# Patient Record
Sex: FEMALE | Race: WHITE | Employment: FULL TIME | URBAN - NONMETROPOLITAN AREA
[De-identification: names, ages, dates, MRNs, and addresses within clinical notes are randomized per-mention and may not be internally consistent; named-entity substitution may affect disease eponyms.]

---

## 2019-05-20 ENCOUNTER — OFFICE VISIT (OUTPATIENT)
Dept: FAMILY MEDICINE CLINIC | Age: 34
End: 2019-05-20
Payer: COMMERCIAL

## 2019-05-20 VITALS
WEIGHT: 246.2 LBS | BODY MASS INDEX: 35.24 KG/M2 | OXYGEN SATURATION: 97 % | TEMPERATURE: 97.8 F | DIASTOLIC BLOOD PRESSURE: 76 MMHG | HEART RATE: 70 BPM | SYSTOLIC BLOOD PRESSURE: 138 MMHG | HEIGHT: 70 IN

## 2019-05-20 DIAGNOSIS — N30.00 ACUTE CYSTITIS WITHOUT HEMATURIA: Primary | ICD-10-CM

## 2019-05-20 PROCEDURE — 99213 OFFICE O/P EST LOW 20 MIN: CPT | Performed by: FAMILY MEDICINE

## 2019-05-20 RX ORDER — CIPROFLOXACIN 500 MG/1
500 TABLET, FILM COATED ORAL 2 TIMES DAILY
Qty: 14 TABLET | Refills: 0 | Status: SHIPPED | OUTPATIENT
Start: 2019-05-20 | End: 2019-05-24 | Stop reason: ALTCHOICE

## 2019-05-20 ASSESSMENT — PATIENT HEALTH QUESTIONNAIRE - PHQ9
1. LITTLE INTEREST OR PLEASURE IN DOING THINGS: 0
SUM OF ALL RESPONSES TO PHQ9 QUESTIONS 1 & 2: 1
2. FEELING DOWN, DEPRESSED OR HOPELESS: 1
SUM OF ALL RESPONSES TO PHQ QUESTIONS 1-9: 1
SUM OF ALL RESPONSES TO PHQ QUESTIONS 1-9: 1

## 2019-05-20 ASSESSMENT — ENCOUNTER SYMPTOMS
PHOTOPHOBIA: 0
SHORTNESS OF BREATH: 0
BACK PAIN: 0
CHEST TIGHTNESS: 0
TROUBLE SWALLOWING: 0
EYE PAIN: 0
COUGH: 0
VOMITING: 0
DIARRHEA: 0
SORE THROAT: 0
EYE REDNESS: 0
SINUS PAIN: 0
EYE DISCHARGE: 0
ALLERGIC/IMMUNOLOGIC NEGATIVE: 1
BLOOD IN STOOL: 0
NAUSEA: 0
ABDOMINAL PAIN: 0

## 2019-05-20 NOTE — PROGRESS NOTES
19  Miguel Mtz : 1985 Sex: female  Age: 27 y.o. Chief Complaint   Patient presents with    Vaginal Itching    Lower Back Pain    Fatigue       The patient states that they have had dysuria and urinary frequency for the last 3 days. Called medical line, given Bactrim yesterday, no improvementThe patient does not admit to suprapubic pressure. The patient has had urgency but denies gross hematuria. The patient denies any abdominal or flank pain. The patient denies nausea and vomiting. No fevers of chills. No prior history of kidney stones. The patient has a history of UTI's and states that this feels the same. Review of Systems   Constitutional: Negative for appetite change, fatigue and unexpected weight change. HENT: Negative for congestion, ear pain, hearing loss, sinus pain, sore throat and trouble swallowing. Eyes: Negative for photophobia, pain, discharge and redness. Respiratory: Negative for cough, chest tightness and shortness of breath. Cardiovascular: Negative for chest pain, palpitations and leg swelling. Gastrointestinal: Negative for abdominal pain, blood in stool, diarrhea, nausea and vomiting. Endocrine: Negative. Genitourinary: Positive for dysuria, flank pain, frequency and urgency. Negative for hematuria. Musculoskeletal: Negative for arthralgias, back pain, joint swelling and myalgias. Skin: Negative. Allergic/Immunologic: Negative. Neurological: Negative for dizziness, seizures, syncope, weakness, light-headedness, numbness and headaches. Hematological: Negative for adenopathy. Does not bruise/bleed easily. Psychiatric/Behavioral: Negative.           Current Outpatient Medications:     ciprofloxacin (CIPRO) 500 MG tablet, Take 1 tablet by mouth 2 times daily for 7 days, Disp: 14 tablet, Rfl: 0    Prenatal MV-Min-Fe Fum-FA-DHA (PRENATAL 1 PO), Take 1 tablet by mouth daily, Disp: , Rfl:     ibuprofen (ADVIL;MOTRIN) 800 MG tablet, Take 1 tablet by mouth every 8 hours, Disp: 60 tablet, Rfl: 3    benzocaine-menthol (DERMOPLAST) 20-0.5 % AERO spray, Apply topically as needed for Pain, Disp: 1 Can, Rfl: 3    iron polysaccharides (NIFEREX) 150 MG capsule, Take 1 capsule by mouth 2 times daily (before meals), Disp: 60 capsule, Rfl: 3    docusate sodium (COLACE, DULCOLAX) 100 MG CAPS, Take 100 mg by mouth 2 times daily, Disp: 60 capsule, Rfl: 3    lanolin ointment, Apply topically as needed. , Disp: 1 Tube, Rfl: 3    famotidine (PEPCID) 10 MG tablet, Take 10 mg by mouth daily as needed, Disp: , Rfl:     fluticasone (FLONASE) 50 MCG/ACT nasal spray, , Disp: , Rfl:     SUMAtriptan (IMITREX) 100 MG tablet, Take 100 mg by mouth once as needed for Migraine Twice a week as needed, Disp: , Rfl:     desvenlafaxine (PRISTIQ) 50 MG TB24, Take 50 mg by mouth daily Instructed to take am of procedure, Disp: , Rfl:     propranolol (INDERAL) 20 MG tablet, Take 40 mg by mouth 2 times daily Instructed to take am of procedure, takes for migraines, Disp: , Rfl:     pantoprazole (PROTONIX) 40 MG tablet, Take 40 mg by mouth daily Instructed to take am of procedure, Disp: , Rfl:   Allergies   Allergen Reactions    Pcn [Penicillins] Rash       Past Medical History:   Diagnosis Date    Abnormal Pap smear of cervix     Back pain     Chronic sinusitis     with facial pain    Disease of blood and blood forming organ     hemacromotosis high levels iron    Hemochromatosis     Migraines     born with heart murmur    Seasonal allergies      Past Surgical History:   Procedure Laterality Date    SINUS SURGERY N/A 2/25/16    FUNCTIONAL ENDOSCOPIC SINUS SURGERY WITH BILATERAL MAXILLARY, FRONTAL & SPENOID ANTROSTOMIES, SUBMUCCOSAL RESECTION OF INFERIOR TURBINATES     Family History   Problem Relation Age of Onset    Arthritis Mother     Other Mother         sinus problems    High Blood Pressure Father     Other Maternal Aunt         migraine    Other Maternal Uncle         migraine    Other Paternal Aunt         migraine    Other Paternal Uncle         migraine    Arthritis Maternal Grandmother      Social History     Socioeconomic History    Marital status:      Spouse name: Not on file    Number of children: Not on file    Years of education: Not on file    Highest education level: Not on file   Occupational History    Not on file   Social Needs    Financial resource strain: Not on file    Food insecurity:     Worry: Not on file     Inability: Not on file    Transportation needs:     Medical: Not on file     Non-medical: Not on file   Tobacco Use    Smoking status: Never Smoker    Smokeless tobacco: Never Used   Substance and Sexual Activity    Alcohol use: No     Alcohol/week: 0.0 oz    Drug use: No    Sexual activity: Not on file   Lifestyle    Physical activity:     Days per week: Not on file     Minutes per session: Not on file    Stress: Not on file   Relationships    Social connections:     Talks on phone: Not on file     Gets together: Not on file     Attends Bahai service: Not on file     Active member of club or organization: Not on file     Attends meetings of clubs or organizations: Not on file     Relationship status: Not on file    Intimate partner violence:     Fear of current or ex partner: Not on file     Emotionally abused: Not on file     Physically abused: Not on file     Forced sexual activity: Not on file   Other Topics Concern    Not on file   Social History Narrative    Not on file       Vitals:    05/20/19 1116   BP: 138/76   Pulse: 70   Temp: 97.8 °F (36.6 °C)   SpO2: 97%   Weight: 246 lb 3.2 oz (111.7 kg)   Height: 5' 10\" (1.778 m)       Physical Exam   Constitutional: She is oriented to person, place, and time. She appears well-developed and well-nourished. HENT:   Head: Atraumatic.    Right Ear: External ear normal.   Left Ear: External ear normal.   Nose: Nose normal.   Mouth/Throat: Oropharynx is clear and moist. No oropharyngeal exudate. Eyes: Pupils are equal, round, and reactive to light. Conjunctivae and EOM are normal.   Neck: Normal range of motion. Neck supple. No tracheal deviation present. No thyromegaly present. Cardiovascular: Normal rate, regular rhythm and intact distal pulses. Exam reveals no gallop and no friction rub. No murmur heard. Pulmonary/Chest: Effort normal and breath sounds normal. No respiratory distress. Abdominal: Soft. Bowel sounds are normal.   Bilateral flank pain with suprapubic tenderness to palpation   Musculoskeletal: Normal range of motion. She exhibits no edema, tenderness or deformity. Lymphadenopathy:     She has no cervical adenopathy. Neurological: She is alert and oriented to person, place, and time. She displays normal reflexes. No sensory deficit. She exhibits normal muscle tone. Coordination normal.   Skin: Skin is warm and dry. Capillary refill takes less than 2 seconds. No rash noted. Psychiatric: She has a normal mood and affect. Assessment and Plan:  Alejandro Monzon was seen today for vaginal itching, lower back pain and fatigue. Diagnoses and all orders for this visit:    Acute cystitis without hematuria    Other orders  -     ciprofloxacin (CIPRO) 500 MG tablet; Take 1 tablet by mouth 2 times daily for 7 days        Return in about 2 weeks (around 6/3/2019).       Seen By:  Nicole Win DO

## 2019-05-22 ENCOUNTER — TELEPHONE (OUTPATIENT)
Dept: FAMILY MEDICINE CLINIC | Age: 34
End: 2019-05-22

## 2019-05-22 ENCOUNTER — HOSPITAL ENCOUNTER (EMERGENCY)
Age: 34
Discharge: HOME | End: 2019-05-22
Attending: EMERGENCY MEDICINE
Payer: COMMERCIAL

## 2019-05-22 VITALS
HEART RATE: 70 BPM | HEIGHT: 70 IN | RESPIRATION RATE: 14 BRPM | WEIGHT: 240 LBS | SYSTOLIC BLOOD PRESSURE: 118 MMHG | OXYGEN SATURATION: 99 % | BODY MASS INDEX: 34.36 KG/M2 | DIASTOLIC BLOOD PRESSURE: 66 MMHG | TEMPERATURE: 97.8 F

## 2019-05-22 DIAGNOSIS — R42 DIZZINESS: ICD-10-CM

## 2019-05-22 DIAGNOSIS — R11.0 NAUSEA: ICD-10-CM

## 2019-05-22 DIAGNOSIS — R30.0 DYSURIA: Primary | ICD-10-CM

## 2019-05-22 LAB
ANION GAP SERPL CALCULATED.3IONS-SCNC: 11 MMOL/L (ref 7–16)
BACTERIA: ABNORMAL /HPF
BASOPHILS ABSOLUTE: 0.05 E9/L (ref 0–0.2)
BASOPHILS RELATIVE PERCENT: 0.6 % (ref 0–2)
BILIRUBIN URINE: NEGATIVE
BLOOD, URINE: NORMAL
BUN BLDV-MCNC: 13 MG/DL (ref 6–20)
CALCIUM SERPL-MCNC: 8.2 MG/DL (ref 8.6–10.2)
CHLORIDE BLD-SCNC: 100 MMOL/L (ref 98–107)
CLARITY: NORMAL
CO2: 25 MMOL/L (ref 22–29)
COLOR: YELLOW
CREAT SERPL-MCNC: 0.7 MG/DL (ref 0.5–1)
EOSINOPHILS ABSOLUTE: 0.4 E9/L (ref 0.05–0.5)
EOSINOPHILS RELATIVE PERCENT: 4.6 % (ref 0–6)
EPITHELIAL CELLS, UA: ABNORMAL /HPF
GFR AFRICAN AMERICAN: >60
GFR NON-AFRICAN AMERICAN: >60 ML/MIN/1.73
GLUCOSE BLD-MCNC: 98 MG/DL (ref 74–99)
GLUCOSE URINE: NEGATIVE MG/DL
HCG, URINE, POC: NEGATIVE
HCT VFR BLD CALC: 42.1 % (ref 34–48)
HEMOGLOBIN: 13.7 G/DL (ref 11.5–15.5)
IMMATURE GRANULOCYTES #: 0.04 E9/L
IMMATURE GRANULOCYTES %: 0.5 % (ref 0–5)
KETONES, URINE: NEGATIVE MG/DL
LEUKOCYTE ESTERASE, URINE: NEGATIVE
LYMPHOCYTES ABSOLUTE: 3.15 E9/L (ref 1.5–4)
LYMPHOCYTES RELATIVE PERCENT: 36.3 % (ref 20–42)
Lab: NORMAL
MCH RBC QN AUTO: 28.5 PG (ref 26–35)
MCHC RBC AUTO-ENTMCNC: 32.5 % (ref 32–34.5)
MCV RBC AUTO: 87.5 FL (ref 80–99.9)
MONOCYTES ABSOLUTE: 0.73 E9/L (ref 0.1–0.95)
MONOCYTES RELATIVE PERCENT: 8.4 % (ref 2–12)
NEGATIVE QC PASS/FAIL: NORMAL
NEUTROPHILS ABSOLUTE: 4.3 E9/L (ref 1.8–7.3)
NEUTROPHILS RELATIVE PERCENT: 49.6 % (ref 43–80)
NITRITE, URINE: NEGATIVE
PDW BLD-RTO: 12.9 FL (ref 11.5–15)
PH UA: 6.5 (ref 5–9)
PLATELET # BLD: 328 E9/L (ref 130–450)
PMV BLD AUTO: 9.1 FL (ref 7–12)
POSITIVE QC PASS/FAIL: NORMAL
POTASSIUM SERPL-SCNC: 3.7 MMOL/L (ref 3.5–5)
PROTEIN UA: NEGATIVE MG/DL
RBC # BLD: 4.81 E12/L (ref 3.5–5.5)
RBC UA: ABNORMAL /HPF (ref 0–2)
SODIUM BLD-SCNC: 136 MMOL/L (ref 132–146)
SPECIFIC GRAVITY UA: 1.02 (ref 1–1.03)
UROBILINOGEN, URINE: 0.2 E.U./DL
WBC # BLD: 8.7 E9/L (ref 4.5–11.5)
WBC UA: ABNORMAL /HPF (ref 0–5)

## 2019-05-22 PROCEDURE — 80048 BASIC METABOLIC PNL TOTAL CA: CPT

## 2019-05-22 PROCEDURE — 96375 TX/PRO/DX INJ NEW DRUG ADDON: CPT

## 2019-05-22 PROCEDURE — 6360000002 HC RX W HCPCS: Performed by: EMERGENCY MEDICINE

## 2019-05-22 PROCEDURE — 85025 COMPLETE CBC W/AUTO DIFF WBC: CPT

## 2019-05-22 PROCEDURE — 2580000003 HC RX 258: Performed by: EMERGENCY MEDICINE

## 2019-05-22 PROCEDURE — 87088 URINE BACTERIA CULTURE: CPT

## 2019-05-22 PROCEDURE — 96374 THER/PROPH/DIAG INJ IV PUSH: CPT

## 2019-05-22 PROCEDURE — 99283 EMERGENCY DEPT VISIT LOW MDM: CPT

## 2019-05-22 PROCEDURE — 81001 URINALYSIS AUTO W/SCOPE: CPT

## 2019-05-22 RX ORDER — ONDANSETRON 4 MG/1
4 TABLET, ORALLY DISINTEGRATING ORAL EVERY 8 HOURS PRN
Qty: 10 TABLET | Refills: 0 | Status: SHIPPED | OUTPATIENT
Start: 2019-05-22 | End: 2019-05-24 | Stop reason: ALTCHOICE

## 2019-05-22 RX ORDER — IBUPROFEN 800 MG/1
800 TABLET ORAL EVERY 8 HOURS PRN
Qty: 15 TABLET | Refills: 0 | Status: SHIPPED | OUTPATIENT
Start: 2019-05-22 | End: 2019-05-24

## 2019-05-22 RX ORDER — KETOROLAC TROMETHAMINE 30 MG/ML
30 INJECTION, SOLUTION INTRAMUSCULAR; INTRAVENOUS ONCE
Status: COMPLETED | OUTPATIENT
Start: 2019-05-22 | End: 2019-05-22

## 2019-05-22 RX ORDER — 0.9 % SODIUM CHLORIDE 0.9 %
1000 INTRAVENOUS SOLUTION INTRAVENOUS ONCE
Status: COMPLETED | OUTPATIENT
Start: 2019-05-22 | End: 2019-05-22

## 2019-05-22 RX ORDER — ONDANSETRON 2 MG/ML
8 INJECTION INTRAMUSCULAR; INTRAVENOUS ONCE
Status: COMPLETED | OUTPATIENT
Start: 2019-05-22 | End: 2019-05-22

## 2019-05-22 RX ADMIN — KETOROLAC TROMETHAMINE 30 MG: 30 INJECTION, SOLUTION INTRAMUSCULAR at 18:20

## 2019-05-22 RX ADMIN — ONDANSETRON 8 MG: 2 INJECTION INTRAMUSCULAR; INTRAVENOUS at 18:20

## 2019-05-22 RX ADMIN — SODIUM CHLORIDE 1000 ML: 9 INJECTION, SOLUTION INTRAVENOUS at 18:19

## 2019-05-22 ASSESSMENT — ENCOUNTER SYMPTOMS
BLOOD IN STOOL: 0
VOMITING: 0
COUGH: 0
NAUSEA: 1
BACK PAIN: 0
ABDOMINAL PAIN: 0
SHORTNESS OF BREATH: 0

## 2019-05-22 ASSESSMENT — PAIN DESCRIPTION - LOCATION
LOCATION: BACK
LOCATION: ABDOMEN

## 2019-05-22 ASSESSMENT — PAIN DESCRIPTION - PAIN TYPE: TYPE: ACUTE PAIN

## 2019-05-22 ASSESSMENT — PAIN DESCRIPTION - FREQUENCY: FREQUENCY: CONTINUOUS

## 2019-05-22 ASSESSMENT — PAIN DESCRIPTION - ORIENTATION: ORIENTATION: LOWER;MID

## 2019-05-22 ASSESSMENT — PAIN DESCRIPTION - DESCRIPTORS: DESCRIPTORS: DISCOMFORT

## 2019-05-22 ASSESSMENT — PAIN SCALES - GENERAL
PAINLEVEL_OUTOF10: 7
PAINLEVEL_OUTOF10: 6

## 2019-05-22 NOTE — ED PROVIDER NOTES
rhythm. Pulmonary/Chest: Effort normal and breath sounds normal.   Abdominal: Soft. Normal appearance and bowel sounds are normal. There is tenderness in the suprapubic area. There is CVA tenderness. Neurological: She is alert and oriented to person, place, and time. Procedures    MDM    ED Course as of May 22 1924   Wed May 22, 2019   135 [facility] Patient seen and examined concern for pyelonephritis IV fluids nausea meds and pain medication and order will evaluate with I and CBC CMP and urinalysis. [CF]   1923 Labs were reviewed patient's labs are very reassuring. Patient is treated with Cipro for her UTI and she is to continue this medication. She is feeling improvement at this time she'll be discharged with anti-inflammatories and nausea medications. She is encouraged her to rehydrate. I will follow-up with her primary care physician.    [CF]      ED Course User Index  [CF] Anju Uribe, DO     --------------------------------------------- PAST HISTORY ---------------------------------------------  Past Medical History:  has a past medical history of Abnormal Pap smear of cervix, Back pain, Chronic sinusitis, Disease of blood and blood forming organ, Hemochromatosis, Migraines, and Seasonal allergies. Past Surgical History:  has a past surgical history that includes sinus surgery (N/A, 2/25/16). Social History:  reports that she has never smoked. She has never used smokeless tobacco. She reports that she does not drink alcohol or use drugs. Family History: family history includes Arthritis in her maternal grandmother and mother; High Blood Pressure in her father; Other in her maternal aunt, maternal uncle, mother, paternal aunt, and paternal uncle. The patients home medications have been reviewed.     Allergies: Pcn [penicillins]    -------------------------------------------------- RESULTS -------------------------------------------------  Labs:  Results for orders placed or performed during the hospital encounter of 05/22/19   CBC auto differential   Result Value Ref Range    WBC 8.7 4.5 - 11.5 E9/L    RBC 4.81 3.50 - 5.50 E12/L    Hemoglobin 13.7 11.5 - 15.5 g/dL    Hematocrit 42.1 34.0 - 48.0 %    MCV 87.5 80.0 - 99.9 fL    MCH 28.5 26.0 - 35.0 pg    MCHC 32.5 32.0 - 34.5 %    RDW 12.9 11.5 - 15.0 fL    Platelets 799 461 - 428 E9/L    MPV 9.1 7.0 - 12.0 fL    Neutrophils % 49.6 43.0 - 80.0 %    Immature Granulocytes % 0.5 0.0 - 5.0 %    Lymphocytes % 36.3 20.0 - 42.0 %    Monocytes % 8.4 2.0 - 12.0 %    Eosinophils % 4.6 0.0 - 6.0 %    Basophils % 0.6 0.0 - 2.0 %    Neutrophils # 4.30 1.80 - 7.30 E9/L    Immature Granulocytes # 0.04 E9/L    Lymphocytes # 3.15 1.50 - 4.00 E9/L    Monocytes # 0.73 0.10 - 0.95 E9/L    Eosinophils # 0.40 0.05 - 0.50 E9/L    Basophils # 0.05 0.00 - 0.20 E6/E   Basic Metabolic Panel   Result Value Ref Range    Sodium 136 132 - 146 mmol/L    Potassium 3.7 3.5 - 5.0 mmol/L    Chloride 100 98 - 107 mmol/L    CO2 25 22 - 29 mmol/L    Anion Gap 11 7 - 16 mmol/L    Glucose 98 74 - 99 mg/dL    BUN 13 6 - 20 mg/dL    CREATININE 0.7 0.5 - 1.0 mg/dL    GFR Non-African American >60 >=60 mL/min/1.73    GFR African American >60     Calcium 8.2 (L) 8.6 - 10.2 mg/dL   Urinalysis   Result Value Ref Range    Color, UA Yellow Straw/Yellow    Clarity, UA SL CLOUDY Clear    Glucose, Ur Negative Negative mg/dL    Bilirubin Urine Negative Negative    Ketones, Urine Negative Negative mg/dL    Specific Gravity, UA 1.025 1.005 - 1.030    Blood, Urine TRACE-INTACT Negative    pH, UA 6.5 5.0 - 9.0    Protein, UA Negative Negative mg/dL    Urobilinogen, Urine 0.2 <2.0 E.U./dL    Nitrite, Urine Negative Negative    Leukocyte Esterase, Urine Negative Negative   Microscopic Urinalysis   Result Value Ref Range    WBC, UA 2-5 0 - 5 /HPF    RBC, UA 0-1 0 - 2 /HPF    Epi Cells FEW /HPF    Bacteria, UA FEW (A) /HPF   POC Pregnancy Urine Qual   Result Value Ref Range    HCG, Urine, POC Negative Negative Lot Number [id number]     Positive QC Pass/Fail Pass     Negative QC Pass/Fail Pass        Radiology:  No orders to display       ------------------------- NURSING NOTES AND VITALS REVIEWED ---------------------------  Date / Time Roomed:  5/22/2019  5:48 PM  ED Bed Assignment:  [id number]    The nursing notes within the ED encounter and vital signs as below have been reviewed. /74   Pulse 79   Temp 97.8 °F (36.6 °C) (Oral)   Resp 16   Ht 5' 10\" (1.778 m)   Wt 240 lb (108.9 kg)   LMP 05/08/2019 (Approximate)   SpO2 98%   BMI 34.44 kg/m²   Oxygen Saturation Interpretation: Normal      ------------------------------------------ PROGRESS NOTES ------------------------------------------    I have spoken with the patient and discussed todays results, in addition to providing specific details for the plan of care and counseling regarding the diagnosis and prognosis. Their questions are answered at this time and they are agreeable with the plan. I discussed at length with them reasons for immediate return here for re evaluation. They will followup with their primary care physician by calling their office tomorrow. --------------------------------- ADDITIONAL PROVIDER NOTES ---------------------------------  At this time the patient is without objective evidence of an acute process requiring hospitalization or inpatient management. They have remained hemodynamically stable throughout their entire ED visit and are stable for discharge with outpatient follow-up. The plan has been discussed in detail and they are aware of the specific conditions for emergent return, as well as the importance of follow-up. New Prescriptions    IBUPROFEN (IBU) 800 MG TABLET    Take 1 tablet by mouth every 8 hours as needed for Pain    ONDANSETRON (ZOFRAN ODT) 4 MG DISINTEGRATING TABLET    Take 1 tablet by mouth every 8 hours as needed for Nausea or Vomiting       Diagnosis:  1. Dysuria    2. Nausea    3. Dizziness        Disposition:  Patient's disposition: Discharge to home  Patient's condition is stable.               Kyleigh Taylor DO  Resident  05/22/19 0837

## 2019-05-22 NOTE — TELEPHONE ENCOUNTER
Pt left a VM requesting results from culture. She said that her symptoms are worse. Now has N&V and diarrhea. Culture results are not back yet. When I called pt she was in the ER getting evaluated.

## 2019-05-22 NOTE — ED NOTES
Bed:  HALL-09  Expected date:   Expected time:   Means of arrival:   Comments:  Jeremy Dawkins RN  05/22/19 0925

## 2019-05-24 ENCOUNTER — OFFICE VISIT (OUTPATIENT)
Dept: FAMILY MEDICINE CLINIC | Age: 34
End: 2019-05-24
Payer: COMMERCIAL

## 2019-05-24 VITALS
TEMPERATURE: 97.4 F | HEIGHT: 70 IN | SYSTOLIC BLOOD PRESSURE: 122 MMHG | HEART RATE: 85 BPM | DIASTOLIC BLOOD PRESSURE: 74 MMHG | BODY MASS INDEX: 35.39 KG/M2 | OXYGEN SATURATION: 99 % | WEIGHT: 247.2 LBS

## 2019-05-24 DIAGNOSIS — R11.2 NON-INTRACTABLE VOMITING WITH NAUSEA, UNSPECIFIED VOMITING TYPE: ICD-10-CM

## 2019-05-24 DIAGNOSIS — K21.00 GERD WITH ESOPHAGITIS: ICD-10-CM

## 2019-05-24 DIAGNOSIS — H81.313 VERTIGO, AURAL, BILATERAL: Primary | ICD-10-CM

## 2019-05-24 LAB — URINE CULTURE, ROUTINE: NORMAL

## 2019-05-24 PROCEDURE — 93000 ELECTROCARDIOGRAM COMPLETE: CPT | Performed by: FAMILY MEDICINE

## 2019-05-24 PROCEDURE — 99214 OFFICE O/P EST MOD 30 MIN: CPT | Performed by: FAMILY MEDICINE

## 2019-05-24 RX ORDER — MECLIZINE HYDROCHLORIDE 25 MG/1
25 TABLET ORAL 2 TIMES DAILY
Qty: 20 TABLET | Refills: 0 | Status: SHIPPED | OUTPATIENT
Start: 2019-05-24 | End: 2019-06-03

## 2019-05-24 RX ORDER — ONDANSETRON 4 MG/1
4 TABLET, FILM COATED ORAL DAILY PRN
Qty: 20 TABLET | Refills: 1 | Status: SHIPPED
Start: 2019-05-24 | End: 2020-06-08

## 2019-05-24 RX ORDER — SERTRALINE HYDROCHLORIDE 100 MG/1
TABLET, FILM COATED ORAL
Refills: 0 | COMMUNITY
Start: 2019-04-18 | End: 2019-08-02 | Stop reason: SDUPTHER

## 2019-05-24 ASSESSMENT — ENCOUNTER SYMPTOMS
EYE DISCHARGE: 0
COUGH: 0
NAUSEA: 1
EYE REDNESS: 0
PHOTOPHOBIA: 0
SINUS PAIN: 0
TROUBLE SWALLOWING: 0
SORE THROAT: 0
ABDOMINAL PAIN: 0
VOMITING: 1
EYE PAIN: 0
BACK PAIN: 0
DIARRHEA: 0
BLOOD IN STOOL: 0
ALLERGIC/IMMUNOLOGIC NEGATIVE: 1
CHEST TIGHTNESS: 0
SHORTNESS OF BREATH: 0

## 2019-05-24 NOTE — PROGRESS NOTES
19  Paulette Mtz : 1985 Sex: female  Age: 27 y.o. Chief Complaint   Patient presents with    Dizziness     ER Wednesday shows no infection    Nausea    Fatigue       Seen Monday for urinary complaints, C&S was neg for4 infection. Seen THE [facility] ER Wednesday for vertigo, N/V. Work up there was benign. Recheck today shows some improvement but still with intermittent vertigo      Review of Systems   Constitutional: Negative for appetite change, fatigue and unexpected weight change. HENT: Negative for congestion, ear pain, hearing loss, sinus pain, sore throat and trouble swallowing. Eyes: Negative for photophobia, pain, discharge and redness. Respiratory: Negative for cough, chest tightness and shortness of breath. Cardiovascular: Negative for chest pain, palpitations and leg swelling. Gastrointestinal: Positive for nausea and vomiting. Negative for abdominal pain, blood in stool and diarrhea. Endocrine: Negative. Genitourinary: Negative for dysuria, flank pain, frequency and hematuria. Musculoskeletal: Negative for arthralgias, back pain, joint swelling and myalgias. Skin: Negative. Allergic/Immunologic: Negative. Neurological: Positive for dizziness and light-headedness. Negative for seizures, syncope, weakness, numbness and headaches. Hematological: Negative for adenopathy. Does not bruise/bleed easily. Psychiatric/Behavioral: Negative.           Current Outpatient Medications:     ondansetron (ZOFRAN) 4 MG tablet, Take 1 tablet by mouth daily as needed for Nausea or Vomiting, Disp: 20 tablet, Rfl: 1    meclizine (ANTIVERT) 25 MG tablet, Take 1 tablet by mouth 2 times daily for 10 days, Disp: 20 tablet, Rfl: 0    sertraline (ZOLOFT) 100 MG tablet, take 1 tablet by mouth once daily, Disp: , Rfl: 0  Allergies   Allergen Reactions    Pcn [Penicillins] Rash       Past Medical History:   Diagnosis Date    Abnormal Pap smear of cervix     Back pain    

## 2019-08-09 RX ORDER — SERTRALINE HYDROCHLORIDE 100 MG/1
100 TABLET, FILM COATED ORAL DAILY
Qty: 30 TABLET | Refills: 3 | Status: SHIPPED | OUTPATIENT
Start: 2019-08-09 | End: 2019-12-20 | Stop reason: SDUPTHER

## 2019-12-20 RX ORDER — SERTRALINE HYDROCHLORIDE 100 MG/1
100 TABLET, FILM COATED ORAL DAILY
Qty: 30 TABLET | Refills: 2 | Status: SHIPPED
Start: 2019-12-20 | End: 2020-04-01 | Stop reason: SDUPTHER

## 2020-01-24 ENCOUNTER — OFFICE VISIT (OUTPATIENT)
Dept: FAMILY MEDICINE CLINIC | Age: 35
End: 2020-01-24
Payer: COMMERCIAL

## 2020-01-24 VITALS
TEMPERATURE: 98.6 F | HEIGHT: 69 IN | HEART RATE: 77 BPM | DIASTOLIC BLOOD PRESSURE: 82 MMHG | BODY MASS INDEX: 39.99 KG/M2 | SYSTOLIC BLOOD PRESSURE: 136 MMHG | OXYGEN SATURATION: 98 % | WEIGHT: 270 LBS | RESPIRATION RATE: 18 BRPM

## 2020-01-24 PROCEDURE — 1036F TOBACCO NON-USER: CPT | Performed by: PHYSICIAN ASSISTANT

## 2020-01-24 PROCEDURE — G8427 DOCREV CUR MEDS BY ELIG CLIN: HCPCS | Performed by: PHYSICIAN ASSISTANT

## 2020-01-24 PROCEDURE — G8484 FLU IMMUNIZE NO ADMIN: HCPCS | Performed by: PHYSICIAN ASSISTANT

## 2020-01-24 PROCEDURE — 99213 OFFICE O/P EST LOW 20 MIN: CPT | Performed by: PHYSICIAN ASSISTANT

## 2020-01-24 PROCEDURE — G8417 CALC BMI ABV UP PARAM F/U: HCPCS | Performed by: PHYSICIAN ASSISTANT

## 2020-01-24 RX ORDER — BROMPHENIRAMINE MALEATE, PSEUDOEPHEDRINE HYDROCHLORIDE, AND DEXTROMETHORPHAN HYDROBROMIDE 2; 30; 10 MG/5ML; MG/5ML; MG/5ML
5 SYRUP ORAL 4 TIMES DAILY PRN
Qty: 120 ML | Refills: 0 | Status: SHIPPED | OUTPATIENT
Start: 2020-01-24 | End: 2020-01-31

## 2020-01-24 RX ORDER — DOXYCYCLINE 100 MG/1
100 CAPSULE ORAL 2 TIMES DAILY
Qty: 20 CAPSULE | Refills: 0 | Status: SHIPPED | OUTPATIENT
Start: 2020-01-24 | End: 2020-02-03

## 2020-01-24 NOTE — PROGRESS NOTES
Chief Complaint:   Sinus Problem      History of Present Illness   Kwadwo Acosta is a 27 y.o. old female who presents [facility] for cough and nasal and chest congestion which began 2 days ago. She also states she has a sore throat, intermittent headaches, maxillary sinus pressure, bilateral ear pressure and intermittent dizziness. She intermittently has greenish drainage from nares and is has a productive cough with greenish sputum. She has attempted taking OTC medications without marked improvement. Denies any CP, SOB, palpitations, syncope, abdominal pain, fever, chills, neck stiffness, rash, weakness or lethargy. Non smoker. Denies other symptoms and presents for evaluation. ROS   Constitutional: Negative for fatigue, fever and unexpected weight change. HENT: + for maxillary sinus pressure, drainage, ear pressure sore throatand nasal congestion. Negative for  ear discharge, ear pain, hearing loss, mouth sores, and sneezing. Eyes: + for intermittent pressure behind the eyes. Negative for photophobia, pain, redness and itching. Respiratory: +cough productive greenish sputum,  Negative for chest tightness, shortness of breath and wheezing. Cardiovascular: Negative for chest pain, palpitations and leg swelling. Gastrointestinal:  Negative for abdominal pain, constipation, diarrhea, nausea and vomiting. Endocrine: Negative for cold intolerance and heat intolerance. Genitourinary: Negative for difficulty urinating, dysuria, frequency, hematuria and urgency. Musculoskeletal: Negative for arthralgias, back pain, joint swelling, myalgias, neck pain and neck stiffness. Skin: Negative for color change, pallor and wound. Allergic/Immunologic: Negative for environmental allergies and food allergies. Neurological: + for headaches and intermittent dizziness. Negative  For seizures, syncope, weakness, and light-headedness. Hematological: Negative for adenopathy.     Past Surgical History: has a past surgical history that includes sinus surgery (N/A, 2/25/16). Social History:  reports that she has never smoked. She has never used smokeless tobacco. She reports that she does not drink alcohol or use drugs. Family History: family history includes Arthritis in her maternal grandmother and mother; High Blood Pressure in her father; Other in her maternal aunt, maternal uncle, mother, paternal aunt, and paternal uncle. Allergies: Pcn [penicillins]    Physical Exam     VS:  /82   Pulse 77   Temp 98.6 °F (37 °C) (Temporal)   Resp 18   Ht 5' 9\" (1.753 m)   Wt 270 lb (122.5 kg)   SpO2 98%   BMI 39.87 kg/m²        Constitutional:  Alert, development consistent with age. HEENT: Bilateral TMs are gray and intact. Nares are patent. Pharynx is pink without lesions or exudate. Uvula is midline. No drooling or trismus. Mild postnasal drainage is appreciated. Mild tenderness with palpation of the maxillary sinuses. Neck:  Supple. There is no anterior cervical adenopathy. Lungs:   Breath sounds: Non-labored, equal, and without adventitious sounds. No wheezing, rales, or rhonchi. Heart:  Regular rate and rhythm, normal heart sounds, without pathological murmurs, ectopy, gallops, or rubs. Skin:  Normal turgor. Warm, dry, without visible rash. Neurological:  Alert and oriented. Motor functions intact. Responds to verbal commands. Hand grasp are equal bilaterally. Gait is steady. EOM's are normal without nystagmus. Lab / Imaging Results   (All laboratory and radiology results have been personally reviewed by myself)  Labs:  No results found for this visit on 01/24/20. Assessment / Plan     Impression(s):  Fausto Willis was seen today for sinus problem. Diagnoses and all orders for this visit:    Acute non-recurrent frontal sinusitis  -     doxycycline monohydrate (MONODOX) 100 MG capsule;  Take 1 capsule by mouth 2 times daily for 10 days  -     brompheniramine-pseudoephedrine-DM 2-30-10

## 2020-04-01 RX ORDER — SERTRALINE HYDROCHLORIDE 100 MG/1
100 TABLET, FILM COATED ORAL DAILY
Qty: 30 TABLET | Refills: 2 | Status: SHIPPED
Start: 2020-04-01 | End: 2020-10-02 | Stop reason: ALTCHOICE

## 2020-06-08 ENCOUNTER — VIRTUAL VISIT (OUTPATIENT)
Dept: FAMILY MEDICINE CLINIC | Age: 35
End: 2020-06-08
Payer: COMMERCIAL

## 2020-06-08 PROCEDURE — 99213 OFFICE O/P EST LOW 20 MIN: CPT | Performed by: FAMILY MEDICINE

## 2020-06-08 RX ORDER — ETONOGESTREL 68 MG/1
68 IMPLANT SUBCUTANEOUS ONCE
COMMUNITY
End: 2020-10-02

## 2020-06-08 ASSESSMENT — ENCOUNTER SYMPTOMS
COUGH: 0
EYE REDNESS: 0
SINUS PAIN: 0
BACK PAIN: 1
EYE PAIN: 0
VOMITING: 0
BLOOD IN STOOL: 0
DIARRHEA: 0
CHEST TIGHTNESS: 0
EYE DISCHARGE: 0
SORE THROAT: 0
SHORTNESS OF BREATH: 0
ABDOMINAL PAIN: 0
PHOTOPHOBIA: 0
TROUBLE SWALLOWING: 0
NAUSEA: 0
ALLERGIC/IMMUNOLOGIC NEGATIVE: 1

## 2020-06-08 NOTE — PROGRESS NOTES
2020    TELEHEALTH EVALUATION -- Audio/Visual (During [facility]- public health emergency)    HPI: Arthritis pain, anxiety    Wong Mtz (:  1985) has requested an audio/video evaluation for the following concern(s):    Ongoing pain lower back, and now right knee for last week. Pain, stiffness, decreased ROM. Good color, pulses, sensation leg. Gradually weaning Zoloft, now down to 50 mg daily with no side effects  Review of Systems   Constitutional: Negative for appetite change, fatigue and unexpected weight change. HENT: Negative for congestion, ear pain, hearing loss, sinus pain, sore throat and trouble swallowing. Eyes: Negative for photophobia, pain, discharge and redness. Respiratory: Negative for cough, chest tightness and shortness of breath. Cardiovascular: Negative for chest pain, palpitations and leg swelling. Gastrointestinal: Negative for abdominal pain, blood in stool, diarrhea, nausea and vomiting. Endocrine: Negative. Genitourinary: Negative for dysuria, flank pain, frequency and hematuria. Musculoskeletal: Positive for arthralgias, back pain, joint swelling and neck pain. Negative for myalgias. Lumbar, cervical   Skin: Negative. Allergic/Immunologic: Negative. Neurological: Negative for dizziness, seizures, syncope, weakness, light-headedness, numbness and headaches. Hematological: Negative for adenopathy. Does not bruise/bleed easily. Psychiatric/Behavioral: The patient is nervous/anxious. Prior to Visit Medications    Medication Sig Taking?  Authorizing Provider   etonogestrel (NEXPLANON) 68 MG implant 68 mg by Subdermal route once Yes Historical Provider, MD   sertraline (ZOLOFT) 100 MG tablet Take 1 tablet by mouth daily Yes Easton Page [place], DO       Social History     Tobacco Use    Smoking status: Never Smoker    Smokeless tobacco: Never Used   Substance Use Topics    Alcohol use: No     Alcohol/week: 0.0 standard drinks    Drug

## 2020-06-09 ENCOUNTER — HOSPITAL ENCOUNTER (OUTPATIENT)
Age: 35
Discharge: HOME | End: 2020-06-11
Payer: COMMERCIAL

## 2020-06-09 LAB
ALBUMIN SERPL-MCNC: 4.5 G/DL (ref 3.5–5.2)
ALP BLD-CCNC: 97 U/L (ref 35–104)
ALT SERPL-CCNC: 18 U/L (ref 0–32)
ANION GAP SERPL CALCULATED.3IONS-SCNC: 10 MMOL/L (ref 7–16)
AST SERPL-CCNC: 15 U/L (ref 0–31)
BACTERIA: ABNORMAL /HPF
BASOPHILS ABSOLUTE: 0.1 E9/L (ref 0–0.2)
BASOPHILS RELATIVE PERCENT: 1 % (ref 0–2)
BILIRUB SERPL-MCNC: 0.5 MG/DL (ref 0–1.2)
BILIRUBIN URINE: NEGATIVE
BLOOD, URINE: ABNORMAL
BUN BLDV-MCNC: 10 MG/DL (ref 6–20)
CALCIUM SERPL-MCNC: 9.3 MG/DL (ref 8.6–10.2)
CHLORIDE BLD-SCNC: 102 MMOL/L (ref 98–107)
CHOLESTEROL, TOTAL: 194 MG/DL (ref 0–199)
CLARITY: CLEAR
CO2: 27 MMOL/L (ref 22–29)
COLOR: YELLOW
CREAT SERPL-MCNC: 0.7 MG/DL (ref 0.5–1)
EOSINOPHILS ABSOLUTE: 0.79 E9/L (ref 0.05–0.5)
EOSINOPHILS RELATIVE PERCENT: 8 % (ref 0–6)
GFR AFRICAN AMERICAN: >60
GFR NON-AFRICAN AMERICAN: >60 ML/MIN/1.73
GLUCOSE FASTING: 91 MG/DL (ref 74–99)
GLUCOSE URINE: NEGATIVE MG/DL
HCT VFR BLD CALC: 45.1 % (ref 34–48)
HDLC SERPL-MCNC: 52 MG/DL
HEMOGLOBIN: 14.4 G/DL (ref 11.5–15.5)
IMMATURE GRANULOCYTES #: 0.06 E9/L
IMMATURE GRANULOCYTES %: 0.6 % (ref 0–5)
KETONES, URINE: NEGATIVE MG/DL
LDL CHOLESTEROL CALCULATED: 113 MG/DL (ref 0–99)
LEUKOCYTE ESTERASE, URINE: ABNORMAL
LYMPHOCYTES ABSOLUTE: 3.19 E9/L (ref 1.5–4)
LYMPHOCYTES RELATIVE PERCENT: 32.2 % (ref 20–42)
MCH RBC QN AUTO: 28.9 PG (ref 26–35)
MCHC RBC AUTO-ENTMCNC: 31.9 % (ref 32–34.5)
MCV RBC AUTO: 90.4 FL (ref 80–99.9)
MONOCYTES ABSOLUTE: 0.54 E9/L (ref 0.1–0.95)
MONOCYTES RELATIVE PERCENT: 5.4 % (ref 2–12)
NEUTROPHILS ABSOLUTE: 5.24 E9/L (ref 1.8–7.3)
NEUTROPHILS RELATIVE PERCENT: 52.8 % (ref 43–80)
NITRITE, URINE: NEGATIVE
PDW BLD-RTO: 12.8 FL (ref 11.5–15)
PH UA: 7 (ref 5–9)
PLATELET # BLD: 376 E9/L (ref 130–450)
PMV BLD AUTO: 9.4 FL (ref 7–12)
POTASSIUM SERPL-SCNC: 4.2 MMOL/L (ref 3.5–5)
PROTEIN UA: NEGATIVE MG/DL
RBC # BLD: 4.99 E12/L (ref 3.5–5.5)
RBC UA: ABNORMAL /HPF (ref 0–2)
SODIUM BLD-SCNC: 139 MMOL/L (ref 132–146)
SPECIFIC GRAVITY UA: 1.01 (ref 1–1.03)
TOTAL PROTEIN: 7.4 G/DL (ref 6.4–8.3)
TRIGL SERPL-MCNC: 147 MG/DL (ref 0–149)
TSH SERPL DL<=0.05 MIU/L-ACNC: 1.15 UIU/ML (ref 0.27–4.2)
UROBILINOGEN, URINE: 0.2 E.U./DL
VLDLC SERPL CALC-MCNC: 29 MG/DL
WBC # BLD: 9.9 E9/L (ref 4.5–11.5)
WBC UA: ABNORMAL /HPF (ref 0–5)

## 2020-06-09 PROCEDURE — 85025 COMPLETE CBC W/AUTO DIFF WBC: CPT

## 2020-06-09 PROCEDURE — 80053 COMPREHEN METABOLIC PANEL: CPT

## 2020-06-09 PROCEDURE — 80061 LIPID PANEL: CPT

## 2020-06-09 PROCEDURE — 84443 ASSAY THYROID STIM HORMONE: CPT

## 2020-06-09 PROCEDURE — 81001 URINALYSIS AUTO W/SCOPE: CPT

## 2020-06-09 PROCEDURE — 36415 COLL VENOUS BLD VENIPUNCTURE: CPT

## 2020-06-16 ENCOUNTER — TELEPHONE (OUTPATIENT)
Dept: FAMILY MEDICINE CLINIC | Age: 35
End: 2020-06-16

## 2020-06-16 RX ORDER — MELOXICAM 15 MG/1
15 TABLET ORAL DAILY
Qty: 60 TABLET | Refills: 2 | Status: SHIPPED
Start: 2020-06-16 | End: 2020-10-02

## 2020-06-16 NOTE — TELEPHONE ENCOUNTER
She is asking if you will give her a script of Mobic? She said that you was waiting for the labs to come back before putting her on this.          [phone]

## 2020-10-02 ENCOUNTER — OFFICE VISIT (OUTPATIENT)
Dept: FAMILY MEDICINE CLINIC | Age: 35
End: 2020-10-02
Payer: COMMERCIAL

## 2020-10-02 ENCOUNTER — NURSE TRIAGE (OUTPATIENT)
Dept: OTHER | Facility: CLINIC | Age: 35
End: 2020-10-02

## 2020-10-02 VITALS
HEART RATE: 88 BPM | WEIGHT: 280 LBS | TEMPERATURE: 97.5 F | SYSTOLIC BLOOD PRESSURE: 158 MMHG | OXYGEN SATURATION: 98 % | BODY MASS INDEX: 41.35 KG/M2 | DIASTOLIC BLOOD PRESSURE: 100 MMHG

## 2020-10-02 PROCEDURE — G8417 CALC BMI ABV UP PARAM F/U: HCPCS | Performed by: FAMILY MEDICINE

## 2020-10-02 PROCEDURE — G8484 FLU IMMUNIZE NO ADMIN: HCPCS | Performed by: FAMILY MEDICINE

## 2020-10-02 PROCEDURE — G8427 DOCREV CUR MEDS BY ELIG CLIN: HCPCS | Performed by: FAMILY MEDICINE

## 2020-10-02 PROCEDURE — 1036F TOBACCO NON-USER: CPT | Performed by: FAMILY MEDICINE

## 2020-10-02 PROCEDURE — 93000 ELECTROCARDIOGRAM COMPLETE: CPT | Performed by: FAMILY MEDICINE

## 2020-10-02 PROCEDURE — 99214 OFFICE O/P EST MOD 30 MIN: CPT | Performed by: FAMILY MEDICINE

## 2020-10-02 RX ORDER — LISINOPRIL 10 MG/1
10 TABLET ORAL DAILY
Qty: 30 TABLET | Refills: 0 | Status: SHIPPED
Start: 2020-10-02 | End: 2020-10-19 | Stop reason: DRUGHIGH

## 2020-10-02 ASSESSMENT — ENCOUNTER SYMPTOMS
EYE PAIN: 0
BACK PAIN: 0
VOMITING: 0
CHEST TIGHTNESS: 0
SINUS PAIN: 0
COUGH: 0
SHORTNESS OF BREATH: 0
TROUBLE SWALLOWING: 0
EYE DISCHARGE: 0
SORE THROAT: 0
EYE REDNESS: 0
ABDOMINAL PAIN: 0
NAUSEA: 0
BLOOD IN STOOL: 0
PHOTOPHOBIA: 0
ALLERGIC/IMMUNOLOGIC NEGATIVE: 1
DIARRHEA: 0

## 2020-10-02 NOTE — PROGRESS NOTES
10/2/20  Sandy Mtz : 1985 Sex: female  Age: 38 y.o. Chief Complaint   Patient presents with    Hypertension       The patient is here for blood pressure check. . No recent changes to their medications. No headache or visual disturbances. No dizziness or tinnitus. No chest pain, palpitations, or dyspnea. No nausea and vomiting. Admits numbness, tingling and or weakness to the upper extremities, has history of cervical disc disease. No fevers or chills. No recent illness. Review of Systems   Constitutional: Negative for appetite change, fatigue and unexpected weight change. HENT: Negative for congestion, ear pain, hearing loss, sinus pain, sore throat and trouble swallowing. Eyes: Negative for photophobia, pain, discharge and redness. Respiratory: Negative for cough, chest tightness and shortness of breath. Cardiovascular: Negative for chest pain, palpitations and leg swelling. Gastrointestinal: Negative for abdominal pain, blood in stool, diarrhea, nausea and vomiting. Endocrine: Negative. Genitourinary: Negative for dysuria, flank pain, frequency and hematuria. Musculoskeletal: Negative for arthralgias, back pain, joint swelling and myalgias. Skin: Negative. Allergic/Immunologic: Negative. Neurological: Negative for dizziness, seizures, syncope, weakness, light-headedness, numbness and headaches. Paresthesias bilateral upper extremities   Hematological: Negative for adenopathy. Does not bruise/bleed easily. Psychiatric/Behavioral: Negative.           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    Hemochromatosis     Migraines     born with heart murmur    Seasonal allergies      Past Surgical History:   Procedure Laterality Date    SINUS SURGERY N/A 2/25/16    FUNCTIONAL ENDOSCOPIC SINUS SURGERY WITH BILATERAL MAXILLARY, FRONTAL & SPENOID ANTROSTOMIES, SUBMUCCOSAL RESECTION OF INFERIOR TURBINATES     Family History   Problem Relation Age of Onset    Arthritis Mother     Other Mother         sinus problems    High Blood Pressure Father     Other Maternal Aunt         migraine    Other Maternal Uncle         migraine    Other Paternal Aunt         migraine    Other Paternal Uncle         migraine    Arthritis Maternal Grandmother      Social History     Socioeconomic History    Marital status:      Spouse name: Not on file    Number of children: Not on file    Years of education: Not on file    Highest education level: Not on file   Occupational History    Not on file   Social Needs    Financial resource strain: Not on file    Food insecurity     Worry: Not on file     Inability: Not on file    Transportation needs     Medical: Not on file     Non-medical: Not on file   Tobacco Use    Smoking status: Never Smoker    Smokeless tobacco: Never Used   Substance and Sexual Activity    Alcohol use: No     Alcohol/week: 0.0 standard drinks    Drug use: No    Sexual activity: Not on file   Lifestyle    Physical activity     Days per week: Not on file     Minutes per session: Not on file    Stress: Not on file   Relationships    Social connections     Talks on phone: Not on file     Gets together: Not on file     Attends Jehovah's witness service: Not on file     Active member of club or organization: Not on file     Attends meetings of clubs or organizations: Not on file     Relationship status: Not on file    Intimate partner violence     Fear of current or ex partner: Not on file     Emotionally abused: Not on file     Physically abused: Not on file Forced sexual activity: Not on file   Other Topics Concern    Not on file   Social History Narrative    Not on file       Vitals:    10/02/20 1441   BP: (!) 158/100   Pulse: 88   Temp: 97.5 °F (36.4 °C)   SpO2: 98%   Weight: 280 lb (127 kg)       Physical Exam  Vitals signs and nursing note reviewed. Constitutional:       Appearance: She is well-developed. HENT:      Head: Atraumatic. Right Ear: External ear normal.      Left Ear: External ear normal.      Nose: Nose normal.      Mouth/Throat:      Pharynx: No oropharyngeal exudate. Eyes:      Conjunctiva/sclera: Conjunctivae normal.      Pupils: Pupils are equal, round, and reactive to light. Neck:      Musculoskeletal: Normal range of motion and neck supple. Thyroid: No thyromegaly. Trachea: No tracheal deviation. Cardiovascular:      Rate and Rhythm: Normal rate and regular rhythm. Heart sounds: No murmur. No friction rub. No gallop. Pulmonary:      Effort: Pulmonary effort is normal. No respiratory distress. Breath sounds: Normal breath sounds. Abdominal:      General: Bowel sounds are normal.      Palpations: Abdomen is soft. Musculoskeletal: Normal range of motion. General: No tenderness or deformity. Lymphadenopathy:      Cervical: No cervical adenopathy. Skin:     General: Skin is warm and dry. Capillary Refill: Capillary refill takes less than 2 seconds. Findings: No rash. Neurological:      Mental Status: She is alert and oriented to person, place, and time. Sensory: Sensory deficit present. Motor: No abnormal muscle tone. Coordination: Coordination normal.      Deep Tendon Reflexes: Reflexes normal.      Comments: Upper extremities         Assessment and Plan:  Vernel Libman was seen today for hypertension. Diagnoses and all orders for this visit:    Hypertension, unspecified type  -     EKG 12 Lead  -     lisinopril (PRINIVIL;ZESTRIL) 10 MG tablet;  Take 1 tablet by mouth daily  -     US CAROTID ARTERY BILATERAL; Future    Cervical radiculitis    Thyroid nodule  -     US THYROID; Future        Return in about 4 weeks (around 10/30/2020).       Seen By:  Ho Moreira, DO

## 2020-10-02 NOTE — TELEPHONE ENCOUNTER
Reason for Disposition   Systolic BP >= 106 OR Diastolic >= 648    Answer Assessment - Initial Assessment Questions  1. BLOOD PRESSURE: \"What is the blood pressure? \" \"Did you take at least two measurements 5 minutes apart?\"      163/100  2. ONSET: \"When did you take your blood pressure? \"      Now  3. HOW: \"How did you obtain the blood pressure? \" (e.g., visiting nurse, automatic home BP monitor)      School nurse  4. HISTORY: \"Do you have a history of high blood pressure? \"      Last several weeks  5. MEDICATIONS: Didier Gauze you taking any medications for blood pressure? \" \"Have you missed any doses recently? \"      No  6. OTHER SYMPTOMS: \"Do you have any symptoms? \" (e.g., headache, chest pain, blurred vision, difficulty breathing, weakness)      Does report headaches  7. PREGNANCY: \"Is there any chance you are pregnant? \" \"When was your last menstrual period? \"      No, had BC removed yesterday    Protocols used: HIGH BLOOD PRESSURE-ADULT-[place]    Received call from [facility]. Caller is reporting she went to get her [facility] implant yesterday and was told her BP was 160/100. Today she had school nurse take her BP and is 163/100. Caller is asymptomatic. Provided care advice to help with symptoms. Call soft transferred to [address] to schedule appointment. Attention Provider: Thank you for allowing me to participate in the care of your patient. The  patient was connected to triage in response to information provided to the [facility]. Please do not respond through this encounter as the response is not directed to a shared pool.

## 2020-10-06 ENCOUNTER — OFFICE VISIT (OUTPATIENT)
Dept: FAMILY MEDICINE CLINIC | Age: 35
End: 2020-10-06
Payer: COMMERCIAL

## 2020-10-06 ENCOUNTER — TELEPHONE (OUTPATIENT)
Dept: FAMILY MEDICINE CLINIC | Age: 35
End: 2020-10-06

## 2020-10-06 ENCOUNTER — HOSPITAL ENCOUNTER (EMERGENCY)
Age: 35
Discharge: HOME | End: 2020-10-06
Attending: EMERGENCY MEDICINE
Payer: COMMERCIAL

## 2020-10-06 ENCOUNTER — APPOINTMENT (OUTPATIENT)
Dept: CT IMAGING | Age: 35
End: 2020-10-06
Payer: COMMERCIAL

## 2020-10-06 VITALS
SYSTOLIC BLOOD PRESSURE: 134 MMHG | TEMPERATURE: 97.8 F | DIASTOLIC BLOOD PRESSURE: 70 MMHG | HEART RATE: 96 BPM | BODY MASS INDEX: 39.99 KG/M2 | RESPIRATION RATE: 18 BRPM | OXYGEN SATURATION: 98 % | WEIGHT: 270 LBS | HEIGHT: 69 IN

## 2020-10-06 VITALS
HEIGHT: 69 IN | DIASTOLIC BLOOD PRESSURE: 100 MMHG | OXYGEN SATURATION: 98 % | HEART RATE: 92 BPM | TEMPERATURE: 97.7 F | BODY MASS INDEX: 41.18 KG/M2 | SYSTOLIC BLOOD PRESSURE: 144 MMHG | RESPIRATION RATE: 16 BRPM | WEIGHT: 278 LBS

## 2020-10-06 LAB
ANION GAP SERPL CALCULATED.3IONS-SCNC: 12 MMOL/L (ref 7–16)
BASOPHILS ABSOLUTE: 0.09 E9/L (ref 0–0.2)
BASOPHILS RELATIVE PERCENT: 0.8 % (ref 0–2)
BUN BLDV-MCNC: 10 MG/DL (ref 6–20)
CALCIUM SERPL-MCNC: 9.3 MG/DL (ref 8.6–10.2)
CHLORIDE BLD-SCNC: 100 MMOL/L (ref 98–107)
CO2: 26 MMOL/L (ref 22–29)
CREAT SERPL-MCNC: 0.7 MG/DL (ref 0.5–1)
EKG ATRIAL RATE: 80 BPM
EKG P AXIS: 25 DEGREES
EKG P-R INTERVAL: 152 MS
EKG Q-T INTERVAL: 378 MS
EKG QRS DURATION: 82 MS
EKG QTC CALCULATION (BAZETT): 435 MS
EKG R AXIS: 27 DEGREES
EKG T AXIS: 48 DEGREES
EKG VENTRICULAR RATE: 80 BPM
EOSINOPHILS ABSOLUTE: 0.51 E9/L (ref 0.05–0.5)
EOSINOPHILS RELATIVE PERCENT: 4.7 % (ref 0–6)
GFR AFRICAN AMERICAN: >60
GFR NON-AFRICAN AMERICAN: >60 ML/MIN/1.73
GLUCOSE BLD-MCNC: 116 MG/DL (ref 74–99)
HCT VFR BLD CALC: 44.8 % (ref 34–48)
HEMOGLOBIN: 14.7 G/DL (ref 11.5–15.5)
IMMATURE GRANULOCYTES #: 0.06 E9/L
IMMATURE GRANULOCYTES %: 0.6 % (ref 0–5)
LYMPHOCYTES ABSOLUTE: 3.6 E9/L (ref 1.5–4)
LYMPHOCYTES RELATIVE PERCENT: 33 % (ref 20–42)
MCH RBC QN AUTO: 29.6 PG (ref 26–35)
MCHC RBC AUTO-ENTMCNC: 32.8 % (ref 32–34.5)
MCV RBC AUTO: 90.3 FL (ref 80–99.9)
MONOCYTES ABSOLUTE: 0.51 E9/L (ref 0.1–0.95)
MONOCYTES RELATIVE PERCENT: 4.7 % (ref 2–12)
NEUTROPHILS ABSOLUTE: 6.13 E9/L (ref 1.8–7.3)
NEUTROPHILS RELATIVE PERCENT: 56.2 % (ref 43–80)
PDW BLD-RTO: 12.4 FL (ref 11.5–15)
PLATELET # BLD: 381 E9/L (ref 130–450)
PMV BLD AUTO: 8.9 FL (ref 7–12)
POTASSIUM SERPL-SCNC: 4.1 MMOL/L (ref 3.5–5)
RBC # BLD: 4.96 E12/L (ref 3.5–5.5)
SODIUM BLD-SCNC: 138 MMOL/L (ref 132–146)
TROPONIN: <0.01 NG/ML (ref 0–0.03)
WBC # BLD: 10.9 E9/L (ref 4.5–11.5)

## 2020-10-06 PROCEDURE — G8427 DOCREV CUR MEDS BY ELIG CLIN: HCPCS | Performed by: FAMILY MEDICINE

## 2020-10-06 PROCEDURE — 2580000003 HC RX 258: Performed by: STUDENT IN AN ORGANIZED HEALTH CARE EDUCATION/TRAINING PROGRAM

## 2020-10-06 PROCEDURE — 96372 THER/PROPH/DIAG INJ SC/IM: CPT

## 2020-10-06 PROCEDURE — 6360000002 HC RX W HCPCS: Performed by: STUDENT IN AN ORGANIZED HEALTH CARE EDUCATION/TRAINING PROGRAM

## 2020-10-06 PROCEDURE — 93005 ELECTROCARDIOGRAM TRACING: CPT | Performed by: EMERGENCY MEDICINE

## 2020-10-06 PROCEDURE — 84484 ASSAY OF TROPONIN QUANT: CPT

## 2020-10-06 PROCEDURE — 80048 BASIC METABOLIC PNL TOTAL CA: CPT

## 2020-10-06 PROCEDURE — 96375 TX/PRO/DX INJ NEW DRUG ADDON: CPT

## 2020-10-06 PROCEDURE — 70450 CT HEAD/BRAIN W/O DYE: CPT

## 2020-10-06 PROCEDURE — 99282 EMERGENCY DEPT VISIT SF MDM: CPT

## 2020-10-06 PROCEDURE — G8417 CALC BMI ABV UP PARAM F/U: HCPCS | Performed by: FAMILY MEDICINE

## 2020-10-06 PROCEDURE — 1036F TOBACCO NON-USER: CPT | Performed by: FAMILY MEDICINE

## 2020-10-06 PROCEDURE — 99213 OFFICE O/P EST LOW 20 MIN: CPT | Performed by: FAMILY MEDICINE

## 2020-10-06 PROCEDURE — 96374 THER/PROPH/DIAG INJ IV PUSH: CPT

## 2020-10-06 PROCEDURE — 85025 COMPLETE CBC W/AUTO DIFF WBC: CPT

## 2020-10-06 PROCEDURE — G8484 FLU IMMUNIZE NO ADMIN: HCPCS | Performed by: FAMILY MEDICINE

## 2020-10-06 RX ORDER — DEXAMETHASONE SODIUM PHOSPHATE 4 MG/ML
4 INJECTION, SOLUTION INTRA-ARTICULAR; INTRALESIONAL; INTRAMUSCULAR; INTRAVENOUS; SOFT TISSUE ONCE
Status: COMPLETED | OUTPATIENT
Start: 2020-10-06 | End: 2020-10-06

## 2020-10-06 RX ORDER — METOCLOPRAMIDE HYDROCHLORIDE 5 MG/ML
10 INJECTION INTRAMUSCULAR; INTRAVENOUS ONCE
Status: COMPLETED | OUTPATIENT
Start: 2020-10-06 | End: 2020-10-06

## 2020-10-06 RX ORDER — DIPHENHYDRAMINE HYDROCHLORIDE 50 MG/ML
25 INJECTION INTRAMUSCULAR; INTRAVENOUS ONCE
Status: COMPLETED | OUTPATIENT
Start: 2020-10-06 | End: 2020-10-06

## 2020-10-06 RX ORDER — KETOROLAC TROMETHAMINE 30 MG/ML
15 INJECTION, SOLUTION INTRAMUSCULAR; INTRAVENOUS ONCE
Status: COMPLETED | OUTPATIENT
Start: 2020-10-06 | End: 2020-10-06

## 2020-10-06 RX ORDER — 0.9 % SODIUM CHLORIDE 0.9 %
1000 INTRAVENOUS SOLUTION INTRAVENOUS ONCE
Status: COMPLETED | OUTPATIENT
Start: 2020-10-06 | End: 2020-10-06

## 2020-10-06 RX ADMIN — SODIUM CHLORIDE 1000 ML: 9 INJECTION, SOLUTION INTRAVENOUS at 18:48

## 2020-10-06 RX ADMIN — KETOROLAC TROMETHAMINE 15 MG: 30 INJECTION, SOLUTION INTRAMUSCULAR; INTRAVENOUS at 18:49

## 2020-10-06 RX ADMIN — DEXAMETHASONE SODIUM PHOSPHATE 4 MG: 4 INJECTION, SOLUTION INTRAMUSCULAR; INTRAVENOUS at 18:49

## 2020-10-06 RX ADMIN — DIPHENHYDRAMINE HYDROCHLORIDE 25 MG: 50 INJECTION, SOLUTION INTRAMUSCULAR; INTRAVENOUS at 18:49

## 2020-10-06 RX ADMIN — METOCLOPRAMIDE 10 MG: 5 INJECTION, SOLUTION INTRAMUSCULAR; INTRAVENOUS at 18:49

## 2020-10-06 ASSESSMENT — ENCOUNTER SYMPTOMS
EYE PAIN: 0
WHEEZING: 0
SORE THROAT: 0
COUGH: 0
NAUSEA: 0
EYE REDNESS: 0
ALLERGIC/IMMUNOLOGIC NEGATIVE: 1
EYE PAIN: 0
VOMITING: 0
BACK PAIN: 0
PHOTOPHOBIA: 0
NAUSEA: 0
SINUS PAIN: 0
COUGH: 0
ABDOMINAL PAIN: 0
TROUBLE SWALLOWING: 0
EYE DISCHARGE: 0
DIARRHEA: 0
VOMITING: 0
DIARRHEA: 0
PHOTOPHOBIA: 0
EYE REDNESS: 0
TROUBLE SWALLOWING: 0
BACK PAIN: 0
SORE THROAT: 0
BLOOD IN STOOL: 0
EYE DISCHARGE: 0
SHORTNESS OF BREATH: 0
CHEST TIGHTNESS: 0
SHORTNESS OF BREATH: 0
ABDOMINAL DISTENTION: 0

## 2020-10-06 ASSESSMENT — PAIN SCALES - GENERAL: PAINLEVEL_OUTOF10: 3

## 2020-10-06 NOTE — PROGRESS NOTES
10/6/20  Sandy Mtz : 1985 Sex: female  Age: 38 y.o. Chief Complaint   Patient presents with    Hypertension    Headache       The patient is here for blood pressure check. Patient has been taking their medications as prescribed. No recent changes to their medications. No headache or visual disturbances. No dizziness or tinnitus. No chest pain, palpitations, or dyspnea. No nausea and vomiting. No numbness, tingling and or weakness to the extremities. No fevers or chills. No recent illness. The patient complains of a headache for the last 5 days. The headache is located all over the head. The headache did come on gradually. It is constant and currently a 5/10 on the pain scale. The patient has taken Tylenol/Motrin at home with minimal improvement in their discomfort. She has had some \"floaters\" in her peripheral vision Patient denies any dizziness or visual disturbances. No photophobia. No nausea and vomiting. No neck pain/ stiffness, fever or chills. No sore throat, nasal congestion or facial pressure. No numbness, tingling, or weakness to the extremities. No back and abdominal or flank pain. No bowel or bladder dysfunction. No recent head injury. No new exposures or recent illness. She has a history of migraines but this is a different pain      Review of Systems   Constitutional: Negative for appetite change, fatigue and unexpected weight change. HENT: Negative for congestion, ear pain, hearing loss, sinus pain, sore throat and trouble swallowing. Eyes: Negative for photophobia, pain, discharge and redness. Respiratory: Negative for cough, chest tightness and shortness of breath. Cardiovascular: Negative for chest pain, palpitations and leg swelling. Gastrointestinal: Negative for abdominal pain, blood in stool, diarrhea, nausea and vomiting. Endocrine: Negative. Genitourinary: Negative for dysuria, flank pain, frequency and hematuria.    Musculoskeletal: Negative for arthralgias, back pain, joint swelling and myalgias. Skin: Negative. Allergic/Immunologic: Negative. Neurological: Positive for headaches. Negative for dizziness, seizures, syncope, weakness, light-headedness and numbness. Hematological: Negative for adenopathy. Does not bruise/bleed easily. Psychiatric/Behavioral: Negative.           Current Outpatient Medications:     lisinopril (PRINIVIL;ZESTRIL) 10 MG tablet, Take 1 tablet by mouth daily, Disp: 30 tablet, Rfl: 0  Allergies   Allergen Reactions    Pcn [Penicillins] Rash       Past Medical History:   Diagnosis Date    Abnormal Pap smear of cervix     Back pain     Chronic sinusitis     with facial pain    Depression 6/6/2011    Disease of blood and blood forming organ     hemacromotosis high levels iron    Hemochromatosis     Migraines     born with heart murmur    Seasonal allergies      Past Surgical History:   Procedure Laterality Date    SINUS SURGERY N/A 2/25/16    FUNCTIONAL ENDOSCOPIC SINUS SURGERY WITH BILATERAL MAXILLARY, FRONTAL & SPENOID ANTROSTOMIES, SUBMUCCOSAL RESECTION OF INFERIOR TURBINATES     Family History   Problem Relation Age of Onset    Arthritis Mother     Other Mother         sinus problems    High Blood Pressure Father     Other Maternal Aunt         migraine    Other Maternal Uncle         migraine    Other Paternal Aunt         migraine    Other Paternal Uncle         migraine    Arthritis Maternal Grandmother      Social History     Socioeconomic History    Marital status:      Spouse name: Not on file    Number of children: Not on file    Years of education: Not on file    Highest education level: Not on file   Occupational History    Not on file   Social Needs    Financial resource strain: Not on file    Food insecurity     Worry: Not on file     Inability: Not on file    Transportation needs     Medical: Not on file     Non-medical: Not on file   Tobacco Use    Smoking status: Never Smoker    Smokeless tobacco: Never Used   Substance and Sexual Activity    Alcohol use: No     Alcohol/week: 0.0 standard drinks    Drug use: No    Sexual activity: Not on file   Lifestyle    Physical activity     Days per week: Not on file     Minutes per session: Not on file    Stress: Not on file   Relationships    Social connections     Talks on phone: Not on file     Gets together: Not on file     Attends Congregation service: Not on file     Active member of club or organization: Not on file     Attends meetings of clubs or organizations: Not on file     Relationship status: Not on file    Intimate partner violence     Fear of current or ex partner: Not on file     Emotionally abused: Not on file     Physically abused: Not on file     Forced sexual activity: Not on file   Other Topics Concern    Not on file   Social History Narrative    Not on file       Vitals:    10/06/20 1536   BP: (!) 144/100   Pulse: 92   Resp: 16   Temp: 97.7 °F (36.5 °C)   TempSrc: Temporal   SpO2: 98%   Weight: 278 lb (126.1 kg)   Height: 5' 9\" (1.753 m)       Physical Exam  Vitals signs and nursing note reviewed. Constitutional:       Appearance: She is well-developed. HENT:      Head: Atraumatic. Right Ear: External ear normal.      Left Ear: External ear normal.      Nose: Nose normal.      Mouth/Throat:      Pharynx: No oropharyngeal exudate. Eyes:      Conjunctiva/sclera: Conjunctivae normal.      Pupils: Pupils are equal, round, and reactive to light. Neck:      Musculoskeletal: Normal range of motion and neck supple. Thyroid: No thyromegaly. Trachea: No tracheal deviation. Cardiovascular:      Rate and Rhythm: Normal rate and regular rhythm. Heart sounds: No murmur. No friction rub. No gallop. Pulmonary:      Effort: Pulmonary effort is normal. No respiratory distress. Breath sounds: Normal breath sounds.    Abdominal:      General: Bowel sounds are normal. Palpations: Abdomen is soft. Musculoskeletal: Normal range of motion. General: No tenderness or deformity. Lymphadenopathy:      Cervical: No cervical adenopathy. Skin:     General: Skin is warm and dry. Capillary Refill: Capillary refill takes less than 2 seconds. Findings: No rash. Neurological:      Mental Status: She is alert and oriented to person, place, and time. Sensory: No sensory deficit. Motor: No abnormal muscle tone. Coordination: Coordination normal.      Deep Tendon Reflexes: Reflexes normal.         Assessment and Plan:  Earnest Pain was seen today for hypertension and headache. Diagnoses and all orders for this visit:    Essential hypertension    Acute nonintractable headache, unspecified headache type    To THE [facility] ER for evaluation, treatment. She will need scanned to r/o pathology    No follow-ups on file.       Seen By:  Cody Murillo DO

## 2020-10-06 NOTE — TELEPHONE ENCOUNTER
Patient calling in you gave her lisinopril 10mg on fri. Her bp has remained high at 157/107.  Please advise

## 2020-10-06 NOTE — ED PROVIDER NOTES
Laying in bed, comfortable. HENT:      Head: Normocephalic and atraumatic. Right Ear: External ear normal.      Left Ear: External ear normal.      Nose: Nose normal.   Eyes:      Extraocular Movements: Extraocular movements intact. Conjunctiva/sclera: Conjunctivae normal.      Pupils: Pupils are equal, round, and reactive to light. Neck:      Musculoskeletal: Normal range of motion and neck supple. No neck rigidity or muscular tenderness. Cardiovascular:      Rate and Rhythm: Normal rate and regular rhythm. Pulses: Normal pulses. Heart sounds: Normal heart sounds. Pulmonary:      Effort: Pulmonary effort is normal. No respiratory distress. Breath sounds: Normal breath sounds. No wheezing, rhonchi or rales. Chest:      Chest wall: No tenderness. Abdominal:      General: Abdomen is flat. There is no distension. Palpations: Abdomen is soft. Tenderness: There is no abdominal tenderness. There is no guarding or rebound. Musculoskeletal:         General: No swelling or deformity. Right lower leg: No edema. Left lower leg: No edema. Lymphadenopathy:      Cervical: No cervical adenopathy. Skin:     General: Skin is warm and dry. Capillary Refill: Capillary refill takes less than 2 seconds. Neurological:      General: No focal deficit present. Mental Status: She is alert. Cranial Nerves: No cranial nerve deficit. Sensory: No sensory deficit. Motor: No weakness. Psychiatric:         Mood and Affect: Mood normal.         Behavior: Behavior normal.         Thought Content: Thought content normal.          Procedures     MDM  Number of Diagnoses or Management Options  Chronic nonintractable headache, unspecified headache type:   Hypertension, unspecified type:   Diagnosis management comments: This is a 76-year-old female presenting to the emergency department for headache, hypertension.   Given her ongoing headache, her numbness, tingling, visual disturbance, will obtain CT of head. Will symptomatically treat her headache here in the emergency department. Headache improved with treatment here in the emergency department, head CT was negative. Patient already follows with neurology, has been seen at [facility] clinic. Advised patient to follow-up with her PCP, neurologist as necessary to help control her headache symptoms. Patient comfortable with discharge home, all questions were answered. Patient was told to return the emergency department with any new or worsening symptoms. ED Course as of Oct 06 2014   Tue Oct 06, 2020   2000 Patient reevaluated, her headache has resolved with medication, patient updated on results of labs, imaging, patient comfortable with plan for discharge home.    [RH]      ED Course User Index  [RH] [address], DO        ED Course as of Oct 06 2014   Tue Oct 06, 2020   2000 Patient reevaluated, her headache has resolved with medication, patient updated on results of labs, imaging, patient comfortable with plan for discharge home.    [RH]      ED Course User Index  [RH] [address] DO       --------------------------------------------- PAST HISTORY ---------------------------------------------  Past Medical History:  has a past medical history of Abnormal Pap smear of cervix, Back pain, Chronic sinusitis, Depression, Disease of blood and blood forming organ, Hemochromatosis, Migraines, and Seasonal allergies. Past Surgical History:  has a past surgical history that includes sinus surgery (N/A, 2/25/16). Social History:  reports that she has never smoked. She has never used smokeless tobacco. She reports that she does not drink alcohol or use drugs. Family History: family history includes Arthritis in her maternal grandmother and mother; High Blood Pressure in her father; Other in her maternal aunt, maternal uncle, mother, paternal aunt, and paternal uncle.      The patients home medications have been reviewed. Allergies: Pcn [penicillins]    -------------------------------------------------- RESULTS -------------------------------------------------  Labs:  Results for orders placed or performed during the hospital encounter of 10/06/20   CBC auto differential   Result Value Ref Range    WBC 10.9 4.5 - 11.5 E9/L    RBC 4.96 3.50 - 5.50 E12/L    Hemoglobin 14.7 11.5 - 15.5 g/dL    Hematocrit 44.8 34.0 - 48.0 %    MCV 90.3 80.0 - 99.9 fL    MCH 29.6 26.0 - 35.0 pg    MCHC 32.8 32.0 - 34.5 %    RDW 12.4 11.5 - 15.0 fL    Platelets 841 517 - 130 E9/L    MPV 8.9 7.0 - 12.0 fL    Neutrophils % 56.2 43.0 - 80.0 %    Immature Granulocytes % 0.6 0.0 - 5.0 %    Lymphocytes % 33.0 20.0 - 42.0 %    Monocytes % 4.7 2.0 - 12.0 %    Eosinophils % 4.7 0.0 - 6.0 %    Basophils % 0.8 0.0 - 2.0 %    Neutrophils Absolute 6.13 1.80 - 7.30 E9/L    Immature Granulocytes # 0.06 E9/L    Lymphocytes Absolute 3.60 1.50 - 4.00 E9/L    Monocytes Absolute 0.51 0.10 - 0.95 E9/L    Eosinophils Absolute 0.51 (H) 0.05 - 0.50 E9/L    Basophils Absolute 0.09 0.00 - 0.20 A0/N   Basic metabolic panel   Result Value Ref Range    Sodium 138 132 - 146 mmol/L    Potassium 4.1 3.5 - 5.0 mmol/L    Chloride 100 98 - 107 mmol/L    CO2 26 22 - 29 mmol/L    Anion Gap 12 7 - 16 mmol/L    Glucose 116 (H) 74 - 99 mg/dL    BUN 10 6 - 20 mg/dL    CREATININE 0.7 0.5 - 1.0 mg/dL    GFR Non-African American >60 >=60 mL/min/1.73    GFR African American >60     Calcium 9.3 8.6 - 10.2 mg/dL   Troponin   Result Value Ref Range    Troponin <0.01 0.00 - 0.03 ng/mL   EKG 12 Lead   Result Value Ref Range    Ventricular Rate 80 BPM    Atrial Rate 80 BPM    P-R Interval 152 ms    QRS Duration 82 ms    Q-T Interval 378 ms    QTc Calculation (Bazett) 435 ms    P Axis 25 degrees    R Axis 27 degrees    T Axis 48 degrees       Radiology:  CT HEAD WO CONTRAST   Final Result   No acute intracranial abnormality. ------------------------- NURSING NOTES AND VITALS REVIEWED ---------------------------  Date / Time Roomed:  10/6/2020  5:34 PM  ED Bed Assignment:  [id number]    The nursing notes within the ED encounter and vital signs as below have been reviewed. /70   Pulse 96   Temp 97.8 °F (36.6 °C)   Resp 18   Ht 5' 9\" (1.753 m)   Wt 270 lb (122.5 kg)   SpO2 98%   BMI 39.87 kg/m²   Oxygen Saturation Interpretation: Normal      ------------------------------------------ PROGRESS NOTES ------------------------------------------  6:18 PM EDT  I have spoken with the patient and discussed todays results, in addition to providing specific details for the plan of care and counseling regarding the diagnosis and prognosis. Their questions are answered at this time and they are agreeable with the plan. I discussed at length with them reasons for immediate return here for re evaluation. They will followup with their primary care physician by calling their office tomorrow. --------------------------------- ADDITIONAL PROVIDER NOTES ---------------------------------  At this time the patient is without objective evidence of an acute process requiring hospitalization or inpatient management. They have remained hemodynamically stable throughout their entire ED visit and are stable for discharge with outpatient follow-up. The plan has been discussed in detail and they are aware of the specific conditions for emergent return, as well as the importance of follow-up. New Prescriptions    No medications on file       Diagnosis:  1. Chronic nonintractable headache, unspecified headache type    2. Hypertension, unspecified type        Disposition:  Patient's disposition: Discharge to home  Patient's condition is stable.            [address] DO  Resident  10/06/20 2014

## 2020-10-07 ENCOUNTER — TELEPHONE (OUTPATIENT)
Dept: FAMILY MEDICINE CLINIC | Age: 35
End: 2020-10-07

## 2020-10-07 NOTE — TELEPHONE ENCOUNTER
Dr. Ziegler [facility] office called they need a US-guided  fine needle aspiration with afirma done.

## 2020-10-13 NOTE — TELEPHONE ENCOUNTER
Doctor needs to put the order in and once that gets done then we can call interventional radiology to let them know the order is in and they will call patient to schedule. If there is any questions on what to order you can call IR at [phone] and they can tell you exactly what to put in. Let me know.      Brii Murillo

## 2020-10-14 NOTE — TELEPHONE ENCOUNTER
[facility] in and faxed [facility] along with insurance card

## 2020-10-19 ENCOUNTER — TELEPHONE (OUTPATIENT)
Dept: FAMILY MEDICINE CLINIC | Age: 35
End: 2020-10-19

## 2020-10-19 RX ORDER — LISINOPRIL 20 MG/1
20 TABLET ORAL DAILY
Qty: 30 TABLET | Refills: 2 | Status: SHIPPED
Start: 2020-10-19 | End: 2020-12-04 | Stop reason: SDUPTHER

## 2020-10-19 NOTE — TELEPHONE ENCOUNTER
Name of Medication(s) Requested:  Lisinopril  20MG    Pharmacy Requested:   [facility]    Medication(s) pended? [x] Yes  [] No    Last Appointment:  10/6/2020    Future appts:  Future Appointments   Date Time Provider Ab Smith   10/30/2020  3:30 PM [place] Rowena Mendoza, DO REMI MURPHY [facility]        Does patient need call back? [] Yes  [x] No       She was to double up on the 10mg, so I pended the 20mg for you to send to [facility].

## 2020-10-19 NOTE — TELEPHONE ENCOUNTER
Mendel [place] at interventional radiology scheduling calling [place] SONO GUIDE NEEDLE BIOPSY is an inactive order. Please change order to image# [id number] U/S thyroid biopsy. She will check back in epic for new order. Patient is scheduled for nov 9.  She is aware you are out of office

## 2020-10-30 ENCOUNTER — OFFICE VISIT (OUTPATIENT)
Dept: FAMILY MEDICINE CLINIC | Age: 35
End: 2020-10-30
Payer: COMMERCIAL

## 2020-10-30 VITALS
RESPIRATION RATE: 16 BRPM | SYSTOLIC BLOOD PRESSURE: 138 MMHG | HEIGHT: 69 IN | OXYGEN SATURATION: 98 % | WEIGHT: 281 LBS | BODY MASS INDEX: 41.62 KG/M2 | DIASTOLIC BLOOD PRESSURE: 82 MMHG | HEART RATE: 110 BPM | TEMPERATURE: 97.2 F

## 2020-10-30 PROCEDURE — G8417 CALC BMI ABV UP PARAM F/U: HCPCS | Performed by: FAMILY MEDICINE

## 2020-10-30 PROCEDURE — G8427 DOCREV CUR MEDS BY ELIG CLIN: HCPCS | Performed by: FAMILY MEDICINE

## 2020-10-30 PROCEDURE — 99213 OFFICE O/P EST LOW 20 MIN: CPT | Performed by: FAMILY MEDICINE

## 2020-10-30 PROCEDURE — 1036F TOBACCO NON-USER: CPT | Performed by: FAMILY MEDICINE

## 2020-10-30 PROCEDURE — G8484 FLU IMMUNIZE NO ADMIN: HCPCS | Performed by: FAMILY MEDICINE

## 2020-10-30 RX ORDER — HYDROCHLOROTHIAZIDE 25 MG/1
25 TABLET ORAL EVERY MORNING
Qty: 30 TABLET | Refills: 0 | Status: SHIPPED
Start: 2020-10-30 | End: 2020-11-12 | Stop reason: SDUPTHER

## 2020-10-30 ASSESSMENT — ENCOUNTER SYMPTOMS
NAUSEA: 0
VOMITING: 0
EYE PAIN: 0
SORE THROAT: 0
COUGH: 0
PHOTOPHOBIA: 0
CHEST TIGHTNESS: 0
EYE DISCHARGE: 0
BLOOD IN STOOL: 0
DIARRHEA: 0
TROUBLE SWALLOWING: 0
SHORTNESS OF BREATH: 0
ALLERGIC/IMMUNOLOGIC NEGATIVE: 1
SINUS PAIN: 0
EYE REDNESS: 0
ABDOMINAL PAIN: 0
BACK PAIN: 0

## 2020-10-30 NOTE — PROGRESS NOTES
10/30/20  Luiz Mtz : 1985 Sex: female  Age: 38 y.o. Chief Complaint   Patient presents with    Hypertension       The patient is here for blood pressure check. Patient has been taking their medications as prescribed. No recent changes to their medications. No headache or visual disturbances. No dizziness or tinnitus. No chest pain, palpitations, or dyspnea. No nausea and vomiting. No numbness, tingling and or weakness to the extremities. No fevers or chills. No recent illness. Review of Systems   Constitutional: Negative for appetite change, fatigue and unexpected weight change. HENT: Negative for congestion, ear pain, hearing loss, sinus pain, sore throat and trouble swallowing. Eyes: Negative for photophobia, pain, discharge and redness. Respiratory: Negative for cough, chest tightness and shortness of breath. Cardiovascular: Negative for chest pain, palpitations and leg swelling. Gastrointestinal: Negative for abdominal pain, blood in stool, diarrhea, nausea and vomiting. Endocrine: Negative. Genitourinary: Negative for dysuria, flank pain, frequency and hematuria. Musculoskeletal: Negative for arthralgias, back pain, joint swelling and myalgias. Skin: Negative. Allergic/Immunologic: Negative. Neurological: Negative for dizziness, seizures, syncope, weakness, light-headedness, numbness and headaches. Hematological: Negative for adenopathy. Does not bruise/bleed easily. Psychiatric/Behavioral: Negative.           Current Outpatient Medications:     hydroCHLOROthiazide (HYDRODIURIL) 25 MG tablet, Take 1 tablet by mouth every morning, Disp: 30 tablet, Rfl: 0    lisinopril (PRINIVIL;ZESTRIL) 20 MG tablet, Take 1 tablet by mouth daily, Disp: 30 tablet, Rfl: 2  Allergies   Allergen Reactions    Pcn [Penicillins] Rash       Past Medical History:   Diagnosis Date    Abnormal Pap smear of cervix     Back pain     Chronic sinusitis     with facial pain    Depression 6/6/2011    Disease of blood and blood forming organ     hemacromotosis high levels iron    Hemochromatosis     Migraines     born with heart murmur    Seasonal allergies      Past Surgical History:   Procedure Laterality Date    SINUS SURGERY N/A 2/25/16    FUNCTIONAL ENDOSCOPIC SINUS SURGERY WITH BILATERAL MAXILLARY, FRONTAL & SPENOID ANTROSTOMIES, SUBMUCCOSAL RESECTION OF INFERIOR TURBINATES     Family History   Problem Relation Age of Onset    Arthritis Mother     Other Mother         sinus problems    High Blood Pressure Father     Other Maternal Aunt         migraine    Other Maternal Uncle         migraine    Other Paternal Aunt         migraine    Other Paternal Uncle         migraine    Arthritis Maternal Grandmother      Social History     Socioeconomic History    Marital status:      Spouse name: Not on file    Number of children: Not on file    Years of education: Not on file    Highest education level: Not on file   Occupational History    Not on file   Social Needs    Financial resource strain: Not on file    Food insecurity     Worry: Not on file     Inability: Not on file    Transportation needs     Medical: Not on file     Non-medical: Not on file   Tobacco Use    Smoking status: Never Smoker    Smokeless tobacco: Never Used   Substance and Sexual Activity    Alcohol use: No     Alcohol/week: 0.0 standard drinks    Drug use: No    Sexual activity: Not on file   Lifestyle    Physical activity     Days per week: Not on file     Minutes per session: Not on file    Stress: Not on file   Relationships    Social connections     Talks on phone: Not on file     Gets together: Not on file     Attends Nondenominational service: Not on file     Active member of club or organization: Not on file     Attends meetings of clubs or organizations: Not on file     Relationship status: Not on file    Intimate partner violence     Fear of current or ex partner: Not on file Emotionally abused: Not on file     Physically abused: Not on file     Forced sexual activity: Not on file   Other Topics Concern    Not on file   Social History Narrative    Not on file       Vitals:    10/30/20 1541 10/30/20 1552   BP:  138/82   Pulse: 110    Resp: 16    Temp: 97.2 °F (36.2 °C)    TempSrc: Temporal    SpO2: 98%    Weight: 281 lb (127.5 kg)    Height: 5' 9\" (1.753 m)        Physical Exam  Vitals signs and nursing note reviewed. Constitutional:       Appearance: She is well-developed. HENT:      Head: Atraumatic. Right Ear: External ear normal.      Left Ear: External ear normal.      Nose: Nose normal.      Mouth/Throat:      Pharynx: No oropharyngeal exudate. Eyes:      Conjunctiva/sclera: Conjunctivae normal.      Pupils: Pupils are equal, round, and reactive to light. Neck:      Musculoskeletal: Normal range of motion and neck supple. Thyroid: No thyromegaly. Trachea: No tracheal deviation. Cardiovascular:      Rate and Rhythm: Normal rate and regular rhythm. Heart sounds: No murmur. No friction rub. No gallop. Pulmonary:      Effort: Pulmonary effort is normal. No respiratory distress. Breath sounds: Normal breath sounds. Abdominal:      General: Bowel sounds are normal.      Palpations: Abdomen is soft. Musculoskeletal: Normal range of motion. General: No tenderness or deformity. Lymphadenopathy:      Cervical: No cervical adenopathy. Skin:     General: Skin is warm and dry. Capillary Refill: Capillary refill takes less than 2 seconds. Findings: No rash. Neurological:      Mental Status: She is alert and oriented to person, place, and time. Sensory: No sensory deficit. Motor: No abnormal muscle tone. Coordination: Coordination normal.      Deep Tendon Reflexes: Reflexes normal.         Assessment and Plan:  Nathaniel Funk was seen today for hypertension.     Diagnoses and all orders for this visit:    Essential hypertension  -     hydroCHLOROthiazide (HYDRODIURIL) 25 MG tablet; Take 1 tablet by mouth every morning    Multiple thyroid nodules    [facility]    Return in about 2 weeks (around 11/13/2020).       Seen By:  Ari Anderson DO

## 2020-11-02 DIAGNOSIS — Z01.818 PREOP TESTING: Primary | ICD-10-CM

## 2020-11-03 DIAGNOSIS — Z01.818 PREOP TESTING: ICD-10-CM

## 2020-11-04 LAB
SARS-COV-2: NOT DETECTED
SOURCE: NORMAL

## 2020-11-09 ENCOUNTER — HOSPITAL ENCOUNTER (OUTPATIENT)
Dept: ULTRASOUND IMAGING | Age: 35
Discharge: HOME | End: 2020-11-11
Payer: COMMERCIAL

## 2020-11-09 PROCEDURE — 88173 CYTOPATH EVAL FNA REPORT: CPT

## 2020-11-09 PROCEDURE — 10005 FNA BX W/US GDN 1ST LES: CPT

## 2020-11-09 PROCEDURE — 88305 TISSUE EXAM BY PATHOLOGIST: CPT

## 2020-11-09 PROCEDURE — 10005 FNA BX W/US GDN 1ST LES: CPT | Performed by: RADIOLOGY

## 2020-11-09 NOTE — H&P
Interventional Radiology  Attending Pre-operative History and Physical    DIAGNOSIS:    Patient Active Problem List   Diagnosis    GERD with esophagitis    Allergic rhinitis    Congenital spondylolysis, lumbosacral region    Degeneration of lumbar or lumbosacral intervertebral disc    Depression    Hemochromatosis, hereditary ([facility] [address]    Migraine without aura, with intractable migraine, so stated, without mention of status migrainosus       CHIEF COMPLAINT: <principal problem not specified>          Current Outpatient Medications:     hydroCHLOROthiazide (HYDRODIURIL) 25 MG tablet, Take 1 tablet by mouth every morning, Disp: 30 tablet, Rfl: 0    lisinopril (PRINIVIL;ZESTRIL) 20 MG tablet, Take 1 tablet by mouth daily, Disp: 30 tablet, Rfl: 2    Allergies   Allergen Reactions    Pcn [Penicillins] Rash       Past Medical History:   Diagnosis Date    Abnormal Pap smear of cervix     Back pain     Chronic sinusitis     with facial pain    Depression 6/6/2011    Disease of blood and blood forming organ     hemacromotosis high levels iron    Hemochromatosis     Migraines     born with heart murmur    Seasonal allergies        Past Surgical History:   Procedure Laterality Date    SINUS SURGERY N/A 2/25/16    FUNCTIONAL ENDOSCOPIC SINUS SURGERY WITH BILATERAL MAXILLARY, FRONTAL & SPENOID ANTROSTOMIES, SUBMUCCOSAL RESECTION OF INFERIOR TURBINATES       Family History   Problem Relation Age of Onset    Arthritis Mother     Other Mother         sinus problems    High Blood Pressure Father     Other Maternal Aunt         migraine    Other Maternal Uncle         migraine    Other Paternal Aunt         migraine    Other Paternal Uncle         migraine    Arthritis Maternal Grandmother        Social History     Socioeconomic History    Marital status:      Spouse name: Not on file    Number of children: Not on file    Years of education: Not on file    Highest education level: Not on file Occupational History    Not on file   Social Needs    Financial resource strain: Not on file    Food insecurity     Worry: Not on file     Inability: Not on file    Transportation needs     Medical: Not on file     Non-medical: Not on file   Tobacco Use    Smoking status: Never Smoker    Smokeless tobacco: Never Used   Substance and Sexual Activity    Alcohol use: No     Alcohol/week: 0.0 standard drinks    Drug use: No    Sexual activity: Not on file   Lifestyle    Physical activity     Days per week: Not on file     Minutes per session: Not on file    Stress: Not on file   Relationships    Social connections     Talks on phone: Not on file     Gets together: Not on file     Attends Caodaism service: Not on file     Active member of club or organization: Not on file     Attends meetings of clubs or organizations: Not on file     Relationship status: Not on file    Intimate partner violence     Fear of current or ex partner: Not on file     Emotionally abused: Not on file     Physically abused: Not on file     Forced sexual activity: Not on file   Other Topics Concern    Not on file   Social History Narrative    Not on file       ROS: Non-contributory other than as noted above    PHYSICAL EXAM:      Heent: Alert and orientated.     Heart:  Rapid regular rhythm    Lungs:  demonstrate no contraindications to proceed      Abdomen:  normal      DATA:  CBC:   Lab Results   Component Value Date    WBC 10.9 10/06/2020    RBC 4.96 10/06/2020    HGB 14.7 10/06/2020    HCT 44.8 10/06/2020    MCV 90.3 10/06/2020    MCH 29.6 10/06/2020    MCHC 32.8 10/06/2020    RDW 12.4 10/06/2020     10/06/2020    MPV 8.9 10/06/2020     CBC with Differential:    Lab Results   Component Value Date    WBC 10.9 10/06/2020    RBC 4.96 10/06/2020    HGB 14.7 10/06/2020    HCT 44.8 10/06/2020     10/06/2020    MCV 90.3 10/06/2020    MCH 29.6 10/06/2020    MCHC 32.8 10/06/2020    RDW 12.4 10/06/2020    SEGSPCT 52 01/10/2012    LYMPHOPCT 33.0 10/06/2020    MONOPCT 4.7 10/06/2020    BASOPCT 0.8 10/06/2020    MONOSABS 0.51 10/06/2020    LYMPHSABS 3.60 10/06/2020    EOSABS 0.51 10/06/2020    BASOSABS 0.09 10/06/2020     Platelets:    Lab Results   Component Value Date     10/06/2020     BUN/Creatinine:    Lab Results   Component Value Date    BUN 10 10/06/2020    CREATININE 0.7 10/06/2020       ASSESSMENT AND PLAN:  1. Bilateral thyroid mass bx  2. Procedure options, risks and benefits reviewed with patient. Patient expresses understanding.     Electronically signed by Kristian De Oliveira II, MD on 11/9/2020 at 1:33 PM

## 2020-11-09 NOTE — BRIEF OP NOTE
Brief Postoperative Note    Debbie Rivero  YOB: 1985  [id number]    Pre-operative Diagnosis: mass  Bilateral thyroid mass bx  Post-operative Diagnosis: Same    Procedure: biopsy    Estimated Blood Loss: < 10 cc    Surgeon: Brett ALLEN     Complications: none    Specimens obtained: Yes, biopsy of mass    Findings: biopsy of a mass      Cem DAWOOD Tobias   11/9/2020 1:34 PM

## 2020-11-12 ENCOUNTER — OFFICE VISIT (OUTPATIENT)
Dept: FAMILY MEDICINE CLINIC | Age: 35
End: 2020-11-12
Payer: COMMERCIAL

## 2020-11-12 VITALS
HEART RATE: 80 BPM | TEMPERATURE: 97.7 F | RESPIRATION RATE: 16 BRPM | OXYGEN SATURATION: 99 % | HEIGHT: 69 IN | DIASTOLIC BLOOD PRESSURE: 80 MMHG | BODY MASS INDEX: 40.73 KG/M2 | SYSTOLIC BLOOD PRESSURE: 122 MMHG | WEIGHT: 275 LBS

## 2020-11-12 PROCEDURE — G8484 FLU IMMUNIZE NO ADMIN: HCPCS | Performed by: FAMILY MEDICINE

## 2020-11-12 PROCEDURE — G8427 DOCREV CUR MEDS BY ELIG CLIN: HCPCS | Performed by: FAMILY MEDICINE

## 2020-11-12 PROCEDURE — 99213 OFFICE O/P EST LOW 20 MIN: CPT | Performed by: FAMILY MEDICINE

## 2020-11-12 PROCEDURE — G8417 CALC BMI ABV UP PARAM F/U: HCPCS | Performed by: FAMILY MEDICINE

## 2020-11-12 PROCEDURE — 1036F TOBACCO NON-USER: CPT | Performed by: FAMILY MEDICINE

## 2020-11-12 RX ORDER — HYDROCHLOROTHIAZIDE 25 MG/1
25 TABLET ORAL EVERY MORNING
Qty: 30 TABLET | Refills: 1 | Status: SHIPPED
Start: 2020-11-12 | End: 2020-12-10 | Stop reason: SDUPTHER

## 2020-11-12 ASSESSMENT — ENCOUNTER SYMPTOMS
DIARRHEA: 0
SINUS PAIN: 0
EYE REDNESS: 0
TROUBLE SWALLOWING: 0
PHOTOPHOBIA: 0
SORE THROAT: 0
COUGH: 0
EYE PAIN: 0
SHORTNESS OF BREATH: 0
VOMITING: 0
ALLERGIC/IMMUNOLOGIC NEGATIVE: 1
BACK PAIN: 0
ABDOMINAL PAIN: 0
NAUSEA: 0
BLOOD IN STOOL: 0
EYE DISCHARGE: 0
CHEST TIGHTNESS: 0

## 2020-11-12 NOTE — PROGRESS NOTES
20  Alma Rosa Farrell Brothers : 1985 Sex: female  Age: 38 y.o. Chief Complaint   Patient presents with    Hypertension       The patient is here for blood pressure check. Patient has been taking their medications as prescribed. No recent changes to their medications. No headache or visual disturbances. No dizziness or tinnitus. No chest pain, palpitations, or dyspnea. No nausea and vomiting. No numbness, tingling and or weakness to the extremities. No fevers or chills. No recent illness. Biopsy results from thyroid were negative for malignancy. However she continues as if these are hot nodules with palpitationsm tenperature intolerances. She will reauire an endo referral      Review of Systems   Constitutional: Negative for appetite change, fatigue and unexpected weight change. HENT: Negative for congestion, ear pain, hearing loss, sinus pain, sore throat and trouble swallowing. Eyes: Negative for photophobia, pain, discharge and redness. Respiratory: Negative for cough, chest tightness and shortness of breath. Cardiovascular: Negative for chest pain, palpitations and leg swelling. Gastrointestinal: Negative for abdominal pain, blood in stool, diarrhea, nausea and vomiting. Endocrine: Negative. Genitourinary: Negative for dysuria, flank pain, frequency and hematuria. Musculoskeletal: Negative for arthralgias, back pain, joint swelling and myalgias. Skin: Negative. Allergic/Immunologic: Negative. Neurological: Negative for dizziness, seizures, syncope, weakness, light-headedness, numbness and headaches. Hematological: Negative for adenopathy. Does not bruise/bleed easily. Psychiatric/Behavioral: Negative.           Current Outpatient Medications:     hydroCHLOROthiazide (HYDRODIURIL) 25 MG tablet, Take 1 tablet by mouth every morning, Disp: 30 tablet, Rfl: 1    lisinopril (PRINIVIL;ZESTRIL) 20 MG tablet, Take 1 tablet by mouth daily, Disp: 30 tablet, Rfl: 2  Allergies   Allergen Reactions    Pcn [Penicillins] Rash       Past Medical History:   Diagnosis Date    Abnormal Pap smear of cervix     Back pain     Chronic sinusitis     with facial pain    Depression 6/6/2011    Disease of blood and blood forming organ     hemacromotosis high levels iron    Hemochromatosis     Migraines     born with heart murmur    Seasonal allergies      Past Surgical History:   Procedure Laterality Date    SINUS SURGERY N/A 2/25/16    FUNCTIONAL ENDOSCOPIC SINUS SURGERY WITH BILATERAL MAXILLARY, FRONTAL & SPENOID ANTROSTOMIES, SUBMUCCOSAL RESECTION OF INFERIOR TURBINATES     Family History   Problem Relation Age of Onset    Arthritis Mother     Other Mother         sinus problems    High Blood Pressure Father     Other Maternal Aunt         migraine    Other Maternal Uncle         migraine    Other Paternal Aunt         migraine    Other Paternal Uncle         migraine    Arthritis Maternal Grandmother      Social History     Socioeconomic History    Marital status:      Spouse name: Not on file    Number of children: Not on file    Years of education: Not on file    Highest education level: Not on file   Occupational History    Not on file   Social Needs    Financial resource strain: Not on file    Food insecurity     Worry: Not on file     Inability: Not on file    Transportation needs     Medical: Not on file     Non-medical: Not on file   Tobacco Use    Smoking status: Never Smoker    Smokeless tobacco: Never Used   Substance and Sexual Activity    Alcohol use: No     Alcohol/week: 0.0 standard drinks    Drug use: No    Sexual activity: Not on file   Lifestyle    Physical activity     Days per week: Not on file     Minutes per session: Not on file    Stress: Not on file   Relationships    Social connections     Talks on phone: Not on file     Gets together: Not on file     Attends Gnosticist service: Not on file     Active member of club or Deep Tendon Reflexes: Reflexes normal.         Assessment and Plan:  Ernie Ríos was seen today for hypertension. Diagnoses and all orders for this visit:    Melba Brandon MD, Endocrinology, [place]    Essential hypertension  -     hydroCHLOROthiazide (HYDRODIURIL) 25 MG tablet; Take 1 tablet by mouth every morning        Return in about 3 months (around 2/12/2021).       Seen By:  Susannah Duran DO

## 2020-12-04 RX ORDER — LISINOPRIL 20 MG/1
20 TABLET ORAL DAILY
Qty: 30 TABLET | Refills: 5 | Status: SHIPPED
Start: 2020-12-04 | End: 2021-06-29 | Stop reason: SDUPTHER

## 2020-12-04 NOTE — TELEPHONE ENCOUNTER
Last Appointment:  11/12/2020  Future Appointments   Date Time Provider Ab Alcalai   3/31/2021  2:40 PM Allegra Matias MD [facility]      Pt asking for refill on lisinopril

## 2020-12-10 ENCOUNTER — OFFICE VISIT (OUTPATIENT)
Dept: PRIMARY CARE CLINIC | Age: 35
End: 2020-12-10
Payer: COMMERCIAL

## 2020-12-10 VITALS
BODY MASS INDEX: 40.73 KG/M2 | SYSTOLIC BLOOD PRESSURE: 138 MMHG | TEMPERATURE: 97.5 F | RESPIRATION RATE: 20 BRPM | OXYGEN SATURATION: 100 % | DIASTOLIC BLOOD PRESSURE: 90 MMHG | HEART RATE: 82 BPM | WEIGHT: 275 LBS | HEIGHT: 69 IN

## 2020-12-10 DIAGNOSIS — Z20.822 ENCOUNTER FOR LABORATORY TESTING FOR COVID-19 VIRUS: ICD-10-CM

## 2020-12-10 LAB
Lab: NORMAL
QC PASS/FAIL: NORMAL
SARS-COV-2, POC: NORMAL

## 2020-12-10 PROCEDURE — 1036F TOBACCO NON-USER: CPT | Performed by: PHYSICIAN ASSISTANT

## 2020-12-10 PROCEDURE — G8484 FLU IMMUNIZE NO ADMIN: HCPCS | Performed by: PHYSICIAN ASSISTANT

## 2020-12-10 PROCEDURE — G8427 DOCREV CUR MEDS BY ELIG CLIN: HCPCS | Performed by: PHYSICIAN ASSISTANT

## 2020-12-10 PROCEDURE — G8417 CALC BMI ABV UP PARAM F/U: HCPCS | Performed by: PHYSICIAN ASSISTANT

## 2020-12-10 PROCEDURE — 87426 SARSCOV CORONAVIRUS AG IA: CPT | Performed by: PHYSICIAN ASSISTANT

## 2020-12-10 PROCEDURE — 99213 OFFICE O/P EST LOW 20 MIN: CPT | Performed by: PHYSICIAN ASSISTANT

## 2020-12-10 RX ORDER — AZITHROMYCIN 250 MG/1
250 TABLET, FILM COATED ORAL SEE ADMIN INSTRUCTIONS
Qty: 6 TABLET | Refills: 0 | Status: SHIPPED | OUTPATIENT
Start: 2020-12-10 | End: 2020-12-15

## 2020-12-10 RX ORDER — CHLORPHENIRAMINE MALEATE 4 MG/1
4 TABLET ORAL EVERY 6 HOURS PRN
Qty: 20 TABLET | Refills: 0 | Status: SHIPPED
Start: 2020-12-10 | End: 2021-03-11

## 2020-12-10 RX ORDER — HYDROCHLOROTHIAZIDE 25 MG/1
25 TABLET ORAL EVERY MORNING
Qty: 30 TABLET | Refills: 5 | Status: SHIPPED
Start: 2020-12-10 | End: 2021-08-04

## 2020-12-10 RX ORDER — BENZONATATE 100 MG/1
100 CAPSULE ORAL 3 TIMES DAILY PRN
Qty: 21 CAPSULE | Refills: 0 | Status: SHIPPED | OUTPATIENT
Start: 2020-12-10 | End: 2020-12-17

## 2020-12-10 NOTE — TELEPHONE ENCOUNTER
Last Appointment:  11/12/2020  Future Appointments   Date Time Provider Ab Smith   3/31/2021  2:40 PM Susana Jonas MD [facility]      Patient needs refill on hctz to [facility]

## 2020-12-10 NOTE — PROGRESS NOTES
12/10/20  Yesenia Mtz : 1985 Sex: female  Age 38 y.o. Subjective:  Chief Complaint   Patient presents with    Nasal Congestion     sx x 3 days    Drainage    Cough    Pharyngitis    Diarrhea    Covid Testing     tested for covid beginning of November         HPI:   Yesenia Mtz , 38 y.o. female presents to express care for evaluation of nasal congestion, drainage, cough, sore throat. The patient had a little bit of diarrhea as well. The patient had a Covid test in the beginning of November that was negative. The patient states that any other year she would not think anything of this. Her son does have some mild symptoms similar to what she has currently. The patient is a teacher. ROS:   Unless otherwise stated in this report the patient's positive and negative responses for review of systems for constitutional, eyes, ENT, cardiovascular, respiratory, gastrointestinal, neurological, , musculoskeletal, and integument systems and related systems to the presenting problem are either stated in the history of present illness or were not pertinent or were negative for the symptoms and/or complaints related to the presenting medical problem. Positives and pertinent negatives as per HPI. All others reviewed and are negative.       PMH:     Past Medical History:   Diagnosis Date    Abnormal Pap smear of cervix     Back pain     Chronic sinusitis     with facial pain    Depression 2011    Disease of blood and blood forming organ     hemacromotosis high levels iron    Hemochromatosis     Migraines     born with heart murmur    Seasonal allergies        Past Surgical History:   Procedure Laterality Date    SINUS SURGERY N/A 16    FUNCTIONAL ENDOSCOPIC SINUS SURGERY WITH BILATERAL MAXILLARY, FRONTAL & SPENOID ANTROSTOMIES, SUBMUCCOSAL RESECTION OF INFERIOR TURBINATES       Family History   Problem Relation Age of Onset    Arthritis Mother    [facility] Other Mother post-auricular tenderness, erythema, or swelling noted. Nasal congestion, rhinorrhea  Posterior oropharynx shows slight erythema but no evidence of tonsillar hypertrophy, or exudate. the uvula is midline. No trismus or drooling is noted. Moist mucous membranes. Neck: No anterior/posterior lymphadenopathy noted. No erythema, no masses, no fluctuance or induration noted. No meningeal signs. Cardiovascular: Regular Rate and Rhythm  Respiratory: No acute distress, no rhonchi, wheezing or crackles noted. No stridor or retractions are noted. Neurological: A&O x4, normal speech  Psychiatric: Cooperative         Testing:     Results for orders placed or performed in visit on 12/10/20   POCT COVID-19, Antigen   Result Value Ref Range    SARS-COV-2, POC Not-Detected Not Detected    Lot Number [id number]     QC Pass/Fail pass      Formal PCR test will be sent out for evaluation      Medical Decision Making:       The patient has been seen and evaluated. The vital signs have been reviewed. The patient does not appear to be toxic or lethargic. COVID-19 test in the office was negative. We will send off a formal PCR testing. The patient will be treated with a azithromycin, Tessalon Perles and chlorphentermine. The patient will monitor symptoms. I did recommend that she have her time son checked and evaluated    The patient was educated on the proper dosage of motrin and tylenol and the appropriate intervals of each. The patient is to increase fluid intake over the next several days. The patient is to use OTC decongestant as needed. The patient is to return to express care or go directly to the emergency department should any of the signs or symptoms worsen. The patient is to followup with primary care physician in 2-3 days for repeat evaluation. The patient has no other questions or concerns at this time the patient will be discharged home.       Clinical Impression:   Vernel Libman was seen today for nasal congestion, drainage, cough, pharyngitis, diarrhea and covid testing. Diagnoses and all orders for this visit:    Encounter for laboratory testing for COVID-19 virus  -     POCT COVID-19, Antigen  -     COVID-19 Ambulatory; Future  -     azithromycin (ZITHROMAX) 250 MG tablet; Take 1 tablet by mouth See Admin Instructions for 5 days 500mg on day 1 followed by 250mg on days 2 - 5    Acute upper respiratory infection, unspecified    Acute non-recurrent sinusitis, unspecified location    Other orders  -     chlorpheniramine (ALLER-CHLOR) 4 MG tablet; Take 1 tablet by mouth every 6 hours as needed for Allergies  -     benzonatate (TESSALON) 100 MG capsule; Take 1 capsule by mouth 3 times daily as needed for Cough        The patient is to call for any concerns or return if any of the signs or symptoms worsen. The patient is to follow-up with PCP in the next 2-3 days for repeat evaluation repeat assessment or go directly to the emergency department.      SIGNATURE: Heriberto Mcdaniel III, PA-C

## 2020-12-12 LAB
SARS-COV-2: NOT DETECTED
SOURCE: NORMAL

## 2021-03-05 ENCOUNTER — TELEPHONE (OUTPATIENT)
Dept: FAMILY MEDICINE CLINIC | Age: 36
End: 2021-03-05

## 2021-03-05 NOTE — TELEPHONE ENCOUNTER
Pt called to say that she was advised by her neurologist to give you a call and let you know that she recommended Lyrica or Gabapentin. Pt is leaning more towards taking Lyrica for her issues than the Gabapentin. She is wanting to know if you would be willing to prescribe? Please advise.

## 2021-03-11 ENCOUNTER — OFFICE VISIT (OUTPATIENT)
Dept: FAMILY MEDICINE CLINIC | Age: 36
End: 2021-03-11
Payer: COMMERCIAL

## 2021-03-11 VITALS
RESPIRATION RATE: 18 BRPM | DIASTOLIC BLOOD PRESSURE: 80 MMHG | HEIGHT: 69 IN | HEART RATE: 82 BPM | WEIGHT: 277 LBS | TEMPERATURE: 97.3 F | OXYGEN SATURATION: 99 % | BODY MASS INDEX: 41.03 KG/M2 | SYSTOLIC BLOOD PRESSURE: 138 MMHG

## 2021-03-11 DIAGNOSIS — R20.2 PARESTHESIAS: ICD-10-CM

## 2021-03-11 DIAGNOSIS — K21.00 GASTROESOPHAGEAL REFLUX DISEASE WITH ESOPHAGITIS WITHOUT HEMORRHAGE: Primary | ICD-10-CM

## 2021-03-11 PROCEDURE — G8417 CALC BMI ABV UP PARAM F/U: HCPCS | Performed by: FAMILY MEDICINE

## 2021-03-11 PROCEDURE — 99213 OFFICE O/P EST LOW 20 MIN: CPT | Performed by: FAMILY MEDICINE

## 2021-03-11 PROCEDURE — G8427 DOCREV CUR MEDS BY ELIG CLIN: HCPCS | Performed by: FAMILY MEDICINE

## 2021-03-11 PROCEDURE — G8484 FLU IMMUNIZE NO ADMIN: HCPCS | Performed by: FAMILY MEDICINE

## 2021-03-11 PROCEDURE — 1036F TOBACCO NON-USER: CPT | Performed by: FAMILY MEDICINE

## 2021-03-11 RX ORDER — PANTOPRAZOLE SODIUM 20 MG/1
20 TABLET, DELAYED RELEASE ORAL
Qty: 30 TABLET | Refills: 0 | Status: SHIPPED
Start: 2021-03-11 | End: 2021-04-12

## 2021-03-11 ASSESSMENT — ENCOUNTER SYMPTOMS
ABDOMINAL PAIN: 0
SINUS PAIN: 0
SORE THROAT: 0
DIARRHEA: 0
COUGH: 0
CHEST TIGHTNESS: 0
ALLERGIC/IMMUNOLOGIC NEGATIVE: 1
TROUBLE SWALLOWING: 0
VOMITING: 0
EYE DISCHARGE: 0
NAUSEA: 0
BACK PAIN: 0
SHORTNESS OF BREATH: 0
EYE PAIN: 0
BLOOD IN STOOL: 0
EYE REDNESS: 0
PHOTOPHOBIA: 0

## 2021-03-11 NOTE — PROGRESS NOTES
trouble swallowing. Eyes: Negative for photophobia, pain, discharge and redness. Respiratory: Negative for cough, chest tightness and shortness of breath. Cardiovascular: Negative for chest pain, palpitations and leg swelling. Gastrointestinal: Negative for abdominal pain, blood in stool, diarrhea, nausea and vomiting. Endocrine: Negative. Genitourinary: Negative for dysuria, flank pain, frequency and hematuria. Musculoskeletal: Negative for arthralgias, back pain, joint swelling and myalgias. Skin: Negative. Allergic/Immunologic: Negative. Neurological: Positive for numbness and headaches. Negative for dizziness, seizures, syncope, weakness and light-headedness. Hematological: Negative for adenopathy. Does not bruise/bleed easily. Psychiatric/Behavioral: Negative.           Current Outpatient Medications:     pantoprazole (PROTONIX) 20 MG tablet, Take 1 tablet by mouth every morning (before breakfast), Disp: 30 tablet, Rfl: 0    hydroCHLOROthiazide (HYDRODIURIL) 25 MG tablet, Take 1 tablet by mouth every morning, Disp: 30 tablet, Rfl: 5    lisinopril (PRINIVIL;ZESTRIL) 20 MG tablet, Take 1 tablet by mouth daily, Disp: 30 tablet, Rfl: 5  Allergies   Allergen Reactions    Pcn [Penicillins] Rash       Past Medical History:   Diagnosis Date    Abnormal Pap smear of cervix     Back pain     Chronic sinusitis     with facial pain    Depression 6/6/2011    Disease of blood and blood forming organ     hemacromotosis high levels iron    Hemochromatosis     Migraines     born with heart murmur    Seasonal allergies      Past Surgical History:   Procedure Laterality Date    SINUS SURGERY N/A 2/25/16    FUNCTIONAL ENDOSCOPIC SINUS SURGERY WITH BILATERAL MAXILLARY, FRONTAL & SPENOID ANTROSTOMIES, SUBMUCCOSAL RESECTION OF INFERIOR TURBINATES     Family History   Problem Relation Age of Onset    Arthritis Mother     Other Mother         sinus problems    High Blood Pressure Father     Other Maternal Aunt         migraine    Other Maternal Uncle         migraine    Other Paternal Aunt         migraine    Other Paternal Uncle         migraine    Arthritis Maternal Grandmother      Social History     Socioeconomic History    Marital status:      Spouse name: Not on file    Number of children: Not on file    Years of education: Not on file    Highest education level: Not on file   Occupational History    Not on file   Social Needs    Financial resource strain: Not on file    Food insecurity     Worry: Not on file     Inability: Not on file    Transportation needs     Medical: Not on file     Non-medical: Not on file   Tobacco Use    Smoking status: Never Smoker    Smokeless tobacco: Never Used   Substance and Sexual Activity    Alcohol use: No     Alcohol/week: 0.0 standard drinks    Drug use: No    Sexual activity: Not on file   Lifestyle    Physical activity     Days per week: Not on file     Minutes per session: Not on file    Stress: Not on file   Relationships    Social connections     Talks on phone: Not on file     Gets together: Not on file     Attends [employer]ostalism service: Not on file     Active member of club or organization: Not on file     Attends meetings of clubs or organizations: Not on file     Relationship status: Not on file    Intimate partner violence     Fear of current or ex partner: Not on file     Emotionally abused: Not on file     Physically abused: Not on file     Forced sexual activity: Not on file   Other Topics Concern    Not on file   Social History Narrative    Not on file       Vitals:    03/11/21 0845   BP: 138/80   Pulse: 82   Resp: 18   Temp: 97.3 °F (36.3 °C)   TempSrc: Temporal   SpO2: 99%   Weight: 277 lb (125.6 kg)   Height: 5' 9\" (1.753 m)       Physical Exam  Vitals signs and nursing note reviewed. Constitutional:       Appearance: She is well-developed. HENT:      Head: Atraumatic.       Right Ear: External ear normal.      Left

## 2021-03-31 ENCOUNTER — OFFICE VISIT (OUTPATIENT)
Dept: ENDOCRINOLOGY | Age: 36
End: 2021-03-31
Payer: COMMERCIAL

## 2021-03-31 ENCOUNTER — INITIAL CONSULT (OUTPATIENT)
Dept: NEUROSURGERY | Age: 36
End: 2021-03-31
Payer: COMMERCIAL

## 2021-03-31 ENCOUNTER — TELEPHONE (OUTPATIENT)
Dept: FAMILY MEDICINE CLINIC | Age: 36
End: 2021-03-31

## 2021-03-31 ENCOUNTER — HOSPITAL ENCOUNTER (OUTPATIENT)
Age: 36
Discharge: HOME | End: 2021-03-31
Payer: COMMERCIAL

## 2021-03-31 VITALS
WEIGHT: 282 LBS | DIASTOLIC BLOOD PRESSURE: 78 MMHG | TEMPERATURE: 98.4 F | HEART RATE: 102 BPM | BODY MASS INDEX: 41.64 KG/M2 | SYSTOLIC BLOOD PRESSURE: 140 MMHG | OXYGEN SATURATION: 97 %

## 2021-03-31 DIAGNOSIS — R63.5 ABNORMAL WEIGHT GAIN: ICD-10-CM

## 2021-03-31 DIAGNOSIS — R20.0 NUMBNESS IN BOTH LEGS: ICD-10-CM

## 2021-03-31 DIAGNOSIS — R20.0 NUMBNESS IN BOTH LEGS: Primary | ICD-10-CM

## 2021-03-31 DIAGNOSIS — E04.9 GOITER: ICD-10-CM

## 2021-03-31 DIAGNOSIS — E66.01 MORBID OBESITY (HCC): ICD-10-CM

## 2021-03-31 DIAGNOSIS — E04.2 MULTINODULAR GOITER: ICD-10-CM

## 2021-03-31 DIAGNOSIS — I10 HYPERTENSION, UNSPECIFIED TYPE: Primary | ICD-10-CM

## 2021-03-31 LAB
FOLATE: 11.7 NG/ML (ref 4.8–24.2)
VITAMIN B-12: 769 PG/ML (ref 211–946)

## 2021-03-31 PROCEDURE — G8484 FLU IMMUNIZE NO ADMIN: HCPCS | Performed by: INTERNAL MEDICINE

## 2021-03-31 PROCEDURE — 99204 OFFICE O/P NEW MOD 45 MIN: CPT | Performed by: PHYSICIAN ASSISTANT

## 2021-03-31 PROCEDURE — G8428 CUR MEDS NOT DOCUMENT: HCPCS | Performed by: PHYSICIAN ASSISTANT

## 2021-03-31 PROCEDURE — G8417 CALC BMI ABV UP PARAM F/U: HCPCS | Performed by: INTERNAL MEDICINE

## 2021-03-31 PROCEDURE — G8484 FLU IMMUNIZE NO ADMIN: HCPCS | Performed by: PHYSICIAN ASSISTANT

## 2021-03-31 PROCEDURE — G8417 CALC BMI ABV UP PARAM F/U: HCPCS | Performed by: PHYSICIAN ASSISTANT

## 2021-03-31 PROCEDURE — 82607 VITAMIN B-12: CPT

## 2021-03-31 PROCEDURE — 82746 ASSAY OF FOLIC ACID SERUM: CPT

## 2021-03-31 PROCEDURE — 1036F TOBACCO NON-USER: CPT | Performed by: PHYSICIAN ASSISTANT

## 2021-03-31 PROCEDURE — 36415 COLL VENOUS BLD VENIPUNCTURE: CPT

## 2021-03-31 PROCEDURE — G8428 CUR MEDS NOT DOCUMENT: HCPCS | Performed by: INTERNAL MEDICINE

## 2021-03-31 PROCEDURE — 1036F TOBACCO NON-USER: CPT | Performed by: INTERNAL MEDICINE

## 2021-03-31 PROCEDURE — 99204 OFFICE O/P NEW MOD 45 MIN: CPT | Performed by: INTERNAL MEDICINE

## 2021-03-31 RX ORDER — DEXAMETHASONE 1 MG
1 TABLET ORAL ONCE
Qty: 1 TABLET | Refills: 0 | Status: SHIPPED | OUTPATIENT
Start: 2021-03-31 | End: 2021-03-31

## 2021-03-31 ASSESSMENT — ENCOUNTER SYMPTOMS
EYES NEGATIVE: 1
RESPIRATORY NEGATIVE: 1
BACK PAIN: 1
ALLERGIC/IMMUNOLOGIC NEGATIVE: 1
GASTROINTESTINAL NEGATIVE: 1

## 2021-03-31 NOTE — PROGRESS NOTES
sinusitis     with facial pain    Depression 6/6/2011    Disease of blood and blood forming organ     hemacromotosis high levels iron    Hemochromatosis     Migraines     born with heart murmur    Seasonal allergies        PAST SURGICAL HISTORY   Past Surgical History:   Procedure Laterality Date    SINUS SURGERY N/A 2/25/16    FUNCTIONAL ENDOSCOPIC SINUS SURGERY WITH BILATERAL MAXILLARY, FRONTAL & SPENOID ANTROSTOMIES, SUBMUCCOSAL RESECTION OF INFERIOR TURBINATES       SOCIAL HISTORY   Tobacco:   reports that she has never smoked. She has never used smokeless tobacco.  Alcohol:   reports no history of alcohol use. Drugs:   reports no history of drug use. FAMILY HISTORY   Family History   Problem Relation Age of Onset    Arthritis Mother     Other Mother         sinus problems    High Blood Pressure Father     Other Maternal Aunt         migraine    Other Maternal Uncle         migraine    Other Paternal Aunt         migraine    Other Paternal Uncle         migraine    Arthritis Maternal Grandmother        ALLERGIES AND DRUG REACTIONS   Allergies   Allergen Reactions    Pcn [Penicillins] Rash       CURRENT MEDICATIONS   Current Outpatient Medications   Medication Sig Dispense Refill    dexamethasone (DECADRON) 1 MG tablet Take 1 tablet by mouth once for 1 dose Take 1 tablet of Dexamethasone 1 mg at 11 pm and go to the Lab next morning at 8am 1 tablet 0    pantoprazole (PROTONIX) 20 MG tablet Take 1 tablet by mouth every morning (before breakfast) 30 tablet 0    hydroCHLOROthiazide (HYDRODIURIL) 25 MG tablet Take 1 tablet by mouth every morning 30 tablet 5    lisinopril (PRINIVIL;ZESTRIL) 20 MG tablet Take 1 tablet by mouth daily 30 tablet 5     No current facility-administered medications for this visit. Review of Systems  Constitutional: No fever, no chills, no diaphoresis, no generalized weakness.   HEENT: No blurred vision, No sore throat, no ear pain, no hair loss  Neck: denied any neck swelling, difficulty swallowing,   Cadrdiopulomary: No CP, SOB or palpitation, No orthopnea or PND. No cough or wheezing. GI: No N/V/D, no constipation, No abdominal pain, no melena or hematochezia   : Denied any dysuria, hematuria, flank pain, discharge, or incontinence. Skin: denied any rash, ulcer, Hirsute, or hyperpigmentation. MSK: denied any joint deformity, joint pain/swelling, muscle pain, or back pain. Neuro: no numbess, no tingling, no weakness,     OBJECTIVE    BP (!) 140/78   Pulse 102   Temp 98.4 °F (36.9 °C)   Wt 282 lb (127.9 kg)   SpO2 97%   BMI 41.64 kg/m²   BP Readings from Last 4 Encounters:   03/31/21 (!) 140/78   03/11/21 138/80   12/10/20 (!) 138/90   11/12/20 122/80     Wt Readings from Last 6 Encounters:   03/31/21 282 lb (127.9 kg)   03/11/21 277 lb (125.6 kg)   12/10/20 275 lb (124.7 kg)   11/12/20 275 lb (124.7 kg)   10/30/20 281 lb (127.5 kg)   10/06/20 270 lb (122.5 kg)       Physical examination:  General: awake alert, oriented x3, no abnormal position or movements. HEENT: normocephalic non traumatic  Neck: supple, no LN enlargement, no thyromegaly, Lt side thyroid nodule is palpable, no thyroid tenderness, no JVD. Pulm: Clear equal air entry no added sounds, no wheezing or rhonchi    CVS: S1 + S2, no murmur, no heave. Dorsalis pedis pulse palpable   Abd: soft lax, no tenderness, no organomegaly, audible bowel sounds. Skin: warm, no lesions, no rash.  No Palmar erythema  Musculoskeletal: No back tenderness, no kyphosis/scoliosis     Neuro: CN intact, sensation normal , muscle power normal. No tremors   Psych: normal mood, and affect    Review of Laboratory Data:  I personally reviewed the following labs:   Lab Results   Component Value Date/Time    WBC 10.9 10/06/2020 04:55 PM    RBC 4.96 10/06/2020 04:55 PM    HGB 14.7 10/06/2020 04:55 PM    HCT 44.8 10/06/2020 04:55 PM    MCV 90.3 10/06/2020 04:55 PM    MCH 29.6 10/06/2020 04:55 PM    MCHC 32.8 10/06/2020 04:55 PM RDW 12.4 10/06/2020 04:55 PM     10/06/2020 04:55 PM    MPV 8.9 10/06/2020 04:55 PM      Lab Results   Component Value Date/Time     10/06/2020 04:55 PM    K 4.1 10/06/2020 04:55 PM    CO2 26 10/06/2020 04:55 PM    BUN 10 10/06/2020 04:55 PM    CREATININE 0.7 10/06/2020 04:55 PM    CALCIUM 9.3 10/06/2020 04:55 PM    LABGLOM >60 10/06/2020 04:55 PM    GFRAA >60 10/06/2020 04:55 PM      Lab Results   Component Value Date/Time    TSH 1.150 06/09/2020 10:30 AM     Lab Results   Component Value Date    GLUCOSE 116 10/06/2020    GLUCOSE 89 01/10/2012     Lab Results   Component Value Date    TRIG 147 06/09/2020    HDL 52 06/09/2020    LDLCALC 113 06/09/2020    CHOL 194 06/09/2020     No results found for: [address] Brothers, a 39 y.o.-old female seen in for the following issues     Multinodular goiter   · FNA to dominant Lt side thyroid nodule was benign in 11/2020   ·  I will continue following with US  · Repeat US in few weeks then in a year of nodule remained stable   · Check TFT     Obesity   · With HTN will order 1 mg DST   · Ordered referral to Wt loss clinic     I personally reviewed external notes from PCP and other patient's care team providers, and personally interpreted labs associated with the above diagnosis. I also ordered labs to further assess and manage the above addressed medical conditions. Return in about 5 months (around 8/31/2021) for Multinodular goiter, VitD deficiency, HTN . The above issues were reviewed with the patient who understood and agreed with the plan. Thank you for allowing us to participate in the care of this patient. Please do not hesitate to contact us with any additional questions. Diagnosis Orders   1. Hypertension, unspecified type  dexamethasone (DECADRON) 1 MG tablet    Cortisol Total    Basic Metabolic Panel   2.  Abnormal weight gain  dexamethasone (DECADRON) 1 MG tablet    Cortisol Total    TSH without Reflex    T4, Free    Basic Metabolic Panel   3. Goiter  TSH without Reflex    T4, Free    Thyroid AB    T4   4. Multinodular goiter  TSH without Reflex    T4, Free    US THYROID   5. Morbid obesity [facility])  Latosha Fay MD, CHYNA POOL [facility], Surgical Weight Loss [address] [facility] [place] MD  Endocrinologist, [facility])   [facility] [address] [facility] [address]   Phone: [phone]  Fax: [phone]  -------------------------   An electronic signature was used to authenticate this note.  Sarina Alonso MD on 3/31/2021 at 8:11 PM

## 2021-03-31 NOTE — PROGRESS NOTES
Subjective:      Patient ID: Morenita Osborn is a 39 y.o. female. Back Pain  This is a chronic problem. Episode onset: over 20 years. The problem occurs daily. The problem has been gradually worsening since onset. The pain is present in the lumbar spine. The quality of the pain is described as aching. The pain is at a severity of 5/10. The symptoms are aggravated by twisting, standing and bending. Associated symptoms include headaches and numbness. (Leg numbness and tingling. Bilateral arm and face numbness/tingling) She has tried chiropractic manipulation and NSAIDs (KIKE, facet injections, PT) for the symptoms. The treatment provided mild relief. Review of Systems   Constitutional: Negative. HENT: Negative. Eyes: Negative. Respiratory: Negative. Cardiovascular: Negative. Gastrointestinal: Negative. Endocrine: Negative. Genitourinary: Negative. Musculoskeletal: Positive for back pain. Skin: Negative. Allergic/Immunologic: Negative. Neurological: Positive for numbness and headaches. Hematological: Negative. Psychiatric/Behavioral: Negative. Objective:   Physical Exam  Constitutional:       Appearance: Normal appearance. HENT:      Head: Normocephalic and atraumatic. Nose: Nose normal.   Eyes:      Pupils: Pupils are equal, round, and reactive to light. Pulmonary:      Effort: Pulmonary effort is normal.   Abdominal:      General: There is no distension. Skin:     General: Skin is warm and dry. Neurological:      Mental Status: She is alert. GCS: GCS eye subscore is 4. GCS verbal subscore is 5. GCS motor subscore is 6. Cranial Nerves: Cranial nerves are intact. Psychiatric:         Mood and Affect: Mood normal.         Assessment:      39year old female with complaints including:  Headaches, neck/mid/low back pain, facial and all extremity numbness.   Spine x-ray reveals grade 2 spondylolithesis at L5-S1 and degenerative changes in cervical and thoracic spine. Plan: We will order a B12 level. She may continue with PT and NSAIDS. If conservative measures fail, we will order a spine and brain MRI.         JUDSON Bennett

## 2021-04-05 NOTE — TELEPHONE ENCOUNTER
Cannot pend unsure if you want voltaren gel vs voltaren pills. Unsure of doseing or directions of use. Does not mention in last note.

## 2021-04-11 DIAGNOSIS — K21.00 GASTROESOPHAGEAL REFLUX DISEASE WITH ESOPHAGITIS WITHOUT HEMORRHAGE: ICD-10-CM

## 2021-04-12 RX ORDER — PANTOPRAZOLE SODIUM 20 MG/1
TABLET, DELAYED RELEASE ORAL
Qty: 30 TABLET | Refills: 0 | Status: SHIPPED
Start: 2021-04-12 | End: 2021-05-20

## 2021-04-12 NOTE — TELEPHONE ENCOUNTER
Last Appointment:  3/11/2021  Future Appointments   Date Time Provider Ab Smith   4/14/2021  3:30 PM [facility]   9/15/2021  3:00 PM [facility]      Refill requested

## 2021-04-13 ENCOUNTER — TELEPHONE (OUTPATIENT)
Dept: BARIATRICS/WEIGHT MGMT | Age: 36
End: 2021-04-13

## 2021-04-14 ENCOUNTER — OFFICE VISIT (OUTPATIENT)
Dept: FAMILY MEDICINE CLINIC | Age: 36
End: 2021-04-14
Payer: COMMERCIAL

## 2021-04-14 VITALS
HEIGHT: 69 IN | RESPIRATION RATE: 18 BRPM | HEART RATE: 97 BPM | TEMPERATURE: 97.2 F | WEIGHT: 280 LBS | DIASTOLIC BLOOD PRESSURE: 84 MMHG | SYSTOLIC BLOOD PRESSURE: 134 MMHG | BODY MASS INDEX: 41.47 KG/M2 | OXYGEN SATURATION: 98 %

## 2021-04-14 DIAGNOSIS — R20.2 PARESTHESIAS: Primary | ICD-10-CM

## 2021-04-14 DIAGNOSIS — E83.110 HEMOCHROMATOSIS, HEREDITARY (HCC): ICD-10-CM

## 2021-04-14 DIAGNOSIS — G43.019 INTRACTABLE MIGRAINE WITHOUT AURA AND WITHOUT STATUS MIGRAINOSUS: ICD-10-CM

## 2021-04-14 DIAGNOSIS — Q76.2 CONGENITAL SPONDYLOLYSIS, LUMBOSACRAL REGION: ICD-10-CM

## 2021-04-14 PROCEDURE — G8427 DOCREV CUR MEDS BY ELIG CLIN: HCPCS | Performed by: FAMILY MEDICINE

## 2021-04-14 PROCEDURE — 99213 OFFICE O/P EST LOW 20 MIN: CPT | Performed by: FAMILY MEDICINE

## 2021-04-14 PROCEDURE — 1036F TOBACCO NON-USER: CPT | Performed by: FAMILY MEDICINE

## 2021-04-14 PROCEDURE — G8417 CALC BMI ABV UP PARAM F/U: HCPCS | Performed by: FAMILY MEDICINE

## 2021-04-14 ASSESSMENT — ENCOUNTER SYMPTOMS
ABDOMINAL PAIN: 0
BLOOD IN STOOL: 0
EYE PAIN: 0
EYE DISCHARGE: 0
TROUBLE SWALLOWING: 0
CHEST TIGHTNESS: 0
BACK PAIN: 0
SINUS PAIN: 0
PHOTOPHOBIA: 0
SHORTNESS OF BREATH: 0
NAUSEA: 0
DIARRHEA: 0
EYE REDNESS: 0
ALLERGIC/IMMUNOLOGIC NEGATIVE: 1
COUGH: 0
SORE THROAT: 0
VOMITING: 0

## 2021-04-14 NOTE — PROGRESS NOTES
21  Julee Crisostomo Brothers : 1985 Sex: female  Age: 39 y.o. Assessment and Plan:  Neil Padilla was seen today for hypertension. Diagnoses and all orders for this visit:    Paresthesias  Continues locally with endocrinology and neurosurgery. Intractable migraine without aura and without status migrainosus  This remains stable    Congenital spondylolysis, lumbosacral region  Unchanged. Hemochromatosis, hereditary ([address]  Pending blood work to recheck CBC. Return in about 4 weeks (around 2021). Chief Complaint   Patient presents with    Hypertension       The patient is here for blood pressure check. Patient has been taking their medications as prescribed. No recent changes to their medications. No headache or visual disturbances. No dizziness or tinnitus. No chest pain, palpitations, or dyspnea. No nausea and vomiting. No numbness, tingling and or weakness to the extremities. No fevers or chills. No recent illness. Cup continues for her paresthesias. [facility] clinic felt it was not due to or surgical cause, local neurosurgeon here agreed with that. She is also seeing the endocrinologist who is working up her thyroid and other causes like her adrenal glands. She continues with his numbness pains essentially unchanged from its onset. He is hesitant to continue care at the [facility] because of the difficulty in traveling, will see if we can get local specialists to take care what she needs. Review of Systems   Constitutional: Negative for appetite change, fatigue and unexpected weight change. HENT: Negative for congestion, ear pain, hearing loss, sinus pain, sore throat and trouble swallowing. Eyes: Negative for photophobia, pain, discharge and redness. Respiratory: Negative for cough, chest tightness and shortness of breath. Cardiovascular: Negative for chest pain, palpitations and leg swelling.    Gastrointestinal: Negative for abdominal pain, blood in stool, diarrhea, nausea and vomiting. Endocrine: Negative. Genitourinary: Negative for dysuria, flank pain, frequency and hematuria. Musculoskeletal: Negative for arthralgias, back pain, joint swelling and myalgias. Skin: Negative. Allergic/Immunologic: Negative. Neurological: Positive for numbness. Negative for dizziness, seizures, syncope, weakness, light-headedness and headaches. Face, trunk and extremities. Hematological: Negative for adenopathy. Does not bruise/bleed easily. Psychiatric/Behavioral: Negative.           Current Outpatient Medications:     pantoprazole (PROTONIX) 20 MG tablet, take 1 tablet by mouth every morning BEFORE BREAKFAST, Disp: 30 tablet, Rfl: 0    diclofenac (VOLTAREN) 50 MG EC tablet, Take 1 tablet by mouth 2 times daily, Disp: 60 tablet, Rfl: 2    hydroCHLOROthiazide (HYDRODIURIL) 25 MG tablet, Take 1 tablet by mouth every morning, Disp: 30 tablet, Rfl: 5    lisinopril (PRINIVIL;ZESTRIL) 20 MG tablet, Take 1 tablet by mouth daily, Disp: 30 tablet, Rfl: 5  Allergies   Allergen Reactions    Pcn [Penicillins] Rash       Past Medical History:   Diagnosis Date    Abnormal Pap smear of cervix     Back pain     Chronic sinusitis     with facial pain    Depression 6/6/2011    Disease of blood and blood forming organ     hemacromotosis high levels iron    Hemochromatosis     Migraines     born with heart murmur    Seasonal allergies      Past Surgical History:   Procedure Laterality Date    SINUS SURGERY N/A 2/25/16    FUNCTIONAL ENDOSCOPIC SINUS SURGERY WITH BILATERAL MAXILLARY, FRONTAL & SPENOID ANTROSTOMIES, SUBMUCCOSAL RESECTION OF INFERIOR TURBINATES     Family History   Problem Relation Age of Onset    Arthritis Mother     Other Mother         sinus problems    High Blood Pressure Father     Other Maternal Aunt         migraine    Other Maternal Uncle         migraine    Other Paternal Aunt         migraine    Other Paternal Uncle migraine    Arthritis Maternal Grandmother      Social History     Socioeconomic History    Marital status:      Spouse name: Not on file    Number of children: Not on file    Years of education: Not on file    Highest education level: Not on file   Occupational History    Not on file   Social Needs    Financial resource strain: Not on file    Food insecurity     Worry: Not on file     Inability: Not on file    Transportation needs     Medical: Not on file     Non-medical: Not on file   Tobacco Use    Smoking status: Never Smoker    Smokeless tobacco: Never Used   Substance and Sexual Activity    Alcohol use: No     Alcohol/week: 0.0 standard drinks    Drug use: No    Sexual activity: Not on file   Lifestyle    Physical activity     Days per week: Not on file     Minutes per session: Not on file    Stress: Not on file   Relationships    Social connections     Talks on phone: Not on file     Gets together: Not on file     Attends [employer] service: Not on file     Active member of club or organization: Not on file     Attends meetings of clubs or organizations: Not on file     Relationship status: Not on file    Intimate partner violence     Fear of current or ex partner: Not on file     Emotionally abused: Not on file     Physically abused: Not on file     Forced sexual activity: Not on file   Other Topics Concern    Not on file   Social History Narrative    Not on file       Vitals:    04/14/21 1540   BP: 134/84   Pulse: 97   Resp: 18   Temp: 97.2 °F (36.2 °C)   SpO2: 98%   Weight: 280 lb (127 kg)   Height: 5' 9\" (1.753 m)       Physical Exam  Vitals signs and nursing note reviewed. Constitutional:       Appearance: She is well-developed. HENT:      Head: Atraumatic. Right Ear: External ear normal.      Left Ear: External ear normal.      Nose: Nose normal.      Mouth/Throat:      Pharynx: No oropharyngeal exudate.    Eyes:      Conjunctiva/sclera: Conjunctivae normal. Pupils: Pupils are equal, round, and reactive to light. Neck:      Musculoskeletal: Normal range of motion and neck supple. Thyroid: No thyromegaly. Trachea: No tracheal deviation. Cardiovascular:      Rate and Rhythm: Normal rate and regular rhythm. Heart sounds: No murmur. No friction rub. No gallop. Pulmonary:      Effort: Pulmonary effort is normal. No respiratory distress. Breath sounds: Normal breath sounds. Abdominal:      General: Bowel sounds are normal.      Palpations: Abdomen is soft. Musculoskeletal: Normal range of motion. General: No tenderness or deformity. Lymphadenopathy:      Cervical: No cervical adenopathy. Skin:     General: Skin is warm and dry. Capillary Refill: Capillary refill takes less than 2 seconds. Findings: No rash. Neurological:      Mental Status: She is alert and oriented to person, place, and time. Sensory: Sensory deficit present. Motor: No abnormal muscle tone.       Coordination: Coordination normal.      Deep Tendon Reflexes: Reflexes normal.             Seen By:  Lior Cerda, DO

## 2021-04-17 ENCOUNTER — HOSPITAL ENCOUNTER (OUTPATIENT)
Dept: ULTRASOUND IMAGING | Age: 36
Discharge: HOME | End: 2021-04-19
Payer: COMMERCIAL

## 2021-04-17 ENCOUNTER — HOSPITAL ENCOUNTER (OUTPATIENT)
Age: 36
Discharge: HOME | End: 2021-04-17
Payer: COMMERCIAL

## 2021-04-17 DIAGNOSIS — E04.2 MULTINODULAR GOITER: ICD-10-CM

## 2021-04-17 DIAGNOSIS — I10 HYPERTENSION, UNSPECIFIED TYPE: ICD-10-CM

## 2021-04-17 DIAGNOSIS — E04.9 GOITER: ICD-10-CM

## 2021-04-17 DIAGNOSIS — R63.5 ABNORMAL WEIGHT GAIN: ICD-10-CM

## 2021-04-17 LAB
ANION GAP SERPL CALCULATED.3IONS-SCNC: 11 MMOL/L (ref 7–16)
BUN BLDV-MCNC: 16 MG/DL (ref 6–20)
CALCIUM SERPL-MCNC: 9.3 MG/DL (ref 8.6–10.2)
CHLORIDE BLD-SCNC: 104 MMOL/L (ref 98–107)
CO2: 23 MMOL/L (ref 22–29)
CORTISOL TOTAL: 1.07 MCG/DL (ref 2.68–18.4)
CREAT SERPL-MCNC: 0.7 MG/DL (ref 0.5–1)
GFR AFRICAN AMERICAN: >60
GFR NON-AFRICAN AMERICAN: >60 ML/MIN/1.73
GLUCOSE BLD-MCNC: 103 MG/DL (ref 74–99)
POTASSIUM SERPL-SCNC: 4.4 MMOL/L (ref 3.5–5)
SODIUM BLD-SCNC: 138 MMOL/L (ref 132–146)
T4 FREE: 1.22 NG/DL (ref 0.93–1.7)
T4 TOTAL: 8.5 MCG/DL (ref 4.5–11.7)
TSH SERPL DL<=0.05 MIU/L-ACNC: 0.68 UIU/ML (ref 0.27–4.2)

## 2021-04-17 PROCEDURE — 80048 BASIC METABOLIC PNL TOTAL CA: CPT

## 2021-04-17 PROCEDURE — 84439 ASSAY OF FREE THYROXINE: CPT

## 2021-04-17 PROCEDURE — 82533 TOTAL CORTISOL: CPT

## 2021-04-17 PROCEDURE — 36415 COLL VENOUS BLD VENIPUNCTURE: CPT

## 2021-04-17 PROCEDURE — 86376 MICROSOMAL ANTIBODY EACH: CPT

## 2021-04-17 PROCEDURE — 84443 ASSAY THYROID STIM HORMONE: CPT

## 2021-04-17 PROCEDURE — 76536 US EXAM OF HEAD AND NECK: CPT

## 2021-04-17 PROCEDURE — 86800 THYROGLOBULIN ANTIBODY: CPT

## 2021-04-19 ENCOUNTER — TELEPHONE (OUTPATIENT)
Dept: ENDOCRINOLOGY | Age: 36
End: 2021-04-19

## 2021-04-20 LAB
THYROGLOBULIN AB: <0.9 IU/ML (ref 0–4)
THYROID PEROXIDASE (TPO) ABS: 0.3 IU/ML (ref 0–9)

## 2021-04-20 NOTE — TELEPHONE ENCOUNTER
Notify pt,  I have reviewed your recent results    Thyroid nodule remained stable on ultrasound    Thyroid hormones are very good     AM cortisol appropriately suppressed following 1 mg dexamethasone suppression test. This result is excellent and essentially r/o Cushing's

## 2021-04-27 ENCOUNTER — TELEPHONE (OUTPATIENT)
Dept: NEUROSURGERY | Age: 36
End: 2021-04-27

## 2021-04-27 DIAGNOSIS — R20.0 EXTREMITY NUMBNESS: ICD-10-CM

## 2021-04-27 DIAGNOSIS — M54.2 NECK PAIN OF OVER 3 MONTHS DURATION: ICD-10-CM

## 2021-04-27 DIAGNOSIS — M54.9 MID BACK PAIN: ICD-10-CM

## 2021-04-27 DIAGNOSIS — M54.16 LUMBAR RADICULOPATHY: Primary | ICD-10-CM

## 2021-04-27 NOTE — TELEPHONE ENCOUNTER
Ester Weaver called in stating [facility] [place] [address]. It helped some but she still has numbness everywhere- more so in the face, and now she is having weakness in her hands. She is getting more concerned and would like the MRIs ordered.  Pts #[phone]

## 2021-05-04 ENCOUNTER — OFFICE VISIT (OUTPATIENT)
Dept: BARIATRICS/WEIGHT MGMT | Age: 36
End: 2021-05-04
Payer: COMMERCIAL

## 2021-05-04 VITALS
TEMPERATURE: 97.2 F | SYSTOLIC BLOOD PRESSURE: 139 MMHG | HEART RATE: 86 BPM | DIASTOLIC BLOOD PRESSURE: 65 MMHG | WEIGHT: 279.2 LBS | BODY MASS INDEX: 41.35 KG/M2 | HEIGHT: 69 IN

## 2021-05-04 DIAGNOSIS — E66.01 CLASS 3 SEVERE OBESITY DUE TO EXCESS CALORIES WITHOUT SERIOUS COMORBIDITY WITH BODY MASS INDEX (BMI) OF 40.0 TO 44.9 IN ADULT (HCC): ICD-10-CM

## 2021-05-04 DIAGNOSIS — I10 ESSENTIAL HYPERTENSION: Primary | ICD-10-CM

## 2021-05-04 PROCEDURE — 1036F TOBACCO NON-USER: CPT | Performed by: INTERNAL MEDICINE

## 2021-05-04 PROCEDURE — G8428 CUR MEDS NOT DOCUMENT: HCPCS | Performed by: INTERNAL MEDICINE

## 2021-05-04 PROCEDURE — 99202 OFFICE O/P NEW SF 15 MIN: CPT

## 2021-05-04 PROCEDURE — 99205 OFFICE O/P NEW HI 60 MIN: CPT | Performed by: INTERNAL MEDICINE

## 2021-05-04 PROCEDURE — G8417 CALC BMI ABV UP PARAM F/U: HCPCS | Performed by: INTERNAL MEDICINE

## 2021-05-04 NOTE — PROGRESS NOTES
CC -   HTN, Obesity    BACKGROUND -   First visit: 5/4/21    · Obesity   Began in mid-20's  Initial BMI 41.8, Wt 279.2 lbs, 5'8.5\"  HS Grad wt 160 lbs   Lowest   wt 160 lbs   Highest  wt 280 lbs  Pattern of wt gain: grad  Wt change past yr: +10 lbs  Most wt lost: 40 lbs (Shakeology with [employer])  Other diets attempted: [employer], Noom, Gluten-free, no medications, fad diets, Naturopathic    Desire to lose weight: 10/10  Problem posed by appetite: 6-7/10    Initial Diet:    Number of meals per day - 4    Number of snacks per day - 3-4    Meal volume - 12\" plate, usually seconds    Fast food/convenience store - 5x/week    Restaurants (not fast food) - 0x/week   Sweets - 7d/week (reg Snickers, Butterfinger; 4 small cookies)   Chips - 5d/week (1 cereal bowl, flat)   Crackers/pretzels - 3d/week (1 handful)   Nuts - 0d/week   Peanut Butter - 0d/week   Popcorn - 0d/week   Dried fruit - 5d/week (2 tablespoons of raisins or craisins)   Whole fruit - 5d/week (1-2 pieces)   Breakfast cereal - 5d/week (1/3 cup dry oats with 3 tablespoons brown sugar)   Granola/Protein/Energy bar - 0d/week   Sugar sweetened beverages - 24 oz/wk of lemonade, 1 cup tea/d, 7d/wk with 1-2 tablespoons sugar   Protein - No supplements   Fiber - No supplements     Exercise:    Gym membership - none    Walking - none    Running - none    Resistance - none    Aerobic class - none    ______________________    [facility] -  Past Medical History:   Diagnosis Date    Abnormal Pap smear of cervix     Back pain     Chronic sinusitis     with facial pain    Class 3 severe obesity due to excess calories without serious comorbidity with body mass index (BMI) of 40.0 to 44.9 in adult [facility])     Depression 06/06/2011    Essential hypertension     Hemochromatosis     Migraines     born with heart murmur    Seasonal allergies      Current Outpatient Medications   Medication Sig Dispense Refill    pantoprazole (PROTONIX) 20 MG tablet take 1 tablet by mouth every morning BEFORE BREAKFAST 30 tablet 0    diclofenac (VOLTAREN) 50 MG EC tablet Take 1 tablet by mouth 2 times daily 60 tablet 2    hydroCHLOROthiazide (HYDRODIURIL) 25 MG tablet Take 1 tablet by mouth every morning 30 tablet 5    lisinopril (PRINIVIL;ZESTRIL) 20 MG tablet Take 1 tablet by mouth daily 30 tablet 5     No current facility-administered medications for this visit. ROS -  Card - no CP  GI - no N/V/D    PE -  Gen : /65 (Site: Left Upper Arm, Position: Sitting, Cuff Size: Large Adult)   Pulse 86   Temp 97.2 °F (36.2 °C) (Temporal)   Ht 5' 8.5\" (1.74 m)   Wt 279 lb 3.2 oz (126.6 kg)   BMI 41.83 kg/m²    WN, WD, NAD  Lung: Nml resp effort  Psych: Normal mood   Full affect  Neuro: Moves all ext well  ______________________    HISTORY & ASSESSMENT/PLAN -     Problem 1  - HTN   HPI   - Diagnosed 09/2020       139/65 today      Regimen: HCTZ 25 mg/d and Lisinopril 20 mg/d      Home readings are 150's/80's. But they are much better at physician visits (130's/80's). Her BP cuff therefore may be giving erroneously high readings       Excess weight is increasing the severity of her hypertension  Assessment  - Controlled with medication  Plan   - Cont present regimen     Proceed with wt loss per the plan below    Problem 2  - Obesity  HPI   - See above Background for description    Weight  Date    279.2 lbs 5/4/21  DEN = 2325 lesli/d = 16,275 lesli/wk  Weight effect of high risk, high calorie foods = [employer] food 750 lesli/wk + Sweets 2465 + Chips/Crackers/Pretzels 1200 +  = 5,090 lesli/wk = 725 lesli/d = 31% DEN = 72 lbs/yr  Willing to consider surgery, but does not want to without first trying a non-surgical approach  Does not want an appetites suppressant today.  Qsymia is the best option b/c of her migraines  Will hold on a VLCD for now and go with a counting based program with target elimination  Agreeable with a counting  Assessment  - Uncontrolled  Plan   -   Rules:  · Count every calorie every day  · Limit sweets to one day per month  · Limit chips/crackers/pretzels/nuts to 100 lesli/day  · Eliminate all sugar sweetened beverages (including fruit juice)  · Limit restaurants (including fast food and food from a convenience store) to one time every two weeks    Requirements:  · Make sure protein intake is at least 70 grams per day (do not count protein every day; instead spot check your intake every 2-3 weeks and make sure what you think you are getting is close to accurate; consider using a protein shake if needed; these are in the pharmacy section of the stores, not the grocery section; Premier, Pure Protein and Fairlife are relatively inexpensive and taste good to most patients; other options are Nectar, Boost Max, Ensure Max, BeneProtein and GNC lean (which is lactose-free); Nectar fruit, Premier Protein Clear, IsoPure Protein Drink, and Protein 2 O are water-based options; Quest (or Cosco, which is cheaper and is ordered on Amazon) and the siXis 1 protein bars can also be used, but have less protein in them )  · Make sure that fiber intake is at least 22 grams per day. Do this by either eating 12 tablespoons of the original, plain Fiber One cereal every day or 4 tablespoons of wheat dextrin powder (Benefiber or a generic brand) every day. Work up to this amount slowly by starting with only one-eighth to one-fourth of the target amount and then adding another one-eighth to one-fourth every one or two weeks until reaching the target. · Take one multivitamin every day    Targets:  · Limit calorie intake to 1500 calories/day  · Walk 30 minutes daily  · Avoid eating 2 hours within bedtime. Tips:  · Do not eat outside of the dining room or the kitchen  · Do not eat while watching TV, videos, working on the computer or using a smart phone  · Do not eat food out of a multi-serving bag or container. · Establish 6 hours of food-free periods (times you don't eat) each day.  No period can be less than 1 hour long. The periods need to be the same every day for days that are the same (for example, workdays would have one set of food free periods and weekends would have another set of days). These six hours are in addition to the two hours before bedtime and the time spent sleeping.         Total time spent on encounter: 68 min    Dale Teixeira MD  Endocrinology/Obesity  5/4/21

## 2021-05-04 NOTE — PATIENT INSTRUCTIONS
Rules:  · Count every calorie every day  · Limit sweets to one day per month  · Limit chips/crackers/pretzels/nuts to 100 lesli/day  · Eliminate all sugar sweetened beverages (including fruit juice)  · Limit restaurants (including fast food and food from a convenience store) to one time every two weeks    Requirements:  · Make sure protein intake is at least 70 grams per day (do not count protein every day; instead spot check your intake every 2-3 weeks and make sure what you think you are getting is close to accurate; consider using a protein shake if needed; these are in the pharmacy section of the stores, not the grocery section; Premier, Pure Protein and Fairlife are relatively inexpensive and taste good to most patients; other options are Nectar, Boost Max, Ensure Max, BeneProtein and GNC lean (which is lactose-free); Nectar fruit, Premier Protein Clear, IsoPure Protein Drink, and Protein 2 O are water-based options; Quest (or Cosco, which is cheaper and is ordered on [employer]) and the [employer] protein bars can also be used, but have less protein in them )  · Make sure that fiber intake is at least 22 grams per day. Do this by either eating 12 tablespoons of the original, plain Fiber One cereal every day or 4 tablespoons of wheat dextrin powder (Benefiber or a generic brand) every day. Work up to this amount slowly by starting with only one-eighth to one-fourth of the target amount and then adding another one-eighth to one-fourth every one or two weeks until reaching the target. · Take one multivitamin every day    Targets:  · Limit calorie intake to 1500 calories/day  · Walk 30 minutes daily  · Avoid eating 2 hours within bedtime. Tips:  · Do not eat outside of the dining room or the kitchen  · Do not eat while watching TV, videos, working on the computer or using a smart phone  · Do not eat food out of a multi-serving bag or container.   · Establish 6 hours of food-free periods (times you don't eat) each day. No period can be less than 1 hour long. The periods need to be the same every day for days that are the same (for example, workdays would have one set of food free periods and weekends would have another set of days). These six hours are in addition to the two hours before bedtime and the time spent sleeping.

## 2021-05-20 DIAGNOSIS — K21.00 GASTROESOPHAGEAL REFLUX DISEASE WITH ESOPHAGITIS WITHOUT HEMORRHAGE: ICD-10-CM

## 2021-05-20 RX ORDER — PANTOPRAZOLE SODIUM 20 MG/1
TABLET, DELAYED RELEASE ORAL
Qty: 30 TABLET | Refills: 5 | Status: SHIPPED
Start: 2021-05-20 | End: 2021-06-29

## 2021-05-20 NOTE — TELEPHONE ENCOUNTER
Last Appointment:  4/14/2021  Future Appointments   Date Time Provider Ab Smith   6/8/2021  9:15 AM Ismael Huggins MD Surg Weight [facility]   6/25/2021  8:30 AM DO Tisha Cruz ENT [facility]   9/15/2021  3:00 PM Douglas Rice MD BD ENDO [facility]

## 2021-06-07 ENCOUNTER — HOSPITAL ENCOUNTER (OUTPATIENT)
Dept: MRI IMAGING | Age: 36
Discharge: HOME | End: 2021-06-09
Payer: COMMERCIAL

## 2021-06-07 DIAGNOSIS — M54.16 LUMBAR RADICULOPATHY: ICD-10-CM

## 2021-06-07 DIAGNOSIS — R20.0 EXTREMITY NUMBNESS: ICD-10-CM

## 2021-06-07 DIAGNOSIS — M54.9 MID BACK PAIN: ICD-10-CM

## 2021-06-07 DIAGNOSIS — M54.2 NECK PAIN OF OVER 3 MONTHS DURATION: ICD-10-CM

## 2021-06-07 PROCEDURE — 72148 MRI LUMBAR SPINE W/O DYE: CPT

## 2021-06-07 PROCEDURE — 72141 MRI NECK SPINE W/O DYE: CPT

## 2021-06-08 ENCOUNTER — OFFICE VISIT (OUTPATIENT)
Dept: BARIATRICS/WEIGHT MGMT | Age: 36
End: 2021-06-08
Payer: COMMERCIAL

## 2021-06-08 VITALS
HEIGHT: 69 IN | SYSTOLIC BLOOD PRESSURE: 117 MMHG | WEIGHT: 262.6 LBS | HEART RATE: 65 BPM | DIASTOLIC BLOOD PRESSURE: 73 MMHG | TEMPERATURE: 98.1 F | BODY MASS INDEX: 38.89 KG/M2

## 2021-06-08 DIAGNOSIS — E66.01 CLASS 3 SEVERE OBESITY DUE TO EXCESS CALORIES WITHOUT SERIOUS COMORBIDITY WITH BODY MASS INDEX (BMI) OF 40.0 TO 44.9 IN ADULT (HCC): ICD-10-CM

## 2021-06-08 DIAGNOSIS — I10 ESSENTIAL HYPERTENSION: Primary | ICD-10-CM

## 2021-06-08 PROCEDURE — 99211 OFF/OP EST MAY X REQ PHY/QHP: CPT

## 2021-06-08 PROCEDURE — G8428 CUR MEDS NOT DOCUMENT: HCPCS | Performed by: INTERNAL MEDICINE

## 2021-06-08 PROCEDURE — G8417 CALC BMI ABV UP PARAM F/U: HCPCS | Performed by: INTERNAL MEDICINE

## 2021-06-08 PROCEDURE — 1036F TOBACCO NON-USER: CPT | Performed by: INTERNAL MEDICINE

## 2021-06-08 PROCEDURE — 99213 OFFICE O/P EST LOW 20 MIN: CPT | Performed by: INTERNAL MEDICINE

## 2021-06-08 NOTE — PATIENT INSTRUCTIONS
Rules:  · Count every calorie every day  · Limit sweets to one day per month  · Limit chips/crackers/pretzels/nuts to 100 lesli/day  · Eliminate all sugar sweetened beverages (including fruit juice)  · Limit restaurants (including fast food and food from a convenience store) to one time every two weeks    Requirements:  · Make sure protein intake is at least 70 grams per day (do not count protein every day; instead spot check your intake every 2-3 weeks and make sure what you think you are getting is close to accurate; consider using a protein shake if needed; these are in the pharmacy section of the stores, not the grocery section; Premier, Pure Protein and Fairlife are relatively inexpensive and taste good to most patients; other options are Nectar, Boost Max, Ensure Max, BeneProtein and GNC lean (which is lactose-free); Nectar fruit, Premier Protein Clear, IsoPure Protein Drink, and Protein 2 O are water-based options; Quest (or Cosco, which is cheaper and is ordered on [employer]) and the [employer] protein bars can also be used, but have less protein in them )  · Make sure that fiber intake is at least 22 grams per day. Do this by either eating 12 tablespoons of the original, plain Fiber One cereal every day or 4 tablespoons of wheat dextrin powder (Benefiber or a generic brand) every day. Work up to this amount slowly by starting with only one-eighth to one-fourth of the target amount and then adding another one-eighth to one-fourth every one or two weeks until reaching the target. · Take one multivitamin every day    Targets:  · Limit calorie intake to 1500 calories/day  · Walk 30 minutes daily  · Avoid eating 2 hours within bedtime. Tips:  · Do not eat outside of the dining room or the kitchen  · Do not eat while watching TV, videos, working on the computer or using a smart phone  · Do not eat food out of a multi-serving bag or container.   · Establish 6 hours of food-free periods (times you don't eat) each day. No period can be less than 1 hour long. The periods need to be the same every day for days that are the same (for example, workdays would have one set of food free periods and weekends would have another set of days). These six hours are in addition to the two hours before bedtime and the time spent sleeping.     Cont lisinopril 20 mg daily  Consider reducing HCTZ by 12.5 mg increments if BP allows    See me back in six weeks

## 2021-06-08 NOTE — PROGRESS NOTES
CC -   HTN, Obesity    BACKGROUND -   Last visit: 5/4/21  First visit: 5/4/21    · Obesity   Began in mid-20's  Initial BMI 41.8, Wt 279.2 lbs, 5'8.5\"  HS Grad wt 160 lbs   Lowest   wt 160 lbs   Highest  wt 280 lbs  Pattern of wt gain: grad  Wt change past yr: +10 lbs  Most wt lost: 40 lbs (Shakeology with [employer])  Other diets attempted: [employer], Noom, Gluten-free, no medications, fad diets, Naturopathic    Desire to lose weight: 10/10  Problem posed by appetite: 6-7/10    Initial Diet:    Number of meals per day - 4    Number of snacks per day - 3-4    Meal volume - 12\" plate, usually seconds    Fast food/convenience store - 5x/week    Restaurants (not fast food) - 0x/week   Sweets - 7d/week (reg Snickers, Butterfinger; 4 small cookies)   Chips - 5d/week (1 cereal bowl, flat)   Crackers/pretzels - 3d/week (1 handful)   Nuts - 0d/week   Peanut Butter - 0d/week   Popcorn - 0d/week   Dried fruit - 5d/week (2 tablespoons of raisins or craisins)   Whole fruit - 5d/week (1-2 pieces)   Breakfast cereal - 5d/week (1/3 cup dry oats with 3 tablespoons brown sugar)   Granola/Protein/Energy bar - 0d/week   Sugar sweetened beverages - 24 oz/wk of lemonade, 1 cup tea/d, 7d/wk with 1-2 tablespoons sugar   Protein - No supplements   Fiber - No supplements     Exercise:    Gym membership - none    Walking - none    Running - none    Resistance - none    Aerobic class - none    ______________________    [facility] -  Past Medical History:   Diagnosis Date    Abnormal Pap smear of cervix     Back pain     Chronic sinusitis     with facial pain    Class 3 severe obesity due to excess calories without serious comorbidity with body mass index (BMI) of 40.0 to 44.9 in adult [facility])     Depression 06/06/2011    Essential hypertension     Essential hypertension     Gastroesophageal reflux disease without esophagitis     Hemochromatosis     Migraines     born with heart murmur    Seasonal allergies      Current Outpatient Medications Medication Sig Dispense Refill    pantoprazole (PROTONIX) 20 MG tablet take 1 tablet by mouth every morning BEFORE BREAKFAST 30 tablet 5    diclofenac (VOLTAREN) 50 MG EC tablet Take 1 tablet by mouth 2 times daily 60 tablet 2    hydroCHLOROthiazide (HYDRODIURIL) 25 MG tablet Take 1 tablet by mouth every morning 30 tablet 5    lisinopril (PRINIVIL;ZESTRIL) 20 MG tablet Take 1 tablet by mouth daily 30 tablet 5     No current facility-administered medications for this visit. ROS -  Card - no CP  GI - no N/V/D    PE -  Gen : /73 (Site: Left Upper Arm, Position: Sitting, Cuff Size: Thigh)   Pulse 65   Temp 98.1 °F (36.7 °C) (Temporal)   Ht 5' 8.5\" (1.74 m)   Wt 262 lb 9.6 oz (119.1 kg)   BMI 39.35 kg/m²    WN, WD, NAD  Lung: Nml resp effort  Psych: Normal mood   Full affect  Neuro:  Moves all ext well  ______________________    HISTORY & ASSESSMENT/PLAN -     Problem 1  - HTN   HPI   - Diagnosed 09/2020      /73 today      Regimen: HCTZ 25 mg/d and Lisinopril 20 mg/d      Checks at home 2x/wk; runs in the 120s/80s       Excess weight increases the severity of her hypertension  Assessment  - Controlled with medication; improving with wt reduction; okay to trial reduction of HCTZ  Plan   - Cont lisinopril 20 mg daily      Consider reducing HCTZ by 12.5 mg increments if BP allows     Cont wt loss per the plan below    Problem 2  - Obesity  HPI   - See above Background for description    Weight  Date    279.2 lbs 5/4/21    262.6 lbs 6/8/21  Total wt change to date:  - 16.6 lbs  DEN = 2325 lesli/d = 16,275 lesli/wk  Avg daily energy variance:   5/4/21 - 6/8/21 = 14.8 lbs (51,800 lesli)/34d = - 1,523 lesli/d deficit  Weight effect of high risk, high calorie foods = [employer] food 750 lesli/wk + Sweets 2465 + Chips/Crackers/Pretzels 1200 +  = 5,090 lesli/wk = 725 lesli/d = 31% DEN = 72 lbs/yr  At the first visit, she was willing to consider surgery, but did not want to without first trying a non-surgical

## 2021-06-14 ENCOUNTER — TELEPHONE (OUTPATIENT)
Dept: NEUROSURGERY | Age: 36
End: 2021-06-14

## 2021-06-14 DIAGNOSIS — M54.16 LUMBAR RADICULOPATHY: Primary | ICD-10-CM

## 2021-06-15 ENCOUNTER — HOSPITAL ENCOUNTER (OUTPATIENT)
Dept: MRI IMAGING | Age: 36
Discharge: HOME | End: 2021-06-17
Payer: COMMERCIAL

## 2021-06-15 PROCEDURE — 72146 MRI CHEST SPINE W/O DYE: CPT

## 2021-06-21 ENCOUNTER — TELEPHONE (OUTPATIENT)
Dept: FAMILY MEDICINE CLINIC | Age: 36
End: 2021-06-21

## 2021-06-21 DIAGNOSIS — F41.9 ANXIETY: Primary | ICD-10-CM

## 2021-06-21 RX ORDER — LORAZEPAM 0.5 MG/1
TABLET ORAL
Qty: 30 TABLET | Refills: 0 | Status: SHIPPED | OUTPATIENT
Start: 2021-06-21 | End: 2021-06-22

## 2021-06-21 NOTE — TELEPHONE ENCOUNTER
The patient needs a referral for an oncologist. She is going to see Dr. Sheree Leon at [facility]   I also called the pharmacy and confirmed the rx

## 2021-06-22 ENCOUNTER — HOSPITAL ENCOUNTER (OUTPATIENT)
Age: 36
Discharge: HOME | End: 2021-06-22
Payer: COMMERCIAL

## 2021-06-22 ENCOUNTER — HOSPITAL ENCOUNTER (OUTPATIENT)
Dept: MRI IMAGING | Age: 36
Discharge: HOME | End: 2021-06-24
Payer: COMMERCIAL

## 2021-06-22 DIAGNOSIS — M54.16 LUMBAR RADICULOPATHY: ICD-10-CM

## 2021-06-22 LAB
BUN BLDV-MCNC: 15 MG/DL (ref 6–20)
CREAT SERPL-MCNC: 0.8 MG/DL (ref 0.5–1)
GFR AFRICAN AMERICAN: >60
GFR NON-AFRICAN AMERICAN: >60 ML/MIN/1.73

## 2021-06-22 PROCEDURE — 6360000004 HC RX CONTRAST MEDICATION: Performed by: RADIOLOGY

## 2021-06-22 PROCEDURE — 84520 ASSAY OF UREA NITROGEN: CPT

## 2021-06-22 PROCEDURE — 36415 COLL VENOUS BLD VENIPUNCTURE: CPT

## 2021-06-22 PROCEDURE — 70553 MRI BRAIN STEM W/O & W/DYE: CPT

## 2021-06-22 PROCEDURE — 82565 ASSAY OF CREATININE: CPT

## 2021-06-22 PROCEDURE — A9579 GAD-BASE MR CONTRAST NOS,1ML: HCPCS | Performed by: RADIOLOGY

## 2021-06-22 RX ADMIN — GADOTERIDOL 20 ML: 279.3 INJECTION, SOLUTION INTRAVENOUS at 17:50

## 2021-06-25 ENCOUNTER — OFFICE VISIT (OUTPATIENT)
Dept: ENT CLINIC | Age: 36
End: 2021-06-25
Payer: COMMERCIAL

## 2021-06-25 VITALS — HEIGHT: 69 IN | WEIGHT: 260 LBS | BODY MASS INDEX: 38.51 KG/M2

## 2021-06-25 DIAGNOSIS — J30.1 SEASONAL ALLERGIC RHINITIS DUE TO POLLEN: Primary | ICD-10-CM

## 2021-06-25 DIAGNOSIS — J32.4 CHRONIC PANSINUSITIS: ICD-10-CM

## 2021-06-25 PROCEDURE — 99204 OFFICE O/P NEW MOD 45 MIN: CPT | Performed by: OTOLARYNGOLOGY

## 2021-06-25 PROCEDURE — G8417 CALC BMI ABV UP PARAM F/U: HCPCS | Performed by: OTOLARYNGOLOGY

## 2021-06-25 PROCEDURE — 1036F TOBACCO NON-USER: CPT | Performed by: OTOLARYNGOLOGY

## 2021-06-25 PROCEDURE — G8427 DOCREV CUR MEDS BY ELIG CLIN: HCPCS | Performed by: OTOLARYNGOLOGY

## 2021-06-25 ASSESSMENT — ENCOUNTER SYMPTOMS
RESPIRATORY NEGATIVE: 1
COLOR CHANGE: 0
GASTROINTESTINAL NEGATIVE: 1
RHINORRHEA: 1
SINUS PRESSURE: 1
ABDOMINAL PAIN: 0
EYES NEGATIVE: 1
SHORTNESS OF BREATH: 0

## 2021-06-25 NOTE — PROGRESS NOTES
Subjective:      Patient ID:  Malinda Vega is a 39 y.o. female. HPI:    Pt is returning from FESS surgery 4 year(s) ago. PT is  doing well. PT is  breathing better. surgery in 2016  Pt is not using Flonase. Pt is not having issues since the surgery. She is still having issues with drainage     Pt is complaining of muffled hearing.      Past Medical History:   Diagnosis Date    Abnormal Pap smear of cervix     Back pain     Chronic sinusitis     with facial pain    Class 3 severe obesity due to excess calories without serious comorbidity with body mass index (BMI) of 40.0 to 44.9 in adult [facility])     Depression 06/06/2011    Essential hypertension     Essential hypertension     Gastroesophageal reflux disease without esophagitis     Hemochromatosis     Hypertension    [facility] Migraines     born with heart murmur    Seasonal allergies      Past Surgical History:   Procedure Laterality Date    SINUS SURGERY N/A 2/25/16    FUNCTIONAL ENDOSCOPIC SINUS SURGERY WITH BILATERAL MAXILLARY, FRONTAL & SPENOID ANTROSTOMIES, SUBMUCCOSAL RESECTION OF INFERIOR TURBINATES     Family History   Problem Relation Age of Onset    Arthritis Mother     Other Mother         sinus problems    High Blood Pressure Father     Other Maternal Aunt         migraine    Other Maternal Uncle         migraine    Other Paternal Aunt         migraine    Other Paternal Uncle         migraine    Arthritis Maternal Grandmother      Social History     Socioeconomic History    Marital status:      Spouse name: None    Number of children: None    Years of education: None    Highest education level: None   Occupational History    None   Tobacco Use    Smoking status: Never Smoker    Smokeless tobacco: Never Used   Vaping Use    Vaping Use: Never used   Substance and Sexual Activity    Alcohol use: No     Alcohol/week: 0.0 standard drinks    Drug use: No    Sexual activity: None   Other Topics Concern    None Hearing, tympanic membrane, ear canal and external ear normal.      Left Ear: Hearing, tympanic membrane, ear canal and external ear normal.      Nose: Mucosal edema and rhinorrhea present. Mouth/Throat:      Pharynx: Uvula midline. Eyes:      Conjunctiva/sclera: Conjunctivae normal.      Pupils: Pupils are equal, round, and reactive to light. Cardiovascular:      Rate and Rhythm: Normal rate and regular rhythm. Heart sounds: Normal heart sounds. Pulmonary:      Effort: Pulmonary effort is normal.      Breath sounds: Normal breath sounds. Abdominal:      General: Bowel sounds are normal.      Palpations: Abdomen is soft. Musculoskeletal:      Cervical back: Normal range of motion and neck supple. Skin:     General: Skin is warm and dry. Neurological:      Mental Status: She is alert and oriented to person, place, and time. Assessment:       Diagnosis Orders   1. Seasonal allergic rhinitis due to pollen     2. Chronic pansinusitis                Plan:      Allergic rhinitis  I do want to continue fluticasone (Flonase)   for now. Call or return to clinic prn if these symptoms worsen or fail to improve as anticipated. Pt will also get audio if not better.      Follow up in 1 month(s)

## 2021-06-28 ENCOUNTER — TELEPHONE (OUTPATIENT)
Dept: NEUROSURGERY | Age: 36
End: 2021-06-28

## 2021-06-29 ENCOUNTER — OFFICE VISIT (OUTPATIENT)
Dept: FAMILY MEDICINE CLINIC | Age: 36
End: 2021-06-29
Payer: COMMERCIAL

## 2021-06-29 VITALS
DIASTOLIC BLOOD PRESSURE: 76 MMHG | SYSTOLIC BLOOD PRESSURE: 110 MMHG | OXYGEN SATURATION: 98 % | HEART RATE: 66 BPM | RESPIRATION RATE: 16 BRPM | HEIGHT: 68 IN | BODY MASS INDEX: 38.19 KG/M2 | WEIGHT: 252 LBS | TEMPERATURE: 97.7 F

## 2021-06-29 DIAGNOSIS — R20.2 PARESTHESIAS: ICD-10-CM

## 2021-06-29 DIAGNOSIS — Z01.818 PRE-OP EXAM: Primary | ICD-10-CM

## 2021-06-29 DIAGNOSIS — K21.00 GASTROESOPHAGEAL REFLUX DISEASE WITH ESOPHAGITIS WITHOUT HEMORRHAGE: ICD-10-CM

## 2021-06-29 DIAGNOSIS — E66.01 CLASS 3 SEVERE OBESITY DUE TO EXCESS CALORIES WITHOUT SERIOUS COMORBIDITY WITH BODY MASS INDEX (BMI) OF 40.0 TO 44.9 IN ADULT (HCC): ICD-10-CM

## 2021-06-29 DIAGNOSIS — G43.019 INTRACTABLE MIGRAINE WITHOUT AURA AND WITHOUT STATUS MIGRAINOSUS: ICD-10-CM

## 2021-06-29 DIAGNOSIS — Q76.2 CONGENITAL SPONDYLOLYSIS, LUMBOSACRAL REGION: ICD-10-CM

## 2021-06-29 DIAGNOSIS — I10 ESSENTIAL HYPERTENSION: ICD-10-CM

## 2021-06-29 DIAGNOSIS — E83.110 HEMOCHROMATOSIS, HEREDITARY (HCC): ICD-10-CM

## 2021-06-29 PROCEDURE — G8427 DOCREV CUR MEDS BY ELIG CLIN: HCPCS | Performed by: FAMILY MEDICINE

## 2021-06-29 PROCEDURE — 99214 OFFICE O/P EST MOD 30 MIN: CPT | Performed by: FAMILY MEDICINE

## 2021-06-29 PROCEDURE — G8417 CALC BMI ABV UP PARAM F/U: HCPCS | Performed by: FAMILY MEDICINE

## 2021-06-29 PROCEDURE — 93000 ELECTROCARDIOGRAM COMPLETE: CPT | Performed by: FAMILY MEDICINE

## 2021-06-29 PROCEDURE — 1036F TOBACCO NON-USER: CPT | Performed by: FAMILY MEDICINE

## 2021-06-29 RX ORDER — HYDROCHLOROTHIAZIDE 25 MG/1
25 TABLET ORAL EVERY MORNING
Qty: 30 TABLET | Refills: 5 | Status: CANCELLED | OUTPATIENT
Start: 2021-06-29

## 2021-06-29 RX ORDER — LISINOPRIL 20 MG/1
20 TABLET ORAL DAILY
Qty: 30 TABLET | Refills: 5 | Status: SHIPPED
Start: 2021-06-29 | End: 2021-08-04

## 2021-06-29 SDOH — ECONOMIC STABILITY: FOOD INSECURITY: WITHIN THE PAST 12 MONTHS, THE FOOD YOU BOUGHT JUST DIDN'T LAST AND YOU DIDN'T HAVE MONEY TO GET MORE.: PATIENT DECLINED

## 2021-06-29 SDOH — ECONOMIC STABILITY: FOOD INSECURITY: WITHIN THE PAST 12 MONTHS, YOU WORRIED THAT YOUR FOOD WOULD RUN OUT BEFORE YOU GOT MONEY TO BUY MORE.: PATIENT DECLINED

## 2021-06-29 ASSESSMENT — SOCIAL DETERMINANTS OF HEALTH (SDOH): HOW HARD IS IT FOR YOU TO PAY FOR THE VERY BASICS LIKE FOOD, HOUSING, MEDICAL CARE, AND HEATING?: PATIENT DECLINED

## 2021-06-29 ASSESSMENT — ENCOUNTER SYMPTOMS
DIARRHEA: 0
NAUSEA: 0
PHOTOPHOBIA: 0
SINUS PAIN: 0
EYE DISCHARGE: 0
EYE PAIN: 0
ALLERGIC/IMMUNOLOGIC NEGATIVE: 1
SHORTNESS OF BREATH: 0
ABDOMINAL PAIN: 0
BACK PAIN: 1
COUGH: 0
CHEST TIGHTNESS: 0
TROUBLE SWALLOWING: 0
BLOOD IN STOOL: 0
SORE THROAT: 0
VOMITING: 0
EYE REDNESS: 0

## 2021-06-29 NOTE — PROGRESS NOTES
21  Louise Meyer Brothers : 1985 Sex: female  Age: 39 y.o. Assessment and Plan:  Becka Berry was seen today for pre-op exam and other. Diagnoses and all orders for this visit:    Becka Berry was seen today for pre-op exam and other. Diagnoses and all orders for this visit:    Pre-op exam  -     EKG 12 Lead; Future  -     EKG 12 Lead  ECG was within normal limits. Essential hypertension  -     lisinopril (PRINIVIL;ZESTRIL) 20 MG tablet; Take 1 tablet by mouth daily  We will discontinue hydrochlorothiazide, monitor blood pressure at home. Gastroesophageal reflux disease with esophagitis without hemorrhage  Is resolved with her weight loss. Intractable migraine without aura and without status migrainosus  Stable at this time no voice complaints     hemochromatosis, hereditary [facility])  -     External Referral To Oncology  Will have a referral for hematology oncology for their evaluation. Class 3 severe obesity due to excess calories without serious comorbidity with body mass index (BMI) of 40.0 to 44.9 in adult ([facility])  BMI now down to class II with a value of 38.5 as a reflection of her 30 pound weight loss. Susie Read MD, Neurology, [facility]  No neurosurgical etiology for her paresthesias, will get neurology opinion. Congenital spondylolysis, lumbosacral region  -     External Referral To Physical Therapy  Continue physical therapy in [place] by [facility]. Return in about 6 months (around 2021). Chief Complaint   Patient presents with    Pre-op Exam     tubal ligation on 21    Other     Referral- Dr. Byron Pickett and for Physical Therapy       Patient here for presurgical exam, BTL scheduled by gynecologist.  She has lost 30 pounds in the last 6 months. To a weight loss clinic run by an endocrinologist.  Her blood pressure issues are resolving, the GERD symptoms are also gone. Her paresthesias continue. Her MRIs were reviewed me. Gastroesophageal reflux disease without esophagitis     Hemochromatosis     Hypertension    [facility] Migraines     born with heart murmur    Seasonal allergies      Past Surgical History:   Procedure Laterality Date    SINUS SURGERY N/A 2/25/16    FUNCTIONAL ENDOSCOPIC SINUS SURGERY WITH BILATERAL MAXILLARY, FRONTAL & SPENOID ANTROSTOMIES, SUBMUCCOSAL RESECTION OF INFERIOR TURBINATES     Family History   Problem Relation Age of Onset    Arthritis Mother     Other Mother         sinus problems    High Blood Pressure Father     Other Maternal Aunt         migraine    Other Maternal Uncle         migraine    Other Paternal Aunt         migraine    Other Paternal Uncle         migraine    Arthritis Maternal Grandmother      Social History     Socioeconomic History    Marital status:      Spouse name: Not on file    Number of children: Not on file    Years of education: Not on file    Highest education level: Not on file   Occupational History    Not on file   Tobacco Use    Smoking status: Never Smoker    Smokeless tobacco: Never Used   Vaping Use    Vaping Use: Never used   Substance and Sexual Activity    Alcohol use: No     Alcohol/week: 0.0 standard drinks    Drug use: No    Sexual activity: Not on file   Other Topics Concern    Not on file   Social History Narrative    Not on file     Social Determinants of Health     Financial Resource Strain: Unknown    Difficulty of Paying Living Expenses: Patient refused   Food Insecurity: Unknown    Worried About Running Out of Food in the Last Year: Patient refused    Ran Out of Food in the Last Year: Patient refused   Transportation Needs:     Lack of Transportation (Medical):      Lack of Transportation (Non-Medical):    Physical Activity:     Days of Exercise per Week:     Minutes of Exercise per Session:    Stress:     Feeling of Stress :    Social Connections:     Frequency of Communication with Friends and Family:     Frequency of Social Gatherings with Friends and Family:     Attends Buddhist Services:     Active Member of Clubs or Organizations:     Attends Club or Organization Meetings:     Marital Status:    Intimate Partner Violence:     Fear of Current or Ex-Partner:     Emotionally Abused:     Physically Abused:     Sexually Abused:        Vitals:    06/29/21 0903   BP: 110/76   Pulse: 66   Resp: 16   Temp: 97.7 °F (36.5 °C)   TempSrc: Temporal   SpO2: 98%   Weight: 252 lb (114.3 kg)   Height: 5' 8\" (1.727 m)       Physical Exam  Vitals and nursing note reviewed. Constitutional:       Appearance: She is well-developed. HENT:      Head: Atraumatic. Right Ear: External ear normal.      Left Ear: External ear normal.      Nose: Nose normal.      Mouth/Throat:      Pharynx: No oropharyngeal exudate. Eyes:      Conjunctiva/sclera: Conjunctivae normal.      Pupils: Pupils are equal, round, and reactive to light. Neck:      Thyroid: No thyromegaly. Trachea: No tracheal deviation. Cardiovascular:      Rate and Rhythm: Normal rate and regular rhythm. Heart sounds: No murmur heard. No friction rub. No gallop. Pulmonary:      Effort: Pulmonary effort is normal. No respiratory distress. Breath sounds: Normal breath sounds. Abdominal:      General: Bowel sounds are normal.      Palpations: Abdomen is soft. Musculoskeletal:         General: Tenderness present. No deformity. Normal range of motion. Cervical back: Normal range of motion and neck supple. Comments: Pain, spasm, stiffness decreased range of motion cervical, lumbar spines. There are some radicular discomfort arms and legs. Lymphadenopathy:      Cervical: No cervical adenopathy. Skin:     General: Skin is warm and dry. Capillary Refill: Capillary refill takes less than 2 seconds. Findings: No rash. Neurological:      Mental Status: She is alert and oriented to person, place, and time.       Sensory: No sensory deficit. Motor: No abnormal muscle tone.       Coordination: Coordination normal.      Deep Tendon Reflexes: Reflexes normal.             Seen By:  Kelsy Lyons DO

## 2021-07-20 ENCOUNTER — OFFICE VISIT (OUTPATIENT)
Dept: BARIATRICS/WEIGHT MGMT | Age: 36
End: 2021-07-20
Payer: COMMERCIAL

## 2021-07-20 VITALS
SYSTOLIC BLOOD PRESSURE: 136 MMHG | DIASTOLIC BLOOD PRESSURE: 87 MMHG | HEIGHT: 69 IN | WEIGHT: 251.4 LBS | TEMPERATURE: 97.2 F | HEART RATE: 64 BPM | BODY MASS INDEX: 37.23 KG/M2

## 2021-07-20 DIAGNOSIS — E66.01 CLASS 3 SEVERE OBESITY DUE TO EXCESS CALORIES WITHOUT SERIOUS COMORBIDITY WITH BODY MASS INDEX (BMI) OF 40.0 TO 44.9 IN ADULT (HCC): ICD-10-CM

## 2021-07-20 DIAGNOSIS — I10 ESSENTIAL HYPERTENSION: Primary | ICD-10-CM

## 2021-07-20 PROCEDURE — 99211 OFF/OP EST MAY X REQ PHY/QHP: CPT

## 2021-07-20 PROCEDURE — 99214 OFFICE O/P EST MOD 30 MIN: CPT | Performed by: INTERNAL MEDICINE

## 2021-07-20 PROCEDURE — 1036F TOBACCO NON-USER: CPT | Performed by: INTERNAL MEDICINE

## 2021-07-20 PROCEDURE — G8417 CALC BMI ABV UP PARAM F/U: HCPCS | Performed by: INTERNAL MEDICINE

## 2021-07-20 PROCEDURE — G8428 CUR MEDS NOT DOCUMENT: HCPCS | Performed by: INTERNAL MEDICINE

## 2021-07-20 NOTE — PROGRESS NOTES
CC -   HTN, Obesity    BACKGROUND -   Last visit: 6/8/21  First visit: 5/4/21    · Obesity   Began in mid-20's  Initial BMI 41.8, Wt 279.2 lbs, 5'8.5\"  HS Grad wt 160 lbs   Lowest   wt 160 lbs   Highest  wt 280 lbs  Pattern of wt gain: grad  Wt change past yr: +10 lbs  Most wt lost: 40 lbs ([employer] with [employer])  Other diets attempted: [employer], Noom, Gluten-free, no medications, fad diets, Naturopathic    Desire to lose weight: 10/10  Problem posed by appetite: 6-7/10    Initial Diet:    Number of meals per day - 4    Number of snacks per day - 3-4    Meal volume - 12\" plate, usually seconds    Fast food/convenience store - 5x/week    Restaurants (not fast food) - 0x/week   Sweets - 7d/week (reg Snickers, Butterfinger; 4 small cookies)   Chips - 5d/week (1 cereal bowl, flat)   Crackers/pretzels - 3d/week (1 handful)   Nuts - 0d/week   Peanut Butter - 0d/week   Popcorn - 0d/week   Dried fruit - 5d/week (2 tablespoons of raisins or craisins)   Whole fruit - 5d/week (1-2 pieces)   Breakfast cereal - 5d/week (1/3 cup dry oats with 3 tablespoons brown sugar)   Granola/Protein/Energy bar - 0d/week   Sugar sweetened beverages - 24 oz/wk of lemonade, 1 cup tea/d, 7d/wk with 1-2 tablespoons sugar   Protein - No supplements   Fiber - No supplements     Exercise:    Gym membership - none    Walking - none    Running - none    Resistance - none    Aerobic class - none    ______________________    [facility] -  Past Medical History:   Diagnosis Date    Abnormal Pap smear of cervix     Back pain     Chronic sinusitis     with facial pain    Class 3 severe obesity due to excess calories without serious comorbidity with body mass index (BMI) of 40.0 to 44.9 in adult [facility])     Depression 06/06/2011    Essential hypertension     Essential hypertension     Gastroesophageal reflux disease without esophagitis     Hemochromatosis     Hypertension     Migraines     born with heart murmur    Seasonal allergies      Current Outpatient Medications   Medication Sig Dispense Refill    lisinopril (PRINIVIL;ZESTRIL) 20 MG tablet Take 1 tablet by mouth daily 30 tablet 5    MULTIPLE VITAMIN PO Take by mouth      diclofenac (VOLTAREN) 50 MG EC tablet Take 1 tablet by mouth 2 times daily 60 tablet 2    hydroCHLOROthiazide (HYDRODIURIL) 25 MG tablet Take 1 tablet by mouth every morning 30 tablet 5     No current facility-administered medications for this visit. ROS -  Card - no CP  GI - no N/V/D    PE -  Gen : /87 (Site: Left Upper Arm, Position: Sitting, Cuff Size: Large Adult)   Pulse 64   Temp 97.2 °F (36.2 °C) (Temporal)   Ht 5' 8.5\" (1.74 m)   Wt 251 lb 6.4 oz (114 kg)   BMI 37.67 kg/m²    WN, WD, NAD  Lung: Nml resp effort  Psych: Normal mood   Full affect  Neuro:  Moves all ext well  ______________________    HISTORY & ASSESSMENT/PLAN -     Problem 1  - HTN   HPI   - Diagnosed 09/2020      /87 today      Regimen: Lisinopril 20 mg/d      Was able to stop HCTZ      Checks at home 2-3x/wk; runs in the 120s/80s       Excess weight increases the severity of her hypertension  Assessment  - Controlled with medication; improving with wt reduction  Plan   - Cont lisinopril 20 mg daily     Cont wt loss per the plan below    Problem 2  - Obesity  HPI   - See above Background for description    Weight  Date    279.2 lbs 5/04/21    262.6 lbs 6/08/21    251.4 lbs 7/20/21  Total wt change to date:  - 27.8 lbs  DEN = 2325 lesli/d = 16,275 lesli/wk  Avg daily energy variance:   5/4/21 - 6/8/21 = -14.8 lbs (51,800 lesli)/34d = - 1,523 lesli/d deficit   6/8/21 - 7/2/21 = -11.2 lbs (39,200 lesli)/42d = -    933 lesli/d deficit  Weight effect of high risk, high calorie foods = Restaurants food 750 lesli/wk + Sweets 2465 + Chips/Crackers/Pretzels 1200 +  = 5,090 lesli/wk = 725 lesli/d = 31% DEN = 72 lbs/yr  At the first visit, she was willing to consider surgery, but did not want to without first trying a non-surgical approach  Does not want an appetite suppressant today. (Qsymia woud the best option b/c of her migraines. However, has been on topiramate in the past and suffered cognitive impairment )  We chose a counting-based regimen with targeted elimination. Update:  Counting calories 7d/wk and limiting to <1500 lesli/d  Not eating sweets more than one day/month  Eating >100 lesli/d of salty snacks  Not drinking SSBs  Eating restaurant food 1-3x/month (especially pizza)  Assessment  - Improved; cont with present regimen  Plan   -   Rules:  · Count every calorie every day  · Limit sweets to one day per month  · Limit chips/crackers/pretzels/nuts to 100 lesli/day  · Eliminate all sugar sweetened beverages (including fruit juice)  · Limit restaurants (including fast food and food from a convenience store) to one time every two weeks    Requirements:  · Make sure protein intake is at least 70 grams per day (do not count protein every day; instead spot check your intake every 2-3 weeks and make sure what you think you are getting is close to accurate; consider using a protein shake if needed; these are in the pharmacy section of the stores, not the grocery section; Premier, Pure Protein and Fairlife are relatively inexpensive and taste good to most patients; other options are Nectar, Boost Max, Ensure Max, BeneProtein and GNC lean (which is lactose-free); Nectar fruit, Premier Protein Clear, IsoPure Protein Drink, and Protein 2 O are water-based options; Quest (or Cosco, which is cheaper and is ordered on Amazon) and the Oh Ye[employer]amb[employer] [place] 1 protein bars can also be used, but have less protein in them )  · Make sure that fiber intake is at least 22 grams per day. Do this by either eating 12 tablespoons of the original, plain Fiber One cereal every day or 4 tablespoons of wheat dextrin powder (Benefiber or a generic brand) every day.  Work up to this amount slowly by starting with only one-eighth to one-fourth of the target amount and then adding another one-eighth to one-fourth every one or two weeks until reaching the target. · Take one multivitamin every day    Targets:  · Limit calorie intake to 1500 calories/day  · Walk 30 minutes daily  · Avoid eating 2 hours within bedtime. Tips:  · Do not eat outside of the dining room or the kitchen  · Do not eat while watching TV, videos, working on the computer or using a smart phone  · Do not eat food out of a multi-serving bag or container. · Establish 6 hours of food-free periods (times you don't eat) each day. No period can be less than 1 hour long. The periods need to be the same every day for days that are the same (for example, workdays would have one set of food free periods and weekends would have another set of days). These six hours are in addition to the two hours before bedtime and the time spent sleeping.     Total time spent on encounter: 35 min    Elder Arechiga MD  Endocrinology/Obesity  7/20/21

## 2021-07-20 NOTE — PATIENT INSTRUCTIONS
Rules:  · Count every calorie every day  · Limit sweets to one day per month  · Limit chips/crackers/pretzels/nuts to 100 lesli/day  · Eliminate all sugar sweetened beverages (including fruit juice)  · Limit restaurants (including fast food and food from a convenience store) to one time every two weeks    Requirements:  · Make sure protein intake is at least 70 grams per day (do not count protein every day; instead spot check your intake every 2-3 weeks and make sure what you think you are getting is close to accurate; consider using a protein shake if needed; these are in the pharmacy section of the stores, not the grocery section; Premier, Pure Protein and Fairlife are relatively inexpensive and taste good to most patients; other options are Nectar, Boost Max, Ensure Max, BeneProtein and GNC lean (which is lactose-free); Nectar fruit, Premier Protein Clear, IsoPure Protein Drink, and Protein 2 O are water-based options; Quest (or Cosco, which is cheaper and is ordered on Amazon) and the Bolster protein bars can also be used, but have less protein in them )  · Make sure that fiber intake is at least 22 grams per day. Do this by either eating 12 tablespoons of the original, plain Fiber One cereal every day or 4 tablespoons of wheat dextrin powder (Benefiber or a generic brand) every day. Work up to this amount slowly by starting with only one-eighth to one-fourth of the target amount and then adding another one-eighth to one-fourth every one or two weeks until reaching the target. · Take one multivitamin every day    Targets:  · Limit calorie intake to 1500 calories/day  · Walk 30 minutes daily  · Avoid eating 2 hours within bedtime. Tips:  · Do not eat outside of the dining room or the kitchen  · Do not eat while watching TV, videos, working on the computer or using a smart phone  · Do not eat food out of a multi-serving bag or container.   · Establish 6 hours of food-free periods (times you don't eat) each day. No period can be less than 1 hour long. The periods need to be the same every day for days that are the same (for example, workdays would have one set of food free periods and weekends would have another set of days). These six hours are in addition to the two hours before bedtime and the time spent sleeping.

## 2021-07-27 ENCOUNTER — TELEPHONE (OUTPATIENT)
Dept: ENT CLINIC | Age: 36
End: 2021-07-27

## 2021-07-27 NOTE — TELEPHONE ENCOUNTER
Pt called to r/s her appt on 7/28 a 1 month for allergies and possible audio. Pt states her hearing is diminished. I was unable to accommodate her schedule. 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 No appts are available until after the school year starts.   Please review and call her with appt info [phone]

## 2021-07-28 ENCOUNTER — PROCEDURE VISIT (OUTPATIENT)
Dept: AUDIOLOGY | Age: 36
End: 2021-07-28
Payer: COMMERCIAL

## 2021-07-28 ENCOUNTER — OFFICE VISIT (OUTPATIENT)
Dept: ENT CLINIC | Age: 36
End: 2021-07-28
Payer: COMMERCIAL

## 2021-07-28 VITALS
BODY MASS INDEX: 36.29 KG/M2 | OXYGEN SATURATION: 100 % | DIASTOLIC BLOOD PRESSURE: 91 MMHG | HEIGHT: 69 IN | HEART RATE: 74 BPM | SYSTOLIC BLOOD PRESSURE: 143 MMHG | TEMPERATURE: 98.1 F | WEIGHT: 245 LBS

## 2021-07-28 DIAGNOSIS — H69.83 ETD (EUSTACHIAN TUBE DYSFUNCTION), BILATERAL: ICD-10-CM

## 2021-07-28 DIAGNOSIS — H93.8X3 SENSATION OF FULLNESS IN BOTH EARS: Primary | ICD-10-CM

## 2021-07-28 DIAGNOSIS — J32.4 CHRONIC PANSINUSITIS: ICD-10-CM

## 2021-07-28 DIAGNOSIS — J30.1 SEASONAL ALLERGIC RHINITIS DUE TO POLLEN: Primary | ICD-10-CM

## 2021-07-28 DIAGNOSIS — H90.0 CONDUCTIVE HEARING LOSS, BILATERAL: ICD-10-CM

## 2021-07-28 PROCEDURE — 92567 TYMPANOMETRY: CPT | Performed by: AUDIOLOGIST

## 2021-07-28 PROCEDURE — G8427 DOCREV CUR MEDS BY ELIG CLIN: HCPCS | Performed by: OTOLARYNGOLOGY

## 2021-07-28 PROCEDURE — 99213 OFFICE O/P EST LOW 20 MIN: CPT | Performed by: OTOLARYNGOLOGY

## 2021-07-28 PROCEDURE — G8417 CALC BMI ABV UP PARAM F/U: HCPCS | Performed by: OTOLARYNGOLOGY

## 2021-07-28 PROCEDURE — 1036F TOBACCO NON-USER: CPT | Performed by: OTOLARYNGOLOGY

## 2021-07-28 PROCEDURE — 92557 COMPREHENSIVE HEARING TEST: CPT | Performed by: AUDIOLOGIST

## 2021-07-28 RX ORDER — AZELASTINE 1 MG/ML
1 SPRAY, METERED NASAL 2 TIMES DAILY
Qty: 1 BOTTLE | Refills: 3 | Status: SHIPPED
Start: 2021-07-28 | End: 2022-05-14

## 2021-07-28 ASSESSMENT — ENCOUNTER SYMPTOMS
SINUS PRESSURE: 1
COLOR CHANGE: 0
RESPIRATORY NEGATIVE: 1
ABDOMINAL PAIN: 0
GASTROINTESTINAL NEGATIVE: 1
SHORTNESS OF BREATH: 0
EYES NEGATIVE: 1
RHINORRHEA: 1

## 2021-07-28 NOTE — PROGRESS NOTES
Subjective:      Patient ID:  Dina Encinas is a 39 y.o. female. HPI:    Patient presents today for ear recheck. Condition has been present for 1 month(s). Pt states the flonase is not helping she thinks the ears are continually plugged. PT feel the hearing is down and muffled.        Past Medical History:   Diagnosis Date    Abnormal Pap smear of cervix     Back pain     Chronic sinusitis     with facial pain    Class 3 severe obesity due to excess calories without serious comorbidity with body mass index (BMI) of 40.0 to 44.9 in adult [facility])     Depression 06/06/2011    Essential hypertension     Essential hypertension     Gastroesophageal reflux disease without esophagitis     Hemochromatosis     Hypertension    [facility]ra[facility]     born with heart murmur    Seasonal allergies      Past Surgical History:   Procedure Laterality Date    SINUS SURGERY N/A 02/25/2016    FUNCTIONAL ENDOSCOPIC SINUS SURGERY WITH BILATERAL MAXILLARY, FRONTAL & SPENOID ANTROSTOMIES, SUBMUCCOSAL RESECTION OF INFERIOR TURBINATES    TUBAL LIGATION  07/22/2021     Family History   Problem Relation Age of Onset    Arthritis Mother     Other Mother         sinus problems    High Blood Pressure Father     Other Maternal Aunt         migraine    Other Maternal Uncle         migraine    Other Paternal Aunt         migraine    Other Paternal Uncle         migraine    Arthritis Maternal Grandmother      Social History     Socioeconomic History    Marital status:      Spouse name: None    Number of children: None    Years of education: None    Highest education level: None   Occupational History    None   Tobacco Use    Smoking status: Never Smoker    Smokeless tobacco: Never Used   Vaping Use    Vaping Use: Never used   Substance and Sexual Activity    Alcohol use: No     Alcohol/week: 0.0 standard drinks    Drug use: No    Sexual activity: None   Other Topics Concern    None   Social History Narrative    None     Social Determinants of Health     Financial Resource Strain: Unknown    Difficulty of Paying Living Expenses: Patient refused   Food Insecurity: Unknown    Worried About Running Out of Food in the Last Year: Patient refused    Ran Out of Food in the Last Year: Patient refused   Transportation Needs:     Lack of Transportation (Medical):  Lack of Transportation (Non-Medical):    Physical Activity:     Days of Exercise per Week:     Minutes of Exercise per Session:    Stress:     Feeling of Stress :    Social Connections:     Frequency of Communication with Friends and Family:     Frequency of Social Gatherings with Friends and Family:     Attends Faith Services:     Active Member of Clubs or Organizations:     Attends Club or Organization Meetings:     Marital Status:    Intimate Partner Violence:     Fear of Current or Ex-Partner:     Emotionally Abused:     Physically Abused:     Sexually Abused: Allergies   Allergen Reactions    Pcn [Penicillins] Rash       Review of Systems   Constitutional: Negative. Negative for fever. HENT: Positive for congestion, postnasal drip, rhinorrhea, sinus pressure and sneezing. Eyes: Negative. Negative for visual disturbance. Respiratory: Negative. Negative for shortness of breath. Cardiovascular: Negative. Negative for chest pain. Gastrointestinal: Negative. Negative for abdominal pain. Genitourinary: Negative. Musculoskeletal: Negative. Skin: Negative. Negative for color change. Allergic/Immunologic: Positive for environmental allergies. Neurological: Negative. Psychiatric/Behavioral: Negative. Negative for behavioral problems and hallucinations. All other systems reviewed and are negative. Objective:     Vitals:    07/28/21 0857   BP: (!) 143/91   Pulse: 74   Temp: 98.1 °F (36.7 °C)   SpO2: 100%     Physical Exam  Vitals and nursing note reviewed.    Constitutional:       Appearance: She is well-developed. HENT:      Head: Normocephalic and atraumatic. Right Ear: Hearing, tympanic membrane, ear canal and external ear normal.      Left Ear: Hearing, tympanic membrane, ear canal and external ear normal.      Nose: Mucosal edema and rhinorrhea present. Mouth/Throat:      Pharynx: Uvula midline. Eyes:      Conjunctiva/sclera: Conjunctivae normal.      Pupils: Pupils are equal, round, and reactive to light. Cardiovascular:      Rate and Rhythm: Normal rate and regular rhythm. Heart sounds: Normal heart sounds. Pulmonary:      Effort: Pulmonary effort is normal.      Breath sounds: Normal breath sounds. Abdominal:      General: Bowel sounds are normal.      Palpations: Abdomen is soft. Musculoskeletal:      Cervical back: Normal range of motion and neck supple. Skin:     General: Skin is warm and dry. Neurological:      Mental Status: She is alert and oriented to person, place, and time. Audio:              Assessment:       Diagnosis Orders   1. Seasonal allergic rhinitis due to pollen     2. Chronic pansinusitis                Plan:      Allergic rhinitis  I do want to continue fluticasone (Flonase) Astelin for now. Call or return to clinic prn if these symptoms worsen or fail to improve as anticipated. I like to send the patient for allergy testing because they have shown to have more severe symptoms despite medical therapy.       Follow up in 4 month(s)

## 2021-08-04 ENCOUNTER — OFFICE VISIT (OUTPATIENT)
Dept: FAMILY MEDICINE CLINIC | Age: 36
End: 2021-08-04
Payer: COMMERCIAL

## 2021-08-04 VITALS
HEART RATE: 73 BPM | HEIGHT: 69 IN | WEIGHT: 246 LBS | OXYGEN SATURATION: 100 % | BODY MASS INDEX: 36.43 KG/M2 | RESPIRATION RATE: 18 BRPM | TEMPERATURE: 97.6 F | SYSTOLIC BLOOD PRESSURE: 124 MMHG | DIASTOLIC BLOOD PRESSURE: 84 MMHG

## 2021-08-04 DIAGNOSIS — E66.01 CLASS 3 SEVERE OBESITY DUE TO EXCESS CALORIES WITHOUT SERIOUS COMORBIDITY WITH BODY MASS INDEX (BMI) OF 40.0 TO 44.9 IN ADULT (HCC): ICD-10-CM

## 2021-08-04 DIAGNOSIS — Q76.2 CONGENITAL SPONDYLOLYSIS, LUMBOSACRAL REGION: ICD-10-CM

## 2021-08-04 DIAGNOSIS — E83.110 HEMOCHROMATOSIS, HEREDITARY (HCC): ICD-10-CM

## 2021-08-04 DIAGNOSIS — I10 ESSENTIAL HYPERTENSION: Primary | ICD-10-CM

## 2021-08-04 DIAGNOSIS — J30.1 SEASONAL ALLERGIC RHINITIS DUE TO POLLEN: ICD-10-CM

## 2021-08-04 DIAGNOSIS — I10 ESSENTIAL HYPERTENSION: ICD-10-CM

## 2021-08-04 LAB
ALBUMIN SERPL-MCNC: 4 G/DL (ref 3.5–5.2)
ALP BLD-CCNC: 67 U/L (ref 35–104)
ALT SERPL-CCNC: 19 U/L (ref 0–32)
ANION GAP SERPL CALCULATED.3IONS-SCNC: 10 MMOL/L (ref 7–16)
AST SERPL-CCNC: 15 U/L (ref 0–31)
BACTERIA: NORMAL /HPF
BASOPHILS ABSOLUTE: 0.05 E9/L (ref 0–0.2)
BASOPHILS RELATIVE PERCENT: 0.6 % (ref 0–2)
BILIRUB SERPL-MCNC: 0.3 MG/DL (ref 0–1.2)
BILIRUBIN URINE: NEGATIVE
BLOOD, URINE: NORMAL
BUN BLDV-MCNC: 11 MG/DL (ref 6–20)
CALCIUM SERPL-MCNC: 9.3 MG/DL (ref 8.6–10.2)
CHLORIDE BLD-SCNC: 104 MMOL/L (ref 98–107)
CHOLESTEROL, TOTAL: 144 MG/DL (ref 0–199)
CLARITY: CLEAR
CO2: 25 MMOL/L (ref 22–29)
COLOR: YELLOW
CREAT SERPL-MCNC: 0.7 MG/DL (ref 0.5–1)
EOSINOPHILS ABSOLUTE: 0.33 E9/L (ref 0.05–0.5)
EOSINOPHILS RELATIVE PERCENT: 3.7 % (ref 0–6)
FERRITIN: 28 NG/ML
GFR AFRICAN AMERICAN: >60
GFR NON-AFRICAN AMERICAN: >60 ML/MIN/1.73
GLUCOSE FASTING: 96 MG/DL (ref 74–99)
GLUCOSE URINE: NEGATIVE MG/DL
HCT VFR BLD CALC: 43.1 % (ref 34–48)
HDLC SERPL-MCNC: 43 MG/DL
HEMOGLOBIN: 14.2 G/DL (ref 11.5–15.5)
IMMATURE GRANULOCYTES #: 0.03 E9/L
IMMATURE GRANULOCYTES %: 0.3 % (ref 0–5)
IRON SATURATION: 27 % (ref 15–50)
IRON: 58 MCG/DL (ref 37–145)
KETONES, URINE: NEGATIVE MG/DL
LDL CHOLESTEROL CALCULATED: 82 MG/DL (ref 0–99)
LEUKOCYTE ESTERASE, URINE: NEGATIVE
LYMPHOCYTES ABSOLUTE: 2.69 E9/L (ref 1.5–4)
LYMPHOCYTES RELATIVE PERCENT: 29.9 % (ref 20–42)
MAGNESIUM: 1.7 MG/DL (ref 1.6–2.6)
MCH RBC QN AUTO: 29.6 PG (ref 26–35)
MCHC RBC AUTO-ENTMCNC: 32.9 % (ref 32–34.5)
MCV RBC AUTO: 89.8 FL (ref 80–99.9)
MONOCYTES ABSOLUTE: 0.63 E9/L (ref 0.1–0.95)
MONOCYTES RELATIVE PERCENT: 7 % (ref 2–12)
NEUTROPHILS ABSOLUTE: 5.27 E9/L (ref 1.8–7.3)
NEUTROPHILS RELATIVE PERCENT: 58.5 % (ref 43–80)
NITRITE, URINE: NEGATIVE
PDW BLD-RTO: 12.7 FL (ref 11.5–15)
PH UA: 5.5 (ref 5–9)
PLATELET # BLD: 351 E9/L (ref 130–450)
PMV BLD AUTO: 9.6 FL (ref 7–12)
POTASSIUM SERPL-SCNC: 4.1 MMOL/L (ref 3.5–5)
PROTEIN UA: NEGATIVE MG/DL
RBC # BLD: 4.8 E12/L (ref 3.5–5.5)
RBC UA: NORMAL /HPF (ref 0–2)
SODIUM BLD-SCNC: 139 MMOL/L (ref 132–146)
SPECIFIC GRAVITY UA: 1.01 (ref 1–1.03)
TOTAL IRON BINDING CAPACITY: 213 MCG/DL (ref 250–450)
TOTAL PROTEIN: 6.8 G/DL (ref 6.4–8.3)
TRIGL SERPL-MCNC: 97 MG/DL (ref 0–149)
TSH SERPL DL<=0.05 MIU/L-ACNC: 0.63 UIU/ML (ref 0.27–4.2)
UROBILINOGEN, URINE: 0.2 E.U./DL
VLDLC SERPL CALC-MCNC: 19 MG/DL
WBC # BLD: 9 E9/L (ref 4.5–11.5)
WBC UA: NORMAL /HPF (ref 0–5)

## 2021-08-04 PROCEDURE — G8417 CALC BMI ABV UP PARAM F/U: HCPCS | Performed by: FAMILY MEDICINE

## 2021-08-04 PROCEDURE — 99214 OFFICE O/P EST MOD 30 MIN: CPT | Performed by: FAMILY MEDICINE

## 2021-08-04 PROCEDURE — G8427 DOCREV CUR MEDS BY ELIG CLIN: HCPCS | Performed by: FAMILY MEDICINE

## 2021-08-04 PROCEDURE — 1036F TOBACCO NON-USER: CPT | Performed by: FAMILY MEDICINE

## 2021-08-04 RX ORDER — PROPRANOLOL HCL 60 MG
60 CAPSULE, EXTENDED RELEASE 24HR ORAL DAILY
Qty: 30 CAPSULE | Refills: 1 | Status: SHIPPED
Start: 2021-08-04 | End: 2021-08-27

## 2021-08-04 ASSESSMENT — ENCOUNTER SYMPTOMS
EYE PAIN: 0
CHEST TIGHTNESS: 0
EYE DISCHARGE: 0
SORE THROAT: 0
EYE REDNESS: 0
PHOTOPHOBIA: 0
VOMITING: 0
SINUS PAIN: 0
COUGH: 0
BLOOD IN STOOL: 0
SHORTNESS OF BREATH: 0
NAUSEA: 0
ALLERGIC/IMMUNOLOGIC NEGATIVE: 1
BACK PAIN: 0
ABDOMINAL PAIN: 0
DIARRHEA: 0
TROUBLE SWALLOWING: 0

## 2021-08-04 NOTE — PROGRESS NOTES
21  Dinh Tovar Brothers : 1985 Sex: female  Age: 39 y.o. Assessment and Plan:  Cindy Gill was seen today for medication problem. Diagnoses and all orders for this visit:    Essential hypertension  -     propranolol (INDERAL LA) 60 MG extended release capsule; Take 1 capsule by mouth daily  -     CBC Auto Differential; Future  -     Comprehensive Metabolic Panel, Fasting; Future  -     Lipid Panel; Future  -     TSH without Reflex; Future  -     Urinalysis; Future  -     MAGNESIUM; Future  We will stop lisinopril last possible side effects. Propranolol 60 mg initially may increase to twice daily depending on blood pressure response. This will also help her headaches which is a history of migraine. Seasonal allergic rhinitis due to pollen    Congenital spondylolysis, lumbosacral region    Hemochromatosis, hereditary ([facility] [address]  -     CBC Auto Differential; Future  -     IRON AND TIBC; Future  -     FERRITIN; Future    Class 3 severe obesity due to excess calories without serious comorbidity with body mass index (BMI) of 40.0 to 44.9 in adult [facility])        No follow-ups on file. Chief Complaint   Patient presents with    Medication Problem     side effects lisinopril ? The patient is here for blood pressure check. Patient has been taking their medications as prescribed. No recent changes to their medications. No headache or visual disturbances. No dizziness or tinnitus. No chest pain, palpitations, or dyspnea. No nausea and vomiting. No numbness, tingling and or weakness to the extremities. No fevers or chills. No recent illness. Is with the paresthesias of her face and upper extremities. He was seen by ear nose and throat, her hearing test was normal. Has an appointment with neurology next week, will keep this. Also appointment with hematology for hemochromatosis which is next month. Just recently seen neurosurgery, no surgical meant contemplated.       Review of Systems Constitutional: Negative for appetite change, fatigue and unexpected weight change. HENT: Negative for congestion, ear pain, hearing loss, sinus pain, sore throat and trouble swallowing. Eyes: Negative for photophobia, pain, discharge and redness. Respiratory: Negative for cough, chest tightness and shortness of breath. Cardiovascular: Negative for chest pain, palpitations and leg swelling. Gastrointestinal: Negative for abdominal pain, blood in stool, diarrhea, nausea and vomiting. Endocrine: Negative. Genitourinary: Negative for dysuria, flank pain, frequency and hematuria. Musculoskeletal: Negative for arthralgias, back pain, joint swelling and myalgias. Skin: Negative. Allergic/Immunologic: Negative. Neurological: Positive for numbness. Negative for dizziness, seizures, syncope, weakness, light-headedness and headaches. Hematological: Negative for adenopathy. Does not bruise/bleed easily. Psychiatric/Behavioral: Negative.           Current Outpatient Medications:     propranolol (INDERAL LA) 60 MG extended release capsule, Take 1 capsule by mouth daily, Disp: 30 capsule, Rfl: 1    azelastine (ASTELIN) 0.1 % nasal spray, 1 spray by Nasal route 2 times daily Use in each nostril as directed, Disp: 1 Bottle, Rfl: 3    MULTIPLE VITAMIN PO, Take by mouth, Disp: , Rfl:   Allergies   Allergen Reactions    Pcn [Penicillins] Rash       Past Medical History:   Diagnosis Date    Abnormal Pap smear of cervix     Back pain     Chronic sinusitis     with facial pain    Class 3 severe obesity due to excess calories without serious comorbidity with body mass index (BMI) of 40.0 to 44.9 in adult [facility])     Depression 06/06/2011    Essential hypertension     Essential hypertension     Gastroesophageal reflux disease without esophagitis     Hemochromatosis     Hypertension     Migraines     born with heart murmur    Seasonal allergies      Past Surgical History:   Procedure Laterality Date    SINUS SURGERY N/A 02/25/2016    FUNCTIONAL ENDOSCOPIC SINUS SURGERY WITH BILATERAL MAXILLARY, FRONTAL & SPENOID ANTROSTOMIES, SUBMUCCOSAL RESECTION OF INFERIOR TURBINATES    TUBAL LIGATION  07/22/2021     Family History   Problem Relation Age of Onset    Arthritis Mother     Other Mother         sinus problems    High Blood Pressure Father     Other Maternal Aunt         migraine    Other Maternal Uncle         migraine    Other Paternal Aunt         migraine    Other Paternal Uncle         migraine    Arthritis Maternal Grandmother      Social History     Socioeconomic History    Marital status:      Spouse name: Not on file    Number of children: Not on file    Years of education: Not on file    Highest education level: Not on file   Occupational History    Not on file   Tobacco Use    Smoking status: Never Smoker    Smokeless tobacco: Never Used   Vaping Use    Vaping Use: Never used   Substance and Sexual Activity    Alcohol use: No     Alcohol/week: 0.0 standard drinks    Drug use: No    Sexual activity: Not on file   Other Topics Concern    Not on file   Social History Narrative    Not on file     Social Determinants of Health     Financial Resource Strain: Unknown    Difficulty of Paying Living Expenses: Patient refused   Food Insecurity: Unknown    Worried About [address] in the Last Year: Patient refused    [address] in the Last Year: Patient refused   Transportation Needs:     Lack of Transportation (Medical):      Lack of Transportation (Non-Medical):    Physical Activity:     Days of Exercise per Week:     Minutes of Exercise per Session:    Stress:     Feeling of Stress :    Social Connections:     Frequency of Communication with Friends and Family:     Frequency of Social Gatherings with Friends and Family:     Attends Mosque Services:     Active Member of Clubs or Organizations:     Attends Club or Organization Meetings:     Marital Status:    Intimate Partner Violence:     Fear of Current or Ex-Partner:     Emotionally Abused:     Physically Abused:     Sexually Abused:        Vitals:    08/04/21 0803   BP: 124/84   Pulse: 73   Resp: 18   Temp: 97.6 °F (36.4 °C)   TempSrc: Temporal   SpO2: 100%   Weight: 246 lb (111.6 kg)   Height: 5' 8.5\" (1.74 m)       Physical Exam  Vitals and nursing note reviewed. Constitutional:       Appearance: She is well-developed. HENT:      Head: Atraumatic. Right Ear: External ear normal.      Left Ear: External ear normal.      Nose: Nose normal.      Mouth/Throat:      Pharynx: No oropharyngeal exudate. Eyes:      Conjunctiva/sclera: Conjunctivae normal.      Pupils: Pupils are equal, round, and reactive to light. Neck:      Thyroid: No thyromegaly. Trachea: No tracheal deviation. Cardiovascular:      Rate and Rhythm: Normal rate and regular rhythm. Heart sounds: No murmur heard. No friction rub. No gallop. Pulmonary:      Effort: Pulmonary effort is normal. No respiratory distress. Breath sounds: Normal breath sounds. Abdominal:      General: Bowel sounds are normal.      Palpations: Abdomen is soft. Musculoskeletal:         General: No tenderness or deformity. Normal range of motion. Cervical back: Normal range of motion and neck supple. Lymphadenopathy:      Cervical: No cervical adenopathy. Skin:     General: Skin is warm and dry. Capillary Refill: Capillary refill takes less than 2 seconds. Findings: No rash. Neurological:      Mental Status: She is alert and oriented to person, place, and time. Sensory: Sensory deficit present. Motor: No abnormal muscle tone.       Coordination: Coordination normal.      Deep Tendon Reflexes: Reflexes normal.             Seen By:  Mandy Hickman DO

## 2021-08-05 ENCOUNTER — TELEPHONE (OUTPATIENT)
Dept: FAMILY MEDICINE CLINIC | Age: 36
End: 2021-08-05

## 2021-08-05 ENCOUNTER — HOSPITAL ENCOUNTER (EMERGENCY)
Age: 36
Discharge: LEFT BEFORE DISCHARGE | End: 2021-08-05
Payer: COMMERCIAL

## 2021-08-05 NOTE — TELEPHONE ENCOUNTER
She is calling to tell you that she is having really bad increase in facial numbness. Both arms are weak and heavy. Also having pelvic numbness. She is having a little bit of leaky bladder. She is beginning to panic over this.   Please advise if you want to see her or have her go to ER?

## 2021-08-10 PROBLEM — R20.2 PARESTHESIAS: Status: ACTIVE | Noted: 2021-08-10

## 2021-08-12 ENCOUNTER — TELEPHONE (OUTPATIENT)
Dept: FAMILY MEDICINE CLINIC | Age: 36
End: 2021-08-12

## 2021-08-12 NOTE — TELEPHONE ENCOUNTER
Patient calling with what she has going on its made worse when in the heat. 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 The [place] resident recommended dr write a letter to the school to be in a climate control environment. The letter needs to state her condition is made worse by the heat. The school may be able to get her a window unit. She will come in for an appt if needed. The school fax is [phone].

## 2021-08-13 ENCOUNTER — OFFICE VISIT (OUTPATIENT)
Dept: NEUROLOGY | Age: 36
End: 2021-08-13
Payer: COMMERCIAL

## 2021-08-13 ENCOUNTER — OFFICE VISIT (OUTPATIENT)
Dept: FAMILY MEDICINE CLINIC | Age: 36
End: 2021-08-13
Payer: COMMERCIAL

## 2021-08-13 VITALS
TEMPERATURE: 97.3 F | HEIGHT: 69 IN | OXYGEN SATURATION: 100 % | RESPIRATION RATE: 17 BRPM | DIASTOLIC BLOOD PRESSURE: 84 MMHG | SYSTOLIC BLOOD PRESSURE: 134 MMHG | HEART RATE: 73 BPM | BODY MASS INDEX: 36.54 KG/M2 | WEIGHT: 246.7 LBS

## 2021-08-13 VITALS
SYSTOLIC BLOOD PRESSURE: 140 MMHG | DIASTOLIC BLOOD PRESSURE: 100 MMHG | BODY MASS INDEX: 36.43 KG/M2 | WEIGHT: 246 LBS | HEIGHT: 69 IN

## 2021-08-13 DIAGNOSIS — M51.36 DDD (DEGENERATIVE DISC DISEASE), LUMBAR: Chronic | ICD-10-CM

## 2021-08-13 DIAGNOSIS — R20.2 PARESTHESIAS: ICD-10-CM

## 2021-08-13 DIAGNOSIS — G89.29 CHRONIC GENERALIZED PAIN DISORDER: Chronic | ICD-10-CM

## 2021-08-13 DIAGNOSIS — I10 ESSENTIAL HYPERTENSION: Primary | ICD-10-CM

## 2021-08-13 DIAGNOSIS — R52 CHRONIC GENERALIZED PAIN DISORDER: Chronic | ICD-10-CM

## 2021-08-13 DIAGNOSIS — M50.30 DDD (DEGENERATIVE DISC DISEASE), CERVICAL: Chronic | ICD-10-CM

## 2021-08-13 PROBLEM — M51.369 DDD (DEGENERATIVE DISC DISEASE), LUMBAR: Chronic | Status: ACTIVE | Noted: 2021-08-13

## 2021-08-13 PROCEDURE — G8427 DOCREV CUR MEDS BY ELIG CLIN: HCPCS | Performed by: FAMILY MEDICINE

## 2021-08-13 PROCEDURE — 99204 OFFICE O/P NEW MOD 45 MIN: CPT | Performed by: PSYCHIATRY & NEUROLOGY

## 2021-08-13 PROCEDURE — G8417 CALC BMI ABV UP PARAM F/U: HCPCS | Performed by: FAMILY MEDICINE

## 2021-08-13 PROCEDURE — 1036F TOBACCO NON-USER: CPT | Performed by: FAMILY MEDICINE

## 2021-08-13 PROCEDURE — 99213 OFFICE O/P EST LOW 20 MIN: CPT | Performed by: FAMILY MEDICINE

## 2021-08-13 PROCEDURE — 1036F TOBACCO NON-USER: CPT | Performed by: PSYCHIATRY & NEUROLOGY

## 2021-08-13 PROCEDURE — G8427 DOCREV CUR MEDS BY ELIG CLIN: HCPCS | Performed by: PSYCHIATRY & NEUROLOGY

## 2021-08-13 PROCEDURE — G8417 CALC BMI ABV UP PARAM F/U: HCPCS | Performed by: PSYCHIATRY & NEUROLOGY

## 2021-08-13 ASSESSMENT — ENCOUNTER SYMPTOMS
EYE PAIN: 0
GASTROINTESTINAL NEGATIVE: 1
TROUBLE SWALLOWING: 0
EYES NEGATIVE: 1
RESPIRATORY NEGATIVE: 1
SINUS PAIN: 0
EYE REDNESS: 0
DIARRHEA: 0
ALLERGIC/IMMUNOLOGIC NEGATIVE: 1
PHOTOPHOBIA: 0
EYE DISCHARGE: 0
CHEST TIGHTNESS: 0
SHORTNESS OF BREATH: 0
VOMITING: 0
BLOOD IN STOOL: 0
ABDOMINAL PAIN: 0
NAUSEA: 0
COUGH: 0
BACK PAIN: 0
ALLERGIC/IMMUNOLOGIC NEGATIVE: 1
BACK PAIN: 1
SORE THROAT: 0

## 2021-08-13 NOTE — PROGRESS NOTES
Neurology Consult Note:    Patient: Sulema Hagan  : 1985  Date: 21  Referring provider: Power Allen DO    Referral to Neurology: Chronic generalized pain syndrome, hx diffuse paresthesias. Cc: diffuse numbness, tingling, pain x many yrs. Including low back pain and arm numbness-beginning last summer, and also reports diffuse facial and lip numbness-starting this past April. Dear Power Allen DO:     I have seen Scot Walker Brothers an alert, anxious, obese, 26-year-old woman whom you referred to the Neurology clinic for consultation regarding a chronic generalized pain syndrome with c/o diffuse pain and tingling symptoms without localizing neurologic deficits. She reports diffuse numbness, tingling and chronic pain for many years including low back pain, arm numbness beginning last summer, and diffuse facial and lip numbness beginning this past April. She has seen doctors at the [address] [place] [address], also for paresthesias. She had a normal EMG/NCS of her right arm at . Her sed rate and CRP were within normal limits. She did not follow-up with the [facility]. She also saw Dr. Casandra Camacho, for cervical and lumbar degenerative disc disease who did not recommend spinal surgery. She has a history of hypertension and lisinopril intolerance recently and was changed to propranolol per her primary care provider for blood pressure management. She has an elevated blood pressure reading today of 140/100. [facility] records are reviewed from the [facility] through 3/2/2021 and [facility] MR imaging studies of the brain, cervical, thoracic and lumbar spines which does show multilevel cervical and lumbar spondylosis, degenerative disc disease and the NCS/EMG study performed at the [facility] was stated to be normal.  Her sed.  rate was also normal.  MR studies including an MR brain scan with and without contrast, showed no evidence of infarct, mass, mass-effect, hemorrhage, abnormal enhancement or demyelinating disease. Lab Data: Reviewed from 8/4/2021, 3/31/2021, normal vitamin Q58 and folic acid level, blood glucose 103, 116, calcium 9.3 cortisol 1.07, normal sodium and potassium levels, normal BUN and creatinine, normal LFTs, magnesium 1.7. [facility] lab reports reviewed from 2/22/2021, normal ESR and CRP. Imaging Data: 6/7/21, MR studies of the brain, cervical, thoracic and lumbar spines, reviewed. Cervical and lumbar spondylosis, degenerative disc disease. Current Outpatient Medications   Medication Sig Dispense Refill    propranolol (INDERAL LA) 60 MG extended release capsule Take 1 capsule by mouth daily 30 capsule 1    azelastine (ASTELIN) 0.1 % nasal spray 1 spray by Nasal route 2 times daily Use in each nostril as directed 1 Bottle 3    MULTIPLE VITAMIN PO Take by mouth (Patient not taking: Reported on 8/13/2021)       No current facility-administered medications for this visit.        Allergies   Allergen Reactions    Pcn [Penicillins] Rash       Patient Active Problem List   Diagnosis    GERD with esophagitis    Allergic rhinitis    Congenital spondylolysis, lumbosacral region    Degeneration of lumbar or lumbosacral intervertebral disc    Depression    Hemochromatosis, hereditary ([address]    Back pain    Class 3 severe obesity due to excess calories without serious comorbidity with body mass index (BMI) of 40.0 to 44.9 in adult [facility])    Essential hypertension    Paresthesias    Chronic generalized pain disorder    DDD (degenerative disc disease), cervical    DDD (degenerative disc disease), lumbar     Past Medical History:   Diagnosis Date    Abnormal Pap smear of cervix     Back pain     Chronic generalized pain disorder 8/13/2021    Chronic sinusitis     with facial pain    Class 3 severe obesity due to excess calories without serious comorbidity with body mass index (BMI) of 40.0 to 44.9 in adult ([facility] [address]     DDD (degenerative disc disease), cervical 8/13/2021    DDD (degenerative disc disease), lumbar 8/13/2021    Depression 06/06/2011    Essential hypertension     Essential hypertension     Gastroesophageal reflux disease without esophagitis     Hemochromatosis     Migraines     born with heart murmur    Paresthesias 8/10/2021    Seasonal allergies        Past Surgical History:   Procedure Laterality Date    SINUS SURGERY N/A 02/25/2016    FUNCTIONAL ENDOSCOPIC SINUS SURGERY WITH BILATERAL MAXILLARY, FRONTAL & SPENOID ANTROSTOMIES, SUBMUCCOSAL RESECTION OF INFERIOR TURBINATES    TUBAL LIGATION  07/22/2021       Family History   Problem Relation Age of Onset    Arthritis Mother     Other Mother         sinus problems    High Blood Pressure Father     Other Maternal Aunt         migraine    Other Maternal Uncle         migraine    Other Paternal Aunt         migraine    Other Paternal Uncle         migraine    Arthritis Maternal Grandmother        Social History     Socioeconomic History    Marital status:      Spouse name: Not on file    Number of children: Not on file    Years of education: Not on file    Highest education level: Not on file   Occupational History    Not on file   Tobacco Use    Smoking status: Never Smoker    Smokeless tobacco: Never Used   Vaping Use    Vaping Use: Never used   Substance and Sexual Activity    Alcohol use: No     Alcohol/week: 0.0 standard drinks    Drug use: No    Sexual activity: Not on file   Other Topics Concern    Not on file   Social History Narrative    Not on file     Social Determinants of Health     Financial Resource Strain: Unknown    Difficulty of Paying Living Expenses: Patient refused   Food Insecurity: Unknown    Worried About Running Out of Food in the Last Year: Patient refused    Ran Out of Food in the Last Year: Patient refused   Transportation Needs:     Lack of Transportation (Medical):      Lack of Transportation (Non-Medical):    Physical Activity:     Days of Exercise per Week:     Minutes of Exercise per Session:    Stress:     Feeling of Stress :    Social Connections:     Frequency of Communication with Friends and Family:     Frequency of Social Gatherings with Friends and Family:     Attends Sikhism Services:     Active Member of Clubs or Organizations:     Attends Club or Organization Meetings:     Marital Status:    Intimate Partner Violence:     Fear of Current or Ex-Partner:     Emotionally Abused:     Physically Abused:     Sexually Abused:      Review of Systems   Constitutional: Negative. HENT: Negative. Eyes: Negative. Respiratory: Negative. Cardiovascular: Negative. Gastrointestinal: Negative. Endocrine:        Hx glucose impairment   Genitourinary: Negative. Musculoskeletal: Positive for arthralgias, back pain, myalgias and neck pain. Skin: Negative. Allergic/Immunologic: Negative. Neurological: Negative. Hematological: Negative. Psychiatric/Behavioral: The patient is nervous/anxious. All other systems reviewed and are negative. Neurologic Exam:  BP (!) 140/100 (Site: Left Upper Arm, Position: Sitting, Cuff Size: Medium Adult)   Ht 5' 9\" (1.753 m)   Wt 246 lb (111.6 kg)   LMP 07/17/2021 (Approximate)   BMI 36.33 kg/m²   General appearance: Alert, obese, anxious, well-nourished, well-groomed, seated in the exam room, no acute distress. HEENT: Normocephalic/atraumatic. Neck: Supple, no carotid bruits or adventitious sounds heard. Cardiac: RRR  Respiratory: grossly clear  Extremities: No edema, erythema or cyanosis. Skin: No apparent lesions or rashes  Musculoskeletal: No fasciculations or tremors, no foot drop, negative bilateral straight leg raising test, no truncal sensory level. Mental Status: Alert, oriented x4  Speech/Language: Clear, grossly fluent. No dysarthria or paraphasic word errors. Attention span/Concentration: Grossly intact  Affect/Mood:  Moderately there may be several typographical errors present that may affect the meaning of the content. Please call with any questions. Note: A total time of 35-40 mins. was spent on the date of service in preparation for this visit, which included face-to-face patient care and completing clinical documentation, and including counseling and coordination of care based on clinical impression, neurologic diagnosis, review of pertinent imaging studies, test results, implementation and discussion of treatment plan, risk factor reduction and patient and/or family education.

## 2021-08-13 NOTE — PROGRESS NOTES
21  Dinh Tovar Brothers : 1985 Sex: female  Age: 39 y.o. Assessment and Plan:  Cindy Gill was seen today for other, hypertension and other. Diagnoses and all orders for this visit:    Cindy Gill was seen today for other, hypertension and other. Diagnoses and all orders for this visit:    Essential hypertension  Blood pressure remains elevated at home, to increase his propranolol to twice daily. Paresthesias  -     External Referral To Neurology  Will obtain a 2nd opinion at the [facility]. Neurology with specialization and neurosensory complaints        No follow-ups on file. 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 x 3 months     Other     pt stated that she saw a neurologist today and would like to discuss it        Her peripheral neuropathy continues to worsen. She is local neurologist who commended large referral center. 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 Review of Systems   Constitutional: Negative for appetite change, fatigue and unexpected weight change. HENT: Negative for congestion, ear pain, hearing loss, sinus pain, sore throat and trouble swallowing. Eyes: Negative for photophobia, pain, discharge and redness. Respiratory: Negative for cough, chest tightness and shortness of breath. Cardiovascular: Negative for chest pain, palpitations and leg swelling. Gastrointestinal: Negative for abdominal pain, blood in stool, diarrhea, nausea and vomiting. Endocrine: Negative. Genitourinary: Negative for dysuria, flank pain, frequency and hematuria.    Musculoskeletal: Negative for arthralgias, back pain, joint swelling and myalgias. Skin: Negative. Allergic/Immunologic: Negative. Neurological: Positive for numbness. Negative for dizziness, seizures, syncope, weakness, light-headedness and headaches. Hematological: Negative for adenopathy. Does not bruise/bleed easily. Psychiatric/Behavioral: Negative.           Current Outpatient Medications:     propranolol (INDERAL LA) 60 MG extended release capsule, Take 1 capsule by mouth daily, Disp: 30 capsule, Rfl: 1    azelastine (ASTELIN) 0.1 % nasal spray, 1 spray by Nasal route 2 times daily Use in each nostril as directed, Disp: 1 Bottle, Rfl: 3  Allergies   Allergen Reactions    Pcn [Penicillins] Rash       Past Medical History:   Diagnosis Date    Abnormal Pap smear of cervix     Back pain     Chronic generalized pain disorder 8/13/2021    Chronic sinusitis     with facial pain    Class 3 severe obesity due to excess calories without serious comorbidity with body mass index (BMI) of 40.0 to 44.9 in adult [facility])     DDD (degenerative disc disease), cervical 8/13/2021    DDD (degenerative disc disease), lumbar 8/13/2021    Depression 06/06/2011    Essential hypertension     Essential hypertension     Gastroesophageal reflux disease without esophagitis     Hemochromatosis     Migraines     born with heart murmur    Paresthesias 8/10/2021    Seasonal allergies      Past Surgical History:   Procedure Laterality Date    SINUS SURGERY N/A 02/25/2016    FUNCTIONAL ENDOSCOPIC SINUS SURGERY WITH BILATERAL MAXILLARY, FRONTAL & SPENOID ANTROSTOMIES, SUBMUCCOSAL RESECTION OF INFERIOR TURBINATES    TUBAL LIGATION  07/22/2021     Family History   Problem Relation Age of Onset    Arthritis Mother     Other Mother         sinus problems    High Blood Pressure Father     Other Maternal Aunt         migraine    Other Maternal Uncle         migraine    Other Paternal Aunt         migraine    Other Paternal Uncle         migraine  Arthritis Maternal Grandmother      Social History     Socioeconomic History    Marital status:      Spouse name: Not on file    Number of children: Not on file    Years of education: Not on file    Highest education level: Not on file   Occupational History    Not on file   Tobacco Use    Smoking status: Never Smoker    Smokeless tobacco: Never Used   Vaping Use    Vaping Use: Never used   Substance and Sexual Activity    Alcohol use: No     Alcohol/week: 0.0 standard drinks    Drug use: No    Sexual activity: Not on file   Other Topics Concern    Not on file   Social History Narrative    Not on file     Social Determinants of Health     Financial Resource Strain: Unknown    Difficulty of Paying Living Expenses: Patient refused   Food Insecurity: Unknown    Worried About Running Out of Food in the Last Year: Patient refused    Ran Out of Food in the Last Year: Patient refused   Transportation Needs:     Lack of Transportation (Medical):  Lack of Transportation (Non-Medical):    Physical Activity:     Days of Exercise per Week:     Minutes of Exercise per Session:    Stress:     Feeling of Stress :    Social Connections:     Frequency of Communication with Friends and Family:     Frequency of Social Gatherings with Friends and Family:     Attends Tenriism Services:     Active Member of Clubs or Organizations:     Attends Club or Organization Meetings:     Marital Status:    Intimate Partner Violence:     Fear of Current or Ex-Partner:     Emotionally Abused:     Physically Abused:     Sexually Abused:        Vitals:    08/13/21 1304   BP: 134/84   Pulse: 73   Resp: 17   Temp: 97.3 °F (36.3 °C)   TempSrc: Temporal   SpO2: 100%   Weight: 246 lb 11.2 oz (111.9 kg)   Height: 5' 9\" (1.753 m)       Physical Exam  Vitals and nursing note reviewed. Constitutional:       Appearance: She is well-developed. HENT:      Head: Atraumatic.       Right Ear: External ear normal.

## 2021-08-23 ENCOUNTER — OFFICE VISIT (OUTPATIENT)
Dept: FAMILY MEDICINE CLINIC | Age: 36
End: 2021-08-23
Payer: COMMERCIAL

## 2021-08-23 VITALS
BODY MASS INDEX: 36.58 KG/M2 | RESPIRATION RATE: 17 BRPM | WEIGHT: 247 LBS | OXYGEN SATURATION: 97 % | HEART RATE: 62 BPM | SYSTOLIC BLOOD PRESSURE: 110 MMHG | DIASTOLIC BLOOD PRESSURE: 76 MMHG | HEIGHT: 69 IN | TEMPERATURE: 97.4 F

## 2021-08-23 DIAGNOSIS — R42 DIZZINESS AND GIDDINESS: ICD-10-CM

## 2021-08-23 DIAGNOSIS — I10 ESSENTIAL HYPERTENSION: Primary | ICD-10-CM

## 2021-08-23 DIAGNOSIS — R20.2 PARESTHESIAS: ICD-10-CM

## 2021-08-23 PROCEDURE — 99213 OFFICE O/P EST LOW 20 MIN: CPT | Performed by: FAMILY MEDICINE

## 2021-08-23 PROCEDURE — 1036F TOBACCO NON-USER: CPT | Performed by: FAMILY MEDICINE

## 2021-08-23 PROCEDURE — G8417 CALC BMI ABV UP PARAM F/U: HCPCS | Performed by: FAMILY MEDICINE

## 2021-08-23 PROCEDURE — G8427 DOCREV CUR MEDS BY ELIG CLIN: HCPCS | Performed by: FAMILY MEDICINE

## 2021-08-23 ASSESSMENT — ENCOUNTER SYMPTOMS
PHOTOPHOBIA: 0
ALLERGIC/IMMUNOLOGIC NEGATIVE: 1
SINUS PAIN: 0
EYE REDNESS: 0
SHORTNESS OF BREATH: 0
EYE DISCHARGE: 0
BACK PAIN: 0
TROUBLE SWALLOWING: 0
BLOOD IN STOOL: 0
NAUSEA: 0
ABDOMINAL PAIN: 0
COUGH: 0
CHEST TIGHTNESS: 0
VOMITING: 0
EYE PAIN: 0
DIARRHEA: 0
SORE THROAT: 0

## 2021-08-23 NOTE — PROGRESS NOTES
21  Miri Mclain Brothers : 1985 Sex: female  Age: 39 y.o. Assessment and Plan:  Christopher Snyder was seen today for hypertension, other and dizziness. Diagnoses and all orders for this visit:    Essential hypertension    Paresthesias  Appropriate forms filled out for accommodations at work. Dizziness and giddiness  She is to hold the propranolol over the next 3 to 4 days, report to me as to whether helps to resolve the dizziness. Return if symptoms worsen or fail to improve. Chief Complaint   Patient presents with    Hypertension    Other     paperwork     Dizziness     propanolol? Presents with increasing lightheadedness and dizziness. She notices since the propranolol was started last week. Fortunately, the propranolol was effective in controlling her blood pressure, and migraine headaches. However, this should be held over the next couple of days to see if it truly is an the cause of her increased dizziness. 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 Review of Systems   Constitutional: Negative for appetite change, fatigue and unexpected weight change. HENT: Negative for congestion, ear pain, hearing loss, sinus pain, sore throat and trouble swallowing. Eyes: Negative for photophobia, pain, discharge and redness. Respiratory: Negative for cough, chest tightness and shortness of breath. Cardiovascular: Negative for chest pain, palpitations and leg swelling. Gastrointestinal: Negative for abdominal pain, blood in stool, diarrhea, nausea and vomiting. Endocrine: Negative. Genitourinary: Negative for dysuria, flank pain, frequency and hematuria. Musculoskeletal: Negative for arthralgias, back pain, joint swelling and myalgias. Skin: Negative. Allergic/Immunologic: Negative. Neurological: Positive for dizziness and light-headedness. Negative for seizures, syncope, weakness, numbness and headaches. Hematological: Negative for adenopathy. Does not bruise/bleed easily. Psychiatric/Behavioral: Negative.           Current Outpatient Medications:     propranolol (INDERAL LA) 60 MG extended release capsule, Take 1 capsule by mouth daily, Disp: 30 capsule, Rfl: 1    azelastine (ASTELIN) 0.1 % nasal spray, 1 spray by Nasal route 2 times daily Use in each nostril as directed, Disp: 1 Bottle, Rfl: 3  Allergies   Allergen Reactions    Pcn [Penicillins] Rash       Past Medical History:   Diagnosis Date    Abnormal Pap smear of cervix     Back pain     Chronic generalized pain disorder 8/13/2021    Chronic sinusitis     with facial pain    Class 3 severe obesity due to excess calories without serious comorbidity with body mass index (BMI) of 40.0 to 44.9 in adult [facility])     DDD (degenerative disc disease), cervical 8/13/2021    DDD (degenerative disc disease), lumbar 8/13/2021    Depression 06/06/2011    Essential hypertension     Essential hypertension     Gastroesophageal reflux disease without esophagitis     Hemochromatosis     Migraines     born with heart murmur    Paresthesias 8/10/2021    Seasonal allergies      Past Surgical History:   Procedure Laterality Date    SINUS SURGERY N/A 02/25/2016    FUNCTIONAL ENDOSCOPIC SINUS SURGERY WITH BILATERAL MAXILLARY, FRONTAL & SPENOID ANTROSTOMIES, SUBMUCCOSAL RESECTION OF INFERIOR TURBINATES    TUBAL LIGATION  07/22/2021     Family History   Problem Relation Age of Onset    Arthritis Mother     Other Mother         sinus problems    High Blood Pressure Father     Other Maternal Aunt         migraine    Other Maternal Uncle         migraine    Other Paternal Aunt         migraine    Other Paternal Uncle         migraine    Arthritis Maternal Grandmother      Social History     Socioeconomic History    Marital status:      Spouse name: Not on file    Number of children: Not on file    Years of education: Not on file    Highest

## 2021-08-24 ENCOUNTER — OFFICE VISIT (OUTPATIENT)
Dept: BARIATRICS/WEIGHT MGMT | Age: 36
End: 2021-08-24
Payer: COMMERCIAL

## 2021-08-24 VITALS
BODY MASS INDEX: 36.43 KG/M2 | HEIGHT: 69 IN | HEART RATE: 65 BPM | TEMPERATURE: 98.1 F | SYSTOLIC BLOOD PRESSURE: 130 MMHG | WEIGHT: 246 LBS | DIASTOLIC BLOOD PRESSURE: 86 MMHG

## 2021-08-24 DIAGNOSIS — I10 ESSENTIAL HYPERTENSION: Primary | ICD-10-CM

## 2021-08-24 DIAGNOSIS — E66.01 CLASS 3 SEVERE OBESITY DUE TO EXCESS CALORIES WITHOUT SERIOUS COMORBIDITY WITH BODY MASS INDEX (BMI) OF 40.0 TO 44.9 IN ADULT (HCC): ICD-10-CM

## 2021-08-24 PROCEDURE — G8417 CALC BMI ABV UP PARAM F/U: HCPCS | Performed by: INTERNAL MEDICINE

## 2021-08-24 PROCEDURE — 99211 OFF/OP EST MAY X REQ PHY/QHP: CPT

## 2021-08-24 PROCEDURE — 99213 OFFICE O/P EST LOW 20 MIN: CPT | Performed by: INTERNAL MEDICINE

## 2021-08-24 PROCEDURE — G8428 CUR MEDS NOT DOCUMENT: HCPCS | Performed by: INTERNAL MEDICINE

## 2021-08-24 PROCEDURE — 1036F TOBACCO NON-USER: CPT | Performed by: INTERNAL MEDICINE

## 2021-08-24 NOTE — PROGRESS NOTES
CC -   HTN, Obesity    BACKGROUND -   Last visit: 7/20/21  First visit: 5/04/21    · Obesity   Began in mid-20's  Initial BMI 41.8, Wt 279.2 lbs, 5'8.5\"  HS Grad wt 160 lbs   Lowest   wt 160 lbs   Highest  wt 280 lbs  Pattern of wt gain: grad  Wt change past yr: +10 lbs  Most wt lost: 40 lbs ([employer] with [employer])  Other diets attempted: [employer], Noom, Gluten-free, no medications, fad diets, Naturopathic    Desire to lose weight: 10/10  Problem posed by appetite: 6-7/10    Initial Diet:    Number of meals per day - 4    Number of snacks per day - 3-4    Meal volume - 12\" plate, usually seconds    Fast food/convenience store - 5x/week    Restaurants (not fast food) - 0x/week   Sweets - 7d/week (reg Snickers, Butterfinger; 4 small cookies)   Chips - 5d/week (1 cereal bowl, flat)   Crackers/pretzels - 3d/week (1 handful)   Nuts - 0d/week   Peanut Butter - 0d/week   Popcorn - 0d/week   Dried fruit - 5d/week (2 tablespoons of raisins or craisins)   Whole fruit - 5d/week (1-2 pieces)   Breakfast cereal - 5d/week (1/3 cup dry oats with 3 tablespoons brown sugar)   Granola/Protein/Energy bar - 0d/week   Sugar sweetened beverages - 24 oz/wk of lemonade, 1 cup tea/d, 7d/wk with 1-2 tablespoons sugar   Protein - No supplements   Fiber - No supplements     Exercise:    Gym membership - none    Walking - none    Running - none    Resistance - none    Aerobic class - none    ______________________    [facility] -  Past Medical History:   Diagnosis Date    Abnormal Pap smear of cervix     Back pain     Chronic generalized pain disorder 8/13/2021    Chronic sinusitis     with facial pain    Class 3 severe obesity due to excess calories without serious comorbidity with body mass index (BMI) of 40.0 to 44.9 in adult [facility])     DDD (degenerative disc disease), cervical 8/13/2021    DDD (degenerative disc disease), lumbar 8/13/2021    Depression 06/06/2011    Essential hypertension     Essential hypertension     Gastroesophageal reflux disease without esophagitis     Hemochromatosis     Migraines     born with heart murmur    Paresthesias 8/10/2021    Seasonal allergies      Current Outpatient Medications   Medication Sig Dispense Refill    propranolol (INDERAL LA) 60 MG extended release capsule Take 1 capsule by mouth daily 30 capsule 1    azelastine (ASTELIN) 0.1 % nasal spray 1 spray by Nasal route 2 times daily Use in each nostril as directed 1 Bottle 3     No current facility-administered medications for this visit. ROS -  Card - no CP  GI - no N/V/D    PE -  Gen : /86 (Site: Left Upper Arm, Position: Sitting, Cuff Size: Large Adult)   Pulse 65   Temp 98.1 °F (36.7 °C) (Temporal)   Ht 5' 8.5\" (1.74 m)   Wt 246 lb (111.6 kg)   LMP 08/16/2021 (Approximate)   BMI 36.86 kg/m²    WN, WD, NAD  Lung: Nml resp effort  Psych: Normal mood   Full affect  Neuro:  Moves all ext well  ______________________    HISTORY & ASSESSMENT/PLAN -     Problem 1  - HTN   HPI   - Diagnosed 09/2020      /86 today      Regimen: Propranolol ER 60 mg (PCP switched from Lisinopril per her request; she wanted it b/c of it being anti-migranal and concern that Lisinopril may be causing side effects)      Checks at home 2-3x/wk; runs in the 120s/80s      Having dizziness       Excess weight increases the severity of her hypertension  Assessment  - Controlled with medication; she does not want to change the propranolol at this time despite the dizziness it is causing  Plan   - Cont propranolol ER 60 mg daily     Cont wt loss per the plan below    Problem 2  - Obesity  HPI   - See above Background for description    Weight  Date    279.2 lbs 5/04/21    262.6 lbs 6/08/21    251.4 lbs 7/20/21    246.0 lbs 8/24/21 243.2 lbs home  Total wt change to date:  - 33.2 lbs  DEN = 2325 lesli/d = 16,275 lesli/wk  Avg daily energy variance:   5/4/21 - 6/08/21 = -14.8 lbs (51,800 lesli)/34d = - 1,523 lesli/d deficit   6/8/21 - 7/20/21 = -11.2 lbs (39,200 lesli)/42d = - 933 lesli/d deficit   7/2/21 - 8/24/21 = - 5.4 lbs (18,900 lesli)/35d = -     540 lesli/d deficit  Weight effect of high risk, high calorie foods = Restaurants food 750 lesli/wk + Sweets 2465 + Chips/Crackers/Pretzels 1200 +  = 5,090 lesli/wk = 725 lesli/d = 31% DEN = 72 lbs/yr  At the first visit, she was willing to consider surgery, but did not want to without first trying a non-surgical approach  Does not want an appetite suppressant today. (Qsymia woud the best option b/c of her migraines. However, has been on topiramate in the past and suffered cognitive impairment )  We chose a counting-based regimen with targeted elimination. Update:  Counting calories 5-6d/wk and limiting to <1800 lesli/d  Has been eating snacks (chiefly salty ones) akbar due to exacerbations of her numbness and weakness  Assessment  - Improved; resume full adherence to present regimen  Plan   -   Rules:  · Count every calorie every day  · Limit sweets to one day per month  · Limit chips/crackers/pretzels/nuts to 100 lesli/day  · Eliminate all sugar sweetened beverages (including fruit juice)  · Limit restaurants (including fast food and food from a convenience store) to one time every two weeks    Requirements:  · Make sure protein intake is at least 70 grams per day (do not count protein every day; instead spot check your intake every 2-3 weeks and make sure what you think you are getting is close to accurate; consider using a protein shake if needed; these are in the pharmacy section of the stores, not the grocery section; Premier, Pure Protein and Fairlife are relatively inexpensive and taste good to most patients; other options are Nectar, Boost Max, Ensure Max, BeneProtein and GNC lean (which is lactose-free);    Nectar fruit, Premier Protein Clear, IsoPure Protein Drink, and Protein 2 O are water-based options; Quest (or Cosco, which is cheaper and is ordered on [employer]) and the Oh Yeah 1 protein bars can also be used, but have less protein in them )  · Make sure that fiber intake is at least 22 grams per day. Do this by either eating 12 tablespoons of the original, plain Fiber One cereal every day or 4 tablespoons of wheat dextrin powder (Benefiber or a generic brand) every day. Work up to this amount slowly by starting with only one-eighth to one-fourth of the target amount and then adding another one-eighth to one-fourth every one or two weeks until reaching the target. · Take one multivitamin every day    Targets:  · Limit calorie intake to 1500 calories/day  · Walk 30 minutes daily  · Avoid eating 2 hours within bedtime. Tips:  · Do not eat outside of the dining room or the kitchen  · Do not eat while watching TV, videos, working on the computer or using a smart phone  · Do not eat food out of a multi-serving bag or container. · Establish 6 hours of food-free periods (times you don't eat) each day. No period can be less than 1 hour long. The periods need to be the same every day for days that are the same (for example, workdays would have one set of food free periods and weekends would have another set of days). These six hours are in addition to the two hours before bedtime and the time spent sleeping.     See me in 6-8 weeks    Jeff Morgan MD  Endocrinology/Obesity  8/24/21

## 2021-08-24 NOTE — PATIENT INSTRUCTIONS
Rules:  · Count every calorie every day  · Limit sweets to one day per month  · Limit chips/crackers/pretzels/nuts to 100 lesli/day  · Eliminate all sugar sweetened beverages (including fruit juice)  · Limit restaurants (including fast food and food from a convenience store) to one time every two weeks    Requirements:  · Make sure protein intake is at least 70 grams per day (do not count protein every day; instead spot check your intake every 2-3 weeks and make sure what you think you are getting is close to accurate; consider using a protein shake if needed; these are in the pharmacy section of the stores, not the grocery section; Premier, Pure Protein and Fairlife are relatively inexpensive and taste good to most patients; other options are Nectar, Boost Max, Ensure Max, BeneProtein and GNC lean (which is lactose-free); Nectar fruit, Premier Protein Clear, IsoPure Protein Drink, and Protein 2 O are water-based options; Quest (or Cosco, which is cheaper and is ordered on [employer]) and the [employer] protein bars can also be used, but have less protein in them )  · Make sure that fiber intake is at least 22 grams per day. Do this by either eating 12 tablespoons of the original, plain Fiber One cereal every day or 4 tablespoons of wheat dextrin powder (Benefiber or a generic brand) every day. Work up to this amount slowly by starting with only one-eighth to one-fourth of the target amount and then adding another one-eighth to one-fourth every one or two weeks until reaching the target. · Take one multivitamin every day    Targets:  · Limit calorie intake to 1500 calories/day  · Walk 30 minutes daily  · Avoid eating 2 hours within bedtime. Tips:  · Do not eat outside of the dining room or the kitchen  · Do not eat while watching TV, videos, working on the computer or using a smart phone  · Do not eat food out of a multi-serving bag or container.   Establish 6 hours of food-free periods (times you don't eat) each day. No period can be less than 1 hour long. The periods need to be the same every day for days that are the same (for example, workdays would have one set of food free periods and weekends would have another set of days). These six hours are in addition to the two hours before bedtime and the time spent sleeping.

## 2021-08-25 ENCOUNTER — NURSE TRIAGE (OUTPATIENT)
Dept: OTHER | Facility: CLINIC | Age: 36
End: 2021-08-25

## 2021-08-25 LAB
BASOPHILS ABSOLUTE: 0.09 E9/L (ref 0–0.2)
BASOPHILS RELATIVE PERCENT: 0.8 % (ref 0–2)
EOSINOPHILS ABSOLUTE: 0.37 E9/L (ref 0.05–0.5)
EOSINOPHILS RELATIVE PERCENT: 3.4 % (ref 0–6)
HCT VFR BLD CALC: 45.1 % (ref 34–48)
HEMOGLOBIN: 14.8 G/DL (ref 11.5–15.5)
IMMATURE GRANULOCYTES #: 0.05 E9/L
IMMATURE GRANULOCYTES %: 0.5 % (ref 0–5)
LYMPHOCYTES ABSOLUTE: 3.77 E9/L (ref 1.5–4)
LYMPHOCYTES RELATIVE PERCENT: 35 % (ref 20–42)
MCH RBC QN AUTO: 29.8 PG (ref 26–35)
MCHC RBC AUTO-ENTMCNC: 32.8 % (ref 32–34.5)
MCV RBC AUTO: 90.9 FL (ref 80–99.9)
MONOCYTES ABSOLUTE: 0.68 E9/L (ref 0.1–0.95)
MONOCYTES RELATIVE PERCENT: 6.3 % (ref 2–12)
NEUTROPHILS ABSOLUTE: 5.8 E9/L (ref 1.8–7.3)
NEUTROPHILS RELATIVE PERCENT: 54 % (ref 43–80)
PDW BLD-RTO: 12.5 FL (ref 11.5–15)
PLATELET # BLD: 344 E9/L (ref 130–450)
PMV BLD AUTO: 9.4 FL (ref 7–12)
RBC # BLD: 4.96 E12/L (ref 3.5–5.5)
WBC # BLD: 10.8 E9/L (ref 4.5–11.5)

## 2021-08-25 PROCEDURE — 80053 COMPREHEN METABOLIC PANEL: CPT

## 2021-08-25 PROCEDURE — 99283 EMERGENCY DEPT VISIT LOW MDM: CPT

## 2021-08-25 PROCEDURE — 96375 TX/PRO/DX INJ NEW DRUG ADDON: CPT

## 2021-08-25 PROCEDURE — 84436 ASSAY OF TOTAL THYROXINE: CPT

## 2021-08-25 PROCEDURE — 84443 ASSAY THYROID STIM HORMONE: CPT

## 2021-08-25 PROCEDURE — 85025 COMPLETE CBC W/AUTO DIFF WBC: CPT

## 2021-08-25 PROCEDURE — 93005 ELECTROCARDIOGRAM TRACING: CPT | Performed by: PHYSICIAN ASSISTANT

## 2021-08-25 PROCEDURE — 84484 ASSAY OF TROPONIN QUANT: CPT

## 2021-08-25 PROCEDURE — 96374 THER/PROPH/DIAG INJ IV PUSH: CPT

## 2021-08-25 ASSESSMENT — PAIN DESCRIPTION - FREQUENCY: FREQUENCY: CONTINUOUS

## 2021-08-25 ASSESSMENT — PAIN DESCRIPTION - LOCATION: LOCATION: HEAD

## 2021-08-25 ASSESSMENT — PAIN DESCRIPTION - PAIN TYPE: TYPE: ACUTE PAIN

## 2021-08-25 ASSESSMENT — PAIN DESCRIPTION - DESCRIPTORS: DESCRIPTORS: ACHING

## 2021-08-25 NOTE — TELEPHONE ENCOUNTER
Reason for Disposition   Extra heart beats OR irregular heart beating  (i.e., \"palpitations\")    Answer Assessment - Initial Assessment Questions  1. DESCRIPTION: \"Describe your dizziness. \"      Patient describes it as \"dizzy, arm and legs get heavy, unstable, off balance\"    2. LIGHTHEADED: \"Do you feel lightheaded? \" (e.g., somewhat faint, woozy, weak upon standing)      Patient states \"I don't know\" when asked if she feels lightheaded    3. VERTIGO: \"Do you feel like either you or the room is spinning or tilting? \" (i.e. vertigo)      Denies spinning but states \"tiliting I guess. \"     4. SEVERITY: \"How bad is it? \"  \"Do you feel like you are going to faint? \" \"Can you stand and walk? \"    - MILD - walking normally    - MODERATE - interferes with normal activities (e.g., work, school)     - SEVERE - unable to stand, requires support to walk, feels like passing out now. Moderate    5. ONSET:  \"When did the dizziness begin? \"       2 weeks ago    6. AGGRAVATING FACTORS: \"Does anything make it worse? \" (e.g., standing, change in head position)      Patient reports any movement can make it worse    7. HEART RATE: \"Can you tell me your heart rate? \" \"How many beats in 15 seconds? \"  (Note: not all patients can do this)        Reports she is unable to tell me her HR, patient reports she thinks her around 79 but it does feel like it is beating irregular    8. CAUSE: \"What do you think is causing the dizziness? \"      Mary Blackwell- working with doctors to rule out multiple autoimmune disorders    9. RECURRENT SYMPTOM: \"Have you had dizziness before? \" If so, ask: \"When was the last time? \" \"What happened that time? \"      Patient reports hx of dizziness off and on it has never been this constant before    10. OTHER SYMPTOMS: \"Do you have any other symptoms? \" (e.g., fever, chest pain, vomiting, diarrhea, bleeding)        Chronic numbness, headache, fatigue, Palpitations    11. PREGNANCY: \"Is there any chance you are pregnant? \" \"When was your last menstrual period? \"        n/a    Protocols used: DIZZINESS - LIGHTHEADEDNESS-ADULT-AH    Received call from Mayte Coronado at [employer] with Red Flag Complaint. Brief description of triage: See above note    Triage indicates for patient to: ED Now. Patient reports she does not want to go to the ED due to concern for COVID but RN encouraged patient that ED would have safety precautions in place for patient safety and RN explained concern for dysrhythmias due to palpitations. Patient agreed to go to ED at this time. Care advice provided, patient verbalizes understanding; denies any other questions or concerns; instructed to call back for any new or worsening symptoms. Attention Provider: Thank you for allowing me to participate in the care of your patient. The patient was connected to triage in response to information provided to the ECC. Please do not respond through this encounter as the response is not directed to a shared pool.

## 2021-08-26 ENCOUNTER — HOSPITAL ENCOUNTER (EMERGENCY)
Age: 36
Discharge: HOME | End: 2021-08-26
Attending: EMERGENCY MEDICINE
Payer: COMMERCIAL

## 2021-08-26 VITALS
HEIGHT: 69 IN | RESPIRATION RATE: 18 BRPM | TEMPERATURE: 98.5 F | DIASTOLIC BLOOD PRESSURE: 63 MMHG | HEART RATE: 75 BPM | WEIGHT: 245 LBS | SYSTOLIC BLOOD PRESSURE: 117 MMHG | BODY MASS INDEX: 36.29 KG/M2 | OXYGEN SATURATION: 95 %

## 2021-08-26 DIAGNOSIS — R00.2 PALPITATIONS: ICD-10-CM

## 2021-08-26 DIAGNOSIS — R51.9 ACUTE NONINTRACTABLE HEADACHE, UNSPECIFIED HEADACHE TYPE: ICD-10-CM

## 2021-08-26 DIAGNOSIS — R42 DIZZINESS: Primary | ICD-10-CM

## 2021-08-26 LAB
ALBUMIN SERPL-MCNC: 4.4 G/DL (ref 3.5–5.2)
ALP BLD-CCNC: 78 U/L (ref 35–104)
ALT SERPL-CCNC: 17 U/L (ref 0–32)
ANION GAP SERPL CALCULATED.3IONS-SCNC: 6 MMOL/L (ref 7–16)
AST SERPL-CCNC: 13 U/L (ref 0–31)
BILIRUB SERPL-MCNC: 0.5 MG/DL (ref 0–1.2)
BUN BLDV-MCNC: 14 MG/DL (ref 6–20)
CALCIUM SERPL-MCNC: 9.4 MG/DL (ref 8.6–10.2)
CHLORIDE BLD-SCNC: 105 MMOL/L (ref 98–107)
CO2: 30 MMOL/L (ref 22–29)
CREAT SERPL-MCNC: 0.8 MG/DL (ref 0.5–1)
EKG ATRIAL RATE: 77 BPM
EKG P AXIS: 56 DEGREES
EKG P-R INTERVAL: 154 MS
EKG Q-T INTERVAL: 388 MS
EKG QRS DURATION: 82 MS
EKG QTC CALCULATION (BAZETT): 439 MS
EKG R AXIS: 27 DEGREES
EKG T AXIS: 53 DEGREES
EKG VENTRICULAR RATE: 77 BPM
GFR AFRICAN AMERICAN: >60
GFR NON-AFRICAN AMERICAN: >60 ML/MIN/1.73
GLUCOSE BLD-MCNC: 110 MG/DL (ref 74–99)
POTASSIUM REFLEX MAGNESIUM: 4.3 MMOL/L (ref 3.5–5)
SODIUM BLD-SCNC: 141 MMOL/L (ref 132–146)
T4 TOTAL: 7.8 MCG/DL (ref 4.5–11.7)
TOTAL PROTEIN: 7.2 G/DL (ref 6.4–8.3)
TROPONIN, HIGH SENSITIVITY: <6 NG/L (ref 0–9)
TSH SERPL DL<=0.05 MIU/L-ACNC: 1.01 UIU/ML (ref 0.27–4.2)

## 2021-08-26 PROCEDURE — 6360000002 HC RX W HCPCS: Performed by: STUDENT IN AN ORGANIZED HEALTH CARE EDUCATION/TRAINING PROGRAM

## 2021-08-26 PROCEDURE — 2580000003 HC RX 258: Performed by: STUDENT IN AN ORGANIZED HEALTH CARE EDUCATION/TRAINING PROGRAM

## 2021-08-26 PROCEDURE — 6370000000 HC RX 637 (ALT 250 FOR IP): Performed by: STUDENT IN AN ORGANIZED HEALTH CARE EDUCATION/TRAINING PROGRAM

## 2021-08-26 RX ORDER — 0.9 % SODIUM CHLORIDE 0.9 %
1000 INTRAVENOUS SOLUTION INTRAVENOUS ONCE
Status: COMPLETED | OUTPATIENT
Start: 2021-08-26 | End: 2021-08-26

## 2021-08-26 RX ORDER — DIPHENHYDRAMINE HYDROCHLORIDE 50 MG/ML
25 INJECTION INTRAMUSCULAR; INTRAVENOUS ONCE
Status: COMPLETED | OUTPATIENT
Start: 2021-08-26 | End: 2021-08-26

## 2021-08-26 RX ORDER — MECLIZINE HYDROCHLORIDE 25 MG/1
25 TABLET ORAL 3 TIMES DAILY PRN
Qty: 15 TABLET | Refills: 0 | Status: SHIPPED | OUTPATIENT
Start: 2021-08-26 | End: 2021-09-02

## 2021-08-26 RX ORDER — METOCLOPRAMIDE HYDROCHLORIDE 5 MG/ML
10 INJECTION INTRAMUSCULAR; INTRAVENOUS ONCE
Status: COMPLETED | OUTPATIENT
Start: 2021-08-26 | End: 2021-08-26

## 2021-08-26 RX ORDER — MECLIZINE HCL 12.5 MG/1
12.5 TABLET ORAL ONCE
Status: COMPLETED | OUTPATIENT
Start: 2021-08-26 | End: 2021-08-26

## 2021-08-26 RX ORDER — LISINOPRIL 20 MG/1
20 TABLET ORAL DAILY
COMMUNITY
End: 2021-10-05 | Stop reason: ALTCHOICE

## 2021-08-26 RX ADMIN — MECLIZINE 12.5 MG: 12.5 TABLET ORAL at 04:34

## 2021-08-26 RX ADMIN — DIPHENHYDRAMINE HYDROCHLORIDE 25 MG: 50 INJECTION, SOLUTION INTRAMUSCULAR; INTRAVENOUS at 04:36

## 2021-08-26 RX ADMIN — METOCLOPRAMIDE HYDROCHLORIDE 10 MG: 5 INJECTION INTRAMUSCULAR; INTRAVENOUS at 04:41

## 2021-08-26 RX ADMIN — SODIUM CHLORIDE 1000 ML: 9 INJECTION, SOLUTION INTRAVENOUS at 04:33

## 2021-08-26 ASSESSMENT — ENCOUNTER SYMPTOMS
BACK PAIN: 0
COUGH: 0
WHEEZING: 0
ABDOMINAL PAIN: 0
NAUSEA: 0
VOMITING: 0
DIARRHEA: 0
TROUBLE SWALLOWING: 0
EYE PAIN: 0
SORE THROAT: 0
SINUS PAIN: 0
RHINORRHEA: 0
SHORTNESS OF BREATH: 0

## 2021-08-26 NOTE — ED PROVIDER NOTES
58-year-old female presents today with complaints of dizziness onset 2 days ago. Her symptoms are described as being unsteady on her feet. Symptoms are moderate in severity and preventing her from being able to go to work. She reports associated headache and palpitations. Heart palpitations are not associated with chest pain or shortness of breath. She denies any nausea or vomiting. No aggravating or alleviating factors. She took meclizine at home with no relief of symptoms. She does admit that the meclizine was . She states that she was recently switched from propanolol to lisinopril by her PCP for hypertension. She suspects that this may be related to her symptoms today. Review of Systems   Constitutional: Negative for diaphoresis and fever. HENT: Negative for ear pain, hearing loss, rhinorrhea, sinus pain, sore throat and trouble swallowing. Eyes: Negative for pain. Respiratory: Negative for cough, shortness of breath and wheezing. Cardiovascular: Positive for palpitations. Negative for chest pain. Gastrointestinal: Negative for abdominal pain, diarrhea, nausea and vomiting. Endocrine: Negative for polyuria. Genitourinary: Negative for flank pain, frequency, hematuria and urgency. Musculoskeletal: Negative for back pain, neck pain and neck stiffness. Neurological: Positive for dizziness and headaches. Negative for speech difficulty, weakness, light-headedness and numbness. Psychiatric/Behavioral: Negative for confusion. The patient is not nervous/anxious. Physical Exam  Constitutional:       General: She is not in acute distress. Appearance: Normal appearance. She is not ill-appearing, toxic-appearing or diaphoretic. HENT:      Head: Normocephalic and atraumatic. Nose: No rhinorrhea. Eyes:      General: No scleral icterus. Extraocular Movements: Extraocular movements intact. Pupils: Pupils are equal, round, and reactive to light. Cardiovascular:      Heart sounds: Normal heart sounds. No murmur heard. No friction rub. No gallop. Pulmonary:      Breath sounds: Normal breath sounds. No wheezing, rhonchi or rales. Abdominal:      Palpations: Abdomen is soft. Tenderness: There is no abdominal tenderness. Musculoskeletal:         General: No swelling. Cervical back: Normal range of motion. No rigidity or tenderness. Right lower leg: No edema. Left lower leg: No edema. Lymphadenopathy:      Cervical: No cervical adenopathy. Skin:     General: Skin is warm and dry. Coloration: Skin is not jaundiced. Neurological:      General: No focal deficit present. Mental Status: She is alert and oriented to person, place, and time. Cranial Nerves: No cranial nerve deficit. Sensory: No sensory deficit. Motor: No weakness. Psychiatric:         Mood and Affect: Mood normal.         Behavior: Behavior normal.         Thought Content: Thought content normal.         Judgment: Judgment normal.          Procedures   EKG: This EKG is signed and interpreted by me. Rate: 77   Rhythm: Sinus  Interpretation: no acute changes  Comparison: stable as compared to patient's most recent EKG      MDM  Number of Diagnoses or Management Options  Acute nonintractable headache, unspecified headache type  Dizziness  Palpitations  Diagnosis management comments: This is a 65-year-old female who presents today with complaints of dizziness associated with headache and heart palpitations. Her symptoms started a couple days ago and have been progressively getting worse. She does have a long history of neurological problems including whole body numbness. She was recently switched from propanolol to lisinopril for control of her hypertension. On arrival to the emergency department she is hemodynamically stable. Blood pressure is slightly elevated but otherwise vitals are within normal limits.  No focal neurologic deficits noted on exam.  No concern for CVA. lab work is unremarkable today in the emergency department. TSH is normal.  This is likely not related to medications which due to normal findings in her CMP including normal sodium and potassium. Discussed these findings with the patient. Informed her to follow-up with neurology for further evaluation. She understands and agrees to treatment. She was given meclizine, Benadryl and Reglan for her headache. She is stable for discharge. Amount and/or Complexity of Data Reviewed  Decide to obtain previous medical records or to obtain history from someone other than the patient: yes                   --------------------------------------------- PAST HISTORY ---------------------------------------------  Past Medical History:  has a past medical history of Abnormal Pap smear of cervix, Back pain, Chronic generalized pain disorder, Chronic sinusitis, Class 3 severe obesity due to excess calories without serious comorbidity with body mass index (BMI) of 40.0 to 44.9 in adult [facility]), DDD (degenerative disc disease), cervical, DDD (degenerative disc disease), lumbar, Depression, Essential hypertension, Essential hypertension, Gastroesophageal reflux disease without esophagitis, Hemochromatosis, Migraines, Paresthesias, and Seasonal allergies. Past Surgical History:  has a past surgical history that includes sinus surgery (N/A, 02/25/2016) and Tubal ligation (07/22/2021). Social History:  reports that she has never smoked. She has never used smokeless tobacco. She reports that she does not drink alcohol and does not use drugs. Family History: family history includes Arthritis in her maternal grandmother and mother; High Blood Pressure in her father; Other in her maternal aunt, maternal uncle, mother, paternal aunt, and paternal uncle. The patients home medications have been reviewed.     Allergies: Rafa [penicillins]    -------------------------------------------------- RESULTS -------------------------------------------------  Labs:  Results for orders placed or performed during the hospital encounter of 08/26/21   CBC Auto Differential   Result Value Ref Range    WBC 10.8 4.5 - 11.5 E9/L    RBC 4.96 3.50 - 5.50 E12/L    Hemoglobin 14.8 11.5 - 15.5 g/dL    Hematocrit 45.1 34.0 - 48.0 %    MCV 90.9 80.0 - 99.9 fL    MCH 29.8 26.0 - 35.0 pg    MCHC 32.8 32.0 - 34.5 %    RDW 12.5 11.5 - 15.0 fL    Platelets 857 966 - 787 E9/L    MPV 9.4 7.0 - 12.0 fL    Neutrophils % 54.0 43.0 - 80.0 %    Immature Granulocytes % 0.5 0.0 - 5.0 %    Lymphocytes % 35.0 20.0 - 42.0 %    Monocytes % 6.3 2.0 - 12.0 %    Eosinophils % 3.4 0.0 - 6.0 %    Basophils % 0.8 0.0 - 2.0 %    Neutrophils Absolute 5.80 1.80 - 7.30 E9/L    Immature Granulocytes # 0.05 E9/L    Lymphocytes Absolute 3.77 1.50 - 4.00 E9/L    Monocytes Absolute 0.68 0.10 - 0.95 E9/L    Eosinophils Absolute 0.37 0.05 - 0.50 E9/L    Basophils Absolute 0.09 0.00 - 0.20 E9/L   Comprehensive Metabolic Panel w/ Reflex to MG   Result Value Ref Range    Sodium 141 132 - 146 mmol/L    Potassium reflex Magnesium 4.3 3.5 - 5.0 mmol/L    Chloride 105 98 - 107 mmol/L    CO2 30 (H) 22 - 29 mmol/L    Anion Gap 6 (L) 7 - 16 mmol/L    Glucose 110 (H) 74 - 99 mg/dL    BUN 14 6 - 20 mg/dL    CREATININE 0.8 0.5 - 1.0 mg/dL    GFR Non-African American >60 >=60 mL/min/1.73    GFR African American >60     Calcium 9.4 8.6 - 10.2 mg/dL    Total Protein 7.2 6.4 - 8.3 g/dL    Albumin 4.4 3.5 - 5.2 g/dL    Total Bilirubin 0.5 0.0 - 1.2 mg/dL    Alkaline Phosphatase 78 35 - 104 U/L    ALT 17 0 - 32 U/L    AST 13 0 - 31 U/L   Troponin   Result Value Ref Range    Troponin, High Sensitivity <6 0 - 9 ng/L   TSH without Reflex   Result Value Ref Range    TSH 1.010 0.270 - 4.200 uIU/mL   T4   Result Value Ref Range    T4, Total 7.8 4.5 - 11.7 mcg/dL   EKG 12 Lead   Result Value Ref Range    Ventricular Rate 77 BPM    Atrial Rate 77 BPM    P-R Interval 154 ms    QRS Duration 82 ms    Q-T Interval 388 ms    QTc Calculation (Bazett) 439 ms    P Axis 56 degrees    R Axis 27 degrees    T Axis 53 degrees       Radiology:  No orders to display       ------------------------- NURSING NOTES AND VITALS REVIEWED ---------------------------  Date / Time Roomed:  8/26/2021  3:31 AM  ED Bed Assignment:  01/01    The nursing notes within the ED encounter and vital signs as below have been reviewed. BP (!) 149/82   Pulse 81   Temp 98 °F (36.7 °C) (Temporal)   Resp 18   Ht 5' 9\" (1.753 m)   Wt 245 lb (111.1 kg)   LMP 08/16/2021 (Approximate)   SpO2 99%   BMI 36.18 kg/m²   Oxygen Saturation Interpretation: Normal      ------------------------------------------ PROGRESS NOTES ------------------------------------------  4:33 AM EDT  I have spoken with the patient and discussed todays results, in addition to providing specific details for the plan of care and counseling regarding the diagnosis and prognosis. Their questions are answered at this time and they are agreeable with the plan. I discussed at length with them reasons for immediate return here for re evaluation. They will followup with their primary care physician by calling their office tomorrow. --------------------------------- ADDITIONAL PROVIDER NOTES ---------------------------------  At this time the patient is without objective evidence of an acute process requiring hospitalization or inpatient management. They have remained hemodynamically stable throughout their entire ED visit and are stable for discharge with outpatient follow-up. The plan has been discussed in detail and they are aware of the specific conditions for emergent return, as well as the importance of follow-up. New Prescriptions    MECLIZINE (ANTIVERT) 25 MG TABLET    Take 1 tablet by mouth 3 times daily as needed (Dizziness)       Diagnosis:  1. Dizziness    2.  Acute nonintractable headache, unspecified headache type    3. Palpitations        Disposition:  Patient's disposition: Discharge to home  Patient's condition is stable.        Cherelle Dunlap DO  Resident  08/26/21 185 LIN Smith DO  Resident  08/26/21 [id number] [facility] DO Maryanne  Resident  08/30/21 1511

## 2021-08-26 NOTE — ED NOTES
FIRST PROVIDER CONTACT ASSESSMENT NOTE                                                                                                Department of Emergency Medicine                                                      First Provider Note  21  11:48 PM EDT  NAME: Ian Mtz  : 1985  MRN: [id number]    Chief Complaint: Dizziness (Thas been going on for a few days), Palpitations, and Headache      History of Present Illness:   Vee Solo is a 39 y.o. female who presents to the ED for palpitations headache    Focused Physical Exam:  VS:    ED Triage Vitals [21 2136]   BP Temp Temp Source Pulse Resp SpO2 Height Weight   (!) 149/82 98 °F (36.7 °C) Temporal 81 18 99 % 5' 9\" (1.753 m) 245 lb (111.1 kg)        General: Alert and in no apparent distress. Medical History:  has a past medical history of Abnormal Pap smear of cervix, Back pain, Chronic generalized pain disorder, Chronic sinusitis, Class 3 severe obesity due to excess calories without serious comorbidity with body mass index (BMI) of 40.0 to 44.9 in [facility]), DDD (degenerative disc disease), cervical, DDD (degenerative disc disease), lumbar, Depression, Essential hypertension, Essential hypertension, Gastroesophageal reflux disease without esophagitis, Hemochromatosis, Migraines, Paresthesias, and Seasonal allergies. Surgical History:  has a past surgical history that includes sinus surgery (N/A, 2016) and Tubal ligation (2021). Social History:  reports that she has never smoked. She has never used smokeless tobacco. She reports that she does not drink alcohol and does not use drugs. Family History: family history includes Arthritis in her maternal grandmother and mother; High Blood Pressure in her father; Other in her maternal aunt, maternal uncle, mother, paternal aunt, and paternal uncle.     Allergies: Pcn [penicillins]     Initial Plan of Care:  Initiate Treatment-Testing, Proceed toTreatment Area When Bed Available for ED Attending/MLP to Continue Care    -------------------------------------------------[address] ASSESSMENT NOTE--------------------------------------------------------  Electronically signed by JUDSON Rios   DD: 8/25/21     JUDSON Rios  08/25/21 2739

## 2021-08-27 ENCOUNTER — OFFICE VISIT (OUTPATIENT)
Dept: FAMILY MEDICINE CLINIC | Age: 36
End: 2021-08-27
Payer: COMMERCIAL

## 2021-08-27 VITALS
HEIGHT: 69 IN | HEART RATE: 85 BPM | BODY MASS INDEX: 36.73 KG/M2 | DIASTOLIC BLOOD PRESSURE: 82 MMHG | OXYGEN SATURATION: 100 % | SYSTOLIC BLOOD PRESSURE: 138 MMHG | TEMPERATURE: 97.8 F | RESPIRATION RATE: 18 BRPM | WEIGHT: 248 LBS

## 2021-08-27 DIAGNOSIS — R20.2 PARESTHESIAS: Primary | ICD-10-CM

## 2021-08-27 DIAGNOSIS — R00.2 PALPITATIONS: ICD-10-CM

## 2021-08-27 DIAGNOSIS — I10 ESSENTIAL HYPERTENSION: ICD-10-CM

## 2021-08-27 PROCEDURE — G8427 DOCREV CUR MEDS BY ELIG CLIN: HCPCS | Performed by: FAMILY MEDICINE

## 2021-08-27 PROCEDURE — 99213 OFFICE O/P EST LOW 20 MIN: CPT | Performed by: FAMILY MEDICINE

## 2021-08-27 PROCEDURE — G8417 CALC BMI ABV UP PARAM F/U: HCPCS | Performed by: FAMILY MEDICINE

## 2021-08-27 PROCEDURE — 1036F TOBACCO NON-USER: CPT | Performed by: FAMILY MEDICINE

## 2021-08-27 ASSESSMENT — ENCOUNTER SYMPTOMS
PHOTOPHOBIA: 0
SHORTNESS OF BREATH: 0
CHEST TIGHTNESS: 0
VOMITING: 0
DIARRHEA: 0
ALLERGIC/IMMUNOLOGIC NEGATIVE: 1
EYE REDNESS: 0
COUGH: 0
NAUSEA: 0
SINUS PAIN: 0
SORE THROAT: 0
BLOOD IN STOOL: 0
ABDOMINAL PAIN: 0
EYE PAIN: 0
BACK PAIN: 0
EYE DISCHARGE: 0
TROUBLE SWALLOWING: 0

## 2021-08-27 NOTE — PROGRESS NOTES
21  Yehuda Wray Brothers : 1985 Sex: female  Age: 39 y.o. Assessment and Plan:  Oliiva was seen today for palpitations, arm pain and other. Diagnoses and all orders for this visit:    Paresthesias  -     Lyme Disease Acute Reflexive Panel; Future  -     dexamethasone (DECADRON) 0.75 MG tablet; Take 1 tablet by mouth 3 times daily (with meals) for 5 days  [facility] paperwork received and will be completed. Essential hypertension    Palpitations  He is to restart the propranolol, this should help with palpitations and for her headaches. Return in about 2 weeks (around 9/10/2021). Chief Complaint   Patient presents with    Palpitations    Arm Pain     weakness    Other     FMLA       Patient is here for recheck emergency room visit. She was seen in the emergency room at [facility] on Wednesday with palpitations, paresthesias of the upper extremities. She also claims some weakness in her upper extremities as well. ER notes were reviewed by me as well as testing. Most all results were densely normal.  Her propranolol been held after her office visit on Monday. Palpitations worsened after that. He denied chest pain, shortness of breath, edema. She has an appointment at the [facility] clinic in September for complete neurological work-up. Meantime, [facility] paperwork left by the patient and will be completed by me. Review of Systems   Constitutional: Negative for appetite change, fatigue and unexpected weight change. HENT: Negative for congestion, ear pain, hearing loss, sinus pain, sore throat and trouble swallowing. Eyes: Negative for photophobia, pain, discharge and redness. Respiratory: Negative for cough, chest tightness and shortness of breath. Cardiovascular: Negative for chest pain, palpitations and leg swelling. Gastrointestinal: Negative for abdominal pain, blood in stool, diarrhea, nausea and vomiting. Endocrine: Negative.     Genitourinary: Negative for dysuria, flank pain, frequency and hematuria. Musculoskeletal: Negative for arthralgias, back pain, joint swelling and myalgias. Skin: Negative. Allergic/Immunologic: Negative. Neurological: Positive for light-headedness, numbness and headaches. Negative for dizziness, seizures, syncope and weakness. Hematological: Negative for adenopathy. Does not bruise/bleed easily. Psychiatric/Behavioral: Negative.           Current Outpatient Medications:     dexamethasone (DECADRON) 0.75 MG tablet, Take 1 tablet by mouth 3 times daily (with meals) for 5 days, Disp: 15 tablet, Rfl: 0    meclizine (ANTIVERT) 25 MG tablet, Take 1 tablet by mouth 3 times daily as needed (Dizziness), Disp: 15 tablet, Rfl: 0    lisinopril (PRINIVIL;ZESTRIL) 20 MG tablet, Take 20 mg by mouth daily, Disp: , Rfl:     azelastine (ASTELIN) 0.1 % nasal spray, 1 spray by Nasal route 2 times daily Use in each nostril as directed, Disp: 1 Bottle, Rfl: 3  Allergies   Allergen Reactions    Pcn [Penicillins] Rash       Past Medical History:   Diagnosis Date    Abnormal Pap smear of cervix     Back pain     Chronic generalized pain disorder 8/13/2021    Chronic sinusitis     with facial pain    Class 3 severe obesity due to excess calories without serious comorbidity with body mass index (BMI) of 40.0 to 44.9 in adult [facility])     DDD (degenerative disc disease), cervical 8/13/2021    DDD (degenerative disc disease), lumbar 8/13/2021    Depression 06/06/2011    Essential hypertension     Essential hypertension     Gastroesophageal reflux disease without esophagitis     Hemochromatosis     Migraines     born with heart murmur    Paresthesias 8/10/2021    Seasonal allergies      Past Surgical History:   Procedure Laterality Date    SINUS SURGERY N/A 02/25/2016    FUNCTIONAL ENDOSCOPIC SINUS SURGERY WITH BILATERAL MAXILLARY, FRONTAL & SPENOID ANTROSTOMIES, SUBMUCCOSAL RESECTION OF INFERIOR TURBINATES    TUBAL LIGATION 07/22/2021     Family History   Problem Relation Age of Onset    Arthritis Mother     Other Mother         sinus problems    High Blood Pressure Father     Other Maternal Aunt         migraine    Other Maternal Uncle         migraine    Other Paternal Aunt         migraine    Other Paternal Uncle         migraine    Arthritis Maternal Grandmother      Social History     Socioeconomic History    Marital status:      Spouse name: Not on file    Number of children: Not on file    Years of education: Not on file    Highest education level: Not on file   Occupational History    Not on file   Tobacco Use    Smoking status: Never Smoker    Smokeless tobacco: Never Used   Vaping Use    Vaping Use: Never used   Substance and Sexual Activity    Alcohol use: No     Alcohol/week: 0.0 standard drinks    Drug use: No    Sexual activity: Not on file   Other Topics Concern    Not on file   Social History Narrative    Not on file     Social Determinants of Health     Financial Resource Strain: Unknown    Difficulty of Paying Living Expenses: Patient refused   Food Insecurity: Unknown    Worried About [facility] in the Last Year: Patient refused    Ran Out of Food in the Last Year: Patient refused   Transportation Needs:     Lack of Transportation (Medical):      Lack of Transportation (Non-Medical):    Physical Activity:     Days of Exercise per Week:     Minutes of Exercise per Session:    Stress:     Feeling of Stress :    Social Connections:     Frequency of Communication with Friends and Family:     Frequency of Social Gatherings with Friends and Family:     Attends [employer] Services:     Active Member of Clubs or Organizations:     Attends Club or Organization Meetings:     Marital Status:    Intimate Partner Violence:     Fear of Current or Ex-Partner:     Emotionally Abused:     Physically Abused:     Sexually Abused:        Vitals:    08/27/21 1548   BP: 138/82   Pulse:

## 2021-08-30 ASSESSMENT — ENCOUNTER SYMPTOMS
NAUSEA: 0
COUGH: 0
SHORTNESS OF BREATH: 0
ABDOMINAL PAIN: 0
RHINORRHEA: 0
EYE PAIN: 0
BACK PAIN: 0
VOMITING: 0
SORE THROAT: 0
WHEEZING: 0
TROUBLE SWALLOWING: 0
DIARRHEA: 0
SINUS PAIN: 0

## 2021-09-10 RX ORDER — PREDNISONE 10 MG/1
TABLET ORAL
Qty: 18 TABLET | Refills: 0 | Status: SHIPPED | OUTPATIENT
Start: 2021-09-10 | End: 2021-09-19

## 2021-09-13 ENCOUNTER — PATIENT MESSAGE (OUTPATIENT)
Dept: FAMILY MEDICINE CLINIC | Age: 36
End: 2021-09-13

## 2021-09-13 NOTE — TELEPHONE ENCOUNTER
From: Christiano Lauren Brothers  To: Jamarcus Baker DO  Sent: 9/13/2021 1:37 PM EDT  Subject: Prescription Question    Hi,  I have a question about two prescriptions, I did not see them as options on the other page to request.    #1- Dr. Haylee Engle wanted me to take two pills daily of the propranolol 60mg. I only ever had the prescription for one pill daily and I will be out in the next day or two. Can he send the prescription in for two pills daily? #2 - Dr. Haylee Engle or the ER had prescribed meclizine for nausea, I can't remember which. That does help with nausea when I am really dizzy, would he be able to refill that script? Please let me know if/when he can do these items. Thank you so much!   Gi Desai  [phone]

## 2021-09-15 ENCOUNTER — VIRTUAL VISIT (OUTPATIENT)
Dept: ENDOCRINOLOGY | Age: 36
End: 2021-09-15
Payer: COMMERCIAL

## 2021-09-15 DIAGNOSIS — E04.2 MULTINODULAR GOITER: Primary | ICD-10-CM

## 2021-09-15 DIAGNOSIS — E55.9 VITAMIN D DEFICIENCY: ICD-10-CM

## 2021-09-15 PROCEDURE — 99214 OFFICE O/P EST MOD 30 MIN: CPT | Performed by: INTERNAL MEDICINE

## 2021-09-15 PROCEDURE — 1036F TOBACCO NON-USER: CPT | Performed by: INTERNAL MEDICINE

## 2021-09-15 PROCEDURE — G8417 CALC BMI ABV UP PARAM F/U: HCPCS | Performed by: INTERNAL MEDICINE

## 2021-09-15 PROCEDURE — G8427 DOCREV CUR MEDS BY ELIG CLIN: HCPCS | Performed by: INTERNAL MEDICINE

## 2021-09-15 RX ORDER — PROPRANOLOL HCL 60 MG
60 CAPSULE, EXTENDED RELEASE 24HR ORAL 2 TIMES DAILY
Qty: 60 CAPSULE | Refills: 2 | Status: SHIPPED
Start: 2021-09-15 | End: 2022-01-11 | Stop reason: SDUPTHER

## 2021-09-15 RX ORDER — MECLIZINE HYDROCHLORIDE 25 MG/1
25 TABLET ORAL 3 TIMES DAILY PRN
Qty: 15 TABLET | Refills: 2 | Status: SHIPPED | OUTPATIENT
Start: 2021-09-15 | End: 2021-09-25

## 2021-10-05 ENCOUNTER — VIRTUAL VISIT (OUTPATIENT)
Dept: BARIATRICS/WEIGHT MGMT | Age: 36
End: 2021-10-05
Payer: COMMERCIAL

## 2021-10-05 DIAGNOSIS — E66.01 CLASS 3 SEVERE OBESITY DUE TO EXCESS CALORIES WITHOUT SERIOUS COMORBIDITY WITH BODY MASS INDEX (BMI) OF 40.0 TO 44.9 IN ADULT (HCC): ICD-10-CM

## 2021-10-05 DIAGNOSIS — I10 ESSENTIAL HYPERTENSION: Primary | ICD-10-CM

## 2021-10-05 PROCEDURE — 99214 OFFICE O/P EST MOD 30 MIN: CPT | Performed by: INTERNAL MEDICINE

## 2021-10-05 PROCEDURE — G8428 CUR MEDS NOT DOCUMENT: HCPCS | Performed by: INTERNAL MEDICINE

## 2021-10-05 NOTE — PATIENT INSTRUCTIONS
Rules:  · Count every calorie every day  · Limit sweets to one day per month  · Limit chips/crackers/pretzels/nuts to 100 lesli/day  · Eliminate all sugar sweetened beverages (including fruit juice)  · Limit restaurants (including fast food and food from a convenience store) to one time every two weeks    Requirements:  · Make sure protein intake is at least 70 grams per day (do not count protein every day; instead spot check your intake every 2-3 weeks and make sure what you think you are getting is close to accurate; consider using a protein shake if needed; these are in the pharmacy section of the stores, not the grocery section; Premier, Pure Protein and Fairlife are relatively inexpensive and taste good to most patients; other options are Nectar, Boost Max, Ensure Max, BeneProtein and GNC lean (which is lactose-free); Nectar fruit, Premier Protein Clear, IsoPure Protein Drink, and Protein 2 O are water-based options; Quest (or Cosco, which is cheaper and is ordered on Amazon) and the Oxford Biotrans protein bars can also be used, but have less protein in them )  · Make sure that fiber intake is at least 22 grams per day. Do this by either eating 12 tablespoons of the original, plain Fiber One cereal every day or 4 tablespoons of wheat dextrin powder (Benefiber or a generic brand) every day. Work up to this amount slowly by starting with only one-eighth to one-fourth of the target amount and then adding another one-eighth to one-fourth every one or two weeks until reaching the target. · Take one multivitamin every day    Targets:  · Limit calorie intake to 1500 calories/day  · Walk 30 minutes daily  · Avoid eating 2 hours within bedtime. Tips:  · Do not eat outside of the dining room or the kitchen  · Do not eat while watching TV, videos, working on the computer or using a smart phone  · Do not eat food out of a multi-serving bag or container.   Establish 6 hours of food-free periods (times you don't eat) each day. No period can be less than 1 hour long. The periods need to be the same every day for days that are the same (for example, workdays would have one set of food free periods and weekends would have another set of days). These six hours are in addition to the two hours before bedtime and the time spent sleeping.

## 2021-10-05 NOTE — PROGRESS NOTES
(degenerative disc disease), cervical 8/13/2021    DDD (degenerative disc disease), lumbar 8/13/2021    Depression 06/06/2011    Essential hypertension     Essential hypertension     Gastroesophageal reflux disease without esophagitis     Hemochromatosis     Migraines     born with heart murmur    Paresthesias 8/10/2021    Seasonal allergies      Current Outpatient Medications   Medication Sig Dispense Refill    propranolol (INDERAL LA) 60 MG extended release capsule Take 1 capsule by mouth 2 times daily 60 capsule 2    lisinopril (PRINIVIL;ZESTRIL) 20 MG tablet Take 20 mg by mouth daily      azelastine (ASTELIN) 0.1 % nasal spray 1 spray by Nasal route 2 times daily Use in each nostril as directed 1 Bottle 3     No current facility-administered medications for this visit. PE -  Gen : There were no vitals taken for this visit. WN, WD, NAD  Lung: Nml resp effort  Psych: Normal mood   Full affect  Neuro:  Moves all ext well  ______________________    HISTORY & ASSESSMENT/PLAN -     Problem 1  - HTN   HPI   - Diagnosed 09/2020      BP mid-120's/low 80's (yesterday 124/82)      Regimen: Propranolol ER 60 mg (PCP switched from Lisinopril per her request; she wanted it b/c of it being anti-migranal and concern that Lisinopril may be causing side effects)      Checks at home 2-3x/wk; runs in the 120s/80s      Having dizziness       Excess weight increases the severity of her hypertension  Assessment  - Controlled with medication; she does not want to change the propranolol at this time despite the dizziness it is causing  Plan   - Cont propranolol ER 60 mg daily     Cont wt loss per the plan below    Problem 2  - Obesity  HPI   - See above Background for description    Weight  Date    279.2 lbs 5/04/21    262.6 lbs 6/08/21    251.4 lbs 7/20/21    246.0 lbs 8/24/21 243.2 lbs home    -----  10/05/21 251.0 lbs home  Total wt change to date:  - 28.2 lbs  DEN = 2325 lesli/d = 16,275 lesli/wk  Avg daily energy variance:   5/04/21 - 6/08/21 = -14.8 lbs (51,800 lesli)/34d = - 1,523 lesli/d deficit   6/08/21 - 7/20/21 = -11.2 lbs (39,200 lesli)/42d = -    933 lesli/d deficit   7/02/21 - 8/24/21 = - 5.4 lbs (18,900 lesli)/35d = -     540 lesli/d deficit   8/24/21-10/05/21 = +7.8 lbs (27,300 lesli)/42d = +    650 lesli/d excess  Weight effect of high risk, high calorie foods = Restaurants food 750 lesli/wk + Sweets 2465 + Chips/Crackers/Pretzels 1200 +  = 5,090 lesli/wk = 725 lesli/d = 31% DEN = 72 lbs/yr  At the first visit, she was willing to consider surgery, but did not want to without first trying a non-surgical approach  Does not want an appetite suppressant today. (Qsymia woud the best option b/c of her migraines. However, has been on topiramate in the past and suffered cognitive impairment )  We chose a counting-based regimen with targeted elimination. Update:  Not counting  Eating sweets,salty snacks, restaurants ad lila  Assessment  - Worsening, needs to resume her prescribed plan  Plan   -   Rules:  · Count every calorie every day  · Limit sweets to one day per month  · Limit chips/crackers/pretzels/nuts to 100 lesli/day  · Eliminate all sugar sweetened beverages (including fruit juice)  · Limit restaurants (including fast food and food from a convenience store) to one time every two weeks    Requirements:  · Make sure protein intake is at least 70 grams per day (do not count protein every day; instead spot check your intake every 2-3 weeks and make sure what you think you are getting is close to accurate; consider using a protein shake if needed; these are in the pharmacy section of the stores, not the grocery section; Premier, Pure Protein and Fairlife are relatively inexpensive and taste good to most patients; other options are Nectar, Boost Max, Ensure Max, BeneProtein and GNC lean (which is lactose-free);    Nectar fruit, Premier Protein Clear, IsoPure Protein Drink, and Protein 2 O are water-based options; [employer] (or [employer], substitute for in-person clinic visit.

## 2021-11-10 ENCOUNTER — OFFICE VISIT (OUTPATIENT)
Dept: BARIATRICS/WEIGHT MGMT | Age: 36
End: 2021-11-10
Payer: COMMERCIAL

## 2021-11-10 VITALS
HEART RATE: 66 BPM | DIASTOLIC BLOOD PRESSURE: 86 MMHG | HEIGHT: 69 IN | TEMPERATURE: 98.1 F | BODY MASS INDEX: 38.8 KG/M2 | SYSTOLIC BLOOD PRESSURE: 143 MMHG | WEIGHT: 262 LBS

## 2021-11-10 DIAGNOSIS — I10 ESSENTIAL HYPERTENSION: Primary | ICD-10-CM

## 2021-11-10 DIAGNOSIS — E66.01 CLASS 3 SEVERE OBESITY DUE TO EXCESS CALORIES WITHOUT SERIOUS COMORBIDITY WITH BODY MASS INDEX (BMI) OF 40.0 TO 44.9 IN ADULT (HCC): ICD-10-CM

## 2021-11-10 PROCEDURE — G8428 CUR MEDS NOT DOCUMENT: HCPCS | Performed by: INTERNAL MEDICINE

## 2021-11-10 PROCEDURE — G8484 FLU IMMUNIZE NO ADMIN: HCPCS | Performed by: INTERNAL MEDICINE

## 2021-11-10 PROCEDURE — 1036F TOBACCO NON-USER: CPT | Performed by: INTERNAL MEDICINE

## 2021-11-10 PROCEDURE — 99211 OFF/OP EST MAY X REQ PHY/QHP: CPT | Performed by: INTERNAL MEDICINE

## 2021-11-10 PROCEDURE — G8417 CALC BMI ABV UP PARAM F/U: HCPCS | Performed by: INTERNAL MEDICINE

## 2021-11-10 PROCEDURE — 99214 OFFICE O/P EST MOD 30 MIN: CPT | Performed by: INTERNAL MEDICINE

## 2021-11-10 RX ORDER — PHENTERMINE HYDROCHLORIDE 37.5 MG/1
TABLET ORAL
Qty: 30 TABLET | Refills: 0 | Status: SHIPPED | OUTPATIENT
Start: 2021-11-10 | End: 2021-12-10

## 2021-11-10 NOTE — PROGRESS NOTES
reflux disease without esophagitis     Hemochromatosis     Migraines     born with heart murmur    Paresthesias 8/10/2021    Seasonal allergies      Current Outpatient Medications   Medication Sig Dispense Refill    propranolol (INDERAL LA) 60 MG extended release capsule Take 1 capsule by mouth 2 times daily 60 capsule 2    azelastine (ASTELIN) 0.1 % nasal spray 1 spray by Nasal route 2 times daily Use in each nostril as directed 1 Bottle 3     No current facility-administered medications for this visit. PE -  Gen : BP (!) 143/86 (Site: Left Upper Arm, Position: Sitting, Cuff Size: Large Adult)   Pulse 66   Temp 98.1 °F (36.7 °C) (Temporal)   Ht 5' 8.5\" (1.74 m)   Wt 262 lb (118.8 kg)   BMI 39.26 kg/m²    WN, WD, NAD  Heart: RRR w/o MGR, no carotid bruits  Lung: Nml resp effort, CTA b/l  Psych: Normal mood   Full affect  Neuro:  Moves all ext well  ______________________    HISTORY & ASSESSMENT/PLAN -     Problem 1  - HTN   HPI   - Diagnosed 09/2020      BP high-120's-130's/mid-80's       Regimen: Propranolol ER 60 mg (PCP switched from Lisinopril per her request; she wanted it b/c of it being anti-migranal and concern that Lisinopril may be causing side effects)      Having dizziness      Stopped propranolol and there was no improvement, so restarted      Meclizine did not help       Excess weight increases the severity of her hypertension  Assessment  - Controlled with medication; she does not want to change the propranolol at this time despite the dizziness it is causing  Plan   - Cont propranolol ER 60 mg daily     Cont wt loss per the plan below    Problem 2  - Obesity  HPI   - See above Background for description    Weight  Date    279.2 lbs 5/04/21    262.6 lbs 6/08/21    251.4 lbs 7/20/21    246.0 lbs 8/24/21   243.2 lbs home    -----  10/05/21 251.0 lbs home    262.0 lbs 11/10/21 258.0 lbs home  Total wt change to date:  - 17.2 lbs  DEN = 2325 lesli/d = 16,275 lesli/wk  Avg daily energy Pure Protein and Fairlife are relatively inexpensive and taste good to most patients; other options are Nectar, Boost Max, Ensure Max, BeneProtein and GNC lean (which is lactose-free); Nectar fruit, Premier Protein Clear, IsoPure Protein Drink, and Protein 2 O are water-based options; Quest (or Cosco, which is cheaper and is ordered on Amazon) and the [employer] 1 protein bars can also be used, but have less protein in them )  · Make sure that fiber intake is at least 22 grams per day. Do this by either eating 12 tablespoons of the original, plain Fiber One cereal every day or 4 tablespoons of wheat dextrin powder (Benefiber or a generic brand) every day. Work up to this amount slowly by starting with only one-eighth to one-fourth of the target amount and then adding another one-eighth to one-fourth every one or two weeks until reaching the target. · Take one multivitamin every day    Targets:  · Limit calorie intake to 1500 calories/day  · Walk 30 minutes daily  · Avoid eating 2 hours within bedtime. Tips:  · Do not eat outside of the dining room or the kitchen  · Do not eat while watching TV, videos, working on the computer or using a smart phone  · Do not eat food out of a multi-serving bag or container. · Establish 6 hours of food-free periods (times you don't eat) each day. No period can be less than 1 hour long. The periods need to be the same every day for days that are the same (for example, workdays would have one set of food free periods and weekends would have another set of days). These six hours are in addition to the two hours before bedtime and the time spent sleeping. Medications: Take phentermine 37.5 mg, one-half to one tablet daily as needed for appetite suppression. Take each dose 30-90 min before effect will be needed. While taking phentermine, check the Blood Pressure every morning and every evening.      If the systolic BP is >200 mmHg, the diastolic BP is >17 mm/Hg or the heart rate is > 100 beats per minute, do not take phentermine that day. If the systolic BP is consistently >155 mmHg, the diastolic BP is consistently above 90 mm/Hg or the heart rate is consistently > 100 beats per minute, then stop taking phentermine altogether. If the systolic BP >056 mmHg or the diastolic BP is >620 mmHg (even if it is only once), then phentermine should be stopped altogether without proving that any of these are consistently elevated. An  electronic signature was used to authenticate this note.     --Ann-Marie Harper MD on 11/10/2021 at 4:18 PM

## 2021-11-10 NOTE — PATIENT INSTRUCTIONS
Rules:  · Count every calorie every day  · Limit sweets to one day per month  · Limit chips/crackers/pretzels/nuts to 100 lesli/day  · Eliminate all sugar sweetened beverages (including fruit juice)  · Limit restaurants (including fast food and food from a convenience store) to one time every two weeks    Requirements:  · Make sure protein intake is at least 70 grams per day (do not count protein every day; instead spot check your intake every 2-3 weeks and make sure what you think you are getting is close to accurate; consider using a protein shake if needed; these are in the pharmacy section of the stores, not the grocery section; Premier, Pure Protein and Fairlife are relatively inexpensive and taste good to most patients; other options are Nectar, Boost Max, Ensure Max, BeneProtein and GNC lean (which is lactose-free); Nectar fruit, Premier Protein Clear, IsoPure Protein Drink, and Protein 2 O are water-based options; Quest (or Cosco, which is cheaper and is ordered on Amazon) and the [employer] protein bars can also be used, but have less protein in them )  · Make sure that fiber intake is at least 22 grams per day. Do this by either eating 12 tablespoons of the original, plain Fiber One cereal every day or 4 tablespoons of wheat dextrin powder (Benefiber or a generic brand) every day. Work up to this amount slowly by starting with only one-eighth to one-fourth of the target amount and then adding another one-eighth to one-fourth every one or two weeks until reaching the target. · Take one multivitamin every day    Targets:  · Limit calorie intake to 1500 calories/day  · Walk 30 minutes daily  · Avoid eating 2 hours within bedtime. Tips:  · Do not eat outside of the dining room or the kitchen  · Do not eat while watching TV, videos, working on the computer or using a smart phone  · Do not eat food out of a multi-serving bag or container.   · Establish 6 hours of food-free periods (times you don't eat) each day. No period can be less than 1 hour long. The periods need to be the same every day for days that are the same (for example, workdays would have one set of food free periods and weekends would have another set of days). These six hours are in addition to the two hours before bedtime and the time spent sleeping. Medications: Take phentermine 37.5 mg, one-half to one tablet daily as needed for appetite suppression. Take each dose 30-90 min before effect will be needed. While taking phentermine, check the Blood Pressure every morning and every evening. If the systolic BP is >197 mmHg, the diastolic BP is >29 mm/Hg or the heart rate is > 100 beats per minute, do not take phentermine that day. If the systolic BP is consistently >155 mmHg, the diastolic BP is consistently above 90 mm/Hg or the heart rate is consistently > 100 beats per minute, then stop taking phentermine altogether. If the systolic BP >128 mmHg or the diastolic BP is >726 mmHg (even if it is only once), then phentermine should be stopped altogether without proving that any of these are consistently elevated.

## 2021-11-30 NOTE — PROGRESS NOTES
[address] [place] [address] Department of Endocrinology Diabetes and Metabolism   [address] [facility] [address]   Phone: [phone]  Fax: [phone]    Date of Service: 9/15/2021  Primary Care Physician: Ho Moreira DO  Provider: Nannette Dillard MD            Reason for the visit:  Multinodular goiter     History of Present Illness: The history is provided by the patient. No  was used. Accuracy of the patient data is excellent. Home Mtz is a very pleasant 39 y.o. female seen today for evaluation and management of multinodular goiter     The patient was found to have thyroid nodule on a routine physical examination   Thyroid US 10/2/2020   Thyroid gland is mildly enlarged and somewhat heterogeneous with normal vascularity. Rt  Lobe measures 19.7 x 20.6 x 50.5 mm --> 0.9 cm nodule   Lt lobe measures  22.8 x 52.7 x 27.5 mm --> 3.1 x 1.8 x 2.1 cm hypoechoic nodule   Isthmus:  5.3 mm  Cervical lymphadenopathy: No abnormal lymph nodes in the imaged portions of the neck.     11/2/2020 - FNA to dominant Lt side nodule --> benign     Hoem Mtz denies any  voice change, or shortness of breath. No family history of thyroid cancer. No prior history of radiation to head or neck region. Lab Results   Component Value Date/Time    TSH 1.010 08/25/2021 11:08 PM    T4FREE 1.22 04/17/2021 08:03 AM    C6RPQYL 7.8 08/25/2021 11:08 PM    TPOABS 0.3 04/17/2021 08:03 AM    THGAB <0.9 04/17/2021 08:03 AM       Patient denied any history of  swelling in the area of the thyroid gland, weight loss, change in appetite, nervousness, anxiety, irritability, tremor, sweating, heat intolerance, changes in bowel habits, muscle weakness or difficulty sleeping. Patient also denied any h/o unexplained weight gain, new fatigue, increased sensitivity to cold, dry skin, brittle fingernails, brittle hair, facial swelling/puffiness, or depressed mood.     PAST MEDICAL HISTORY   Past Passed 3mm stone 10/12/2021   Medical History:   Diagnosis Date    Abnormal Pap smear of cervix     Back pain     Chronic generalized pain disorder 8/13/2021    Chronic sinusitis     with facial pain    Class 3 severe obesity due to excess calories without serious comorbidity with body mass index (BMI) of 40.0 to 44.9 in adult [facility])     DDD (degenerative disc disease), cervical 8/13/2021    DDD (degenerative disc disease), lumbar 8/13/2021    Depression 06/06/2011    Essential hypertension     Essential hypertension     Gastroesophageal reflux disease without esophagitis     Hemochromatosis     Migraines     born with heart murmur    Paresthesias 8/10/2021    Seasonal allergies        PAST SURGICAL HISTORY   Past Surgical History:   Procedure Laterality Date    SINUS SURGERY N/A 02/25/2016    FUNCTIONAL ENDOSCOPIC SINUS SURGERY WITH BILATERAL MAXILLARY, FRONTAL & SPENOID ANTROSTOMIES, SUBMUCCOSAL RESECTION OF INFERIOR TURBINATES    TUBAL LIGATION  07/22/2021       SOCIAL HISTORY   Tobacco:   reports that she has never smoked. She has never used smokeless tobacco.  Alcohol:   reports no history of alcohol use. Drugs:   reports no history of drug use.     FAMILY HISTORY   Family History   Problem Relation Age of Onset    Arthritis Mother     Other Mother         sinus problems    High Blood Pressure Father     Other Maternal Aunt         migraine    Other Maternal Uncle         migraine    Other Paternal Aunt         migraine    Other Paternal Uncle         migraine    Arthritis Maternal Grandmother        ALLERGIES AND DRUG REACTIONS   Allergies   Allergen Reactions    Pcn [Penicillins] Rash       CURRENT MEDICATIONS   Current Outpatient Medications   Medication Sig Dispense Refill    propranolol (INDERAL LA) 60 MG extended release capsule Take 1 capsule by mouth 2 times daily 60 capsule 2    meclizine (ANTIVERT) 25 MG tablet Take 1 tablet by mouth 3 times daily as needed (dizziness, nausea) 15 tablet 2    predniSONE Palmar erythema  Musculoskeletal: No back tenderness, no kyphosis/scoliosis     Neuro: CN intact, sensation normal , muscle power normal. No tremors   Psych: normal mood, and affect    Review of Laboratory Data:  I personally reviewed the following labs:   Lab Results   Component Value Date/Time    WBC 8.4 09/09/2021 04:28 PM    RBC 4.64 09/09/2021 04:28 PM    HGB 13.9 09/09/2021 04:28 PM    HCT 41.6 09/09/2021 04:28 PM    MCV 89.7 09/09/2021 04:28 PM    MCH 30.0 09/09/2021 04:28 PM    MCHC 33.4 09/09/2021 04:28 PM    RDW 13.0 09/09/2021 04:28 PM     09/09/2021 04:28 PM    MPV 7.3 (L) 09/09/2021 04:28 PM      Lab Results   Component Value Date/Time     09/09/2021 04:28 PM    K 4.1 09/09/2021 04:28 PM    K 4.3 08/25/2021 11:08 PM    CO2 24 09/09/2021 04:28 PM    BUN 16 09/09/2021 04:28 PM    CREATININE 0.8 09/09/2021 04:28 PM    CALCIUM 8.8 09/09/2021 04:28 PM    LABGLOM 81 09/09/2021 04:28 PM    GFRAA >60 08/25/2021 11:08 PM      Lab Results   Component Value Date/Time    TSH 1.010 08/25/2021 11:08 PM    T4FREE 1.22 04/17/2021 08:03 AM    O4INYCX 7.8 08/25/2021 11:08 PM    TPOABS 0.3 04/17/2021 08:03 AM    THGAB <0.9 04/17/2021 08:03 AM     Lab Results   Component Value Date    GLUCOSE 112 09/09/2021    GLUCOSE 89 01/10/2012     Lab Results   Component Value Date    TRIG 97 08/04/2021    HDL 43 08/04/2021    LDLCALC 82 08/04/2021    CHOL 144 08/04/2021     No results found for: [address] Brothers, a 39 y.o.-old female seen in for the following issues     Multinodular goiter   · FNA to dominant Lt side thyroid nodule was benign in 11/2020   · Follow up thyroid US 4/2021 --> stable MNG   · Repeat US before next visit   · Check TFT before next visit     vitD deficiency   · Continue vitD supplement  · Check level before next visit     I personally reviewed external notes from PCP and other patient's care team providers, and personally interpreted labs associated with the above diagnosis. I also ordered labs to further assess and manage the above addressed medical conditions. Return in about 11 months (around 8/15/2022) for Multinodular goiter, VitD deficiency. The above issues were reviewed with the patient who understood and agreed with the plan. Thank you for allowing us to participate in the care of this patient. Please do not hesitate to contact us with any additional questions. Diagnosis Orders   1. Multinodular goiter  TSH without Reflex    T4, Free    US THYROID   2. Vitamin D deficiency  Vitamin D 25 Hydroxy    Basic Metabolic Panel     Anitra Yao MD  Endocrinologist, [facility])   [address] [place] [address], [address] [place] [address],[facility] [address] [id number]   Phone: [phone]  Fax: [phone]  -------------------------   An electronic signature was used to authenticate this note.  Steven Harris MD on 9/15/2021 at 4:24 PM

## 2021-12-14 ENCOUNTER — TELEPHONE (OUTPATIENT)
Dept: BARIATRICS/WEIGHT MGMT | Age: 36
End: 2021-12-14

## 2022-01-10 NOTE — TELEPHONE ENCOUNTER
Olivia calling for a new script for Propanolol to be sent to [facility] in [place].

## 2022-01-11 RX ORDER — PROPRANOLOL HCL 60 MG
60 CAPSULE, EXTENDED RELEASE 24HR ORAL 2 TIMES DAILY
Qty: 60 CAPSULE | Refills: 2 | Status: SHIPPED
Start: 2022-01-11 | End: 2022-04-11 | Stop reason: SDUPTHER

## 2022-01-12 ENCOUNTER — TELEPHONE (OUTPATIENT)
Dept: BARIATRICS/WEIGHT MGMT | Age: 37
End: 2022-01-12

## 2022-04-11 RX ORDER — PROPRANOLOL HCL 60 MG
60 CAPSULE, EXTENDED RELEASE 24HR ORAL 2 TIMES DAILY
Qty: 60 CAPSULE | Refills: 2 | Status: SHIPPED
Start: 2022-04-11 | End: 2022-07-13 | Stop reason: SDUPTHER

## 2022-04-11 NOTE — TELEPHONE ENCOUNTER
Last Appointment:  8/27/2021  Future Appointments   Date Time Provider Ab Smith   7/25/2022 10:00 AM TICO Garcia - CNS [facility]

## 2022-05-14 ENCOUNTER — OFFICE VISIT (OUTPATIENT)
Dept: FAMILY MEDICINE CLINIC | Age: 37
End: 2022-05-14
Payer: COMMERCIAL

## 2022-05-14 VITALS
HEART RATE: 70 BPM | OXYGEN SATURATION: 99 % | WEIGHT: 264 LBS | TEMPERATURE: 97.5 F | SYSTOLIC BLOOD PRESSURE: 134 MMHG | DIASTOLIC BLOOD PRESSURE: 84 MMHG | HEIGHT: 69 IN | BODY MASS INDEX: 39.1 KG/M2

## 2022-05-14 DIAGNOSIS — R09.81 SINUS CONGESTION: Primary | ICD-10-CM

## 2022-05-14 PROCEDURE — G8417 CALC BMI ABV UP PARAM F/U: HCPCS | Performed by: FAMILY MEDICINE

## 2022-05-14 PROCEDURE — 1036F TOBACCO NON-USER: CPT | Performed by: FAMILY MEDICINE

## 2022-05-14 PROCEDURE — 99213 OFFICE O/P EST LOW 20 MIN: CPT | Performed by: FAMILY MEDICINE

## 2022-05-14 PROCEDURE — 96372 THER/PROPH/DIAG INJ SC/IM: CPT | Performed by: FAMILY MEDICINE

## 2022-05-14 PROCEDURE — G8427 DOCREV CUR MEDS BY ELIG CLIN: HCPCS | Performed by: FAMILY MEDICINE

## 2022-05-14 RX ORDER — CEFDINIR 300 MG/1
300 CAPSULE ORAL 2 TIMES DAILY
Qty: 20 CAPSULE | Refills: 0 | Status: SHIPPED | OUTPATIENT
Start: 2022-05-14 | End: 2022-05-24

## 2022-05-14 RX ORDER — CITALOPRAM 40 MG/1
40 TABLET ORAL EVERY MORNING
COMMUNITY
Start: 2022-05-10

## 2022-05-14 RX ORDER — PANTOPRAZOLE SODIUM 20 MG/1
TABLET, DELAYED RELEASE ORAL
COMMUNITY
Start: 2022-04-11 | End: 2022-07-13 | Stop reason: SDUPTHER

## 2022-05-14 RX ORDER — TRAZODONE HYDROCHLORIDE 50 MG/1
50 TABLET ORAL NIGHTLY
COMMUNITY
Start: 2022-05-10

## 2022-05-14 RX ORDER — TRIAMCINOLONE ACETONIDE 40 MG/ML
40 INJECTION, SUSPENSION INTRA-ARTICULAR; INTRAMUSCULAR ONCE
Status: COMPLETED | OUTPATIENT
Start: 2022-05-14 | End: 2022-05-14

## 2022-05-14 RX ADMIN — TRIAMCINOLONE ACETONIDE 40 MG: 40 INJECTION, SUSPENSION INTRA-ARTICULAR; INTRAMUSCULAR at 11:27

## 2022-05-14 NOTE — PROGRESS NOTES
Dustin Nearing In    [facility]   Chief Complaint   Patient presents with    Cough     3 weeks    Congestion    Pharyngitis     Sinus congestion  X 3 weeks  She has taken COVID tests at home and they are negative  No fever  No n/v/d  No loss of taste or smell    Review of Systems     /84   Pulse 70   Temp 97.5 °F (36.4 °C) (Temporal)   Ht 5' 9\" (1.753 m)   Wt 264 lb (119.7 kg)   SpO2 99%   BMI 38.99 kg/m²   Physical Exam  Constitutional:       Appearance: Normal appearance. HENT:      Head: Normocephalic and atraumatic. Nose: Congestion present. Eyes:      Extraocular Movements: Extraocular movements intact. Conjunctiva/sclera: Conjunctivae normal.   Cardiovascular:      Rate and Rhythm: Normal rate. Heart sounds: Normal heart sounds. Pulmonary:      Effort: Pulmonary effort is normal.      Breath sounds: Normal breath sounds. Skin:     General: Skin is warm. Neurological:      Mental Status: She is alert and oriented to person, place, and time.    Psychiatric:         Mood and Affect: Mood normal.         Behavior: Behavior normal.            Current Outpatient Medications:     citalopram (CELEXA) 40 MG tablet, , Disp: , Rfl:     traZODone (DESYREL) 50 MG tablet, , Disp: , Rfl:     pantoprazole (PROTONIX) 20 MG tablet, take 1 tablet by mouth every morning BEFORE BREAKFAST, Disp: , Rfl:     cefdinir (OMNICEF) 300 MG capsule, Take 1 capsule by mouth 2 times daily for 10 days, Disp: 20 capsule, Rfl: 0    propranolol (INDERAL LA) 60 MG extended release capsule, Take 1 capsule by mouth 2 times daily, Disp: 60 capsule, Rfl: 2     Past Medical History:   Diagnosis Date    Abnormal Pap smear of cervix     Back pain     Chronic generalized pain disorder 8/13/2021    Chronic sinusitis     with facial pain    Class 3 severe obesity due to excess calories without serious comorbidity with body mass index (BMI) of 40.0 to 44.9 in adult [facility])     DDD (degenerative disc disease), cervical 8/13/2021    DDD (degenerative disc disease), lumbar 8/13/2021    Depression 06/06/2011    Essential hypertension     Essential hypertension     Gastroesophageal reflux disease without esophagitis     Hemochromatosis     Migraines     born with heart murmur    Paresthesias 8/10/2021    Seasonal allergies        Cindy Gill was seen today for cough, congestion and pharyngitis. Diagnoses and all orders for this visit:    Sinus congestion  -     cefdinir (OMNICEF) 300 MG capsule;  Take 1 capsule by mouth 2 times daily for 10 days  -     triamcinolone acetonide (KENALOG-40) injection 40 mg       She is PCN allergic but only reaction was rash as a child so will trial Casandra Andrade MD

## 2022-07-13 ENCOUNTER — OFFICE VISIT (OUTPATIENT)
Dept: FAMILY MEDICINE CLINIC | Age: 37
End: 2022-07-13
Payer: COMMERCIAL

## 2022-07-13 VITALS
RESPIRATION RATE: 16 BRPM | DIASTOLIC BLOOD PRESSURE: 90 MMHG | WEIGHT: 287 LBS | SYSTOLIC BLOOD PRESSURE: 138 MMHG | OXYGEN SATURATION: 98 % | HEIGHT: 69 IN | HEART RATE: 62 BPM | TEMPERATURE: 97.4 F | BODY MASS INDEX: 42.51 KG/M2

## 2022-07-13 DIAGNOSIS — I10 ESSENTIAL HYPERTENSION: Primary | ICD-10-CM

## 2022-07-13 DIAGNOSIS — K21.00 GASTROESOPHAGEAL REFLUX DISEASE WITH ESOPHAGITIS WITHOUT HEMORRHAGE: ICD-10-CM

## 2022-07-13 DIAGNOSIS — E66.01 CLASS 3 SEVERE OBESITY DUE TO EXCESS CALORIES WITHOUT SERIOUS COMORBIDITY WITH BODY MASS INDEX (BMI) OF 40.0 TO 44.9 IN ADULT (HCC): ICD-10-CM

## 2022-07-13 PROCEDURE — G8427 DOCREV CUR MEDS BY ELIG CLIN: HCPCS | Performed by: FAMILY MEDICINE

## 2022-07-13 PROCEDURE — 1036F TOBACCO NON-USER: CPT | Performed by: FAMILY MEDICINE

## 2022-07-13 PROCEDURE — 99213 OFFICE O/P EST LOW 20 MIN: CPT | Performed by: FAMILY MEDICINE

## 2022-07-13 PROCEDURE — G8417 CALC BMI ABV UP PARAM F/U: HCPCS | Performed by: FAMILY MEDICINE

## 2022-07-13 RX ORDER — PANTOPRAZOLE SODIUM 20 MG/1
TABLET, DELAYED RELEASE ORAL
Qty: 30 TABLET | Refills: 2 | Status: SHIPPED
Start: 2022-07-13 | End: 2022-10-03

## 2022-07-13 RX ORDER — PROPRANOLOL HCL 60 MG
60 CAPSULE, EXTENDED RELEASE 24HR ORAL 2 TIMES DAILY
Qty: 60 CAPSULE | Refills: 2 | Status: SHIPPED
Start: 2022-07-13 | End: 2022-10-03

## 2022-07-13 RX ORDER — AMLODIPINE BESYLATE 5 MG/1
5 TABLET ORAL DAILY
Qty: 30 TABLET | Refills: 0 | Status: SHIPPED
Start: 2022-07-13 | End: 2022-08-10 | Stop reason: SDUPTHER

## 2022-07-13 RX ORDER — PROPRANOLOL HCL 60 MG
CAPSULE, EXTENDED RELEASE 24HR ORAL
Qty: 60 CAPSULE | Refills: 2 | OUTPATIENT
Start: 2022-07-13

## 2022-07-13 SDOH — ECONOMIC STABILITY: FOOD INSECURITY: WITHIN THE PAST 12 MONTHS, YOU WORRIED THAT YOUR FOOD WOULD RUN OUT BEFORE YOU GOT MONEY TO BUY MORE.: NEVER TRUE

## 2022-07-13 SDOH — ECONOMIC STABILITY: FOOD INSECURITY: WITHIN THE PAST 12 MONTHS, THE FOOD YOU BOUGHT JUST DIDN'T LAST AND YOU DIDN'T HAVE MONEY TO GET MORE.: NEVER TRUE

## 2022-07-13 ASSESSMENT — PATIENT HEALTH QUESTIONNAIRE - PHQ9
SUM OF ALL RESPONSES TO PHQ QUESTIONS 1-9: 2
4. FEELING TIRED OR HAVING LITTLE ENERGY: 1
SUM OF ALL RESPONSES TO PHQ QUESTIONS 1-9: 2
3. TROUBLE FALLING OR STAYING ASLEEP: 0
5. POOR APPETITE OR OVEREATING: 1
8. MOVING OR SPEAKING SO SLOWLY THAT OTHER PEOPLE COULD HAVE NOTICED. OR THE OPPOSITE, BEING SO FIGETY OR RESTLESS THAT YOU HAVE BEEN MOVING AROUND A LOT MORE THAN USUAL: 0
6. FEELING BAD ABOUT YOURSELF - OR THAT YOU ARE A FAILURE OR HAVE LET YOURSELF OR YOUR FAMILY DOWN: 0
10. IF YOU CHECKED OFF ANY PROBLEMS, HOW DIFFICULT HAVE THESE PROBLEMS MADE IT FOR YOU TO DO YOUR WORK, TAKE CARE OF THINGS AT HOME, OR GET ALONG WITH OTHER PEOPLE: 1
SUM OF ALL RESPONSES TO PHQ QUESTIONS 1-9: 2
7. TROUBLE CONCENTRATING ON THINGS, SUCH AS READING THE NEWSPAPER OR WATCHING TELEVISION: 0
2. FEELING DOWN, DEPRESSED OR HOPELESS: 0
SUM OF ALL RESPONSES TO PHQ QUESTIONS 1-9: 2
9. THOUGHTS THAT YOU WOULD BE BETTER OFF DEAD, OR OF HURTING YOURSELF: 0

## 2022-07-13 ASSESSMENT — ENCOUNTER SYMPTOMS
VOMITING: 0
NAUSEA: 0
TROUBLE SWALLOWING: 0
EYE REDNESS: 0
EYE PAIN: 0
SHORTNESS OF BREATH: 0
EYE DISCHARGE: 0
CHEST TIGHTNESS: 0
BLOOD IN STOOL: 0
DIARRHEA: 0
ALLERGIC/IMMUNOLOGIC NEGATIVE: 1
COUGH: 0
SINUS PAIN: 0
SORE THROAT: 0
ABDOMINAL PAIN: 1
PHOTOPHOBIA: 0
BACK PAIN: 0

## 2022-07-13 ASSESSMENT — SOCIAL DETERMINANTS OF HEALTH (SDOH): HOW HARD IS IT FOR YOU TO PAY FOR THE VERY BASICS LIKE FOOD, HOUSING, MEDICAL CARE, AND HEATING?: PATIENT DECLINED

## 2022-07-13 NOTE — PROGRESS NOTES
22  Nakul Tineo Brothers : 1985 Sex: female  Age: 44 y.o. Assessment and Plan:  Luz Maria Salter was seen today for hypertension and gastroesophageal reflux. Diagnoses and all orders for this visit:    Essential hypertension  -     propranolol (INDERAL LA) 60 MG extended release capsule; Take 1 capsule by mouth 2 times daily  -     amLODIPine (NORVASC) 5 MG tablet; Take 1 tablet by mouth daily    Gastroesophageal reflux disease with esophagitis without hemorrhage  -     pantoprazole (PROTONIX) 20 MG tablet; take 1 tablet by mouth every morning BEFORE BREAKFAST    Class 3 severe obesity due to excess calories without serious comorbidity with body mass index (BMI) of 40.0 to 44.9 in adult (HCC)    Blood pressure remains marginal, will add amlodipine to the beta-blocker as her pulse rate is now 60. Her stomach upset is recurrent because of weight gain will place her on pantoprazole which is help for this and depressed. Her obesity is problematic, attempting to restart her weight loss program bring it with activity adjustment. Return in about 4 weeks (around 8/10/2022), or Blood pressure recheck. Chief Complaint   Patient presents with    Hypertension     has been high lately     Gastroesophageal Reflux     wants back on protonix       The patient is here for blood pressure check. Patient has been taking their medications as prescribed. No recent changes to their medications. No headache or visual disturbances. No dizziness or tinnitus. No chest pain, palpitations, or dyspnea. No nausea and vomiting. No numbness, tingling and or weakness to the extremities. No fevers or chills. No recent illness. Is on intensive weight loss regimen last year and lost almost 50 pounds. Went off the program and now has gained it all back. Having some reflux symptoms which resolved when she had lost weight.       Review of Systems   Constitutional: Negative for appetite change, fatigue and unexpected weight Essential hypertension     Gastroesophageal reflux disease without esophagitis     Hemochromatosis     Migraines     born with heart murmur    Paresthesias 8/10/2021    Seasonal allergies      Past Surgical History:   Procedure Laterality Date    SINUS SURGERY N/A 02/25/2016    FUNCTIONAL ENDOSCOPIC SINUS SURGERY WITH BILATERAL MAXILLARY, FRONTAL & SPENOID ANTROSTOMIES, SUBMUCCOSAL RESECTION OF INFERIOR TURBINATES    TUBAL LIGATION  07/22/2021     Family History   Problem Relation Age of Onset    Arthritis Mother     Other Mother         sinus problems    High Blood Pressure Father     Other Maternal Aunt         migraine    Other Maternal Uncle         migraine    Other Paternal Aunt         migraine    Other Paternal Uncle         migraine    Arthritis Maternal Grandmother      Social History     Socioeconomic History    Marital status:      Spouse name: Not on file    Number of children: Not on file    Years of education: Not on file    Highest education level: Not on file   Occupational History    Not on file   Tobacco Use    Smoking status: Never Smoker    Smokeless tobacco: Never Used   Vaping Use    Vaping Use: Never used   Substance and Sexual Activity    Alcohol use: No     Alcohol/week: 0.0 standard drinks    Drug use: No    Sexual activity: Not on file   Other Topics Concern    Not on file   Social History Narrative    Not on file     Social Determinants of Health     Financial Resource Strain: Unknown    Difficulty of Paying Living Expenses: Patient refused   Food Insecurity: No Food Insecurity    Worried About Running Out of Food in the Last Year: Never true    [place] of Food in the Last Year: Never true   Transportation Needs:     Lack of Transportation (Medical): Not on file    Lack of Transportation (Non-Medical):  Not on file   Physical Activity:     Days of Exercise per Week: Not on file    Minutes of Exercise per Session: Not on file   Stress:     Feeling of Stress : Not on file   Social Connections:     Frequency of Communication with Friends and Family: Not on file    Frequency of Social Gatherings with Friends and Family: Not on file    Attends Quaker Services: Not on file    Active Member of Clubs or Organizations: Not on file    Attends Club or Organization Meetings: Not on file    Marital Status: Not on file   Intimate Partner Violence:     Fear of Current or Ex-Partner: Not on file    Emotionally Abused: Not on file    Physically Abused: Not on file    Sexually Abused: Not on file   Housing Stability:     Unable to Pay for Housing in the Last Year: Not on file    Number of [place] in the Last Year: Not on file    Unstable Housing in the Last Year: Not on file       Vitals:    07/13/22 1131 07/13/22 1133   BP: (!) 142/90 (!) 138/90   Pulse: 62    Resp: 16    Temp: 97.4 °F (36.3 °C)    TempSrc: Temporal    SpO2: 98%    Weight: 287 lb (130.2 kg)    Height: 5' 9\" (1.753 m)        Physical Exam  Vitals and nursing note reviewed. Constitutional:       Appearance: She is well-developed. She is obese. HENT:      Head: Atraumatic. Right Ear: External ear normal.      Left Ear: External ear normal.      Nose: Nose normal.      Mouth/Throat:      Pharynx: No oropharyngeal exudate. Eyes:      Conjunctiva/sclera: Conjunctivae normal.      Pupils: Pupils are equal, round, and reactive to light. Neck:      Thyroid: No thyromegaly. Trachea: No tracheal deviation. Cardiovascular:      Rate and Rhythm: Normal rate and regular rhythm. Heart sounds: No murmur heard. No friction rub. No gallop. Pulmonary:      Effort: Pulmonary effort is normal. No respiratory distress. Breath sounds: Normal breath sounds. Abdominal:      General: Bowel sounds are normal.      Palpations: Abdomen is soft. Musculoskeletal:         General: No tenderness or deformity. Normal range of motion.       Cervical back: Normal range of motion and neck supple. Lymphadenopathy:      Cervical: No cervical adenopathy. Skin:     General: Skin is warm and dry. Capillary Refill: Capillary refill takes less than 2 seconds. Findings: No rash. Neurological:      Mental Status: She is alert and oriented to person, place, and time. Sensory: No sensory deficit. Motor: No abnormal muscle tone.       Coordination: Coordination normal.      Deep Tendon Reflexes: Reflexes normal.             Seen By:  Carlos Gonzalez DO

## 2022-07-25 ENCOUNTER — OFFICE VISIT (OUTPATIENT)
Dept: ENDOCRINOLOGY | Age: 37
End: 2022-07-25
Payer: COMMERCIAL

## 2022-07-25 ENCOUNTER — FOLLOWUP TELEPHONE ENCOUNTER (OUTPATIENT)
Dept: ENDOCRINOLOGY | Age: 37
End: 2022-07-25

## 2022-07-25 VITALS
WEIGHT: 288 LBS | HEART RATE: 71 BPM | OXYGEN SATURATION: 96 % | DIASTOLIC BLOOD PRESSURE: 82 MMHG | SYSTOLIC BLOOD PRESSURE: 133 MMHG | HEIGHT: 69 IN | BODY MASS INDEX: 42.65 KG/M2

## 2022-07-25 DIAGNOSIS — E04.2 MULTINODULAR GOITER: Primary | ICD-10-CM

## 2022-07-25 DIAGNOSIS — E55.9 VITAMIN D DEFICIENCY: ICD-10-CM

## 2022-07-25 DIAGNOSIS — E04.2 MULTINODULAR GOITER: ICD-10-CM

## 2022-07-25 DIAGNOSIS — E66.01 CLASS 3 SEVERE OBESITY DUE TO EXCESS CALORIES WITHOUT SERIOUS COMORBIDITY WITH BODY MASS INDEX (BMI) OF 40.0 TO 44.9 IN ADULT (HCC): ICD-10-CM

## 2022-07-25 LAB
TSH SERPL DL<=0.05 MIU/L-ACNC: 0.55 UIU/ML (ref 0.27–4.2)
VITAMIN D 25-HYDROXY: 22 NG/ML (ref 30–100)

## 2022-07-25 PROCEDURE — 99214 OFFICE O/P EST MOD 30 MIN: CPT | Performed by: CLINICAL NURSE SPECIALIST

## 2022-07-25 PROCEDURE — G8427 DOCREV CUR MEDS BY ELIG CLIN: HCPCS | Performed by: CLINICAL NURSE SPECIALIST

## 2022-07-25 PROCEDURE — G8417 CALC BMI ABV UP PARAM F/U: HCPCS | Performed by: CLINICAL NURSE SPECIALIST

## 2022-07-25 PROCEDURE — 1036F TOBACCO NON-USER: CPT | Performed by: CLINICAL NURSE SPECIALIST

## 2022-07-25 NOTE — PROGRESS NOTES
[address] [place] [address] Department of Endocrinology Diabetes and Metabolism   [address] [facility], [address] [place] [id number]   Phone: [phone]  Fax: [phone]    Date of Service: 7/25/2022  Primary Care Physician: Jose Arreaga DO  Provider: TICO Kimbrough - CNS            Reason for the visit:  Multinodular goiter     History of Present Illness: The history is provided by the patient. No  was used. Accuracy of the patient data is excellent. Beth Mtz is a very pleasant 44 y.o. female seen today for evaluation and management of multinodular goiter     The patient was found to have thyroid nodule on a routine physical examination   Thyroid US 10/2/2020   Thyroid gland is mildly enlarged and somewhat heterogeneous with normal vascularity. Rt  Lobe measures 19.7 x 20.6 x 50.5 mm --> 0.9 cm nodule   Lt lobe measures  22.8 x 52.7 x 27.5 mm --> 3.1 x 1.8 x 2.1 cm hypoechoic nodule   Isthmus:  5.3 mm  Cervical lymphadenopathy: No abnormal lymph nodes in the imaged portions of the neck. 11/2/2020 - FNA to dominant Lt side nodule --> benign     Thyroid US (4/2021) Stable thyroid nodules no change in size   Beth Mtz denies any  voice change, or shortness of breath. No family history of thyroid cancer. No prior history of radiation to head or neck region. Lab Results   Component Value Date/Time    TSH 1.010 08/25/2021 11:08 PM    T4FREE 1.22 04/17/2021 08:03 AM    W2KZDGY 7.8 08/25/2021 11:08 PM    TPOABS 0.3 04/17/2021 08:03 AM    THGAB <0.9 04/17/2021 08:03 AM       Patient denied any history of  swelling in the area of the thyroid gland, weight loss, change in appetite, nervousness, anxiety, irritability, tremor, sweating, heat intolerance, changes in bowel habits, muscle weakness or difficulty sleeping.   Patient also denied any h/o unexplained weight gain, new fatigue, increased sensitivity to cold, dry skin, brittle fingernails, brittle hair, facial swelling/puffiness, or depressed mood. PAST MEDICAL HISTORY   Past Medical History:   Diagnosis Date    Abnormal Pap smear of cervix     Back pain     Chronic generalized pain disorder 8/13/2021    Chronic sinusitis     with facial pain    Class 3 severe obesity due to excess calories without serious comorbidity with body mass index (BMI) of 40.0 to 44.9 in adult [facility])     DDD (degenerative disc disease), cervical 8/13/2021    DDD (degenerative disc disease), lumbar 8/13/2021    Depression 06/06/2011    Essential hypertension     Essential hypertension     Gastroesophageal reflux disease without esophagitis     Hemochromatosis     Migraines     born with heart murmur    Paresthesias 8/10/2021    Seasonal allergies        PAST SURGICAL HISTORY   Past Surgical History:   Procedure Laterality Date    SINUS SURGERY N/A 02/25/2016    FUNCTIONAL ENDOSCOPIC SINUS SURGERY WITH BILATERAL MAXILLARY, FRONTAL & SPENOID ANTROSTOMIES, SUBMUCCOSAL RESECTION OF INFERIOR TURBINATES    TUBAL LIGATION  07/22/2021       SOCIAL HISTORY   Tobacco:   reports that she has never smoked. She has never used smokeless tobacco.  Alcohol:   reports no history of alcohol use. Drugs:   reports no history of drug use.     FAMILY HISTORY   Family History   Problem Relation Age of Onset    Arthritis Mother     Other Mother         sinus problems    High Blood Pressure Father     Other Maternal Aunt         migraine    Other Maternal Uncle         migraine    Other Paternal Aunt         migraine    Other Paternal Uncle         migraine    Arthritis Maternal Grandmother        ALLERGIES AND DRUG REACTIONS   Allergies   Allergen Reactions    Pcn [Penicillins] Rash       CURRENT MEDICATIONS   Current Outpatient Medications   Medication Sig Dispense Refill    propranolol (INDERAL LA) 60 MG extended release capsule Take 1 capsule by mouth 2 times daily 60 capsule 2    pantoprazole (PROTONIX) 20 MG tablet take 1 tablet by mouth every morning BEFORE BREAKFAST 30 tablet 2    amLODIPine (NORVASC) 5 MG tablet Take 1 tablet by mouth daily 30 tablet 0    citalopram (CELEXA) 40 MG tablet       traZODone (DESYREL) 50 MG tablet        No current facility-administered medications for this visit. Review of Systems  Constitutional: No fever, no chills, no diaphoresis, no generalized weakness. HEENT: No blurred vision, No sore throat, no ear pain, no hair loss  Neck: denied any neck swelling, difficulty swallowing,   Cadrdiopulomary: No CP, SOB or palpitation, No orthopnea or PND. No cough or wheezing. GI: No N/V/D, no constipation, No abdominal pain, no melena or hematochezia   : Denied any dysuria, hematuria, flank pain, discharge, or incontinence. Skin: denied any rash, ulcer, Hirsute, or hyperpigmentation. MSK: denied any joint deformity, joint pain/swelling, muscle pain, or back pain. Neuro: no numbess, no tingling, no weakness,     OBJECTIVE    /82   Pulse 71   Ht 5' 9\" (1.753 m)   Wt 288 lb (130.6 kg)   SpO2 96%   BMI 42.53 kg/m²   BP Readings from Last 4 Encounters:   07/25/22 133/82   07/13/22 (!) 138/90   05/14/22 134/84   11/10/21 (!) 143/86     Wt Readings from Last 6 Encounters:   07/25/22 288 lb (130.6 kg)   07/13/22 287 lb (130.2 kg)   05/14/22 264 lb (119.7 kg)   11/10/21 262 lb (118.8 kg)   08/27/21 248 lb (112.5 kg)   08/25/21 245 lb (111.1 kg)       Physical examination:  General: awake alert, oriented x3, no abnormal position or movements. HEENT: normocephalic non traumatic  Neck: supple, no LN enlargement, no thyromegaly, Lt side thyroid nodule is palpable, no thyroid tenderness, no JVD. Pulm: Clear equal air entry no added sounds, no wheezing or rhonchi    CVS: S1 + S2, no murmur, no heave. Dorsalis pedis pulse palpable   Abd: soft lax, no tenderness, no organomegaly, audible bowel sounds. Skin: warm, no lesions, no rash.  No Palmar erythema  Musculoskeletal: No back tenderness, no kyphosis/scoliosis     Neuro: CN intact, sensation normal , muscle power normal. No tremors   Psych: normal mood, and affect    Review of Laboratory Data:  I personally reviewed the following labs:   Lab Results   Component Value Date/Time    WBC 9.1 12/09/2021 03:17 PM    RBC 4.68 12/09/2021 03:17 PM    HGB 14.1 12/09/2021 03:17 PM    HCT 42.2 12/09/2021 03:17 PM    MCV 90.1 12/09/2021 03:17 PM    MCH 30.2 12/09/2021 03:17 PM    MCHC 33.5 12/09/2021 03:17 PM    RDW 12.5 12/09/2021 03:17 PM     12/09/2021 03:17 PM    MPV 7.2 (L) 12/09/2021 03:17 PM      Lab Results   Component Value Date/Time     09/09/2021 04:28 PM    K 4.1 09/09/2021 04:28 PM    K 4.3 08/25/2021 11:08 PM    CO2 24 09/09/2021 04:28 PM    BUN 16 09/09/2021 04:28 PM    CREATININE 0.8 09/09/2021 04:28 PM    CALCIUM 8.8 09/09/2021 04:28 PM    LABGLOM 81 09/09/2021 04:28 PM    GFRAA >60 08/25/2021 11:08 PM      Lab Results   Component Value Date/Time    TSH 1.010 08/25/2021 11:08 PM    T4FREE 1.22 04/17/2021 08:03 AM    L7FVDIR 7.8 08/25/2021 11:08 PM    TPOABS 0.3 04/17/2021 08:03 AM    THGAB <0.9 04/17/2021 08:03 AM     Lab Results   Component Value Date/Time    GLUCOSE 112 09/09/2021 04:28 PM    GLUCOSE 89 01/10/2012 07:37 PM     Lab Results   Component Value Date/Time    TRIG 97 08/04/2021 08:37 AM    HDL 43 08/04/2021 08:37 AM    LDLCALC 82 08/04/2021 08:37 AM    CHOL 144 08/04/2021 08:37 AM     No results found for: [address] Brothers, a 44 y.o.-old female seen in for the following issues     Multinodular goiter   FNA to dominant Lt side thyroid nodule was benign in 11/2020   Follow up thyroid US 4/2021 --> stable MNG   Repeat US   Check TFT     vitD deficiency   Has not taken vitamin D supplementation recently  Will reassess vitamin D    Obesity  Patient has followed up with medical weight loss clinic in the past however she does not wish to continue  Patient will meet with dietitian at today's visit    I personally spent > 30 minutes reviewed external notes from PCP and other patient's care team providers, and personally interpreted labs associated with the above diagnosis. I also ordered labs to further assess and manage the above addressed medical conditions. Return in about 1 year (around 7/25/2023). The above issues were reviewed with the patient who understood and agreed with the plan. Thank you for allowing us to participate in the care of this patient. Please do not hesitate to contact us with any additional questions. Diagnosis Orders   1. Multinodular goiter  US THYROID    TSH      2. Vitamin D deficiency  Vitamin D 25 Hydroxy      3. Class 3 severe obesity due to excess calories without serious comorbidity with body mass index (BMI) of 40.0 to 44.9 in adult [facility])            Heladio Thompson [facility]  [address] [place] [address]   Phone: [phone]  Fax: [phone]  -------------------------   An electronic signature was used to authenticate this note.  TICO Thompson  on 7/25/2022 at 10:29 AM

## 2022-07-29 ENCOUNTER — FOLLOWUP TELEPHONE ENCOUNTER (OUTPATIENT)
Dept: ENDOCRINOLOGY | Age: 37
End: 2022-07-29

## 2022-07-29 NOTE — TELEPHONE ENCOUNTER
Patient attended 1:1 meal planning session with RDN. Reviewed usual intake and struggles within the diet. Patient enjoys preparing and eating healthy foods, states biggest issue is eating fast food at lunch and overeating simple carbs (cereal, snack foods, etc). Her mom was a chronic dieter growing up; patient states she has felt the need to lose weight/had a poor body image from a very young age. Explained the relationship between chronic dieting and restriction mindset and the urge to binge and difficulty maintaining a healthy weight. Made suggestions for easy, healthy breakfast and lunch options. Suggested tracking calories and adjusting usual intake to meet daily intake of [id number] kcals and macronutrient split of 30% protein, 40% CHO and 20% fat. Provided list of healthy meal and snack ideas. Patient agreeable to suggestions. This RDN will f/u as needed, contact information provided.

## 2022-08-02 ENCOUNTER — HOSPITAL ENCOUNTER (OUTPATIENT)
Dept: ULTRASOUND IMAGING | Age: 37
Discharge: HOME | End: 2022-08-04
Payer: COMMERCIAL

## 2022-08-02 DIAGNOSIS — E04.2 MULTINODULAR GOITER: ICD-10-CM

## 2022-08-02 PROCEDURE — 76536 US EXAM OF HEAD AND NECK: CPT

## 2022-08-03 ENCOUNTER — TELEPHONE (OUTPATIENT)
Dept: ENDOCRINOLOGY | Age: 37
End: 2022-08-03

## 2022-08-03 NOTE — TELEPHONE ENCOUNTER
----- Message from TICO Anderson sent at 7/26/2022  9:21 AM EDT -----  Please call patient and inform them vitamin D is low.   Please have him start vitamin D 1000 IU daily over-the-counter

## 2022-08-04 ENCOUNTER — TELEPHONE (OUTPATIENT)
Dept: ENDOCRINOLOGY | Age: 37
End: 2022-08-04

## 2022-08-04 DIAGNOSIS — I10 ESSENTIAL HYPERTENSION: ICD-10-CM

## 2022-08-04 RX ORDER — AMLODIPINE BESYLATE 5 MG/1
TABLET ORAL
Qty: 30 TABLET | Refills: 0 | OUTPATIENT
Start: 2022-08-04

## 2022-08-04 NOTE — TELEPHONE ENCOUNTER
----- Message from TICO Abbasi sent at 8/3/2022  8:35 AM EDT -----  Please call patient and inform her I have reviewed thyroid ultrasound  Mild increase of largest nodule.   No concerning features  We will continue to observe

## 2022-08-10 ENCOUNTER — OFFICE VISIT (OUTPATIENT)
Dept: FAMILY MEDICINE CLINIC | Age: 37
End: 2022-08-10
Payer: COMMERCIAL

## 2022-08-10 VITALS
SYSTOLIC BLOOD PRESSURE: 120 MMHG | BODY MASS INDEX: 43.4 KG/M2 | DIASTOLIC BLOOD PRESSURE: 84 MMHG | OXYGEN SATURATION: 98 % | HEIGHT: 69 IN | TEMPERATURE: 97.4 F | WEIGHT: 293 LBS | RESPIRATION RATE: 16 BRPM | HEART RATE: 63 BPM

## 2022-08-10 DIAGNOSIS — I10 ESSENTIAL HYPERTENSION: ICD-10-CM

## 2022-08-10 PROCEDURE — 99213 OFFICE O/P EST LOW 20 MIN: CPT | Performed by: FAMILY MEDICINE

## 2022-08-10 PROCEDURE — G8417 CALC BMI ABV UP PARAM F/U: HCPCS | Performed by: FAMILY MEDICINE

## 2022-08-10 PROCEDURE — G8427 DOCREV CUR MEDS BY ELIG CLIN: HCPCS | Performed by: FAMILY MEDICINE

## 2022-08-10 PROCEDURE — 1036F TOBACCO NON-USER: CPT | Performed by: FAMILY MEDICINE

## 2022-08-10 RX ORDER — AMLODIPINE BESYLATE 5 MG/1
5 TABLET ORAL DAILY
Qty: 30 TABLET | Refills: 5 | Status: SHIPPED | OUTPATIENT
Start: 2022-08-10

## 2022-08-10 ASSESSMENT — ENCOUNTER SYMPTOMS
TROUBLE SWALLOWING: 0
PHOTOPHOBIA: 0
SINUS PAIN: 0
BACK PAIN: 0
BLOOD IN STOOL: 0
ABDOMINAL PAIN: 0
SORE THROAT: 0
NAUSEA: 0
CHEST TIGHTNESS: 0
VOMITING: 0
DIARRHEA: 0
SHORTNESS OF BREATH: 0
ALLERGIC/IMMUNOLOGIC NEGATIVE: 1
EYE REDNESS: 0
EYE PAIN: 0
EYE DISCHARGE: 0
COUGH: 0

## 2022-08-10 NOTE — PROGRESS NOTES
8/10/22  Tish Mtz : 1985 Sex: female  Age: 44 y.o. Assessment and Plan:  Jorge L Bajwa was seen today for hypertension. Diagnoses and all orders for this visit:    Essential hypertension        No follow-ups on file. Chief Complaint   Patient presents with    Hypertension       The patient is here for blood pressure check. Patient has been taking their medications as prescribed. No recent changes to their medications. No headache or visual disturbances. No dizziness or tinnitus. No chest pain, palpitations, or dyspnea. No nausea and vomiting. No numbness, tingling and or weakness to the extremities. No fevers or chills. No recent illness. Review of Systems   Constitutional:  Negative for appetite change, fatigue and unexpected weight change. HENT:  Negative for congestion, ear pain, hearing loss, sinus pain, sore throat and trouble swallowing. Eyes:  Negative for photophobia, pain, discharge and redness. Respiratory:  Negative for cough, chest tightness and shortness of breath. Cardiovascular:  Negative for chest pain, palpitations and leg swelling. Gastrointestinal:  Negative for abdominal pain, blood in stool, diarrhea, nausea and vomiting. Endocrine: Negative. Genitourinary:  Negative for dysuria, flank pain, frequency and hematuria. Musculoskeletal:  Negative for arthralgias, back pain, joint swelling and myalgias. Skin: Negative. Allergic/Immunologic: Negative. Neurological:  Negative for dizziness, seizures, syncope, weakness, light-headedness, numbness and headaches. Hematological:  Negative for adenopathy. Does not bruise/bleed easily. Psychiatric/Behavioral: Negative.          Current Outpatient Medications:     vitamin D (CHOLECALCIFEROL) 25 MCG (1000 UT) TABS tablet, Take 1,000 Units by mouth in the morning., Disp: , Rfl:     propranolol (INDERAL LA) 60 MG extended release capsule, Take 1 capsule by mouth 2 times daily, Disp: 60 capsule, Rfl: 2    pantoprazole (PROTONIX) 20 MG tablet, take 1 tablet by mouth every morning BEFORE BREAKFAST, Disp: 30 tablet, Rfl: 2    amLODIPine (NORVASC) 5 MG tablet, Take 1 tablet by mouth daily, Disp: 30 tablet, Rfl: 0    citalopram (CELEXA) 40 MG tablet, , Disp: , Rfl:     traZODone (DESYREL) 50 MG tablet, , Disp: , Rfl:   Allergies   Allergen Reactions    Pcn [Penicillins] Rash       Past Medical History:   Diagnosis Date    Abnormal Pap smear of cervix     Back pain     Chronic generalized pain disorder 8/13/2021    Chronic sinusitis     with facial pain    Class 3 severe obesity due to excess calories without serious comorbidity with body mass index (BMI) of 40.0 to 44.9 in adult [facility])     DDD (degenerative disc disease), cervical 8/13/2021    DDD (degenerative disc disease), lumbar 8/13/2021    Depression 06/06/2011    Essential hypertension     Essential hypertension     Gastroesophageal reflux disease without esophagitis     Hemochromatosis     Migraines     born with heart murmur    Paresthesias 8/10/2021    Seasonal allergies      Past Surgical History:   Procedure Laterality Date    SINUS SURGERY N/A 02/25/2016    FUNCTIONAL ENDOSCOPIC SINUS SURGERY WITH BILATERAL MAXILLARY, FRONTAL & SPENOID ANTROSTOMIES, SUBMUCCOSAL RESECTION OF INFERIOR TURBINATES    TUBAL LIGATION  07/22/2021     Family History   Problem Relation Age of Onset    Arthritis Mother     Other Mother         sinus problems    High Blood Pressure Father     Other Maternal Aunt         migraine    Other Maternal Uncle         migraine    Other Paternal Aunt         migraine    Other Paternal Uncle         migraine    Arthritis Maternal Grandmother      Social History     Socioeconomic History    Marital status:      Spouse name: Not on file    Number of children: Not on file    Years of education: Not on file    Highest education level: Not on file   Occupational History    Not on file   Tobacco Use    Smoking status: Never    Smokeless tobacco: Never   Vaping Use    Vaping Use: Never used   Substance and Sexual Activity    Alcohol use: No     Alcohol/week: 0.0 standard drinks    Drug use: No    Sexual activity: Not on file   Other Topics Concern    Not on file   Social History Narrative    Not on file     Social Determinants of Health     Financial Resource Strain: Unknown    Difficulty of Paying Living Expenses: Patient refused   Food Insecurity: No Food Insecurity    Worried About Running Out of Food in the Last Year: Never true    Ran Out of Food in the Last Year: Never true   Transportation Needs: Not on file   Physical Activity: Not on file   Stress: Not on file   Social Connections: Not on file   Intimate Partner Violence: Not on file   Housing Stability: Not on file       Vitals:    08/10/22 0944   BP: 120/84   Pulse: 63   Resp: 16   Temp: 97.4 °F (36.3 °C)   TempSrc: Temporal   SpO2: 98%   Weight: 294 lb (133.4 kg)   Height: 5' 9\" (1.753 m)       Physical Exam  Vitals and nursing note reviewed. Constitutional:       Appearance: She is well-developed. She is obese. HENT:      Head: Atraumatic. Right Ear: External ear normal.      Left Ear: External ear normal.      Nose: Nose normal.      Mouth/Throat:      Pharynx: No oropharyngeal exudate. Eyes:      Conjunctiva/sclera: Conjunctivae normal.      Pupils: Pupils are equal, round, and reactive to light. Neck:      Thyroid: No thyromegaly. Trachea: No tracheal deviation. Cardiovascular:      Rate and Rhythm: Normal rate and regular rhythm. Heart sounds: No murmur heard. No friction rub. No gallop. Pulmonary:      Effort: Pulmonary effort is normal. No respiratory distress. Breath sounds: Normal breath sounds. Abdominal:      General: Bowel sounds are normal.      Palpations: Abdomen is soft. Musculoskeletal:         General: No tenderness or deformity. Normal range of motion. Cervical back: Normal range of motion and neck supple.    Lymphadenopathy:

## 2022-09-01 ENCOUNTER — OFFICE VISIT (OUTPATIENT)
Dept: FAMILY MEDICINE CLINIC | Age: 37
End: 2022-09-01
Payer: COMMERCIAL

## 2022-09-01 ENCOUNTER — TELEPHONE (OUTPATIENT)
Dept: FAMILY MEDICINE CLINIC | Age: 37
End: 2022-09-01

## 2022-09-01 VITALS
WEIGHT: 293 LBS | BODY MASS INDEX: 43.27 KG/M2 | DIASTOLIC BLOOD PRESSURE: 60 MMHG | OXYGEN SATURATION: 99 % | SYSTOLIC BLOOD PRESSURE: 120 MMHG | HEART RATE: 68 BPM | TEMPERATURE: 97.9 F

## 2022-09-01 DIAGNOSIS — U07.1 COVID-19: Primary | ICD-10-CM

## 2022-09-01 DIAGNOSIS — R05.9 COUGH: ICD-10-CM

## 2022-09-01 PROCEDURE — 99213 OFFICE O/P EST LOW 20 MIN: CPT | Performed by: FAMILY MEDICINE

## 2022-09-01 PROCEDURE — 1036F TOBACCO NON-USER: CPT | Performed by: FAMILY MEDICINE

## 2022-09-01 PROCEDURE — G8417 CALC BMI ABV UP PARAM F/U: HCPCS | Performed by: FAMILY MEDICINE

## 2022-09-01 PROCEDURE — G8427 DOCREV CUR MEDS BY ELIG CLIN: HCPCS | Performed by: FAMILY MEDICINE

## 2022-09-01 PROCEDURE — 87426 SARSCOV CORONAVIRUS AG IA: CPT | Performed by: FAMILY MEDICINE

## 2022-09-01 RX ORDER — NIRMATRELVIR AND RITONAVIR 300-100 MG
KIT ORAL
Qty: 30 TABLET | Refills: 0 | Status: SHIPPED | OUTPATIENT
Start: 2022-09-01 | End: 2022-09-06

## 2022-09-01 RX ORDER — ALBUTEROL SULFATE 90 UG/1
2 AEROSOL, METERED RESPIRATORY (INHALATION) 4 TIMES DAILY PRN
Qty: 18 G | Refills: 0 | Status: SHIPPED | OUTPATIENT
Start: 2022-09-01

## 2022-09-01 ASSESSMENT — ENCOUNTER SYMPTOMS
BLOOD IN STOOL: 0
ABDOMINAL PAIN: 0
TROUBLE SWALLOWING: 0
BACK PAIN: 0
SINUS PRESSURE: 1
VOMITING: 0
ALLERGIC/IMMUNOLOGIC NEGATIVE: 1
DIARRHEA: 0
NAUSEA: 0
EYE DISCHARGE: 0
CHEST TIGHTNESS: 0
SORE THROAT: 0
EYE REDNESS: 0
COUGH: 1
EYE PAIN: 0
SHORTNESS OF BREATH: 0
SINUS PAIN: 0
PHOTOPHOBIA: 0

## 2022-09-01 NOTE — PROGRESS NOTES
Negative for abdominal pain, blood in stool, diarrhea, nausea and vomiting. Endocrine: Negative. Genitourinary:  Negative for dysuria, flank pain, frequency and hematuria. Musculoskeletal:  Negative for arthralgias, back pain, joint swelling and myalgias. Skin: Negative. Allergic/Immunologic: Negative. Neurological:  Negative for dizziness, seizures, syncope, weakness, light-headedness, numbness and headaches. Hematological:  Negative for adenopathy. Does not bruise/bleed easily. Psychiatric/Behavioral: Negative. Current Outpatient Medications:     nirmatrelvir/ritonavir (PAXLOVID, 300/100,) 20 x 150 MG & 10 x 100MG TBPK, Take 3 tablets (two 150 mg nirmatrelvir and one 100 mg ritonavir tablets) by mouth every 12 hours for 5 days. , Disp: 30 tablet, Rfl: 0    albuterol sulfate HFA (VENTOLIN HFA) 108 (90 Base) MCG/ACT inhaler, Inhale 2 puffs into the lungs 4 times daily as needed for Wheezing, Disp: 18 g, Rfl: 0    amLODIPine (NORVASC) 5 MG tablet, Take 1 tablet by mouth in the morning., Disp: 30 tablet, Rfl: 5    propranolol (INDERAL LA) 60 MG extended release capsule, Take 1 capsule by mouth 2 times daily, Disp: 60 capsule, Rfl: 2    pantoprazole (PROTONIX) 20 MG tablet, take 1 tablet by mouth every morning BEFORE BREAKFAST, Disp: 30 tablet, Rfl: 2    citalopram (CELEXA) 40 MG tablet, , Disp: , Rfl:     traZODone (DESYREL) 50 MG tablet, , Disp: , Rfl:     vitamin D (CHOLECALCIFEROL) 25 MCG (1000 UT) TABS tablet, Take 1,000 Units by mouth in the morning., Disp: , Rfl:   Allergies   Allergen Reactions    Pcn [Penicillins] Rash       Past Medical History:   Diagnosis Date    Abnormal Pap smear of cervix     Back pain     Chronic generalized pain disorder 8/13/2021    Chronic sinusitis     with facial pain    Class 3 severe obesity due to excess calories without serious comorbidity with body mass index (BMI) of 40.0 to 44.9 in adult [facility])     DDD (degenerative disc disease), cervical 8/13/2021 DDD (degenerative disc disease), lumbar 8/13/2021    Depression 06/06/2011    Essential hypertension     Essential hypertension     Gastroesophageal reflux disease without esophagitis     Hemochromatosis     Migraines     born with heart murmur    Paresthesias 8/10/2021    Seasonal allergies      Past Surgical History:   Procedure Laterality Date    SINUS SURGERY N/A 02/25/2016    FUNCTIONAL ENDOSCOPIC SINUS SURGERY WITH BILATERAL MAXILLARY, FRONTAL & SPENOID ANTROSTOMIES, SUBMUCCOSAL RESECTION OF INFERIOR TURBINATES    TUBAL LIGATION  07/22/2021     Family History   Problem Relation Age of Onset    Arthritis Mother     Other Mother         sinus problems    High Blood Pressure Father     Other Maternal Aunt         migraine    Other Maternal Uncle         migraine    Other Paternal Aunt         migraine    Other Paternal Uncle         migraine    Arthritis Maternal Grandmother      Social History     Socioeconomic History    Marital status:      Spouse name: Not on file    Number of children: Not on file    Years of education: Not on file    Highest education level: Not on file   Occupational History    Not on file   Tobacco Use    Smoking status: Never    Smokeless tobacco: Never   Vaping Use    Vaping Use: Never used   Substance and Sexual Activity    Alcohol use: No     Alcohol/week: 0.0 standard drinks    Drug use: No    Sexual activity: Not on file   Other Topics Concern    Not on file   Social History Narrative    Not on file     Social Determinants of Health     Financial Resource Strain: Unknown    Difficulty of Paying Living Expenses: Patient refused   Food Insecurity: No Food Insecurity    Worried About Running Out of Food in the Last Year: Never true    Ran Out of Food in the Last Year: Never true   Transportation Needs: Not on file   Physical Activity: Not on file   Stress: Not on file   Social Connections: Not on file   Intimate Partner Violence: Not on file   Housing Stability: Not on file

## 2022-09-01 NOTE — TELEPHONE ENCOUNTER
Pt tested positive for Covid last. Started having symptoms late on 8/30 or early 8/31. Pt would like to know if you will prescribe Paxlovid. If so, she would like the Rx sent to [facility] in [place]. Currently using OTC meds for symptom relief. Denies SOB.

## 2022-09-02 DIAGNOSIS — U07.1 COVID-19: ICD-10-CM

## 2022-09-02 LAB
ANION GAP SERPL CALCULATED.3IONS-SCNC: 10 MMOL/L (ref 7–16)
BUN BLDV-MCNC: 9 MG/DL (ref 6–20)
CALCIUM SERPL-MCNC: 9.1 MG/DL (ref 8.6–10.2)
CHLORIDE BLD-SCNC: 104 MMOL/L (ref 98–107)
CO2: 25 MMOL/L (ref 22–29)
CREAT SERPL-MCNC: 0.6 MG/DL (ref 0.5–1)
GFR AFRICAN AMERICAN: >60
GFR NON-AFRICAN AMERICAN: >60 ML/MIN/1.73
GLUCOSE BLD-MCNC: 132 MG/DL (ref 74–99)
POTASSIUM SERPL-SCNC: 4.4 MMOL/L (ref 3.5–5)
SODIUM BLD-SCNC: 139 MMOL/L (ref 132–146)

## 2022-09-12 ENCOUNTER — TELEPHONE (OUTPATIENT)
Dept: FAMILY MEDICINE CLINIC | Age: 37
End: 2022-09-12

## 2022-09-12 DIAGNOSIS — K64.9 HEMORRHOIDS, UNSPECIFIED HEMORRHOID TYPE: Primary | ICD-10-CM

## 2022-09-12 NOTE — TELEPHONE ENCOUNTER
Patient would like a referral to see Dr Audrey Almonte (wants to see a female). She has a large external hemorrhoid that has become very bothersome. She has tried OTC meds but it is to the point where she cannot sit. She has been using Miralax PRN for constipation. She does not feel comfortable coming into the office for a physical exam w/ a male provider.

## 2022-09-14 ENCOUNTER — HOSPITAL ENCOUNTER (OUTPATIENT)
Age: 37
Setting detail: OBSERVATION
Discharge: HOME | End: 2022-09-16
Attending: EMERGENCY MEDICINE | Admitting: SURGERY
Payer: COMMERCIAL

## 2022-09-14 ENCOUNTER — APPOINTMENT (OUTPATIENT)
Dept: CT IMAGING | Age: 37
End: 2022-09-14
Payer: COMMERCIAL

## 2022-09-14 DIAGNOSIS — L03.317 CELLULITIS OF BUTTOCK: ICD-10-CM

## 2022-09-14 DIAGNOSIS — K61.0 PERIANAL ABSCESS: ICD-10-CM

## 2022-09-14 DIAGNOSIS — L02.31 GLUTEAL ABSCESS: Primary | ICD-10-CM

## 2022-09-14 LAB
ALBUMIN SERPL-MCNC: 4 G/DL (ref 3.5–5.2)
ALP BLD-CCNC: 111 U/L (ref 35–104)
ALT SERPL-CCNC: 17 U/L (ref 0–32)
ANION GAP SERPL CALCULATED.3IONS-SCNC: 9 MMOL/L (ref 7–16)
AST SERPL-CCNC: 12 U/L (ref 0–31)
BASOPHILS ABSOLUTE: 0.07 E9/L (ref 0–0.2)
BASOPHILS RELATIVE PERCENT: 0.5 % (ref 0–2)
BILIRUB SERPL-MCNC: 0.2 MG/DL (ref 0–1.2)
BUN BLDV-MCNC: 11 MG/DL (ref 6–20)
CALCIUM SERPL-MCNC: 9 MG/DL (ref 8.6–10.2)
CHLORIDE BLD-SCNC: 100 MMOL/L (ref 98–107)
CO2: 25 MMOL/L (ref 22–29)
CREAT SERPL-MCNC: 0.7 MG/DL (ref 0.5–1)
EOSINOPHILS ABSOLUTE: 0.07 E9/L (ref 0.05–0.5)
EOSINOPHILS RELATIVE PERCENT: 0.5 % (ref 0–6)
GFR AFRICAN AMERICAN: >60
GFR NON-AFRICAN AMERICAN: >60 ML/MIN/1.73
GLUCOSE BLD-MCNC: 108 MG/DL (ref 74–99)
HCG, URINE, POC: NEGATIVE
HCT VFR BLD CALC: 38.3 % (ref 34–48)
HEMOGLOBIN: 12.6 G/DL (ref 11.5–15.5)
IMMATURE GRANULOCYTES #: 0.1 E9/L
IMMATURE GRANULOCYTES %: 0.7 % (ref 0–5)
LYMPHOCYTES ABSOLUTE: 1.93 E9/L (ref 1.5–4)
LYMPHOCYTES RELATIVE PERCENT: 13.6 % (ref 20–42)
Lab: NORMAL
MCH RBC QN AUTO: 29.8 PG (ref 26–35)
MCHC RBC AUTO-ENTMCNC: 32.9 % (ref 32–34.5)
MCV RBC AUTO: 90.5 FL (ref 80–99.9)
MONOCYTES ABSOLUTE: 1.03 E9/L (ref 0.1–0.95)
MONOCYTES RELATIVE PERCENT: 7.3 % (ref 2–12)
NEGATIVE QC PASS/FAIL: NORMAL
NEUTROPHILS ABSOLUTE: 10.94 E9/L (ref 1.8–7.3)
NEUTROPHILS RELATIVE PERCENT: 77.4 % (ref 43–80)
PDW BLD-RTO: 12.5 FL (ref 11.5–15)
PLATELET # BLD: 328 E9/L (ref 130–450)
PMV BLD AUTO: 8.8 FL (ref 7–12)
POSITIVE QC PASS/FAIL: NORMAL
POTASSIUM REFLEX MAGNESIUM: 3.8 MMOL/L (ref 3.5–5)
RBC # BLD: 4.23 E12/L (ref 3.5–5.5)
SODIUM BLD-SCNC: 134 MMOL/L (ref 132–146)
TOTAL PROTEIN: 7.1 G/DL (ref 6.4–8.3)
WBC # BLD: 14.1 E9/L (ref 4.5–11.5)

## 2022-09-14 PROCEDURE — 85025 COMPLETE CBC W/AUTO DIFF WBC: CPT

## 2022-09-14 PROCEDURE — 99285 EMERGENCY DEPT VISIT HI MDM: CPT

## 2022-09-14 PROCEDURE — 72193 CT PELVIS W/DYE: CPT

## 2022-09-14 PROCEDURE — 2580000003 HC RX 258: Performed by: STUDENT IN AN ORGANIZED HEALTH CARE EDUCATION/TRAINING PROGRAM

## 2022-09-14 PROCEDURE — 96374 THER/PROPH/DIAG INJ IV PUSH: CPT

## 2022-09-14 PROCEDURE — 80053 COMPREHEN METABOLIC PANEL: CPT

## 2022-09-14 PROCEDURE — 6360000004 HC RX CONTRAST MEDICATION: Performed by: RADIOLOGY

## 2022-09-14 PROCEDURE — 6360000002 HC RX W HCPCS: Performed by: STUDENT IN AN ORGANIZED HEALTH CARE EDUCATION/TRAINING PROGRAM

## 2022-09-14 PROCEDURE — 6370000000 HC RX 637 (ALT 250 FOR IP): Performed by: STUDENT IN AN ORGANIZED HEALTH CARE EDUCATION/TRAINING PROGRAM

## 2022-09-14 PROCEDURE — G0378 HOSPITAL OBSERVATION PER HR: HCPCS

## 2022-09-14 PROCEDURE — 96375 TX/PRO/DX INJ NEW DRUG ADDON: CPT

## 2022-09-14 RX ORDER — METRONIDAZOLE 500 MG/1
500 TABLET ORAL ONCE
Status: COMPLETED | OUTPATIENT
Start: 2022-09-14 | End: 2022-09-14

## 2022-09-14 RX ORDER — ONDANSETRON 2 MG/ML
4 INJECTION INTRAMUSCULAR; INTRAVENOUS EVERY 6 HOURS PRN
Status: DISCONTINUED | OUTPATIENT
Start: 2022-09-14 | End: 2022-09-16 | Stop reason: HOSPADM

## 2022-09-14 RX ORDER — HYDROCODONE BITARTRATE AND ACETAMINOPHEN 5; 325 MG/1; MG/1
1 TABLET ORAL EVERY 6 HOURS PRN
Qty: 12 TABLET | Refills: 0 | Status: SHIPPED | OUTPATIENT
Start: 2022-09-14 | End: 2022-09-16 | Stop reason: SDUPTHER

## 2022-09-14 RX ORDER — KETOROLAC TROMETHAMINE 30 MG/ML
30 INJECTION, SOLUTION INTRAMUSCULAR; INTRAVENOUS ONCE
Status: COMPLETED | OUTPATIENT
Start: 2022-09-14 | End: 2022-09-14

## 2022-09-14 RX ORDER — METRONIDAZOLE 500 MG/1
500 TABLET ORAL 3 TIMES DAILY
Qty: 30 TABLET | Refills: 0 | Status: SHIPPED | OUTPATIENT
Start: 2022-09-14 | End: 2022-09-16 | Stop reason: SDUPTHER

## 2022-09-14 RX ORDER — KETOROLAC TROMETHAMINE 30 MG/ML
15 INJECTION, SOLUTION INTRAMUSCULAR; INTRAVENOUS EVERY 6 HOURS PRN
Status: DISCONTINUED | OUTPATIENT
Start: 2022-09-14 | End: 2022-09-16 | Stop reason: HOSPADM

## 2022-09-14 RX ORDER — CEPHALEXIN 500 MG/1
500 CAPSULE ORAL 4 TIMES DAILY
Qty: 28 CAPSULE | Refills: 0 | Status: SHIPPED | OUTPATIENT
Start: 2022-09-14 | End: 2022-09-16 | Stop reason: SDUPTHER

## 2022-09-14 RX ORDER — HYDROCODONE BITARTRATE AND ACETAMINOPHEN 5; 325 MG/1; MG/1
1 TABLET ORAL ONCE
Status: COMPLETED | OUTPATIENT
Start: 2022-09-14 | End: 2022-09-14

## 2022-09-14 RX ORDER — SODIUM CHLORIDE 9 MG/ML
INJECTION, SOLUTION INTRAVENOUS CONTINUOUS
Status: DISCONTINUED | OUTPATIENT
Start: 2022-09-15 | End: 2022-09-15

## 2022-09-14 RX ADMIN — HYDROCODONE BITARTRATE AND ACETAMINOPHEN 1 TABLET: 5; 325 TABLET ORAL at 20:39

## 2022-09-14 RX ADMIN — KETOROLAC TROMETHAMINE 30 MG: 30 INJECTION, SOLUTION INTRAMUSCULAR; INTRAVENOUS at 19:09

## 2022-09-14 RX ADMIN — METRONIDAZOLE 500 MG: 500 TABLET ORAL at 18:16

## 2022-09-14 RX ADMIN — IOPAMIDOL 75 ML: 755 INJECTION, SOLUTION INTRAVENOUS at 20:09

## 2022-09-14 RX ADMIN — WATER 2000 MG: 1 INJECTION INTRAMUSCULAR; INTRAVENOUS; SUBCUTANEOUS at 18:16

## 2022-09-14 ASSESSMENT — PAIN SCALES - GENERAL
PAINLEVEL_OUTOF10: 8
PAINLEVEL_OUTOF10: 10
PAINLEVEL_OUTOF10: 8
PAINLEVEL_OUTOF10: 9
PAINLEVEL_OUTOF10: 6

## 2022-09-14 ASSESSMENT — PAIN DESCRIPTION - DESCRIPTORS
DESCRIPTORS: ACHING
DESCRIPTORS: SHARP;BURNING

## 2022-09-14 ASSESSMENT — PAIN - FUNCTIONAL ASSESSMENT
PAIN_FUNCTIONAL_ASSESSMENT: 0-10

## 2022-09-14 ASSESSMENT — PAIN DESCRIPTION - LOCATION
LOCATION: RECTUM

## 2022-09-15 ENCOUNTER — ANESTHESIA EVENT (OUTPATIENT)
Dept: OPERATING ROOM | Age: 37
End: 2022-09-15
Payer: COMMERCIAL

## 2022-09-15 ENCOUNTER — ANESTHESIA (OUTPATIENT)
Dept: OPERATING ROOM | Age: 37
End: 2022-09-15
Payer: COMMERCIAL

## 2022-09-15 PROCEDURE — 87186 SC STD MICRODIL/AGAR DIL: CPT

## 2022-09-15 PROCEDURE — 7100000001 HC PACU RECOVERY - ADDTL 15 MIN: Performed by: SURGERY

## 2022-09-15 PROCEDURE — 87102 FUNGUS ISOLATION CULTURE: CPT

## 2022-09-15 PROCEDURE — 6370000000 HC RX 637 (ALT 250 FOR IP): Performed by: STUDENT IN AN ORGANIZED HEALTH CARE EDUCATION/TRAINING PROGRAM

## 2022-09-15 PROCEDURE — 3600000002 HC SURGERY LEVEL 2 BASE: Performed by: SURGERY

## 2022-09-15 PROCEDURE — 6360000002 HC RX W HCPCS: Performed by: SURGERY

## 2022-09-15 PROCEDURE — 87205 SMEAR GRAM STAIN: CPT

## 2022-09-15 PROCEDURE — 2500000003 HC RX 250 WO HCPCS: Performed by: NURSE ANESTHETIST, CERTIFIED REGISTERED

## 2022-09-15 PROCEDURE — 87077 CULTURE AEROBIC IDENTIFY: CPT

## 2022-09-15 PROCEDURE — 87070 CULTURE OTHR SPECIMN AEROBIC: CPT

## 2022-09-15 PROCEDURE — C1729 CATH, DRAINAGE: HCPCS | Performed by: SURGERY

## 2022-09-15 PROCEDURE — 6360000002 HC RX W HCPCS: Performed by: NURSE ANESTHETIST, CERTIFIED REGISTERED

## 2022-09-15 PROCEDURE — 6370000000 HC RX 637 (ALT 250 FOR IP): Performed by: SURGERY

## 2022-09-15 PROCEDURE — 2709999900 HC NON-CHARGEABLE SUPPLY: Performed by: SURGERY

## 2022-09-15 PROCEDURE — G0378 HOSPITAL OBSERVATION PER HR: HCPCS

## 2022-09-15 PROCEDURE — 2580000003 HC RX 258: Performed by: STUDENT IN AN ORGANIZED HEALTH CARE EDUCATION/TRAINING PROGRAM

## 2022-09-15 PROCEDURE — 7100000000 HC PACU RECOVERY - FIRST 15 MIN: Performed by: SURGERY

## 2022-09-15 PROCEDURE — 87075 CULTR BACTERIA EXCEPT BLOOD: CPT

## 2022-09-15 PROCEDURE — 2580000003 HC RX 258: Performed by: NURSE ANESTHETIST, CERTIFIED REGISTERED

## 2022-09-15 PROCEDURE — 2500000003 HC RX 250 WO HCPCS: Performed by: STUDENT IN AN ORGANIZED HEALTH CARE EDUCATION/TRAINING PROGRAM

## 2022-09-15 PROCEDURE — 87147 CULTURE TYPE IMMUNOLOGIC: CPT

## 2022-09-15 PROCEDURE — 3600000012 HC SURGERY LEVEL 2 ADDTL 15MIN: Performed by: SURGERY

## 2022-09-15 PROCEDURE — 3700000000 HC ANESTHESIA ATTENDED CARE: Performed by: SURGERY

## 2022-09-15 PROCEDURE — 2580000003 HC RX 258: Performed by: SURGERY

## 2022-09-15 PROCEDURE — 2500000003 HC RX 250 WO HCPCS: Performed by: SURGERY

## 2022-09-15 PROCEDURE — 6360000002 HC RX W HCPCS: Performed by: STUDENT IN AN ORGANIZED HEALTH CARE EDUCATION/TRAINING PROGRAM

## 2022-09-15 PROCEDURE — 3700000001 HC ADD 15 MINUTES (ANESTHESIA): Performed by: SURGERY

## 2022-09-15 RX ORDER — SODIUM CHLORIDE 9 MG/ML
INJECTION, SOLUTION INTRAVENOUS CONTINUOUS PRN
Status: DISCONTINUED | OUTPATIENT
Start: 2022-09-15 | End: 2022-09-15 | Stop reason: SDUPTHER

## 2022-09-15 RX ORDER — AMLODIPINE BESYLATE 5 MG/1
5 TABLET ORAL DAILY
Status: DISCONTINUED | OUTPATIENT
Start: 2022-09-15 | End: 2022-09-16 | Stop reason: HOSPADM

## 2022-09-15 RX ORDER — GLYCOPYRROLATE 0.2 MG/ML
INJECTION INTRAMUSCULAR; INTRAVENOUS PRN
Status: DISCONTINUED | OUTPATIENT
Start: 2022-09-15 | End: 2022-09-15 | Stop reason: SDUPTHER

## 2022-09-15 RX ORDER — METRONIDAZOLE 500 MG/100ML
500 INJECTION, SOLUTION INTRAVENOUS EVERY 8 HOURS
Status: DISCONTINUED | OUTPATIENT
Start: 2022-09-15 | End: 2022-09-16 | Stop reason: HOSPADM

## 2022-09-15 RX ORDER — MIDAZOLAM HYDROCHLORIDE 1 MG/ML
INJECTION INTRAMUSCULAR; INTRAVENOUS PRN
Status: DISCONTINUED | OUTPATIENT
Start: 2022-09-15 | End: 2022-09-15 | Stop reason: SDUPTHER

## 2022-09-15 RX ORDER — DOCUSATE SODIUM 100 MG/1
100 CAPSULE, LIQUID FILLED ORAL DAILY PRN
Status: DISCONTINUED | OUTPATIENT
Start: 2022-09-15 | End: 2022-09-16 | Stop reason: HOSPADM

## 2022-09-15 RX ORDER — SODIUM CHLORIDE 9 MG/ML
INJECTION, SOLUTION INTRAVENOUS PRN
Status: CANCELLED | OUTPATIENT
Start: 2022-09-15

## 2022-09-15 RX ORDER — KETAMINE HYDROCHLORIDE 10 MG/ML
INJECTION, SOLUTION INTRAMUSCULAR; INTRAVENOUS PRN
Status: DISCONTINUED | OUTPATIENT
Start: 2022-09-15 | End: 2022-09-15 | Stop reason: SDUPTHER

## 2022-09-15 RX ORDER — MEPERIDINE HYDROCHLORIDE 25 MG/ML
12.5 INJECTION INTRAMUSCULAR; INTRAVENOUS; SUBCUTANEOUS EVERY 5 MIN PRN
Status: CANCELLED | OUTPATIENT
Start: 2022-09-15

## 2022-09-15 RX ORDER — ALBUTEROL SULFATE 2.5 MG/3ML
2.5 SOLUTION RESPIRATORY (INHALATION) 4 TIMES DAILY PRN
Status: DISCONTINUED | OUTPATIENT
Start: 2022-09-15 | End: 2022-09-16 | Stop reason: HOSPADM

## 2022-09-15 RX ORDER — TRAZODONE HYDROCHLORIDE 50 MG/1
50 TABLET ORAL NIGHTLY
Status: DISCONTINUED | OUTPATIENT
Start: 2022-09-15 | End: 2022-09-16 | Stop reason: HOSPADM

## 2022-09-15 RX ORDER — DEXAMETHASONE SODIUM PHOSPHATE 4 MG/ML
INJECTION, SOLUTION INTRA-ARTICULAR; INTRALESIONAL; INTRAMUSCULAR; INTRAVENOUS; SOFT TISSUE PRN
Status: DISCONTINUED | OUTPATIENT
Start: 2022-09-15 | End: 2022-09-15 | Stop reason: SDUPTHER

## 2022-09-15 RX ORDER — SODIUM CHLORIDE 0.9 % (FLUSH) 0.9 %
5-40 SYRINGE (ML) INJECTION EVERY 12 HOURS SCHEDULED
Status: CANCELLED | OUTPATIENT
Start: 2022-09-15

## 2022-09-15 RX ORDER — SODIUM CHLORIDE 0.9 % (FLUSH) 0.9 %
5-40 SYRINGE (ML) INJECTION PRN
Status: CANCELLED | OUTPATIENT
Start: 2022-09-15

## 2022-09-15 RX ORDER — ONDANSETRON 2 MG/ML
INJECTION INTRAMUSCULAR; INTRAVENOUS PRN
Status: DISCONTINUED | OUTPATIENT
Start: 2022-09-15 | End: 2022-09-15 | Stop reason: SDUPTHER

## 2022-09-15 RX ORDER — LIDOCAINE HYDROCHLORIDE AND EPINEPHRINE 10; 10 MG/ML; UG/ML
INJECTION, SOLUTION INFILTRATION; PERINEURAL PRN
Status: DISCONTINUED | OUTPATIENT
Start: 2022-09-15 | End: 2022-09-15 | Stop reason: ALTCHOICE

## 2022-09-15 RX ORDER — KETOROLAC TROMETHAMINE 30 MG/ML
INJECTION, SOLUTION INTRAMUSCULAR; INTRAVENOUS PRN
Status: DISCONTINUED | OUTPATIENT
Start: 2022-09-15 | End: 2022-09-15 | Stop reason: SDUPTHER

## 2022-09-15 RX ORDER — PROPOFOL 10 MG/ML
INJECTION, EMULSION INTRAVENOUS PRN
Status: DISCONTINUED | OUTPATIENT
Start: 2022-09-15 | End: 2022-09-15 | Stop reason: SDUPTHER

## 2022-09-15 RX ORDER — PANTOPRAZOLE SODIUM 20 MG/1
20 TABLET, DELAYED RELEASE ORAL
Status: DISCONTINUED | OUTPATIENT
Start: 2022-09-16 | End: 2022-09-16 | Stop reason: HOSPADM

## 2022-09-15 RX ORDER — ALBUTEROL SULFATE 90 UG/1
2 AEROSOL, METERED RESPIRATORY (INHALATION) 4 TIMES DAILY PRN
Status: DISCONTINUED | OUTPATIENT
Start: 2022-09-15 | End: 2022-09-15 | Stop reason: CLARIF

## 2022-09-15 RX ORDER — LIDOCAINE HYDROCHLORIDE 20 MG/ML
INJECTION, SOLUTION EPIDURAL; INFILTRATION; INTRACAUDAL; PERINEURAL PRN
Status: DISCONTINUED | OUTPATIENT
Start: 2022-09-15 | End: 2022-09-15 | Stop reason: SDUPTHER

## 2022-09-15 RX ORDER — OXYCODONE HYDROCHLORIDE 5 MG/1
5 TABLET ORAL EVERY 4 HOURS PRN
Status: DISCONTINUED | OUTPATIENT
Start: 2022-09-15 | End: 2022-09-16 | Stop reason: HOSPADM

## 2022-09-15 RX ORDER — PROPRANOLOL HCL 60 MG
60 CAPSULE, EXTENDED RELEASE 24HR ORAL 2 TIMES DAILY
Status: DISCONTINUED | OUTPATIENT
Start: 2022-09-15 | End: 2022-09-16 | Stop reason: HOSPADM

## 2022-09-15 RX ORDER — OXYCODONE HYDROCHLORIDE 5 MG/1
10 TABLET ORAL EVERY 4 HOURS PRN
Status: DISCONTINUED | OUTPATIENT
Start: 2022-09-15 | End: 2022-09-16 | Stop reason: HOSPADM

## 2022-09-15 RX ORDER — FENTANYL CITRATE 50 UG/ML
INJECTION, SOLUTION INTRAMUSCULAR; INTRAVENOUS PRN
Status: DISCONTINUED | OUTPATIENT
Start: 2022-09-15 | End: 2022-09-15 | Stop reason: SDUPTHER

## 2022-09-15 RX ORDER — CITALOPRAM 20 MG/1
40 TABLET ORAL DAILY
Status: DISCONTINUED | OUTPATIENT
Start: 2022-09-15 | End: 2022-09-16 | Stop reason: HOSPADM

## 2022-09-15 RX ORDER — DIPHENHYDRAMINE HYDROCHLORIDE 50 MG/ML
12.5 INJECTION INTRAMUSCULAR; INTRAVENOUS
Status: CANCELLED | OUTPATIENT
Start: 2022-09-15 | End: 2022-09-15

## 2022-09-15 RX ADMIN — HYDROMORPHONE HYDROCHLORIDE 0.5 MG: 1 INJECTION, SOLUTION INTRAMUSCULAR; INTRAVENOUS; SUBCUTANEOUS at 06:58

## 2022-09-15 RX ADMIN — DEXAMETHASONE SODIUM PHOSPHATE 4 MG: 4 INJECTION, SOLUTION INTRAMUSCULAR; INTRAVENOUS at 14:30

## 2022-09-15 RX ADMIN — GLYCOPYRROLATE 0.2 MG: 0.2 INJECTION, SOLUTION INTRAMUSCULAR; INTRAVENOUS at 14:30

## 2022-09-15 RX ADMIN — LIDOCAINE HYDROCHLORIDE 100 MG: 20 INJECTION, SOLUTION EPIDURAL; INFILTRATION; INTRACAUDAL; PERINEURAL at 14:30

## 2022-09-15 RX ADMIN — CEFTRIAXONE 2000 MG: 2 INJECTION, POWDER, FOR SOLUTION INTRAMUSCULAR; INTRAVENOUS at 19:08

## 2022-09-15 RX ADMIN — OXYCODONE 5 MG: 5 TABLET ORAL at 19:26

## 2022-09-15 RX ADMIN — DOCUSATE SODIUM 100 MG: 100 CAPSULE, LIQUID FILLED ORAL at 20:34

## 2022-09-15 RX ADMIN — HYDROMORPHONE HYDROCHLORIDE 0.5 MG: 1 INJECTION, SOLUTION INTRAMUSCULAR; INTRAVENOUS; SUBCUTANEOUS at 00:35

## 2022-09-15 RX ADMIN — SODIUM CHLORIDE: 9 INJECTION, SOLUTION INTRAVENOUS at 14:59

## 2022-09-15 RX ADMIN — PROPOFOL 200 MG: 10 INJECTION, EMULSION INTRAVENOUS at 14:30

## 2022-09-15 RX ADMIN — HYDROMORPHONE HYDROCHLORIDE 0.5 MG: 1 INJECTION, SOLUTION INTRAMUSCULAR; INTRAVENOUS; SUBCUTANEOUS at 04:13

## 2022-09-15 RX ADMIN — METRONIDAZOLE 500 MG: 500 INJECTION, SOLUTION INTRAVENOUS at 09:22

## 2022-09-15 RX ADMIN — PROPRANOLOL HYDROCHLORIDE 60 MG: 60 CAPSULE, EXTENDED RELEASE ORAL at 20:34

## 2022-09-15 RX ADMIN — KETOROLAC TROMETHAMINE 15 MG: 30 INJECTION, SOLUTION INTRAMUSCULAR; INTRAVENOUS at 08:32

## 2022-09-15 RX ADMIN — HYDROMORPHONE HYDROCHLORIDE 0.5 MG: 1 INJECTION, SOLUTION INTRAMUSCULAR; INTRAVENOUS; SUBCUTANEOUS at 10:16

## 2022-09-15 RX ADMIN — FENTANYL CITRATE 100 MCG: 50 INJECTION, SOLUTION INTRAMUSCULAR; INTRAVENOUS at 14:30

## 2022-09-15 RX ADMIN — KETAMINE HYDROCHLORIDE 30 MG: 10 INJECTION INTRAMUSCULAR; INTRAVENOUS at 14:30

## 2022-09-15 RX ADMIN — SODIUM CHLORIDE: 9 INJECTION, SOLUTION INTRAVENOUS at 14:17

## 2022-09-15 RX ADMIN — FENTANYL CITRATE 50 MCG: 50 INJECTION, SOLUTION INTRAMUSCULAR; INTRAVENOUS at 14:40

## 2022-09-15 RX ADMIN — KETOROLAC TROMETHAMINE 30 MG: 30 INJECTION, SOLUTION INTRAMUSCULAR; INTRAVENOUS at 14:54

## 2022-09-15 RX ADMIN — HYDROMORPHONE HYDROCHLORIDE 0.5 MG: 1 INJECTION, SOLUTION INTRAMUSCULAR; INTRAVENOUS; SUBCUTANEOUS at 16:15

## 2022-09-15 RX ADMIN — SODIUM CHLORIDE: 9 INJECTION, SOLUTION INTRAVENOUS at 00:35

## 2022-09-15 RX ADMIN — METRONIDAZOLE 500 MG: 500 INJECTION, SOLUTION INTRAVENOUS at 17:46

## 2022-09-15 RX ADMIN — CITALOPRAM HYDROBROMIDE 40 MG: 20 TABLET ORAL at 17:43

## 2022-09-15 RX ADMIN — TRAZODONE HYDROCHLORIDE 50 MG: 50 TABLET ORAL at 20:34

## 2022-09-15 RX ADMIN — ONDANSETRON 4 MG: 2 INJECTION INTRAMUSCULAR; INTRAVENOUS at 14:30

## 2022-09-15 RX ADMIN — SODIUM CHLORIDE: 9 INJECTION, SOLUTION INTRAVENOUS at 13:26

## 2022-09-15 RX ADMIN — MIDAZOLAM 2 MG: 1 INJECTION INTRAMUSCULAR; INTRAVENOUS at 14:17

## 2022-09-15 RX ADMIN — AMLODIPINE BESYLATE 5 MG: 5 TABLET ORAL at 17:43

## 2022-09-15 RX ADMIN — FENTANYL CITRATE 50 MCG: 50 INJECTION, SOLUTION INTRAMUSCULAR; INTRAVENOUS at 15:05

## 2022-09-15 RX ADMIN — HYDROMORPHONE HYDROCHLORIDE 0.5 MG: 1 INJECTION, SOLUTION INTRAMUSCULAR; INTRAVENOUS; SUBCUTANEOUS at 13:21

## 2022-09-15 ASSESSMENT — PAIN DESCRIPTION - DESCRIPTORS
DESCRIPTORS: ACHING;DISCOMFORT
DESCRIPTORS: THROBBING
DESCRIPTORS: ACHING
DESCRIPTORS: BURNING;ACHING
DESCRIPTORS: THROBBING
DESCRIPTORS: THROBBING
DESCRIPTORS: DISCOMFORT

## 2022-09-15 ASSESSMENT — PAIN SCALES - GENERAL
PAINLEVEL_OUTOF10: 5
PAINLEVEL_OUTOF10: 4
PAINLEVEL_OUTOF10: 9
PAINLEVEL_OUTOF10: 6
PAINLEVEL_OUTOF10: 3
PAINLEVEL_OUTOF10: 6
PAINLEVEL_OUTOF10: 6
PAINLEVEL_OUTOF10: 3
PAINLEVEL_OUTOF10: 8
PAINLEVEL_OUTOF10: 8
PAINLEVEL_OUTOF10: 9
PAINLEVEL_OUTOF10: 8
PAINLEVEL_OUTOF10: 10
PAINLEVEL_OUTOF10: 10

## 2022-09-15 ASSESSMENT — ENCOUNTER SYMPTOMS
COUGH: 0
ABDOMINAL PAIN: 0
TROUBLE SWALLOWING: 0
VOMITING: 0
VOICE CHANGE: 0
PHOTOPHOBIA: 0
NAUSEA: 0
DIARRHEA: 0
SHORTNESS OF BREATH: 0
ROS SKIN COMMENTS: GLUTEAL ABSCESS

## 2022-09-15 ASSESSMENT — PAIN DESCRIPTION - LOCATION
LOCATION: BUTTOCKS
LOCATION: BUTTOCKS
LOCATION: OTHER (COMMENT)
LOCATION: BUTTOCKS
LOCATION: OTHER (COMMENT)
LOCATION: BUTTOCKS
LOCATION: BUTTOCKS
LOCATION: OTHER (COMMENT)
LOCATION: RECTUM

## 2022-09-15 ASSESSMENT — PAIN DESCRIPTION - FREQUENCY: FREQUENCY: CONTINUOUS

## 2022-09-15 ASSESSMENT — PAIN DESCRIPTION - PAIN TYPE: TYPE: SURGICAL PAIN

## 2022-09-15 ASSESSMENT — LIFESTYLE VARIABLES: SMOKING_STATUS: 0

## 2022-09-15 ASSESSMENT — PAIN - FUNCTIONAL ASSESSMENT
PAIN_FUNCTIONAL_ASSESSMENT: ACTIVITIES ARE NOT PREVENTED

## 2022-09-15 ASSESSMENT — PAIN DESCRIPTION - ORIENTATION: ORIENTATION: RIGHT

## 2022-09-15 ASSESSMENT — PAIN DESCRIPTION - ONSET: ONSET: ON-GOING

## 2022-09-15 NOTE — ED PROVIDER NOTES
HPI   Patient is a 77-year-old female with medical history of hypertension, hyperlipidemia and obesity presenting to the emergency department for evaluation of buttocks lesion. Patient initially thought that she had a hemorrhoid and came to the ED for evaluation. Patient states that she noticed a lesion several days ago. She is endorsing pain at the site of the lesion. Patient denies any history of hemorrhoids. She otherwise has no other symptoms including fever, chills, nausea, vomiting, chest pain, shortness of breath, cough, congestion, sore throat, back pain, urinary symptoms, abdominal pain, constipation, rectal bleeding or diarrhea. Patient unable to identify the lesion due to its location and wanted it assessed in the emergency department today. Symptoms are mild to moderate with no remitting or exacerbating factors. Review of Systems   Constitutional:  Negative for chills and fever. HENT:  Negative for trouble swallowing and voice change. Eyes:  Negative for photophobia and visual disturbance. Respiratory:  Negative for cough and shortness of breath. Cardiovascular:  Negative for chest pain and palpitations. Gastrointestinal:  Negative for abdominal pain, diarrhea, nausea and vomiting. Genitourinary:  Negative for dysuria. Musculoskeletal:  Negative for arthralgias. Skin:  Positive for wound. Negative for rash. Gluteal abscess    Neurological:  Negative for dizziness and headaches. Psychiatric/Behavioral:  Negative for behavioral problems and confusion. Physical Exam  Constitutional:       General: She is not in acute distress. Appearance: Normal appearance. She is not ill-appearing. HENT:      Head: Normocephalic and atraumatic. Right Ear: External ear normal.      Left Ear: External ear normal.      Nose: Nose normal.   Eyes:      Pupils: Pupils are equal, round, and reactive to light.    Cardiovascular:      Rate and Rhythm: Normal rate and regular rhythm. Pulses: Normal pulses. Heart sounds: Normal heart sounds. Pulmonary:      Effort: Pulmonary effort is normal. No respiratory distress. Breath sounds: Normal breath sounds. No wheezing or rales. Abdominal:      Tenderness: There is no abdominal tenderness. There is no guarding or rebound. Musculoskeletal:      Cervical back: Normal range of motion and neck supple. Skin:     General: Skin is warm and dry. Findings: Lesion present. Comments: Gluteal abscess present on the inner portion of the right gluteal cleft. Overlying cellulitic changes with erythema, warmth and induration. Minimal fluctuance noted. Neurological:      General: No focal deficit present. Mental Status: She is alert and oriented to person, place, and time. Cranial Nerves: No cranial nerve deficit. Coordination: Coordination normal.       MDM  Patient is a 41-year-old female with medical history of hypertension, hyperlipidemia and obesity presenting to the emergency department for evaluation of buttocks lesion. On initial evaluation, patient is nontoxic-appearing, afebrile, hemodynamically stable in no acute distress. Physical exam is remarkable for what appeared to be a intergluteal abscess with overlying cellulitic changes. Blood work and CT abdomen pelvis obtained for further evaluation. Labs are significant for mild leukocytosis with a white count of 14.1. No other clinically significant lab abnormalities noted. 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 Patient did receive 2 g Rocephin and 500 mg of Flagyl in the emergency department. Also given Toradol and Norco for pain control. Given findings, plan is for admission for further work-up and evaluation by general surgery. Patient updated and agreeable with this plan. Dr. Marjan Reyes contacted and agreeable to admission.   Patient remained stable in the ED.    ED Course as of 09/15/22 0211   Wed Sep 14, 2022   2156 Spoke with Dr. Qamar Baig, discussed the patient. He will admit the patient. [BB]      ED Course User Index  [BB] Elvia Paris DO      --------------------------------------------- PAST HISTORY ---------------------------------------------  Past Medical History:  has a past medical history of Abnormal Pap smear of cervix, Back pain, Chronic generalized pain disorder, Chronic sinusitis, Class 3 severe obesity due to excess calories without serious comorbidity with body mass index (BMI) of 40.0 to 44.9 in [facility]), DDD (degenerative disc disease), cervical, DDD (degenerative disc disease), lumbar, Depression, Essential hypertension, Essential hypertension, Gastroesophageal reflux disease without esophagitis, Hemochromatosis, Migraines, Paresthesias, and Seasonal allergies. Past Surgical History:  has a past surgical history that includes sinus surgery (N/A, 02/25/2016) and Tubal ligation (07/22/2021). Social History:  reports that she has never smoked. She has never used smokeless tobacco. She reports that she does not drink alcohol and does not use drugs. Family History: family history includes Arthritis in her maternal grandmother and mother; High Blood Pressure in her father; Other in her maternal aunt, maternal uncle, mother, paternal aunt, and paternal uncle. The patients home medications have been reviewed.     Allergies: Pcn [penicillins]    -------------------------------------------------- RESULTS -------------------------------------------------    LABS:  Results for orders placed or performed during the hospital encounter of 09/14/22   CBC with Auto Differential   Result Value Ref Range    WBC 14.1 (H) 4.5 - 11.5 E9/L    RBC 4.23 3.50 - 5.50 E12/L    Hemoglobin 12.6 11.5 - 15.5 g/dL    Hematocrit 38.3 34.0 - 48.0 %    MCV 90.5 80.0 - 99.9 fL    MCH 29.8 26.0 - 35.0 pg    MCHC 32.9 32.0 - 34.5 %    RDW 12.5 11.5 - 15.0 fL Platelets 857 708 - 099 E9/L    MPV 8.8 7.0 - 12.0 fL    Neutrophils % 77.4 43.0 - 80.0 %    Immature Granulocytes % 0.7 0.0 - 5.0 %    Lymphocytes % 13.6 (L) 20.0 - 42.0 %    Monocytes % 7.3 2.0 - 12.0 %    Eosinophils % 0.5 0.0 - 6.0 %    Basophils % 0.5 0.0 - 2.0 %    Neutrophils Absolute 10.94 (H) 1.80 - 7.30 E9/L    Immature Granulocytes # 0.10 E9/L    Lymphocytes Absolute 1.93 1.50 - 4.00 E9/L    Monocytes Absolute 1.03 (H) 0.10 - 0.95 E9/L    Eosinophils Absolute 0.07 0.05 - 0.50 E9/L    Basophils Absolute 0.07 0.00 - 0.20 E9/L   Comprehensive Metabolic Panel w/ Reflex to MG   Result Value Ref Range    Sodium 134 132 - 146 mmol/L    Potassium reflex Magnesium 3.8 3.5 - 5.0 mmol/L    Chloride 100 98 - 107 mmol/L    CO2 25 22 - 29 mmol/L    Anion Gap 9 7 - 16 mmol/L    Glucose 108 (H) 74 - 99 mg/dL    BUN 11 6 - 20 mg/dL    Creatinine 0.7 0.5 - 1.0 mg/dL    GFR Non-African American >60 >=60 mL/min/1.73    GFR African American >60     Calcium 9.0 8.6 - 10.2 mg/dL    Total Protein 7.1 6.4 - 8.3 g/dL    Albumin 4.0 3.5 - 5.2 g/dL    Total Bilirubin 0.2 0.0 - 1.2 mg/dL    Alkaline Phosphatase 111 (H) 35 - 104 U/L    ALT 17 0 - 32 U/L    AST 12 0 - 31 U/L   POC Pregnancy Urine Qual   Result Value Ref Range    HCG, Urine, POC Negative Negative    Lot Number [id number]     Positive QC Pass/Fail Pass     Negative QC Pass/Fail Pass        RADIOLOGY:  CT PELVIS W CONTRAST Additional Contrast? None   Final Result   Right gluteal/perianal 4.8 cm abscess with findings concerning for underlying   perianal fistula.             ------------------------- NURSING NOTES AND VITALS REVIEWED ---------------------------  Date / Time Roomed:  9/14/2022  5:16 PM  ED Bed Assignment:  0503/0503-A    The nursing notes within the ED encounter and vital signs as below have been reviewed.      Patient Vitals for the past 24 hrs:   BP Temp Temp src Pulse Resp SpO2 Height Weight   09/15/22 0015 -- -- -- -- -- -- -- 298 lb (135.2 kg)   09/14/22 2300 125/72 98.2 °F (36.8 °C) Oral 74 17 97 % 5' 9\" (1.753 m) 298 lb 3.2 oz (135.3 kg)   09/14/22 2213 129/65 98.3 °F (36.8 °C) Oral 78 16 97 % -- --   09/14/22 2040 139/68 -- -- 77 16 95 % -- --   09/14/22 1714 (!) 150/80 98.1 °F (36.7 °C) -- 83 16 97 % 5' 9\" (1.753 m) 290 lb (131.5 kg)       Oxygen Saturation Interpretation: Normal    ------------------------------------------ PROGRESS NOTES -----------------------------------------  Counseling:  I have spoken with the patient and discussed todays results, in addition to providing specific details for the plan of care and counseling regarding the diagnosis and prognosis. Their questions are answered at this time and they are agreeable with the plan of admission.    --------------------------------- ADDITIONAL PROVIDER NOTES ---------------------------------  Consultations:  Spoke with Dr. Mary Chen. Discussed case. They will admit the patient. This patient's ED course included: a personal history and physicial examination, re-evaluation prior to disposition, multiple bedside re-evaluations, and IV medications    This patient has remained hemodynamically stable during their ED course. Diagnosis:  1. Gluteal abscess    2. Cellulitis of buttock    3. Perianal abscess        Disposition:  Patient's disposition: Admit to med/surg floor  Patient's condition is stable.          Wilfrid Chaney DO  Resident  09/15/22 0734

## 2022-09-15 NOTE — DISCHARGE INSTRUCTIONS
Follow all instructions carefully:      Restrictions: Do not drive a car or operate machinery while taking narcotic pain medication    Diet:    ususal       Medications:   Current Discharge Medication List             Details   cephALEXin (KEFLEX) 500 MG capsule Take 1 capsule by mouth 4 times daily for 7 days  Qty: 28 capsule, Refills: 0      metroNIDAZOLE (FLAGYL) 500 MG tablet Take 1 tablet by mouth 3 times daily for 10 days  Qty: 30 tablet, Refills: 0      HYDROcodone-acetaminophen (NORCO) 5-325 MG per tablet Take 1 tablet by mouth every 6 hours as needed for Pain for up to 3 days. Intended supply: 3 days. Take lowest dose possible to manage pain  Qty: 12 tablet, Refills: 0    Associated Diagnoses: Gluteal abscess                Details   albuterol sulfate HFA (VENTOLIN HFA) 108 (90 Base) MCG/ACT inhaler Inhale 2 puffs into the lungs 4 times daily as needed for Wheezing  Qty: 18 g, Refills: 0    Associated Diagnoses: Cough      amLODIPine (NORVASC) 5 MG tablet Take 1 tablet by mouth in the morning. Qty: 30 tablet, Refills: 5    Associated Diagnoses: Essential hypertension      vitamin D (CHOLECALCIFEROL) 25 MCG (1000 UT) TABS tablet Take 1,000 Units by mouth in the morning. propranolol (INDERAL LA) 60 MG extended release capsule Take 1 capsule by mouth 2 times daily  Qty: 60 capsule, Refills: 2    Associated Diagnoses: Essential hypertension      pantoprazole (PROTONIX) 20 MG tablet take 1 tablet by mouth every morning BEFORE BREAKFAST  Qty: 30 tablet, Refills: 2    Associated Diagnoses: Gastroesophageal reflux disease with esophagitis without hemorrhage      citalopram (CELEXA) 40 MG tablet 40 mg daily      traZODone (DESYREL) 50 MG tablet                 May resume all home medications    OK to shower    Encourage tub baths daily             Follow up Care:   Follow up with Dr Samson Elder in 2 weeks  [address], [address] [place] [address]  [phone]       If problems or questions arise call the physician, If unable to reach your physician call [facility] Emergency [facility].     [facility], [facility] [id number] ([address] [phone]

## 2022-09-15 NOTE — CARE COORDINATION
9/15/2022  Social Work Discharge Planning: OR today  for incision and drainage of perianal abscess. This worker met with Pt to discuss  role and transition of care/discharge planning. Pt is independent at home with her spouse. Employed. No DME. Pharmacy is [facility] in [place].  Electronically signed by ANN Gray on 9/15/2022 at 11:55 AM

## 2022-09-15 NOTE — H&P
GENERAL SURGERY  HISTORY AND PHYSICAL  9/15/2022    Chief Complaint   Patient presents with    Rectal Pain     Supposed to see gastro next Friday, pain rated 9/10, no bleeding       HPI  Sima Roger is a 44 y.o. female with PMH HTN who presents for evaluation of buttocks lesion. She states that it started several weeks ago. It felt like a lump around her anus and she initially thought it was a hemorrhoid. She made an appointment with GI docs but their first availability was next week. It started becoming much more painful over past few days. Denies fevers or chills. No nausea or vomiting. Has never had anything like this before and denies family history of Crohn's or IBS. CT A/P showing right gluteal 4.8 cm abscess very close to anus. Past Medical History:   Diagnosis Date    Abnormal Pap smear of cervix     Back pain     Chronic generalized pain disorder 8/13/2021    Chronic sinusitis     with facial pain    Class 3 severe obesity due to excess calories without serious comorbidity with body mass index (BMI) of 40.0 to 44.9 in adult [facility])     DDD (degenerative disc disease), cervical 8/13/2021    DDD (degenerative disc disease), lumbar 8/13/2021    Depression 06/06/2011    Essential hypertension     Essential hypertension     Gastroesophageal reflux disease without esophagitis     Hemochromatosis     Migraines     born with heart murmur    Paresthesias 8/10/2021    Seasonal allergies        Past Surgical History:   Procedure Laterality Date    SINUS SURGERY N/A 02/25/2016    FUNCTIONAL ENDOSCOPIC SINUS SURGERY WITH BILATERAL MAXILLARY, FRONTAL & SPENOID ANTROSTOMIES, SUBMUCCOSAL RESECTION OF INFERIOR TURBINATES    TUBAL LIGATION  07/22/2021       Prior to Admission medications    Medication Sig Start Date End Date Taking?  Authorizing Provider   cephALEXin (KEFLEX) 500 MG capsule Take 1 capsule by mouth 4 times daily for 7 days 9/14/22 9/21/22 Yes Janette Escamilla,    metroNIDAZOLE (FLAGYL) 500 MG 97%       GENERAL:  NAD. A&Ox3. HEAD:  Normocephalic, atraumatic. EYES:   No scleral icterus. PERRLA. LUNGS:  No increased work of breathing. CARDIOVASCULAR: RR  ABDOMEN:  Soft, non-distended, non-tender. No guarding, rigidity, rebound. RECTAL: Area of erythema and tenderness on right buttock, lateral to rectum, no drainage expressed, extremely tender     ASSESSMENT/PLAN:  44 y.o. female with perianal abscess. - prn pain control  - continue antibiotics  - to OR for incision and drainage of perianal abscess today 9/15    Plan discussed with Dr. Ema Randolph.     Lia Bailey MD  Surgery Resident PGY-3  9/15/2022  4:47 AM

## 2022-09-15 NOTE — PATIENT CARE CONFERENCE
P Quality Flow/Interdisciplinary Rounds Progress Note        Quality Flow Rounds held on September 15, 2022    Disciplines Attending:  Bedside Nurse, , and Nursing Unit Leadership    Shira Mtz was admitted on 9/14/2022  5:16 PM    Anticipated Discharge Date:       Disposition:    Ernie Score:  Ernie Scale Score: 22    Readmission Risk              Risk of Unplanned Readmission:  0           Discussed patient goal for the day, patient clinical progression, and barriers to discharge.   The following Goal(s) of the Day/Commitment(s) have been identified:  Diagnostics - Report Results      Milla Higgins RN  September 15, 2022

## 2022-09-15 NOTE — PROGRESS NOTES
Call placed to Dr. Cole Aviles, on call for Dr. Murry Soulier, regarding patient request for stool softener. Awaiting call back.      Electronically signed by Tatianna Espino RN on 9/15/2022 at 7:57 PM

## 2022-09-15 NOTE — ED PROVIDER NOTES
Department of Emergency Medicine    ED  Provider Note - Sign out / Oncoming Provider   Admit Date/RoomTime: 9/14/2022  5:16 PM  9:09 PM EDT      I received this patient at sign out from Dr. Jackie Iraheta. I have discussed the patient's initial exam, treatment and plan of care with the out going physician. Patient signed out pending results of CT Scan. Scan demonstrating \"A right medial gluteal peripherally enhancing fluid collection is identified   measuring 4.8 x 4.5 x 3.0 cm. Superiorly the collection is in continuity   with the anus and there is associated inflammatory change and suggestion of   fluid tracking into the area of the internal sphincter concerning for   underlying perianal fistula. \"    Spoke with Dr. Nathalie Vail, on-call general surgeon, discussed case. He states to start antibiotics and admit to the floor. He will admit the patient. I, Dr. Rita Figueroa am the primary provider of record  ED Course as of 09/14/22 2158   Wed Sep 14, 2022   2156 Spoke with Dr. Nathalie Vail, discussed the patient. He will admit the patient. [BB]      ED Course User Index  [BB] Kristin Cervantes DO            --------------------------------- IMPRESSION AND DISPOSITION ---------------------------------    IMPRESSION  1. Gluteal abscess    2. Cellulitis of buttock    3.  Perianal abscess        DISPOSITION  Disposition: Admit to med/surg floor  Patient condition is stable           Kristin Cervantes DO  09/14/22 2158

## 2022-09-15 NOTE — ANESTHESIA PRE PROCEDURE
Department of Anesthesiology  Preprocedure Note       Name:  Nivia Rasmussen   Age:  44 y.o.  :  1985                                          MRN:  [id number]         Date:  9/15/2022      Surgeon: Lucie Escobedo):  Ny Sullivan MD    Procedure: Procedure(s):  DRAINAGE PERIANAL ABSCESS +AVAIL AT 1300+    Medications prior to admission:   Prior to Admission medications    Medication Sig Start Date End Date Taking? Authorizing Provider   cephALEXin (KEFLEX) 500 MG capsule Take 1 capsule by mouth 4 times daily for 7 days 22 Yes Jantete Escamilla DO   metroNIDAZOLE (FLAGYL) 500 MG tablet Take 1 tablet by mouth 3 times daily for 10 days 22 Yes Janette Escamilla DO   HYDROcodone-acetaminophen (NORCO) 5-325 MG per tablet Take 1 tablet by mouth every 6 hours as needed for Pain for up to 3 days. Intended supply: 3 days. Take lowest dose possible to manage pain 22 Yes Janette Escamilla DO   albuterol sulfate HFA (VENTOLIN HFA) 108 (90 Base) MCG/ACT inhaler Inhale 2 puffs into the lungs 4 times daily as needed for Wheezing 22   Proctor FELIPE Tolbert DO   amLODIPine (NORVASC) 5 MG tablet Take 1 tablet by mouth in the morning. 8/10/22   Proctor FELIPE Tolbert DO   vitamin D (CHOLECALCIFEROL) 25 MCG (1000 UT) TABS tablet Take 1,000 Units by mouth in the morning.     Historical Provider, MD   propranolol (INDERAL LA) 60 MG extended release capsule Take 1 capsule by mouth 2 times daily 22   Proctor FELIPE Tolbert DO   pantoprazole (PROTONIX) 20 MG tablet take 1 tablet by mouth every morning BEFORE BREAKFAST 22   Proctor FELIPE Tolbert DO   citalopram (CELEXA) 40 MG tablet 40 mg daily 5/10/22   Historical Provider, MD   traZODone (DESYREL) 50 MG tablet  5/10/22   Historical Provider, MD       Current medications:    Current Facility-Administered Medications   Medication Dose Route Frequency Provider Last Rate Last Admin    metronidazole (FLAGYL) 500 mg in 0.9% NaCl 100 mL IVPB premix  500 mg IntraVENous Q8H Alesha Hatch  mL/hr at 09/15/22 0922 500 mg at 09/15/22 3607    cefTRIAXone (ROCEPHIN) 2,000 mg in sterile water 20 mL IV syringe  2,000 mg IntraVENous Q24H Alesha Hatch MD        HYDROmorphone (DILAUDID) injection 0.5 mg  0.5 mg IntraVENous Q3H PRN Rex Jordan MD   0.5 mg at 09/15/22 1016    ketorolac (TORADOL) injection 15 mg  15 mg IntraVENous Q6H PRN Rex Jordan MD   15 mg at 09/15/22 0832    ondansetron (ZOFRAN) injection 4 mg  4 mg IntraVENous Q6H PRN Rex Jordan MD        0.9 % sodium chloride infusion   IntraVENous Continuous Rex Jordan MD 75 mL/hr at 09/15/22 0035 New Bag at 09/15/22 0035       Allergies: Allergies   Allergen Reactions    Pcn [Penicillins] Rash       Problem List:    Patient Active Problem List   Diagnosis Code    GERD with esophagitis K21.00    Allergic rhinitis J30.9    Congenital spondylolysis, lumbosacral region Q76.2    Degeneration of lumbar or lumbosacral intervertebral disc M51.37    Depression F32. A    Hemochromatosis, hereditary ([facility] [address] E83.110    Back pain M54.9    Class 3 severe obesity due to excess calories without serious comorbidity with body mass index (BMI) of 40.0 to 44.9 in adult ([facility]) E66.01, Z68.41    Essential hypertension I10    Paresthesias R20.2    Chronic generalized pain disorder R52, G89.29    DDD (degenerative disc disease), cervical M50.30    DDD (degenerative disc disease), lumbar M51.36    Dizziness and giddiness R42    Perianal abscess K61.0       Past Medical History:        Diagnosis Date    Abnormal Pap smear of cervix     Back pain     Chronic generalized pain disorder 8/13/2021    Chronic sinusitis     with facial pain    Class 3 severe obesity due to excess calories without serious comorbidity with body mass index (BMI) of 40.0 to 44.9 in adult [facility])     DDD (degenerative disc disease), cervical 8/13/2021    DDD (degenerative disc disease), lumbar 8/13/2021    Depression 06/06/2011    Essential hypertension     Essential hypertension     Gastroesophageal reflux disease without esophagitis     Hemochromatosis     Migraines     born with heart murmur    Paresthesias 8/10/2021    Seasonal allergies        Past Surgical History:        Procedure Laterality Date    SINUS SURGERY N/A 02/25/2016    FUNCTIONAL ENDOSCOPIC SINUS SURGERY WITH BILATERAL MAXILLARY, FRONTAL & SPENOID ANTROSTOMIES, SUBMUCCOSAL RESECTION OF INFERIOR TURBINATES    TUBAL LIGATION  07/22/2021       Social History:    Social History     Tobacco Use    Smoking status: Never    Smokeless tobacco: Never   Substance Use Topics    Alcohol use: No     Alcohol/week: 0.0 standard drinks                                Counseling given: Not Answered      Vital Signs (Current):   Vitals:    09/15/22 0015 09/15/22 0653 09/15/22 0728 09/15/22 1016   BP:  132/74     Pulse:  71     Resp:  17 18 18   Temp:  36.9 °C (98.5 °F)     TempSrc:  Oral     SpO2:  96%     Weight: 298 lb (135.2 kg)      Height:                                                  BP Readings from Last 3 Encounters:   09/15/22 132/74   09/01/22 120/60   08/10/22 120/84       NPO Status:  Last solid and liquid consumption at 2330 on 9/14/2022. BMI:   Wt Readings from Last 3 Encounters:   09/15/22 298 lb (135.2 kg)   09/01/22 293 lb (132.9 kg)   08/10/22 294 lb (133.4 kg)     Body mass index is 44.01 kg/m².     CBC:   Lab Results   Component Value Date/Time    WBC 14.1 09/14/2022 05:55 PM    RBC 4.23 09/14/2022 05:55 PM    HGB 12.6 09/14/2022 05:55 PM    HCT 38.3 09/14/2022 05:55 PM    MCV 90.5 09/14/2022 05:55 PM    RDW 12.5 09/14/2022 05:55 PM     09/14/2022 05:55 PM       CMP:   Lab Results   Component Value Date/Time     09/14/2022 05:55 PM    K 3.8 09/14/2022 05:55 PM     09/14/2022 05:55 PM    CO2 25 09/14/2022 05:55 PM    BUN 11 09/14/2022 05:55 PM    CREATININE 0.7 09/14/2022 05:55 PM    GFRAA >60 2022 05:55 PM    AGRATIO 1.3 2021 04:28 PM    LABGLOM >60 2022 05:55 PM    GLUCOSE 108 2022 05:55 PM    GLUCOSE 89 01/10/2012 07:37 PM    PROT 7.1 2022 05:55 PM    CALCIUM 9.0 2022 05:55 PM    BILITOT 0.2 2022 05:55 PM    ALKPHOS 111 2022 05:55 PM    AST 12 2022 05:55 PM    ALT 17 2022 05:55 PM       POC Tests: No results for input(s): POCGLU, POCNA, POCK, POCCL, POCBUN, POCHEMO, POCHCT in the last 72 hours. Coags: No results found for: PROTIME, INR, APTT    HCG (If Applicable):   Lab Results   Component Value Date    PREGTESTUR NEGATIVE 2016        ABGs: No results found for: PHART, PO2ART, NFG8KPW, PUO8UJH, BEART, D5ZASFRX     Type & Screen (If Applicable):  No results found for: LABABO, LABRH    Drug/Infectious Status (If Applicable):  No results found for: HIV, HEPCAB    COVID-19 Screening (If Applicable):   Lab Results   Component Value Date/Time    COVID19 Not-Detected 12/10/2020 10:29 AM    COVID19 Not Detected 12/10/2020 12:00 AM     EK2021  Normal sinus rhythm  Possible Left atrial enlargement  Borderline ECG  When compared with ECG of 06-OCT-2020 17:01,  No significant change was found  Confirmed by Laury Huddleston ([id number]) on 2021 6:08:41 PM        Anesthesia Evaluation  Patient summary reviewed and Nursing notes reviewed no history of anesthetic complications:   Airway: Mallampati: III  TM distance: >3 FB   Neck ROM: full  Mouth opening: > = 3 FB   Dental: normal exam     Comment: Patient denies any chipped, loose, or missing teeth    Pulmonary: breath sounds clear to auscultation      (-) not a current smoker          Patient did not smoke on day of surgery. ROS comment: Patient was prescribed an albuterol inhaler when diagnosed with COVID. Patient states she did not need to use it.    Cardiovascular:  Exercise tolerance: good (>4 METS),   (+) hypertension:,       ECG reviewed  Rhythm: regular  Rate: normal           Beta Blocker:  Dose within 24 Hrs         Neuro/Psych:   (+) headaches: migraine headaches, depression/anxiety              ROS comment: Congenital spondylolysis, lumbosacral region  Degeneration of lumbar or lumbosacral intervertebral disc  Chronic generalized pain disorder  DDD (degenerative disc disease), cervical  DDD (degenerative disc disease), lumbar  Paresthesias     GI/Hepatic/Renal:   (+) GERD: well controlled, morbid obesity         ROS comment: Perianal abscess. Endo/Other:                      ROS comment: Hemochromatosis, hereditary  Abdominal:             Vascular: negative vascular ROS. Other Findings:           Anesthesia Plan      general     ASA 3     (#22 Right AC)  Induction: intravenous. MIPS: Postoperative opioids intended and Prophylactic antiemetics administered. Anesthetic plan and risks discussed with patient. Use of blood products discussed with patient whom consented to blood products. Plan discussed with attending. DOS STAFF ADDENDUM:    Patient seen and examined, physical exam updated as needed, chart reviewed. NPO status confirmed. Anesthetic options and risks discussed with patient/legal guardian. Patient/legal guardian verbalized understanding and agrees to proceed.      TICO Gleason - CRNA  Staff CRNA  September 15, 2022  2:00 PM                    Andreia Chao, [address] [place] [address]   9/15/2022

## 2022-09-15 NOTE — OP NOTE
Operative Note      Patient: Trudi Lancaster  YOB: 1985  MRN: [id number]    Date of Procedure: 9/15/2022    Pre-Op Diagnosis: Perianal abscess [K61.0]    Post-Op Diagnosis: Same       Procedure(s):  DRAINAGE PERIANAL ABSCESS +AVAIL AT 1300+    Surgeon(s):  Meri Rayo MD    Assistant:   Resident: Miriam Dickerson MD    Anesthesia: General    Estimated Blood Loss (mL): 5    Complications: None    Specimens:   ID Type Source Tests Collected by Time Destination   1 : perianal abscess Body Fluid Fluid CULTURE, ANAEROBIC, CULTURE, FUNGUS, GRAM STAIN, CULTURE, SURGICAL Meri Rayo MD 9/15/2022 1443              Drains:   Open Drain 09/15/22 Midline Perineal (Active)       Findings: Large perianal abscess, pezzer drain placed    HISTORY: Trudi Lancaster is a 44 y.o. female who presents with perianal pain and abscess. Incision and drainage was recommended. The risks benefits and alternatives of the procedure were discussed with the patient who stated understanding and agreed to proceed. DESCRIPTION OF PROCEDURE: The patient was brought to the operating room and positioned in lithotomy on the OR table. Sequential compression devices were placed on the patient's lower extremities and functioning. Preoperative antibiotics were administered. Anesthesia was obtained without complication as per the anesthesia record. Immediately prior to the procedure a time-out was called and the surgical checklist was reviewed and agreed upon by all present. The patient was prepped and draped in the usual sterile fashion. A lubricated finger was inserted into the anus and we were unable to palpate or see a fistulous tract between the abscess and the rectum. A 1 cm incision was made over the area of greatest fluctuance and we immediately encountered purulent fluid. A trap was used for specimen and swabs were taken as well for micro.  A finger was used to bluntly sweep the abscess cavity and any loculations were broken up. The cavity included went to the subcutaneous tissue, did not involve fascia. Approximately 150 cc of malodorous purulent fluid was expressed. The cavity was irrigated copiously under clear. There was minimal bleeding which was controlled with pressure. A pezzer drain was then placed in the abscess cavity and secured to the skin with 2-0 nylon. Gauze and abd was then placed as a dressing. Needle, sponge, and instrument counts were reported as correct x2. Dr. iMsty Parks was present and scrubbed throughout the case. The patient tolerated the procedure well without complications. She was transferred to the recovery area in good condition.       Electronically signed by Scooter Childress MD on 9/15/2022 at 3:03 PM

## 2022-09-15 NOTE — PROGRESS NOTES
Spoke with Dr. Lionel Minaya regarding admission orders and pain medications. Orders received.    Electronically signed by Pao Oshea RN on 9/14/2022 at 11:47 PM

## 2022-09-15 NOTE — ANESTHESIA POSTPROCEDURE EVALUATION
Department of Anesthesiology  Postprocedure Note    Patient: Wicho Acuña  MRN: [id number]  YOB: 1985  Date of evaluation: 9/15/2022      Procedure Summary     Date: 09/15/22 Room / Location: [facility] 02 / [facility]    Anesthesia Start: 9243 Anesthesia Stop: 9518    Procedure: DRAINAGE PERIANAL ABSCESS +AVAIL AT 1300+ (Anus) Diagnosis:       Perianal abscess      (Perianal abscess [K61.0])    Surgeons: Domingo Moise MD Responsible Provider: Guilherme Castillo MD    Anesthesia Type: General ASA Status: 3          Anesthesia Type: General    Luisa Phase I: Luisa Score: 10    Luisa Phase II:        Anesthesia Post Evaluation    Patient location during evaluation: PACU  Patient participation: complete - patient participated  Level of consciousness: sleepy but conscious  Pain score: 0  Airway patency: patent  Nausea & Vomiting: no nausea and no vomiting  Complications: no  Cardiovascular status: blood pressure returned to baseline  Respiratory status: acceptable  Hydration status: euvolemic

## 2022-09-16 VITALS
BODY MASS INDEX: 42.51 KG/M2 | TEMPERATURE: 97.5 F | RESPIRATION RATE: 16 BRPM | DIASTOLIC BLOOD PRESSURE: 59 MMHG | HEIGHT: 69 IN | HEART RATE: 82 BPM | OXYGEN SATURATION: 97 % | SYSTOLIC BLOOD PRESSURE: 109 MMHG | WEIGHT: 287 LBS

## 2022-09-16 LAB — GRAM STAIN ORDERABLE: NORMAL

## 2022-09-16 PROCEDURE — 96366 THER/PROPH/DIAG IV INF ADDON: CPT

## 2022-09-16 PROCEDURE — 6370000000 HC RX 637 (ALT 250 FOR IP): Performed by: STUDENT IN AN ORGANIZED HEALTH CARE EDUCATION/TRAINING PROGRAM

## 2022-09-16 PROCEDURE — 2500000003 HC RX 250 WO HCPCS: Performed by: STUDENT IN AN ORGANIZED HEALTH CARE EDUCATION/TRAINING PROGRAM

## 2022-09-16 PROCEDURE — 96365 THER/PROPH/DIAG IV INF INIT: CPT

## 2022-09-16 PROCEDURE — G0378 HOSPITAL OBSERVATION PER HR: HCPCS

## 2022-09-16 PROCEDURE — 6360000002 HC RX W HCPCS: Performed by: STUDENT IN AN ORGANIZED HEALTH CARE EDUCATION/TRAINING PROGRAM

## 2022-09-16 PROCEDURE — 96376 TX/PRO/DX INJ SAME DRUG ADON: CPT

## 2022-09-16 RX ORDER — CEPHALEXIN 500 MG/1
500 CAPSULE ORAL 4 TIMES DAILY
Qty: 28 CAPSULE | Refills: 0 | Status: SHIPPED | OUTPATIENT
Start: 2022-09-16 | End: 2022-09-23

## 2022-09-16 RX ORDER — HYDROCODONE BITARTRATE AND ACETAMINOPHEN 5; 325 MG/1; MG/1
1 TABLET ORAL EVERY 6 HOURS PRN
Qty: 12 TABLET | Refills: 0 | Status: SHIPPED | OUTPATIENT
Start: 2022-09-16 | End: 2022-09-19

## 2022-09-16 RX ORDER — METRONIDAZOLE 500 MG/1
500 TABLET ORAL 3 TIMES DAILY
Qty: 30 TABLET | Refills: 0 | Status: SHIPPED | OUTPATIENT
Start: 2022-09-16 | End: 2022-09-26

## 2022-09-16 RX ADMIN — AMLODIPINE BESYLATE 5 MG: 5 TABLET ORAL at 08:55

## 2022-09-16 RX ADMIN — OXYCODONE 5 MG: 5 TABLET ORAL at 02:25

## 2022-09-16 RX ADMIN — OXYCODONE 5 MG: 5 TABLET ORAL at 06:21

## 2022-09-16 RX ADMIN — METRONIDAZOLE 500 MG: 500 INJECTION, SOLUTION INTRAVENOUS at 01:13

## 2022-09-16 RX ADMIN — CITALOPRAM HYDROBROMIDE 40 MG: 20 TABLET ORAL at 08:58

## 2022-09-16 RX ADMIN — PROPRANOLOL HYDROCHLORIDE 60 MG: 60 CAPSULE, EXTENDED RELEASE ORAL at 08:58

## 2022-09-16 RX ADMIN — KETOROLAC TROMETHAMINE 15 MG: 30 INJECTION, SOLUTION INTRAMUSCULAR; INTRAVENOUS at 01:17

## 2022-09-16 RX ADMIN — METRONIDAZOLE 500 MG: 500 INJECTION, SOLUTION INTRAVENOUS at 09:02

## 2022-09-16 RX ADMIN — OXYCODONE 5 MG: 5 TABLET ORAL at 10:43

## 2022-09-16 RX ADMIN — PANTOPRAZOLE SODIUM 20 MG: 20 TABLET, DELAYED RELEASE ORAL at 06:21

## 2022-09-16 ASSESSMENT — PAIN SCALES - GENERAL
PAINLEVEL_OUTOF10: 5
PAINLEVEL_OUTOF10: 5
PAINLEVEL_OUTOF10: 2
PAINLEVEL_OUTOF10: 5
PAINLEVEL_OUTOF10: 0
PAINLEVEL_OUTOF10: 5

## 2022-09-16 ASSESSMENT — PAIN DESCRIPTION - ORIENTATION
ORIENTATION: LEFT
ORIENTATION: RIGHT
ORIENTATION: LEFT

## 2022-09-16 ASSESSMENT — PAIN DESCRIPTION - DESCRIPTORS
DESCRIPTORS: BURNING;ACHING;SORE
DESCRIPTORS: BURNING;DISCOMFORT
DESCRIPTORS: BURNING;DISCOMFORT
DESCRIPTORS: ACHING;DISCOMFORT
DESCRIPTORS: BURNING;DISCOMFORT

## 2022-09-16 ASSESSMENT — PAIN DESCRIPTION - PAIN TYPE: TYPE: SURGICAL PAIN

## 2022-09-16 ASSESSMENT — PAIN DESCRIPTION - LOCATION
LOCATION: BUTTOCKS
LOCATION: PERINEUM
LOCATION: BUTTOCKS

## 2022-09-16 ASSESSMENT — PAIN DESCRIPTION - FREQUENCY: FREQUENCY: CONTINUOUS

## 2022-09-16 ASSESSMENT — PAIN - FUNCTIONAL ASSESSMENT: PAIN_FUNCTIONAL_ASSESSMENT: ACTIVITIES ARE NOT PREVENTED

## 2022-09-16 ASSESSMENT — PAIN DESCRIPTION - ONSET: ONSET: ON-GOING

## 2022-09-16 NOTE — DISCHARGE SUMMARY
Patient ID:  Anabela Mancini  [id number]  75 y.o.  1985    Admit date: 9/14/2022    Discharge date and time:  9/16/2022, 6:22 AM      Admitting Physician: Tavon Proctor MD     Discharge Physician: Tavon Proctor MD     Admission Diagnoses:   Cellulitis of buttock [Q76.862]  Perianal abscess [K61.0]  Gluteal abscess [L02.31]    Discharged Condition: stable    Hospital Course: admitted with large perianal abscess requiring operative drainage    Disposition: home    Discharge Medications:     Medication List        START taking these medications      cephALEXin 500 MG capsule  Commonly known as: KEFLEX  Take 1 capsule by mouth 4 times daily for 7 days     HYDROcodone-acetaminophen 5-325 MG per tablet  Commonly known as: Norco  Take 1 tablet by mouth every 6 hours as needed for Pain for up to 3 days. Intended supply: 3 days. Take lowest dose possible to manage pain     metroNIDAZOLE 500 MG tablet  Commonly known as: FLAGYL  Take 1 tablet by mouth 3 times daily for 10 days            CONTINUE taking these medications      albuterol sulfate  (90 Base) MCG/ACT inhaler  Commonly known as: Ventolin HFA  Inhale 2 puffs into the lungs 4 times daily as needed for Wheezing     amLODIPine 5 MG tablet  Commonly known as: NORVASC  Take 1 tablet by mouth in the morning.      citalopram 40 MG tablet  Commonly known as: CELEXA     pantoprazole 20 MG tablet  Commonly known as: PROTONIX  take 1 tablet by mouth every morning BEFORE BREAKFAST     propranolol 60 MG extended release capsule  Commonly known as: INDERAL LA  Take 1 capsule by mouth 2 times daily     traZODone 50 MG tablet  Commonly known as: DESYREL     vitamin D 25 MCG (1000 UT) Tabs tablet  Commonly known as: CHOLECALCIFEROL               Where to Get Your Medications        You can get these medications from any pharmacy    Bring a paper prescription for each of these medications  HYDROcodone-acetaminophen 5-325 MG per tablet       Information about where to get these medications is not yet available    Ask your nurse or doctor about these medications  cephALEXin 500 MG capsule  metroNIDAZOLE 500 MG tablet           Activity: activity as tolerated    Diet: regular diet    Wound Care: as directed    Follow-up with Dr. Sonya Sims scheduled     Electronically signed by Eli Obrien MD  on 9/16/2022 at 6:22 AM

## 2022-09-16 NOTE — DISCHARGE INSTR - COC
Continuity of Care Form    Patient Name: Christine Bolaños   :  1985  MRN:  [id number]    Admit date:  2022  Discharge date:  ***    Code Status Order: Prior   Advance Directives:   [address] [place] [address] Documentation       Date/Time Healthcare Directive Type of Healthcare Directive Copy in [address] Agent's Name Healthcare Agent's Phone Number    09/15/22 4925 No, patient does not have an advance directive for healthcare treatment -- -- -- -- --            Admitting Physician:  Austin Bruno MD  PCP: Bolivar Sanchez DO    Discharging Nurse: [facility] Unit/Room#: 3978/4412-E  Discharging Unit Phone Number: ***    Emergency Contact:   Extended Emergency Contact Information  Primary Emergency Contact: Sonia Montes De Oca  Address: [address] Phone: [phone]  Mobile Phone: [phone]  Relation: Parent  Secondary Emergency Contact: Ryder Mtz  Address: [address] [place] [address], [address] [place] [address] Phone: [phone]  Mobile Phone: [phone]  Relation: Spouse    Past Surgical History:  Past Surgical History:   Procedure Laterality Date    RECTAL SURGERY N/A 9/15/2022    DRAINAGE PERIANAL ABSCESS performed by Austin Bruno MD at [address] [place] [address] N/A 2016    FUNCTIONAL ENDOSCOPIC SINUS SURGERY WITH BILATERAL MAXILLARY, FRONTAL & SPENOID ANTROSTOMIES, SUBMUCCOSAL RESECTION OF INFERIOR TURBINATES    TUBAL LIGATION  2021       Immunization History:   Immunization History   Administered Date(s) Administered    COVID-19, MODERNA BLUE border, Primary or Immunocompromised, (age 12y+), IM, 100 mcg/0.5mL 2021, 2021, 2021    Influenza Virus Vaccine 2010, 10/20/2011, 2018    Influenza, FLUARIX, FLULAVAL, FLUZONE (age 10 mo+) AND AFLURIA, (age 1 y+), PF, 0.5mL 10/16/2015, 10/29/2020       Active Problems:  Patient Active Problem List   Diagnosis Code    GERD with esophagitis K21.00    Allergic rhinitis J30.9    Congenital spondylolysis, lumbosacral region Q76.2    Degeneration of lumbar or lumbosacral intervertebral disc M51.37    Depression F32. A    Hemochromatosis, hereditary ([facility] [address] E83.110    Back pain M54.9    Class 3 severe obesity due to excess calories without serious comorbidity with body mass index (BMI) of 40.0 to 44.9 in adult ([facility]) E66.01, Z68.41    Essential hypertension I10    Paresthesias R20.2    Chronic generalized pain disorder R52, G89.29    DDD (degenerative disc disease), cervical M50.30    DDD (degenerative disc disease), lumbar M51.36    Dizziness and giddiness R42    Perianal abscess K61.0       Isolation/Infection:   Isolation            No Isolation          Patient Infection Status       Infection Onset Added Last Indicated Last Indicated By Review Planned Expiration Resolved Resolved By    None active    Resolved    COVID-19 (Rule Out) 12/10/20 12/10/20 12/10/20 COVID-19 Ambulatory (Ordered)   20 Rule-Out Test Resulted    COVID-19 (Rule Out) 20 COVID-19 Ambulatory (Ordered)   20 Rule-Out Test Resulted            Nurse Assessment:  Last Vital Signs: BP (!) 182/87   Pulse 82   Temp 97.5 °F (36.4 °C) (Oral)   Resp 18   Ht 5' 9\" (1.753 m)   Wt 287 lb (130.2 kg)   SpO2 97%   BMI 42.38 kg/m²     Last documented pain score (0-10 scale): Pain Level: 5  Last Weight:   Wt Readings from Last 1 Encounters:   22 287 lb (130.2 kg)     Mental Status:  {IP PT MENTAL STATUS:}    IV Access:  { ABIMAEL IV ACCESS:749951981}    Nursing Mobility/ADLs:  Walking   {CHP DME UGF}  Transfer  {CHP DME QAWS:975875092}  Bathing  {CHP DME IOSZ:088784336}  Dressing  {CHP DME OAZD:557279011}  Toileting  {CHP DME QHJO:357549427}  Feeding  {CHP DME HSIZ:863856854}  Med Admin  {CHP DME PBLE:732332650}  Med Delivery   {[facility] [place] Delivery:800914426}    Wound Care Documentation and Therapy:  Incision 09/15/22 Perineum (Active)   Dressing Status Dry; Old drainage noted; Intact 09/15/22 2030   Dressing/Treatment Gauze dressing/dressing sponge;ABD pad;Mesh panties 09/15/22 2030   Number of days: 0        Elimination:  Continence: Bowel: {YES / TP:89895}  Bladder: {YES / JE:69896}  Urinary Catheter: {Urinary Catheter:608512104}   Colostomy/Ileostomy/Ileal Conduit: {YES / IQ:01730}       Date of Last BM: ***    Intake/Output Summary (Last 24 hours) at 2022 0756  Last data filed at 9/15/2022 1753  Gross per 24 hour   Intake 1240 ml   Output 20 ml   Net 1220 ml     I/O last 3 completed shifts: In: 1240 [P.O.:240;  I.V.:1000]  Out: 20 [Blood:20]    Safety Concerns:     [employer] Safety Concerns:814682953}    Impairments/Disabilities:      [employer] Impairments/Disabilities:976599378}    Nutrition Therapy:  Current Nutrition Therapy:   [employer] Diet List:454002384}    Routes of Feeding: {P DME Other Feedings:035567411}  Liquids: {Slp liquid thickness:67239}  Daily Fluid Restriction: {CHP DME Yes amt example:137965173}  Last Modified Barium Swallow with Video (Video Swallowing Test): {Done Not Done RMCL:922496921}    Treatments at the Time of Hospital Discharge:   Respiratory Treatments: ***  Oxygen Therapy:  {Therapy; copd oxygen:54409}  Ventilator:    { CC Vent OZJS:969816206}    Rehab Therapies: {THERAPEUTIC INTERVENTION:4893690760}  Weight Bearing Status/Restrictions: [employer]  Weight Bearin}  Other Medical Equipment (for information only, NOT a DME order):  {EQUIPMENT:226963024}  Other Treatments: ***    Patient's personal belongings (please select all that are sent with patient):  {CHP DME Belongings:109102646}    RN SIGNATURE:  {Esignature:578577562}    CASE MANAGEMENT/SOCIAL WORK SECTION    Inpatient Status Date: ***    Readmission Risk Assessment Score:  Readmission Risk              Risk of Unplanned Readmission:  0           Discharging to Facility/ Agency   Name: Address:  Phone:  Fax:    Dialysis Facility (if applicable)   Name:  Address:  Dialysis Schedule:  Phone:  Fax:    / signature: {Esignature:877960734}    PHYSICIAN SECTION    Prognosis: {Prognosis:8134412452}    Condition at Discharge: Olga Lidia Luciano Patient Condition:552035191}    Rehab Potential (if transferring to Rehab): {Prognosis:8176545135}    Recommended Labs or Other Treatments After Discharge: ***    Physician Certification: I certify the above information and transfer of Manolo Haro  is necessary for the continuing treatment of the diagnosis listed and that she requires {Admit to Appropriate Level of Care:93564} for {GREATER/LESS:226354604} 30 days.      Update Admission H&P: {CHP DME Changes in DRCUO:474875558}    PHYSICIAN SIGNATURE:  {Esignature:337852683}

## 2022-09-16 NOTE — PATIENT CARE CONFERENCE
P Quality Flow/Interdisciplinary Rounds Progress Note        Quality Flow Rounds held on September 16, 2022    Disciplines Attending:  Bedside Nurse, , , and Nursing Unit Leadership    Urmila Varela was admitted on 9/14/2022  5:16 PM    Anticipated Discharge Date:  Expected Discharge Date: 09/17/22    Disposition:    Ernie Score:  Ernie Scale Score: 22    Readmission Risk              Risk of Unplanned Readmission:  0           Discussed patient goal for the day, patient clinical progression, and barriers to discharge.   The following Goal(s) of the Day/Commitment(s) have been identified:  Discharge - Obtain Order      Bianca Mensah RN  September 16, 2022

## 2022-09-19 LAB
ANAEROBIC CULTURE: ABNORMAL
CULTURE SURGICAL: ABNORMAL
ORGANISM: ABNORMAL

## 2022-09-22 ENCOUNTER — OFFICE VISIT (OUTPATIENT)
Dept: FAMILY MEDICINE CLINIC | Age: 37
End: 2022-09-22
Payer: COMMERCIAL

## 2022-09-22 VITALS
BODY MASS INDEX: 42.36 KG/M2 | OXYGEN SATURATION: 97 % | HEIGHT: 69 IN | TEMPERATURE: 98.7 F | WEIGHT: 286 LBS | HEART RATE: 78 BPM | DIASTOLIC BLOOD PRESSURE: 74 MMHG | SYSTOLIC BLOOD PRESSURE: 128 MMHG | RESPIRATION RATE: 16 BRPM

## 2022-09-22 DIAGNOSIS — K61.0 PERIANAL ABSCESS: Primary | ICD-10-CM

## 2022-09-22 DIAGNOSIS — R05.9 COUGH: ICD-10-CM

## 2022-09-22 PROCEDURE — G8417 CALC BMI ABV UP PARAM F/U: HCPCS | Performed by: FAMILY MEDICINE

## 2022-09-22 PROCEDURE — 99213 OFFICE O/P EST LOW 20 MIN: CPT | Performed by: FAMILY MEDICINE

## 2022-09-22 PROCEDURE — G8427 DOCREV CUR MEDS BY ELIG CLIN: HCPCS | Performed by: FAMILY MEDICINE

## 2022-09-22 PROCEDURE — 1036F TOBACCO NON-USER: CPT | Performed by: FAMILY MEDICINE

## 2022-09-22 ASSESSMENT — ENCOUNTER SYMPTOMS
SINUS PAIN: 0
SORE THROAT: 0
TROUBLE SWALLOWING: 0
EYE REDNESS: 0
SHORTNESS OF BREATH: 0
PHOTOPHOBIA: 0
VOMITING: 0
EYE DISCHARGE: 0
BACK PAIN: 0
COUGH: 0
DIARRHEA: 0
ALLERGIC/IMMUNOLOGIC NEGATIVE: 1
CHEST TIGHTNESS: 0
ABDOMINAL PAIN: 0
RECTAL PAIN: 1
BLOOD IN STOOL: 0
EYE PAIN: 0
NAUSEA: 0

## 2022-09-22 NOTE — PROGRESS NOTES
22  Rayna Perry Brothers : 1985 Sex: female  Age: 44 y.o. Assessment and Plan:  Blu Carvajal was seen today for follow-up from hospital.    Diagnoses and all orders for this visit:    Perianal abscess    Cough    She is to continue Mucinex DM, can add Flonase nasal spray as well twice daily for the cough. Surgeon is closely monitoring her postoperative care for the anal abscess. As she heals, work-up will then commence for possibility of fistula. Remain off work until , excuse given. I would like to recheck her in 4 weeks. Return in about 4 weeks (around 10/20/2022). Chief Complaint   Patient presents with    Follow-Up from Hospital       For post hospital visit. She was found to have a perianal abscess approximately 5 x 5 x 3 cm side required hospitalization with intravenous antibiotics and then drainage. CT port suggested the possibility of fistula present there is a consider amount of inflammation as well. Is in today for post hospital visit. She saw the surgeon yesterday where the drain was removed. She continues to take oral cephalexin and metronidazole. Review of Systems   Constitutional:  Negative for appetite change, fatigue and unexpected weight change. HENT:  Negative for congestion, ear pain, hearing loss, sinus pain, sore throat and trouble swallowing. Eyes:  Negative for photophobia, pain, discharge and redness. Respiratory:  Negative for cough, chest tightness and shortness of breath. Cardiovascular:  Negative for chest pain, palpitations and leg swelling. Gastrointestinal:  Positive for rectal pain. Negative for abdominal pain, blood in stool, diarrhea, nausea and vomiting. Endocrine: Negative. Genitourinary:  Negative for dysuria, flank pain, frequency and hematuria. Musculoskeletal:  Negative for arthralgias, back pain, joint swelling and myalgias. Skin: Negative. Allergic/Immunologic: Negative.     Neurological:  Negative for dizziness, seizures, syncope, weakness, light-headedness, numbness and headaches. Hematological:  Negative for adenopathy. Does not bruise/bleed easily. Psychiatric/Behavioral: Negative.          Current Outpatient Medications:     cephALEXin (KEFLEX) 500 MG capsule, Take 1 capsule by mouth 4 times daily for 7 days, Disp: 28 capsule, Rfl: 0    metroNIDAZOLE (FLAGYL) 500 MG tablet, Take 1 tablet by mouth 3 times daily for 10 days, Disp: 30 tablet, Rfl: 0    albuterol sulfate HFA (VENTOLIN HFA) 108 (90 Base) MCG/ACT inhaler, Inhale 2 puffs into the lungs 4 times daily as needed for Wheezing, Disp: 18 g, Rfl: 0    amLODIPine (NORVASC) 5 MG tablet, Take 1 tablet by mouth in the morning., Disp: 30 tablet, Rfl: 5    vitamin D (CHOLECALCIFEROL) 25 MCG (1000 UT) TABS tablet, Take 1,000 Units by mouth in the morning., Disp: , Rfl:     propranolol (INDERAL LA) 60 MG extended release capsule, Take 1 capsule by mouth 2 times daily, Disp: 60 capsule, Rfl: 2    pantoprazole (PROTONIX) 20 MG tablet, take 1 tablet by mouth every morning BEFORE BREAKFAST, Disp: 30 tablet, Rfl: 2    citalopram (CELEXA) 40 MG tablet, 40 mg daily, Disp: , Rfl:     traZODone (DESYREL) 50 MG tablet, , Disp: , Rfl:   Allergies   Allergen Reactions    Pcn [Penicillins] Rash       Past Medical History:   Diagnosis Date    Abnormal Pap smear of cervix     Back pain     Chronic generalized pain disorder 8/13/2021    Chronic sinusitis     with facial pain    Class 3 severe obesity due to excess calories without serious comorbidity with body mass index (BMI) of 40.0 to 44.9 in adult [facility])     DDD (degenerative disc disease), cervical 8/13/2021    DDD (degenerative disc disease), lumbar 8/13/2021    Depression 06/06/2011    Essential hypertension     Essential hypertension     Gastroesophageal reflux disease without esophagitis     Hemochromatosis     Migraines     born with heart murmur    Paresthesias 8/10/2021    Seasonal allergies      Past Surgical History:   Procedure Laterality Date    RECTAL SURGERY N/A 9/15/2022    DRAINAGE PERIANAL ABSCESS performed by Fatuma Amaral MD at [address] N/A 02/25/2016    FUNCTIONAL ENDOSCOPIC SINUS SURGERY WITH BILATERAL MAXILLARY, FRONTAL & SPENOID ANTROSTOMIES, SUBMUCCOSAL RESECTION OF INFERIOR TURBINATES    TUBAL LIGATION  07/22/2021     Family History   Problem Relation Age of Onset    Arthritis Mother     Other Mother         sinus problems    High Blood Pressure Father     Other Maternal Aunt         migraine    Other Maternal Uncle         migraine    Other Paternal Aunt         migraine    Other Paternal Uncle         migraine    Arthritis Maternal Grandmother      Social History     Socioeconomic History    Marital status:      Spouse name: Not on file    Number of children: Not on file    Years of education: Not on file    Highest education level: Not on file   Occupational History    Not on file   Tobacco Use    Smoking status: Never    Smokeless tobacco: Never   Vaping Use    Vaping Use: Never used   Substance and Sexual Activity    Alcohol use: No     Alcohol/week: 0.0 standard drinks    Drug use: No    Sexual activity: Not on file   Other Topics Concern    Not on file   Social History Narrative    Not on file     Social Determinants of Health     Financial Resource Strain: Unknown    Difficulty of Paying Living Expenses: Patient refused   Food Insecurity: No Food Insecurity    Worried About Running Out of Food in the Last Year: Never true    Ran Out of Food in the Last Year: Never true   Transportation Needs: Not on file   Physical Activity: Not on file   Stress: Not on file   Social Connections: Not on file   Intimate Partner Violence: Not on file   Housing Stability: Not on file       Vitals:    09/22/22 0952   BP: 128/74   Pulse: 78   Resp: 16   Temp: 98.7 °F (37.1 °C)   TempSrc: Temporal   SpO2: 97%   Weight: 286 lb (129.7 kg)   Height: 5' 9\" (1.753 m)       Physical Exam  Vitals and nursing note reviewed. Constitutional:       Appearance: She is well-developed. HENT:      Head: Atraumatic. Right Ear: External ear normal.      Left Ear: External ear normal.      Nose: Nose normal.      Mouth/Throat:      Pharynx: No oropharyngeal exudate. Eyes:      Conjunctiva/sclera: Conjunctivae normal.      Pupils: Pupils are equal, round, and reactive to light. Neck:      Thyroid: No thyromegaly. Trachea: No tracheal deviation. Cardiovascular:      Rate and Rhythm: Normal rate and regular rhythm. Heart sounds: No murmur heard. No friction rub. No gallop. Pulmonary:      Effort: Pulmonary effort is normal. No respiratory distress. Breath sounds: Normal breath sounds. Abdominal:      General: Bowel sounds are normal.      Palpations: Abdomen is soft. Comments: Perianal abscess with incision and drainage. Musculoskeletal:         General: No tenderness or deformity. Normal range of motion. Cervical back: Normal range of motion and neck supple. Lymphadenopathy:      Cervical: No cervical adenopathy. Skin:     General: Skin is warm and dry. Capillary Refill: Capillary refill takes less than 2 seconds. Findings: No rash. Neurological:      Mental Status: She is alert and oriented to person, place, and time. Sensory: No sensory deficit. Motor: No abnormal muscle tone.       Coordination: Coordination normal.      Deep Tendon Reflexes: Reflexes normal.           Seen By:  Andi Eaton DO

## 2022-09-22 NOTE — TELEPHONE ENCOUNTER
Phone: [address]    Fax: [phone]                                 Outpatient Speech Therapy                               DAILY TREATMENT NOTE    Date: 2022  Patients Name:  Mlevi Davies  YOB: 2007 (11 y.o.)  Gender:  male  MRN:  [id number]  [facility] #: [id number]  Referring physician:Romy Lynch    Diagnosis: Head Injury, S09.90XD, Cognitive Communication Impairment R41. 841    Precautions:       INSURANCE  Visit Information  SLP Insurance Information: [facility]  Total # of Visits Approved: 30  Total # of Visits to Date: 3  No Show: 1  Canceled Appointment: 0    PAIN  [x]No     []Yes      Pain Rating (0-10 pain scale): 0  Location:  N/A  Pain Description:  NA    SUBJECTIVE  Patient presents to clinic with his grandmother. SHORT TERM GOALS/ TREATMENT SESSION:  Subjective report:           Patient reports that school has been going well for him. He states that it is hard for him when people give him a question. He states he forgets it and then it will come back to him after a bit of time. Patient's grandmother would like to place patient on hold at this time for both PT and OT. ST will call in 2 weeks to check-in on patient. Goal 1: Patient will complete short-term and working memory tasks with 80% accuracy. Patient recalled 5 items after delay independently in 5/5, 5/5     Patient recalled message at beginning of session in      Working memory-3 words in alphabetical order 30% IND, increasing to 80% with 1-2 repetitions. [x]Met  []Partially met  []Not met   Goal 2: Patient will complete problem solving/executive functioning tasks with 80% accuracy. Patient completed problem solving task with plannin% IND, increasing to 100% with minimal cues. Sequencing events/prioritizing:   Min cues to identify all necessary parts needed to complete tasks. - 80% accuracy     Patient reports eRxed difficulty scanning. Patient cued to use his fingers to find his spots. Deductive reasoning- 80% IND, increasing to 100% with min cues. [x]Met  []Partially met  []Not met   Goal 3: Patient will demonstrate adequate sustained and alternating attention during functional tasks with 80% accuracy. Sustained attention and alternating attention tasks with 90% accuracy. [x]Met  []Partially met  []Not met     LONG TERM GOALS/ TREATMENT SESSION:  Goal 1: Patient will demonstrate improved cognitive-linguistic skills to complete activities of daily living independently Goal progressing.  See STG data   []Met  [x]Partially met  []Not met       EDUCATION/HOME EXERCISE PROGRAM (HEP)  New Education/HEP provided to patient/family/caregiver:  Progress in therapy, WRAP memory strategy, HEP    Method of Education:     [x]Discussion     []Demonstration    [] Written     []Other  Evaluation of Patients Response to Education:         [x]Patient and or caregiver verbalized understanding  []Patient and or Caregiver Demonstrated without assistance   []Patient and or Caregiver Demonstrated with assistance  []Needs additional instruction to demonstrate understanding of education    ASSESSMENT  Patient tolerated todays treatment session:    [x] Good   []  Fair   []  Poor  Limitations/difficulties with treatment session due to:   []Pain     []Fatigue     []Other medical complications     []Other    Comments:    PLAN  []Continue with current plan of care  [][facility]  [x]IHold per patient request  [] Change Treatment plan: .  [] Insurance hold  __ Other    Minutes Tracking:  SLP Individual Minutes  Time In: 5402  Time Out: 1400  Minutes: 45    Charges: 1  Electronically signed by:    Jose Roberto Cedeño              Date:9/22/2022

## 2022-09-23 ENCOUNTER — TELEPHONE (OUTPATIENT)
Dept: FAMILY MEDICINE CLINIC | Age: 37
End: 2022-09-23

## 2022-09-23 RX ORDER — FLUCONAZOLE 100 MG/1
100 TABLET ORAL DAILY
Qty: 5 TABLET | Refills: 0 | Status: SHIPPED | OUTPATIENT
Start: 2022-09-23 | End: 2022-09-28

## 2022-09-23 NOTE — TELEPHONE ENCOUNTER
Giovannyelynick Barrios has a yeast infection from the antibiotics she is on. She is asking you to send the medication for that to [address] [place] [address] in [place].

## 2022-09-25 ENCOUNTER — APPOINTMENT (OUTPATIENT)
Dept: CT IMAGING | Age: 37
End: 2022-09-25
Payer: COMMERCIAL

## 2022-09-25 ENCOUNTER — APPOINTMENT (OUTPATIENT)
Dept: GENERAL RADIOLOGY | Age: 37
End: 2022-09-25
Payer: COMMERCIAL

## 2022-09-25 ENCOUNTER — HOSPITAL ENCOUNTER (EMERGENCY)
Age: 37
Discharge: HOME | End: 2022-09-25
Attending: EMERGENCY MEDICINE
Payer: COMMERCIAL

## 2022-09-25 VITALS
WEIGHT: 285 LBS | DIASTOLIC BLOOD PRESSURE: 88 MMHG | RESPIRATION RATE: 16 BRPM | TEMPERATURE: 97.7 F | HEIGHT: 69 IN | SYSTOLIC BLOOD PRESSURE: 135 MMHG | BODY MASS INDEX: 42.21 KG/M2 | OXYGEN SATURATION: 98 % | HEART RATE: 65 BPM

## 2022-09-25 DIAGNOSIS — R10.84 GENERALIZED ABDOMINAL PAIN: Primary | ICD-10-CM

## 2022-09-25 DIAGNOSIS — R11.0 NAUSEA: ICD-10-CM

## 2022-09-25 LAB
ALBUMIN SERPL-MCNC: 3.8 G/DL (ref 3.5–5.2)
ALP BLD-CCNC: 86 U/L (ref 35–104)
ALT SERPL-CCNC: 48 U/L (ref 0–32)
ANION GAP SERPL CALCULATED.3IONS-SCNC: 10 MMOL/L (ref 7–16)
AST SERPL-CCNC: 22 U/L (ref 0–31)
BACTERIA: ABNORMAL /HPF
BASOPHILS ABSOLUTE: 0.07 E9/L (ref 0–0.2)
BASOPHILS RELATIVE PERCENT: 0.6 % (ref 0–2)
BILIRUB SERPL-MCNC: 0.2 MG/DL (ref 0–1.2)
BILIRUBIN URINE: NEGATIVE
BLOOD, URINE: ABNORMAL
BUN BLDV-MCNC: 13 MG/DL (ref 6–20)
CALCIUM SERPL-MCNC: 9.4 MG/DL (ref 8.6–10.2)
CHLORIDE BLD-SCNC: 101 MMOL/L (ref 98–107)
CLARITY: CLEAR
CO2: 24 MMOL/L (ref 22–29)
COLOR: YELLOW
CREAT SERPL-MCNC: 0.7 MG/DL (ref 0.5–1)
EOSINOPHILS ABSOLUTE: 0.26 E9/L (ref 0.05–0.5)
EOSINOPHILS RELATIVE PERCENT: 2.4 % (ref 0–6)
GFR AFRICAN AMERICAN: >60
GFR NON-AFRICAN AMERICAN: >60 ML/MIN/1.73
GLUCOSE BLD-MCNC: 95 MG/DL (ref 74–99)
GLUCOSE URINE: NEGATIVE MG/DL
HCG(URINE) PREGNANCY TEST: NEGATIVE
HCT VFR BLD CALC: 40.7 % (ref 34–48)
HEMOGLOBIN: 13.5 G/DL (ref 11.5–15.5)
IMMATURE GRANULOCYTES #: 0.1 E9/L
IMMATURE GRANULOCYTES %: 0.9 % (ref 0–5)
INFLUENZA A BY PCR: NOT DETECTED
INFLUENZA B BY PCR: NOT DETECTED
KETONES, URINE: NEGATIVE MG/DL
LACTIC ACID: 1.1 MMOL/L (ref 0.5–2.2)
LEUKOCYTE ESTERASE, URINE: ABNORMAL
LIPASE: 31 U/L (ref 13–60)
LYMPHOCYTES ABSOLUTE: 3.68 E9/L (ref 1.5–4)
LYMPHOCYTES RELATIVE PERCENT: 33.4 % (ref 20–42)
MCH RBC QN AUTO: 29.8 PG (ref 26–35)
MCHC RBC AUTO-ENTMCNC: 33.2 % (ref 32–34.5)
MCV RBC AUTO: 89.8 FL (ref 80–99.9)
MONOCYTES ABSOLUTE: 0.7 E9/L (ref 0.1–0.95)
MONOCYTES RELATIVE PERCENT: 6.4 % (ref 2–12)
NEUTROPHILS ABSOLUTE: 6.2 E9/L (ref 1.8–7.3)
NEUTROPHILS RELATIVE PERCENT: 56.3 % (ref 43–80)
NITRITE, URINE: NEGATIVE
PDW BLD-RTO: 12.1 FL (ref 11.5–15)
PH UA: 6 (ref 5–9)
PLATELET # BLD: 346 E9/L (ref 130–450)
PMV BLD AUTO: 8.5 FL (ref 7–12)
POTASSIUM REFLEX MAGNESIUM: 4 MMOL/L (ref 3.5–5)
PROTEIN UA: NEGATIVE MG/DL
RBC # BLD: 4.53 E12/L (ref 3.5–5.5)
RBC UA: ABNORMAL /HPF (ref 0–2)
SARS-COV-2, NAAT: NOT DETECTED
SODIUM BLD-SCNC: 135 MMOL/L (ref 132–146)
SPECIFIC GRAVITY UA: 1.02 (ref 1–1.03)
TOTAL PROTEIN: 7 G/DL (ref 6.4–8.3)
UROBILINOGEN, URINE: 0.2 E.U./DL
WBC # BLD: 11 E9/L (ref 4.5–11.5)
WBC UA: ABNORMAL /HPF (ref 0–5)

## 2022-09-25 PROCEDURE — 99285 EMERGENCY DEPT VISIT HI MDM: CPT

## 2022-09-25 PROCEDURE — 96374 THER/PROPH/DIAG INJ IV PUSH: CPT

## 2022-09-25 PROCEDURE — 96375 TX/PRO/DX INJ NEW DRUG ADDON: CPT

## 2022-09-25 PROCEDURE — 74177 CT ABD & PELVIS W/CONTRAST: CPT

## 2022-09-25 PROCEDURE — 83605 ASSAY OF LACTIC ACID: CPT

## 2022-09-25 PROCEDURE — 71045 X-RAY EXAM CHEST 1 VIEW: CPT

## 2022-09-25 PROCEDURE — 81001 URINALYSIS AUTO W/SCOPE: CPT

## 2022-09-25 PROCEDURE — 6360000004 HC RX CONTRAST MEDICATION: Performed by: RADIOLOGY

## 2022-09-25 PROCEDURE — 87502 INFLUENZA DNA AMP PROBE: CPT

## 2022-09-25 PROCEDURE — 80053 COMPREHEN METABOLIC PANEL: CPT

## 2022-09-25 PROCEDURE — 85025 COMPLETE CBC W/AUTO DIFF WBC: CPT

## 2022-09-25 PROCEDURE — 6360000002 HC RX W HCPCS: Performed by: STUDENT IN AN ORGANIZED HEALTH CARE EDUCATION/TRAINING PROGRAM

## 2022-09-25 PROCEDURE — 87635 SARS-COV-2 COVID-19 AMP PRB: CPT

## 2022-09-25 PROCEDURE — 83690 ASSAY OF LIPASE: CPT

## 2022-09-25 PROCEDURE — 81025 URINE PREGNANCY TEST: CPT

## 2022-09-25 PROCEDURE — 87088 URINE BACTERIA CULTURE: CPT

## 2022-09-25 RX ORDER — ONDANSETRON 2 MG/ML
4 INJECTION INTRAMUSCULAR; INTRAVENOUS ONCE
Status: COMPLETED | OUTPATIENT
Start: 2022-09-25 | End: 2022-09-25

## 2022-09-25 RX ORDER — ONDANSETRON 4 MG/1
4 TABLET, ORALLY DISINTEGRATING ORAL 3 TIMES DAILY PRN
Qty: 21 TABLET | Refills: 0 | Status: SHIPPED | OUTPATIENT
Start: 2022-09-25

## 2022-09-25 RX ORDER — FENTANYL CITRATE 50 UG/ML
50 INJECTION, SOLUTION INTRAMUSCULAR; INTRAVENOUS ONCE
Status: COMPLETED | OUTPATIENT
Start: 2022-09-25 | End: 2022-09-25

## 2022-09-25 RX ADMIN — FENTANYL CITRATE 50 MCG: 50 INJECTION, SOLUTION INTRAMUSCULAR; INTRAVENOUS at 02:28

## 2022-09-25 RX ADMIN — ONDANSETRON 4 MG: 2 INJECTION INTRAMUSCULAR; INTRAVENOUS at 02:29

## 2022-09-25 RX ADMIN — IOPAMIDOL 75 ML: 755 INJECTION, SOLUTION INTRAVENOUS at 02:46

## 2022-09-25 ASSESSMENT — PAIN SCALES - GENERAL: PAINLEVEL_OUTOF10: 2

## 2022-09-25 ASSESSMENT — ENCOUNTER SYMPTOMS
NAUSEA: 1
VOICE CHANGE: 0
TROUBLE SWALLOWING: 0
ABDOMINAL PAIN: 1
SHORTNESS OF BREATH: 0
PHOTOPHOBIA: 0
COUGH: 1
DIARRHEA: 1
VOMITING: 0

## 2022-09-25 ASSESSMENT — PAIN DESCRIPTION - DESCRIPTORS: DESCRIPTORS: SHARP;SHOOTING

## 2022-09-25 ASSESSMENT — PAIN DESCRIPTION - LOCATION: LOCATION: ABDOMEN

## 2022-09-25 ASSESSMENT — PAIN DESCRIPTION - FREQUENCY: FREQUENCY: CONTINUOUS

## 2022-09-25 NOTE — ED PROVIDER NOTES
HPI   Patient is a 34-year-old female with past medical history of obesity and hypertension presenting the emergency department due to worsening abdominal pain. Patient states that her symptoms started acutely today around 10:00. She endorses diffuse abdominal pain that is worse on the right. The pain is sharp, intermittent and nonradiating. Nothing particular makes the pain better or worse. Patient also having associated nausea with no vomiting. Of note, patient was seen in the emergency department on 9/14/2022 for evaluation of rectal pain. She was found to have gluteal and perianal abscess during visit and was admitted by surgery. Patient had abscess drained in the OR by Dr. Leonarda Feliciano. She states drain was placed after surgery and was removed on Wednesday. Patient currently on Flagyl and completed course of Keflex. Patient was also noting that she was recently diagnosed with a yeast infection as on Diflucan now as well. She started this on Friday and it is still taking as prescribed. Patient does admit to diarrhea over the past few days but no blood in the stool. She is also stating that she has a cough with yellow sputum that has been present ever since her surgery. She saw her PCP on Thursday and was told to take Mucinex. Patient was told that her lungs were clear to auscultation by her PCP. Patient does admit to having COVID 3 weeks ago but does not think her cough is lingering from [facility]. She otherwise denies other symptoms including fever, chills, headache, lightheadedness, syncope, chest pain, shortness of breath, flank pain, myalgias, congestion, runny nose, urinary symptoms or constipation. Patient symptoms are mild to moderate with no remitting or exacerbating factors. Patient denying any recent sick contacts or abnormal food ingestions. Patient is a tubal ligation but no other major abdominal surgeries. Review of Systems   Constitutional:  Negative for chills, fatigue and fever. HENT:  Negative for trouble swallowing and voice change. Eyes:  Negative for photophobia and visual disturbance. Respiratory:  Positive for cough. Negative for shortness of breath. Cough, productive with yellow sputum    Cardiovascular:  Negative for chest pain and palpitations. Gastrointestinal:  Positive for abdominal pain, diarrhea and nausea. Negative for vomiting. Endocrine: Negative for polydipsia, polyphagia and polyuria. Genitourinary:  Negative for dysuria, flank pain, genital sores and urgency. Musculoskeletal:  Negative for arthralgias. Skin:  Negative for rash and wound. Neurological:  Negative for dizziness, weakness, numbness and headaches. Psychiatric/Behavioral:  Negative for behavioral problems and confusion. Physical Exam  Constitutional:       General: She is not in acute distress. Appearance: Normal appearance. She is obese. She is not ill-appearing. HENT:      Head: Normocephalic and atraumatic. Mouth/Throat:      Mouth: Mucous membranes are moist.      Pharynx: Oropharynx is clear. Eyes:      Pupils: Pupils are equal, round, and reactive to light. Cardiovascular:      Rate and Rhythm: Normal rate and regular rhythm. Pulses: Normal pulses. Heart sounds: Normal heart sounds. Pulmonary:      Effort: Pulmonary effort is normal. No respiratory distress. Breath sounds: Normal breath sounds. No wheezing or rales. Abdominal:      General: Abdomen is flat. Palpations: Abdomen is soft. Tenderness: There is generalized abdominal tenderness. There is no right CVA tenderness, left CVA tenderness, guarding or rebound. Comments: Moderate tenderness to palpation abdomen diffusely. No rebound, guarding or rigidity noted. No obvious masses palpated. Musculoskeletal:      Cervical back: Normal range of motion and neck supple. Skin:     General: Skin is warm and dry.       Capillary Refill: Capillary refill takes less than 2 seconds. Neurological:      General: No focal deficit present. Mental Status: She is alert and oriented to person, place, and time. Cranial Nerves: No cranial nerve deficit. Coordination: Coordination normal.        MDM   Patient is a 79-year-old female with past medical history of obesity and hypertension presenting the emergency department due to worsening abdominal pain. On initial evaluation, patient is nontoxic-appearing, afebrile, hemodynamically stable in no acute distress. Initial vitals within appropriate limits. Physical exam remarkable for moderate tenderness to palpation in the abdomen diffusely without rebound, guarding or rigidity. No evidence of acute or surgical abdomen noted. Work-up in the emergency department unremarkable. No clinically significant lab abnormality seen. Viral testing negative. Chest x-ray clear. CT abdomen pelvis obtained with no evidence of acute intra-abdominal process. There is note of complex fat/fluid attenuation suggesting periumbilical hernia however on repeat abdominal exam, no obvious mass or hernia was noted. Patient given fentanyl and Zofran in the emergency department with improvement in her symptoms on reevaluation. Work-up results were discussed with the patient and she is agreeable to discharge with outpatient follow-up as needed. Vitals remained stable in the ED. Patient given return precautions in case of new or worsening symptoms. ED Course as of 09/25/22 0345   Sun Sep 25, 2022   4518 Patient reevaluated. Her pain is improved. She states that she is feeling better. Work-up and results discussed with her and she is agreeable to discharge with outpatient general surgery or PCP follow-up as needed. Assessed for periumbilical hernia noted on CT abdomen pelvis but there was no obvious mass palpated.  [PP]      ED Course User Index  [PP] Bev Monsivais DO      --------------------------------------------- PAST HISTORY ---------------------------------------------  Past Medical History:  has a past medical history of Abnormal Pap smear of cervix, Back pain, Chronic generalized pain disorder, Chronic sinusitis, Class 3 severe obesity due to excess calories without serious comorbidity with body mass index (BMI) of 40.0 to 44.9 in adult [facility]), DDD (degenerative disc disease), cervical, DDD (degenerative disc disease), lumbar, Depression, Essential hypertension, Essential hypertension, Gastroesophageal reflux disease without esophagitis, Hemochromatosis, Migraines, Paresthesias, and Seasonal allergies. Past Surgical History:  has a past surgical history that includes sinus surgery (N/A, 02/25/2016); Tubal ligation (07/22/2021); and Rectal surgery (N/A, 9/15/2022). Social History:  reports that she has never smoked. She has never used smokeless tobacco. She reports that she does not drink alcohol and does not use drugs. Family History: family history includes Arthritis in her maternal grandmother and mother; High Blood Pressure in her father; Other in her maternal aunt, maternal uncle, mother, paternal aunt, and paternal uncle. The patients home medications have been reviewed.     Allergies: Pcn [penicillins]    -------------------------------------------------- RESULTS -------------------------------------------------  Labs:  Results for orders placed or performed during the hospital encounter of 09/25/22   COVID-19, Rapid    Specimen: Nasopharyngeal Swab   Result Value Ref Range    SARS-CoV-2, NAAT Not Detected Not Detected   RAPID INFLUENZA A/B ANTIGENS    Specimen: Nasopharyngeal   Result Value Ref Range    Influenza A by PCR Not Detected Not Detected    Influenza B by PCR Not Detected Not Detected   CBC with Auto Differential   Result Value Ref Range    WBC 11.0 4.5 - 11.5 E9/L    RBC 4.53 3.50 - 5.50 E12/L    Hemoglobin 13.5 11.5 - 15.5 g/dL    Hematocrit 40.7 34.0 - 48.0 %    MCV 89.8 80.0 - 99.9 fL    MCH 29.8 26.0 - 35.0 pg    MCHC 33.2 32.0 - 34.5 %    RDW 12.1 11.5 - 15.0 fL    Platelets 707 083 - 306 E9/L    MPV 8.5 7.0 - 12.0 fL    Neutrophils % 56.3 43.0 - 80.0 %    Immature Granulocytes % 0.9 0.0 - 5.0 %    Lymphocytes % 33.4 20.0 - 42.0 %    Monocytes % 6.4 2.0 - 12.0 %    Eosinophils % 2.4 0.0 - 6.0 %    Basophils % 0.6 0.0 - 2.0 %    Neutrophils Absolute 6.20 1.80 - 7.30 E9/L    Immature Granulocytes # 0.10 E9/L    Lymphocytes Absolute 3.68 1.50 - 4.00 E9/L    Monocytes Absolute 0.70 0.10 - 0.95 E9/L    Eosinophils Absolute 0.26 0.05 - 0.50 E9/L    Basophils Absolute 0.07 0.00 - 0.20 E9/L   Comprehensive Metabolic Panel w/ Reflex to MG   Result Value Ref Range    Sodium 135 132 - 146 mmol/L    Potassium reflex Magnesium 4.0 3.5 - 5.0 mmol/L    Chloride 101 98 - 107 mmol/L    CO2 24 22 - 29 mmol/L    Anion Gap 10 7 - 16 mmol/L    Glucose 95 74 - 99 mg/dL    BUN 13 6 - 20 mg/dL    Creatinine 0.7 0.5 - 1.0 mg/dL    GFR Non-African American >60 >=60 mL/min/1.73    GFR African American >60     Calcium 9.4 8.6 - 10.2 mg/dL    Total Protein 7.0 6.4 - 8.3 g/dL    Albumin 3.8 3.5 - 5.2 g/dL    Total Bilirubin 0.2 0.0 - 1.2 mg/dL    Alkaline Phosphatase 86 35 - 104 U/L    ALT 48 (H) 0 - 32 U/L    AST 22 0 - 31 U/L   Lactic Acid   Result Value Ref Range    Lactic Acid 1.1 0.5 - 2.2 mmol/L   Lipase   Result Value Ref Range    Lipase 31 13 - 60 U/L   Urinalysis with Microscopic   Result Value Ref Range    Color, UA Yellow Straw/Yellow    Clarity, UA Clear Clear    Glucose, Ur Negative Negative mg/dL    Bilirubin Urine Negative Negative    Ketones, Urine Negative Negative mg/dL    Specific Gravity, UA 1.025 1.005 - 1.030    Blood, Urine SMALL (A) Negative    pH, UA 6.0 5.0 - 9.0    Protein, UA Negative Negative mg/dL    Urobilinogen, Urine 0.2 <2.0 E.U./dL    Nitrite, Urine Negative Negative    Leukocyte Esterase, Urine TRACE (A) Negative    WBC, UA 0-1 0 - 5 /HPF    RBC, UA 1-3 0 - 2 /HPF    Bacteria, UA RARE (A) None Seen /HPF Pregnancy, Urine   Result Value Ref Range    HCG(Urine) Pregnancy Test NEGATIVE NEGATIVE       Radiology:  XR CHEST PORTABLE   Final Result   No acute disease. RECOMMENDATION:   Careful clinical correlation and follow up recommended. CT ABDOMEN PELVIS W IV CONTRAST Additional Contrast? None   Final Result   1. No acute disease. 2. Normal appendix. 3. Successful drainage of previously demonstrated right gluteal/perianal   abscess. No residual abscess identified. 4. Complex fat/fluid attenuation periumbilical hernia may represent a source   for the patient's current symptoms although it appears similar to the   previous examination of 2 weeks prior. RECOMMENDATIONS:   Careful clinical correlation and follow up recommended. ------------------------- NURSING NOTES AND VITALS REVIEWED ---------------------------  Date / Time Roomed:  9/25/2022 12:30 AM  ED Bed Assignment:  04/04    The nursing notes within the ED encounter and vital signs as below have been reviewed. /88   Pulse 65   Temp 97.7 °F (36.5 °C) (Oral)   Resp 16   Ht 5' 9\" (1.753 m)   Wt 285 lb (129.3 kg)   SpO2 98%   BMI 42.09 kg/m²   Oxygen Saturation Interpretation: Normal      ------------------------------------------ PROGRESS NOTES ------------------------------------------  I have spoken with the patient and discussed todays results, in addition to providing specific details for the plan of care and counseling regarding the diagnosis and prognosis. Their questions are answered at this time and they are agreeable with the plan. I discussed at length with them reasons for immediate return here for re evaluation. They will followup with primary care by calling their office tomorrow.       --------------------------------- ADDITIONAL PROVIDER NOTES ---------------------------------  At this time the patient is without objective evidence of an acute process requiring hospitalization or inpatient management. They have remained hemodynamically stable throughout their entire ED visit and are stable for discharge with outpatient follow-up. The plan has been discussed in detail and they are aware of the specific conditions for emergent return, as well as the importance of follow-up. New Prescriptions    ONDANSETRON (ZOFRAN-ODT) 4 MG DISINTEGRATING TABLET    Take 1 tablet by mouth 3 times daily as needed for Nausea or Vomiting       Diagnosis:  1. Generalized abdominal pain    2. Nausea        Disposition:  Patient's disposition: Discharge to home  Patient's condition is stable.            Sally Vazquez DO  Resident  09/25/22 6232

## 2022-09-26 ENCOUNTER — OFFICE VISIT (OUTPATIENT)
Dept: FAMILY MEDICINE CLINIC | Age: 37
End: 2022-09-26
Payer: COMMERCIAL

## 2022-09-26 VITALS
OXYGEN SATURATION: 98 % | HEIGHT: 69 IN | DIASTOLIC BLOOD PRESSURE: 78 MMHG | WEIGHT: 292 LBS | SYSTOLIC BLOOD PRESSURE: 130 MMHG | RESPIRATION RATE: 16 BRPM | TEMPERATURE: 98 F | HEART RATE: 78 BPM | BODY MASS INDEX: 43.25 KG/M2

## 2022-09-26 DIAGNOSIS — K42.9 UMBILICAL HERNIA WITHOUT OBSTRUCTION AND WITHOUT GANGRENE: Primary | ICD-10-CM

## 2022-09-26 DIAGNOSIS — R10.84 GENERALIZED ABDOMINAL PAIN: ICD-10-CM

## 2022-09-26 PROCEDURE — 1036F TOBACCO NON-USER: CPT | Performed by: FAMILY MEDICINE

## 2022-09-26 PROCEDURE — G8427 DOCREV CUR MEDS BY ELIG CLIN: HCPCS | Performed by: FAMILY MEDICINE

## 2022-09-26 PROCEDURE — G8417 CALC BMI ABV UP PARAM F/U: HCPCS | Performed by: FAMILY MEDICINE

## 2022-09-26 PROCEDURE — 99213 OFFICE O/P EST LOW 20 MIN: CPT | Performed by: FAMILY MEDICINE

## 2022-09-26 ASSESSMENT — ENCOUNTER SYMPTOMS
PHOTOPHOBIA: 0
NAUSEA: 1
ABDOMINAL PAIN: 1
DIARRHEA: 0
BLOOD IN STOOL: 0
ABDOMINAL DISTENTION: 1
ALLERGIC/IMMUNOLOGIC NEGATIVE: 1
EYE PAIN: 0
TROUBLE SWALLOWING: 0
SORE THROAT: 0
COUGH: 0
SHORTNESS OF BREATH: 0
CHEST TIGHTNESS: 0
RECTAL PAIN: 1
EYE DISCHARGE: 0
SINUS PAIN: 0
EYE REDNESS: 0
VOMITING: 0
BACK PAIN: 0

## 2022-09-26 NOTE — PROGRESS NOTES
22  [facility] Brothers : 1985 Sex: female  Age: 44 y.o. Assessment and Plan:  Adolph Ruelas was seen today for abdominal pain. Diagnoses and all orders for this visit:    Umbilical hernia without obstruction and without gangrene    Generalized abdominal pain    She is feeling better than she did yesterday when she went to the emergency room. The results scanning and lab result was reviewed by me. Aside from the hernia other significant findings were noted. He has a follow-up with surgeon on Wednesday, his office was called but they was not available today. Should her complaints not improve, or worsen in any way, she should present to the emergency room immediately. Return if symptoms worsen or fail to improve. Chief Complaint   Patient presents with    Abdominal Pain         Patient presents for post emergency room visit. Severe abdominal discomfort periumbilically weekend. [facility]. Exhaustive work-up there including a CT scan and lab showed a periumbilical hernia otherwise significant findings. Her abscess, which had been drained the previous week, has not recurred. Urgency room notes, scans, lab work was reviewed by me. Review of Systems   Constitutional:  Negative for appetite change, fatigue and unexpected weight change. HENT:  Negative for congestion, ear pain, hearing loss, sinus pain, sore throat and trouble swallowing. Eyes:  Negative for photophobia, pain, discharge and redness. Respiratory:  Negative for cough, chest tightness and shortness of breath. Cardiovascular:  Negative for chest pain, palpitations and leg swelling. Gastrointestinal:  Positive for abdominal distention, abdominal pain, nausea and rectal pain. Negative for blood in stool, diarrhea and vomiting. Endocrine: Negative. Genitourinary:  Negative for dysuria, flank pain, frequency and hematuria.    Musculoskeletal:  Negative for arthralgias, back pain, joint swelling and myalgias. Skin: Negative. Allergic/Immunologic: Negative. Neurological:  Negative for dizziness, seizures, syncope, weakness, light-headedness, numbness and headaches. Hematological:  Negative for adenopathy. Does not bruise/bleed easily. Psychiatric/Behavioral: Negative.          Current Outpatient Medications:     ondansetron (ZOFRAN-ODT) 4 MG disintegrating tablet, Take 1 tablet by mouth 3 times daily as needed for Nausea or Vomiting, Disp: 21 tablet, Rfl: 0    fluconazole (DIFLUCAN) 100 MG tablet, Take 1 tablet by mouth daily for 5 days, Disp: 5 tablet, Rfl: 0    metroNIDAZOLE (FLAGYL) 500 MG tablet, Take 1 tablet by mouth 3 times daily for 10 days, Disp: 30 tablet, Rfl: 0    albuterol sulfate HFA (VENTOLIN HFA) 108 (90 Base) MCG/ACT inhaler, Inhale 2 puffs into the lungs 4 times daily as needed for Wheezing, Disp: 18 g, Rfl: 0    amLODIPine (NORVASC) 5 MG tablet, Take 1 tablet by mouth in the morning., Disp: 30 tablet, Rfl: 5    vitamin D (CHOLECALCIFEROL) 25 MCG (1000 UT) TABS tablet, Take 1,000 Units by mouth in the morning., Disp: , Rfl:     propranolol (INDERAL LA) 60 MG extended release capsule, Take 1 capsule by mouth 2 times daily, Disp: 60 capsule, Rfl: 2    pantoprazole (PROTONIX) 20 MG tablet, take 1 tablet by mouth every morning BEFORE BREAKFAST, Disp: 30 tablet, Rfl: 2    citalopram (CELEXA) 40 MG tablet, 40 mg daily, Disp: , Rfl:     traZODone (DESYREL) 50 MG tablet, , Disp: , Rfl:   Allergies   Allergen Reactions    Pcn [Penicillins] Rash       Past Medical History:   Diagnosis Date    Abnormal Pap smear of cervix     Back pain     Chronic generalized pain disorder 8/13/2021    Chronic sinusitis     with facial pain    Class 3 severe obesity due to excess calories without serious comorbidity with body mass index (BMI) of 40.0 to 44.9 in adult [facility])     DDD (degenerative disc disease), cervical 8/13/2021    DDD (degenerative disc disease), Partner Violence: Not on file   Housing Stability: Not on file       Vitals:    09/26/22 1321   BP: 130/78   Pulse: 78   Resp: 16   Temp: 98 °F (36.7 °C)   TempSrc: Temporal   SpO2: 98%   Weight: 292 lb (132.5 kg)   Height: 5' 9\" (1.753 m)       Physical Exam  Vitals and nursing note reviewed. Constitutional:       Appearance: She is well-developed. HENT:      Head: Atraumatic. Right Ear: External ear normal.      Left Ear: External ear normal.      Nose: Nose normal.      Mouth/Throat:      Pharynx: No oropharyngeal exudate. Eyes:      Conjunctiva/sclera: Conjunctivae normal.      Pupils: Pupils are equal, round, and reactive to light. Neck:      Thyroid: No thyromegaly. Trachea: No tracheal deviation. Cardiovascular:      Rate and Rhythm: Normal rate and regular rhythm. Heart sounds: No murmur heard. No friction rub. No gallop. Pulmonary:      Effort: Pulmonary effort is normal. No respiratory distress. Breath sounds: Normal breath sounds. Abdominal:      General: Bowel sounds are normal.      Palpations: Abdomen is soft. Comments: Abdomen with generalized tenderness to palpation no rebound or guarding noted sounds are diminished. Musculoskeletal:         General: No tenderness or deformity. Normal range of motion. Cervical back: Normal range of motion and neck supple. Lymphadenopathy:      Cervical: No cervical adenopathy. Skin:     General: Skin is warm and dry. Capillary Refill: Capillary refill takes less than 2 seconds. Findings: No rash. Neurological:      Mental Status: She is alert and oriented to person, place, and time. Sensory: No sensory deficit. Motor: No abnormal muscle tone.       Coordination: Coordination normal.      Deep Tendon Reflexes: Reflexes normal.           Seen By:  Irwin Garcia DO

## 2022-09-27 ENCOUNTER — TELEPHONE (OUTPATIENT)
Dept: FAMILY MEDICINE CLINIC | Age: 37
End: 2022-09-27

## 2022-09-27 DIAGNOSIS — R05.9 COUGH: Primary | ICD-10-CM

## 2022-09-27 LAB — URINE CULTURE, ROUTINE: NORMAL

## 2022-09-27 RX ORDER — BENZONATATE 200 MG/1
200 CAPSULE ORAL 3 TIMES DAILY PRN
Qty: 30 CAPSULE | Refills: 0 | Status: SHIPPED | OUTPATIENT
Start: 2022-09-27 | End: 2022-10-04

## 2022-09-27 NOTE — TELEPHONE ENCOUNTER
Doris Evans called in for her cough. Stop the Mucinex. Add she is feeling some better. Should the abdominal pain worsen in any way, she should present back to the emergency room.   Otherwise, follow-up with surgeon tomorrow

## 2022-09-27 NOTE — TELEPHONE ENCOUNTER
I spoke to Olivia, who is doing some better today. She is weak and tired and still coughing a lot. Mucinex is not helping at all. She is asking for something stronger because each time she coughs, it causes abdominal pain around the hernia. She will be home resting all day, and see the doctor tomorrow.

## 2022-09-29 ENCOUNTER — TELEPHONE (OUTPATIENT)
Dept: FAMILY MEDICINE CLINIC | Age: 37
End: 2022-09-29

## 2022-09-29 NOTE — TELEPHONE ENCOUNTER
Give her an appointment for tomorrow, I will do discussed with her then and order the appropriate equipment.

## 2022-09-29 NOTE — TELEPHONE ENCOUNTER
Cheryle Late saw Dr Lynette Valenzuela and got the surgery scheduled for Oct 11th. He mentioned that he was concerned about the cough and hopes it is better before surgery. She is asking for a daily inhaler and nebulizer. If you are agreeable, she would like to get the nebulizer from [facility], and the medications from [address] [place] [address].

## 2022-09-30 ENCOUNTER — OFFICE VISIT (OUTPATIENT)
Dept: FAMILY MEDICINE CLINIC | Age: 37
End: 2022-09-30
Payer: COMMERCIAL

## 2022-09-30 VITALS
DIASTOLIC BLOOD PRESSURE: 72 MMHG | WEIGHT: 291.8 LBS | RESPIRATION RATE: 16 BRPM | TEMPERATURE: 98 F | HEART RATE: 74 BPM | BODY MASS INDEX: 43.22 KG/M2 | SYSTOLIC BLOOD PRESSURE: 118 MMHG | OXYGEN SATURATION: 98 % | HEIGHT: 69 IN

## 2022-09-30 DIAGNOSIS — R05.9 COUGH: Primary | ICD-10-CM

## 2022-09-30 DIAGNOSIS — J45.21 MILD INTERMITTENT ASTHMA WITH ACUTE EXACERBATION: ICD-10-CM

## 2022-09-30 PROCEDURE — 1036F TOBACCO NON-USER: CPT | Performed by: FAMILY MEDICINE

## 2022-09-30 PROCEDURE — 99213 OFFICE O/P EST LOW 20 MIN: CPT | Performed by: FAMILY MEDICINE

## 2022-09-30 PROCEDURE — G8427 DOCREV CUR MEDS BY ELIG CLIN: HCPCS | Performed by: FAMILY MEDICINE

## 2022-09-30 PROCEDURE — G8417 CALC BMI ABV UP PARAM F/U: HCPCS | Performed by: FAMILY MEDICINE

## 2022-09-30 RX ORDER — FLUTICASONE PROPIONATE 110 UG/1
2 AEROSOL, METERED RESPIRATORY (INHALATION) 2 TIMES DAILY
Qty: 12 G | Refills: 3 | Status: SHIPPED | OUTPATIENT
Start: 2022-09-30 | End: 2023-09-30

## 2022-09-30 ASSESSMENT — ENCOUNTER SYMPTOMS
NAUSEA: 0
VOMITING: 0
DIARRHEA: 0
TROUBLE SWALLOWING: 0
EYE REDNESS: 0
EYE DISCHARGE: 0
SORE THROAT: 0
SHORTNESS OF BREATH: 0
ALLERGIC/IMMUNOLOGIC NEGATIVE: 1
EYE PAIN: 0
CHEST TIGHTNESS: 0
BACK PAIN: 0
BLOOD IN STOOL: 0
COUGH: 1
ABDOMINAL PAIN: 0
PHOTOPHOBIA: 0
SINUS PAIN: 0

## 2022-09-30 NOTE — PROGRESS NOTES
22  Jackie Pollen Brothers : 1985 Sex: female  Age: 44 y.o. Assessment and Plan:  Riddhi Love was seen today for cough. Diagnoses and all orders for this visit:    Cough  -     DME Order for Nebulizer as OP    Mild intermittent asthma with acute exacerbation  -     fluticasone (FLOVENT HFA) 110 MCG/ACT inhaler; Inhale 2 puffs into the lungs 2 times daily  -     DME Order for Nebulizer as OP    She can continue the rescue inhaler every 4-6 hours as needed. Add steroid inhaler as well, she is to consider mouth out after each patient the Flovent. We will also get her nebulizer she can have at home. No follow-ups on file. Chief Complaint   Patient presents with    Cough       Congestion, pressure, drainage, facial tenderness, cough, onset 5 days ago. Chest x-ray done earlier this week was negative. Denies fever, chills, diaphoresis, nausea, vomiting, decreased oral intake. Denies other GI or  complaints. OTC treatments minimally effective. 5      Review of Systems   Constitutional:  Negative for appetite change, fatigue and unexpected weight change. HENT:  Positive for congestion and postnasal drip. Negative for ear pain, hearing loss, sinus pain, sore throat and trouble swallowing. Eyes:  Negative for photophobia, pain, discharge and redness. Respiratory:  Positive for cough. Negative for chest tightness and shortness of breath. Cardiovascular:  Negative for chest pain, palpitations and leg swelling. Gastrointestinal:  Negative for abdominal pain, blood in stool, diarrhea, nausea and vomiting. Endocrine: Negative. Genitourinary:  Negative for dysuria, flank pain, frequency and hematuria. Musculoskeletal:  Negative for arthralgias, back pain, joint swelling and myalgias. Skin: Negative. Allergic/Immunologic: Negative. Neurological:  Negative for dizziness, seizures, syncope, weakness, light-headedness, numbness and headaches.    Hematological:  Negative for adenopathy. Does not bruise/bleed easily. Psychiatric/Behavioral: Negative.          Current Outpatient Medications:     fluticasone (FLOVENT HFA) 110 MCG/ACT inhaler, Inhale 2 puffs into the lungs 2 times daily, Disp: 12 g, Rfl: 3    benzonatate (TESSALON) 200 MG capsule, Take 1 capsule by mouth 3 times daily as needed for Cough, Disp: 30 capsule, Rfl: 0    ondansetron (ZOFRAN-ODT) 4 MG disintegrating tablet, Take 1 tablet by mouth 3 times daily as needed for Nausea or Vomiting, Disp: 21 tablet, Rfl: 0    albuterol sulfate HFA (VENTOLIN HFA) 108 (90 Base) MCG/ACT inhaler, Inhale 2 puffs into the lungs 4 times daily as needed for Wheezing, Disp: 18 g, Rfl: 0    amLODIPine (NORVASC) 5 MG tablet, Take 1 tablet by mouth in the morning., Disp: 30 tablet, Rfl: 5    vitamin D (CHOLECALCIFEROL) 25 MCG (1000 UT) TABS tablet, Take 1,000 Units by mouth in the morning., Disp: , Rfl:     propranolol (INDERAL LA) 60 MG extended release capsule, Take 1 capsule by mouth 2 times daily, Disp: 60 capsule, Rfl: 2    pantoprazole (PROTONIX) 20 MG tablet, take 1 tablet by mouth every morning BEFORE BREAKFAST, Disp: 30 tablet, Rfl: 2    citalopram (CELEXA) 40 MG tablet, 40 mg daily, Disp: , Rfl:     traZODone (DESYREL) 50 MG tablet, , Disp: , Rfl:   Allergies   Allergen Reactions    Pcn [Penicillins] Rash       Past Medical History:   Diagnosis Date    Abnormal Pap smear of cervix     Back pain     Chronic generalized pain disorder 8/13/2021    Chronic sinusitis     with facial pain    Class 3 severe obesity due to excess calories without serious comorbidity with body mass index (BMI) of 40.0 to 44.9 in adult [facility])     DDD (degenerative disc disease), cervical 8/13/2021    DDD (degenerative disc disease), lumbar 8/13/2021    Depression 06/06/2011    Essential hypertension     Essential hypertension     Gastroesophageal reflux disease without esophagitis     Hemochromatosis     Migraines     born with heart murmur    Paresthesias 8/10/2021    Seasonal allergies      Past Surgical History:   Procedure Laterality Date    RECTAL SURGERY N/A 9/15/2022    DRAINAGE PERIANAL ABSCESS performed by Ny Sullivan MD at [address] [facility] N/A 02/25/2016    FUNCTIONAL ENDOSCOPIC SINUS SURGERY WITH BILATERAL MAXILLARY, FRONTAL & SPENOID ANTROSTOMIES, SUBMUCCOSAL RESECTION OF INFERIOR TURBINATES    TUBAL LIGATION  07/22/2021     Family History   Problem Relation Age of Onset    Arthritis Mother     Other Mother         sinus problems    High Blood Pressure Father     Other Maternal Aunt         migraine    Other Maternal Uncle         migraine    Other Paternal Aunt         migraine    Other Paternal Uncle         migraine    Arthritis Maternal Grandmother      Social History     Socioeconomic History    Marital status:      Spouse name: Not on file    Number of children: Not on file    Years of education: Not on file    Highest education level: Not on file   Occupational History    Not on file   Tobacco Use    Smoking status: Never    Smokeless tobacco: Never   Vaping Use    Vaping Use: Never used   Substance and Sexual Activity    Alcohol use: No     Alcohol/week: 0.0 standard drinks    Drug use: No    Sexual activity: Not on file   Other Topics Concern    Not on file   Social History Narrative    Not on file     Social Determinants of Health     Financial Resource Strain: Unknown    Difficulty of Paying Living Expenses: Patient refused   Food Insecurity: No Food Insecurity    Worried About Running Out of Food in the Last Year: Never true    Ran Out of Food in the Last Year: Never true   Transportation Needs: Not on file   Physical Activity: Not on file   Stress: Not on file   Social Connections: Not on file   Intimate Partner Violence: Not on file   Housing Stability: Not on file       Vitals:    09/30/22 1310   BP: 118/72   Pulse: 74   Resp: 16   Temp: 98 °F (36.7 °C)   TempSrc: Temporal   SpO2: 98%   Weight: 291 lb 12.8 oz (132.4 kg) Height: 5' 9\" (1.753 m)       Physical Exam  Vitals and nursing note reviewed. Constitutional:       Appearance: She is well-developed. HENT:      Head: Atraumatic. Right Ear: External ear normal.      Left Ear: External ear normal.      Nose: Congestion present. Mouth/Throat:      Pharynx: No oropharyngeal exudate. Eyes:      Conjunctiva/sclera: Conjunctivae normal.      Pupils: Pupils are equal, round, and reactive to light. Neck:      Thyroid: No thyromegaly. Trachea: No tracheal deviation. Cardiovascular:      Rate and Rhythm: Normal rate and regular rhythm. Heart sounds: No murmur heard. No friction rub. No gallop. Pulmonary:      Effort: Pulmonary effort is normal. No respiratory distress. Breath sounds: Normal breath sounds. Abdominal:      General: Bowel sounds are normal.      Palpations: Abdomen is soft. Musculoskeletal:         General: No tenderness or deformity. Normal range of motion. Cervical back: Normal range of motion and neck supple. Lymphadenopathy:      Cervical: No cervical adenopathy. Skin:     General: Skin is warm and dry. Capillary Refill: Capillary refill takes less than 2 seconds. Findings: No rash. Neurological:      Mental Status: She is alert and oriented to person, place, and time. Sensory: No sensory deficit. Motor: No abnormal muscle tone.       Coordination: Coordination normal.      Deep Tendon Reflexes: Reflexes normal.           Seen By:  Duane Hockey, DO

## 2022-10-03 DIAGNOSIS — I10 ESSENTIAL HYPERTENSION: ICD-10-CM

## 2022-10-03 DIAGNOSIS — K21.00 GASTROESOPHAGEAL REFLUX DISEASE WITH ESOPHAGITIS WITHOUT HEMORRHAGE: ICD-10-CM

## 2022-10-03 RX ORDER — PROPRANOLOL HCL 60 MG
CAPSULE, EXTENDED RELEASE 24HR ORAL
Qty: 60 CAPSULE | Refills: 2 | Status: SHIPPED | OUTPATIENT
Start: 2022-10-03

## 2022-10-03 RX ORDER — PANTOPRAZOLE SODIUM 20 MG/1
TABLET, DELAYED RELEASE ORAL
Qty: 30 TABLET | Refills: 2 | Status: SHIPPED | OUTPATIENT
Start: 2022-10-03

## 2022-10-06 ENCOUNTER — PREP FOR PROCEDURE (OUTPATIENT)
Dept: SURGERY | Age: 37
End: 2022-10-06

## 2022-10-06 RX ORDER — SODIUM CHLORIDE 0.9 % (FLUSH) 0.9 %
5-40 SYRINGE (ML) INJECTION PRN
Status: CANCELLED | OUTPATIENT
Start: 2022-10-06

## 2022-10-06 RX ORDER — SODIUM CHLORIDE, SODIUM LACTATE, POTASSIUM CHLORIDE, CALCIUM CHLORIDE 600; 310; 30; 20 MG/100ML; MG/100ML; MG/100ML; MG/100ML
INJECTION, SOLUTION INTRAVENOUS CONTINUOUS
Status: CANCELLED | OUTPATIENT
Start: 2022-10-06

## 2022-10-06 RX ORDER — SODIUM CHLORIDE 0.9 % (FLUSH) 0.9 %
5-40 SYRINGE (ML) INJECTION EVERY 12 HOURS SCHEDULED
Status: CANCELLED | OUTPATIENT
Start: 2022-10-06

## 2022-10-06 NOTE — H&P (VIEW-ONLY)
Date:   [facility]  Name: Lisandra Villar             : 1985 Sex: F  Age: 44 yrs  Acct#:  [id number]          Patient was referred by . Patient's primary care provider is Winnie Gamze D.O..  CC: follow up    HPI: status post I&D of anal abscess. Coughing after surgery, developed severe pain at umbilicus. Went to ED had CT scan    Meds Prior to Visit:  Hydrocodone Bitartrate/Acetaminophen  5-325 mg  take one tablet every 6 hours as needed for pain  Pantoprazole Sodium  40 mg  1 by mouth every day  Amlodipine Besylate  2.5 mg   Propranolol HCL  10 mg      Allergies:  Penicillin        Wt: 292lb    Exam:  Constitution:  Non-toxic, no acute distress  Heart :  RRR  Lungs CTA bilaterally  Abdomen: painful incarcerated ventral hernia at umbilicus without skin changes  Rectal:  incision open and draining without induration or erythema  Extremeties: No rash, cyanosis, or jaundice         Assessment #1: Hx K61.39 Other ischiorectal abscess   Care Plan:              Med Current    :  Hydrocodone Bitartrate/Acetaminophen 5-325 mg                         take one tablet every 6 hours as needed for pain      Assessment #2: Hx K43.6 Other and unspecified ventral hernia with obstruction, without gangrene   Care Plan:              Comments       :  ibuprofen and ice. Schedule laparoscopic ventral hernia repair with mesh in a couple of weeks          Time spent reviewing records, discussing findings, answering questions, reviewing laboratory studies, radiologic exams, and other diagnostics, and reviewing the diagnostic and therapeutic plan: [ ] minutes    Voice recognition software was used in the preparation of this document. Despite all efforts to prevent them, transcription errors may have occurred.   Seen by:

## 2022-10-06 NOTE — PROGRESS NOTES
[facility] PRE-ADMISSION TESTING INSTRUCTIONS    The Preadmission Testing patient is instructed accordingly using the following criteria (check applicable):    ARRIVAL INSTRUCTIONS:  [x] Parking the day of Surgery is located in the Main Entrance lot. Upon entering the door, make an immediate right to the surgery reception desk    [x] Bring photo ID and insurance card    [] Bring in a copy of Living will or Durable Power of  papers. [x] Please be sure to arrange for responsible adult to provide transportation to and from the hospital    [x] Please arrange for responsible adult to be with you for the 24 hour period post procedure due to having anesthesia    [x] If you awake am of surgery not feeling well or have temperature >100 please call [phone]    GENERAL INSTRUCTIONS:    [x] Nothing by mouth after midnight, including gum, candy, mints or water    [x] You may brush your teeth, but do not swallow any water    [x] Take medications as instructed with 1-2 oz of water    [x] Stop herbal supplements and vitamins 5 days prior to procedure    [] Follow preop dosing of blood thinners per physician instructions    [] Take 1/2 dose of evening insulin, but no insulin after midnight    [] No oral diabetic medications after midnight    [] If diabetic and have low blood sugar or feel symptomatic, take 1-2oz apple juice only    [x] Bring inhalers day of surgery    [] Bring C-PAP/ Bi-Pap day of surgery    [x] Bring urine specimen day of surgery    [x] Shower or bath with soap, lather and rinse well, AM of Surgery, no lotion, powders or creams to surgical site    [] Follow bowel prep as instructed per surgeon    [x] No tobacco products within 24 hours of surgery     [x] No alcohol or illegal drug use within 24 hours of surgery.     [x] Jewelry, body piercing's, eyeglasses, contact lenses and dentures are not permitted into surgery (bring cases)      [x] Please do not wear any nail polish, make up or hair products on the day of surgery    [x] You can expect a call the business day prior to procedure to notify you if your arrival time changes    [x] If you receive a survey after surgery we would greatly appreciate your comments    [] Parent/guardian of a minor must accompany their child and remain on the premises  the entire time they are under our care     [] Pediatric patients may bring favorite toy, blanket or comfort item with them    [] A caregiver or family member must remain with the patient during their stay if they are mentally handicapped, have dementia, disoriented or unable to use a call light or would be a safety concern if left unattended    [x] Please notify surgeon if you develop any illness between now and time of surgery (cold, cough, sore throat, fever, nausea, vomiting) or any signs of infections  including skin, wounds, and dental.    [x]  The Outpatient Pharmacy is available to fill your prescription here on your day of surgery, ask your preop nurse for details    [] Other instructions    EDUCATIONAL MATERIALS PROVIDED:    [] PAT Preoperative Education Packet/Booklet     [] Medication List    [] Transfusion bracelet applied with instructions    [] Shower with soap, lather and rinse well, and use CHG wipes provided the evening before surgery as instructed    [] Incentive spirometer with instructions

## 2022-10-06 NOTE — H&P
Date:   [facility]  Name: Stevie Colon             : 1985 Sex: F  Age: 44 yrs  Acct#:  [id number]          Patient was referred by . Patient's primary care provider is Leslie Ponce D.O..  CC: follow up    HPI: status post I&D of anal abscess. Coughing after surgery, developed severe pain at umbilicus. Went to ED had CT scan    Meds Prior to Visit:  Hydrocodone Bitartrate/Acetaminophen  5-325 mg  take one tablet every 6 hours as needed for pain  Pantoprazole Sodium  40 mg  1 by mouth every day  Amlodipine Besylate  2.5 mg   Propranolol HCL  10 mg      Allergies:  Penicillin        Wt: 292lb    Exam:  Constitution:  Non-toxic, no acute distress  Heart :  RRR  Lungs CTA bilaterally  Abdomen: painful incarcerated ventral hernia at umbilicus without skin changes  Rectal:  incision open and draining without induration or erythema  Extremeties: No rash, cyanosis, or jaundice         Assessment #1: Hx K61.39 Other ischiorectal abscess   Care Plan:              Med Current    :  Hydrocodone Bitartrate/Acetaminophen 5-325 mg                         take one tablet every 6 hours as needed for pain      Assessment #2: Hx K43.6 Other and unspecified ventral hernia with obstruction, without gangrene   Care Plan:              Comments       :  ibuprofen and ice. Schedule laparoscopic ventral hernia repair with mesh in a couple of weeks          Time spent reviewing records, discussing findings, answering questions, reviewing laboratory studies, radiologic exams, and other diagnostics, and reviewing the diagnostic and therapeutic plan: [ ] minutes    Voice recognition software was used in the preparation of this document. Despite all efforts to prevent them, transcription errors may have occurred.   Seen by:

## 2022-10-09 DIAGNOSIS — K21.00 GASTROESOPHAGEAL REFLUX DISEASE WITH ESOPHAGITIS WITHOUT HEMORRHAGE: ICD-10-CM

## 2022-10-09 DIAGNOSIS — I10 ESSENTIAL HYPERTENSION: ICD-10-CM

## 2022-10-10 ENCOUNTER — ANESTHESIA EVENT (OUTPATIENT)
Dept: OPERATING ROOM | Age: 37
End: 2022-10-10
Payer: COMMERCIAL

## 2022-10-10 RX ORDER — PROPRANOLOL HCL 60 MG
CAPSULE, EXTENDED RELEASE 24HR ORAL
Qty: 60 CAPSULE | Refills: 2 | OUTPATIENT
Start: 2022-10-10

## 2022-10-10 RX ORDER — PANTOPRAZOLE SODIUM 20 MG/1
TABLET, DELAYED RELEASE ORAL
Qty: 30 TABLET | Refills: 2 | OUTPATIENT
Start: 2022-10-10

## 2022-10-11 ENCOUNTER — TELEPHONE (OUTPATIENT)
Dept: FAMILY MEDICINE CLINIC | Age: 37
End: 2022-10-11

## 2022-10-11 ENCOUNTER — HOSPITAL ENCOUNTER (OUTPATIENT)
Age: 37
Setting detail: OUTPATIENT SURGERY
Discharge: HOME | End: 2022-10-11
Attending: SURGERY | Admitting: SURGERY
Payer: COMMERCIAL

## 2022-10-11 ENCOUNTER — ANESTHESIA (OUTPATIENT)
Dept: OPERATING ROOM | Age: 37
End: 2022-10-11
Payer: COMMERCIAL

## 2022-10-11 VITALS
BODY MASS INDEX: 43.1 KG/M2 | WEIGHT: 291 LBS | TEMPERATURE: 98.3 F | HEIGHT: 69 IN | OXYGEN SATURATION: 93 % | HEART RATE: 77 BPM | SYSTOLIC BLOOD PRESSURE: 126 MMHG | DIASTOLIC BLOOD PRESSURE: 77 MMHG | RESPIRATION RATE: 20 BRPM

## 2022-10-11 DIAGNOSIS — K42.9 UMBILICAL HERNIA WITHOUT OBSTRUCTION AND WITHOUT GANGRENE: Primary | ICD-10-CM

## 2022-10-11 LAB
HCG, URINE, POC: NEGATIVE
Lab: NORMAL
NEGATIVE QC PASS/FAIL: NORMAL
POSITIVE QC PASS/FAIL: NORMAL

## 2022-10-11 PROCEDURE — 7100000011 HC PHASE II RECOVERY - ADDTL 15 MIN: Performed by: SURGERY

## 2022-10-11 PROCEDURE — 3700000001 HC ADD 15 MINUTES (ANESTHESIA): Performed by: SURGERY

## 2022-10-11 PROCEDURE — 3600000019 HC SURGERY ROBOT ADDTL 15MIN: Performed by: SURGERY

## 2022-10-11 PROCEDURE — 7100000000 HC PACU RECOVERY - FIRST 15 MIN: Performed by: SURGERY

## 2022-10-11 PROCEDURE — 3700000000 HC ANESTHESIA ATTENDED CARE: Performed by: SURGERY

## 2022-10-11 PROCEDURE — 6360000002 HC RX W HCPCS

## 2022-10-11 PROCEDURE — 7100000001 HC PACU RECOVERY - ADDTL 15 MIN: Performed by: SURGERY

## 2022-10-11 PROCEDURE — C1781 MESH (IMPLANTABLE): HCPCS | Performed by: SURGERY

## 2022-10-11 PROCEDURE — 2500000003 HC RX 250 WO HCPCS: Performed by: SURGERY

## 2022-10-11 PROCEDURE — 2500000003 HC RX 250 WO HCPCS

## 2022-10-11 PROCEDURE — 6370000000 HC RX 637 (ALT 250 FOR IP): Performed by: PAIN MEDICINE

## 2022-10-11 PROCEDURE — 2709999900 HC NON-CHARGEABLE SUPPLY: Performed by: SURGERY

## 2022-10-11 PROCEDURE — 6360000002 HC RX W HCPCS: Performed by: PAIN MEDICINE

## 2022-10-11 PROCEDURE — 7100000010 HC PHASE II RECOVERY - FIRST 15 MIN: Performed by: SURGERY

## 2022-10-11 PROCEDURE — 3600000009 HC SURGERY ROBOT BASE: Performed by: SURGERY

## 2022-10-11 PROCEDURE — 2580000003 HC RX 258: Performed by: SURGERY

## 2022-10-11 PROCEDURE — S2900 ROBOTIC SURGICAL SYSTEM: HCPCS | Performed by: SURGERY

## 2022-10-11 DEVICE — MESH SURG DIA4.5IN CIR SEPRA TECHNOLOGY VENTRALIGHT: Type: IMPLANTABLE DEVICE | Site: ABDOMEN | Status: FUNCTIONAL

## 2022-10-11 RX ORDER — FENTANYL CITRATE 50 UG/ML
25 INJECTION, SOLUTION INTRAMUSCULAR; INTRAVENOUS ONCE
Status: COMPLETED | OUTPATIENT
Start: 2022-10-11 | End: 2022-10-11

## 2022-10-11 RX ORDER — MEPERIDINE HYDROCHLORIDE 25 MG/ML
12.5 INJECTION INTRAMUSCULAR; INTRAVENOUS; SUBCUTANEOUS EVERY 5 MIN PRN
Status: DISCONTINUED | OUTPATIENT
Start: 2022-10-11 | End: 2022-10-11 | Stop reason: HOSPADM

## 2022-10-11 RX ORDER — PROPOFOL 10 MG/ML
INJECTION, EMULSION INTRAVENOUS PRN
Status: DISCONTINUED | OUTPATIENT
Start: 2022-10-11 | End: 2022-10-11 | Stop reason: SDUPTHER

## 2022-10-11 RX ORDER — SENNA AND DOCUSATE SODIUM 50; 8.6 MG/1; MG/1
2 TABLET, FILM COATED ORAL 2 TIMES DAILY
Qty: 28 TABLET | Refills: 0 | Status: SHIPPED | OUTPATIENT
Start: 2022-10-11

## 2022-10-11 RX ORDER — HYDRALAZINE HYDROCHLORIDE 20 MG/ML
10 INJECTION INTRAMUSCULAR; INTRAVENOUS
Status: DISCONTINUED | OUTPATIENT
Start: 2022-10-11 | End: 2022-10-11 | Stop reason: HOSPADM

## 2022-10-11 RX ORDER — SODIUM CHLORIDE, SODIUM LACTATE, POTASSIUM CHLORIDE, CALCIUM CHLORIDE 600; 310; 30; 20 MG/100ML; MG/100ML; MG/100ML; MG/100ML
INJECTION, SOLUTION INTRAVENOUS CONTINUOUS
Status: DISCONTINUED | OUTPATIENT
Start: 2022-10-11 | End: 2022-10-11 | Stop reason: HOSPADM

## 2022-10-11 RX ORDER — SODIUM CHLORIDE 9 MG/ML
INJECTION, SOLUTION INTRAVENOUS PRN
Status: DISCONTINUED | OUTPATIENT
Start: 2022-10-11 | End: 2022-10-11 | Stop reason: HOSPADM

## 2022-10-11 RX ORDER — MIDAZOLAM HYDROCHLORIDE 1 MG/ML
INJECTION INTRAMUSCULAR; INTRAVENOUS PRN
Status: DISCONTINUED | OUTPATIENT
Start: 2022-10-11 | End: 2022-10-11 | Stop reason: SDUPTHER

## 2022-10-11 RX ORDER — DEXAMETHASONE SODIUM PHOSPHATE 4 MG/ML
INJECTION, SOLUTION INTRA-ARTICULAR; INTRALESIONAL; INTRAMUSCULAR; INTRAVENOUS; SOFT TISSUE PRN
Status: DISCONTINUED | OUTPATIENT
Start: 2022-10-11 | End: 2022-10-11 | Stop reason: SDUPTHER

## 2022-10-11 RX ORDER — OXYCODONE HYDROCHLORIDE 5 MG/1
5 TABLET ORAL EVERY 6 HOURS PRN
Qty: 12 TABLET | Refills: 0 | Status: SHIPPED | OUTPATIENT
Start: 2022-10-11 | End: 2022-10-16

## 2022-10-11 RX ORDER — SODIUM CHLORIDE 0.9 % (FLUSH) 0.9 %
5-40 SYRINGE (ML) INJECTION PRN
Status: DISCONTINUED | OUTPATIENT
Start: 2022-10-11 | End: 2022-10-11 | Stop reason: HOSPADM

## 2022-10-11 RX ORDER — IPRATROPIUM BROMIDE AND ALBUTEROL SULFATE 2.5; .5 MG/3ML; MG/3ML
1 SOLUTION RESPIRATORY (INHALATION)
Status: DISCONTINUED | OUTPATIENT
Start: 2022-10-11 | End: 2022-10-11 | Stop reason: HOSPADM

## 2022-10-11 RX ORDER — SODIUM CHLORIDE 0.9 % (FLUSH) 0.9 %
5-40 SYRINGE (ML) INJECTION EVERY 12 HOURS SCHEDULED
Status: DISCONTINUED | OUTPATIENT
Start: 2022-10-11 | End: 2022-10-11 | Stop reason: HOSPADM

## 2022-10-11 RX ORDER — FENTANYL CITRATE 50 UG/ML
INJECTION, SOLUTION INTRAMUSCULAR; INTRAVENOUS PRN
Status: DISCONTINUED | OUTPATIENT
Start: 2022-10-11 | End: 2022-10-11 | Stop reason: SDUPTHER

## 2022-10-11 RX ORDER — ROCURONIUM BROMIDE 10 MG/ML
INJECTION, SOLUTION INTRAVENOUS PRN
Status: DISCONTINUED | OUTPATIENT
Start: 2022-10-11 | End: 2022-10-11 | Stop reason: SDUPTHER

## 2022-10-11 RX ORDER — LIDOCAINE HYDROCHLORIDE 20 MG/ML
INJECTION, SOLUTION EPIDURAL; INFILTRATION; INTRACAUDAL; PERINEURAL PRN
Status: DISCONTINUED | OUTPATIENT
Start: 2022-10-11 | End: 2022-10-11 | Stop reason: SDUPTHER

## 2022-10-11 RX ORDER — OXYCODONE HYDROCHLORIDE 5 MG/1
5 TABLET ORAL ONCE
Status: COMPLETED | OUTPATIENT
Start: 2022-10-11 | End: 2022-10-11

## 2022-10-11 RX ORDER — ONDANSETRON 2 MG/ML
4 INJECTION INTRAMUSCULAR; INTRAVENOUS
Status: COMPLETED | OUTPATIENT
Start: 2022-10-11 | End: 2022-10-11

## 2022-10-11 RX ORDER — PROCHLORPERAZINE EDISYLATE 5 MG/ML
5 INJECTION INTRAMUSCULAR; INTRAVENOUS
Status: DISCONTINUED | OUTPATIENT
Start: 2022-10-11 | End: 2022-10-11 | Stop reason: HOSPADM

## 2022-10-11 RX ORDER — LABETALOL HYDROCHLORIDE 5 MG/ML
10 INJECTION, SOLUTION INTRAVENOUS
Status: DISCONTINUED | OUTPATIENT
Start: 2022-10-11 | End: 2022-10-11 | Stop reason: HOSPADM

## 2022-10-11 RX ORDER — ALBUTEROL SULFATE 2.5 MG/3ML
2.5 SOLUTION RESPIRATORY (INHALATION) EVERY 6 HOURS PRN
Qty: 120 EACH | Refills: 3 | Status: SHIPPED | OUTPATIENT
Start: 2022-10-11

## 2022-10-11 RX ORDER — ONDANSETRON 2 MG/ML
INJECTION INTRAMUSCULAR; INTRAVENOUS PRN
Status: DISCONTINUED | OUTPATIENT
Start: 2022-10-11 | End: 2022-10-11 | Stop reason: SDUPTHER

## 2022-10-11 RX ORDER — MIDAZOLAM HYDROCHLORIDE 2 MG/2ML
2 INJECTION, SOLUTION INTRAMUSCULAR; INTRAVENOUS
Status: DISCONTINUED | OUTPATIENT
Start: 2022-10-11 | End: 2022-10-11 | Stop reason: HOSPADM

## 2022-10-11 RX ORDER — CLINDAMYCIN PHOSPHATE 900 MG/50ML
900 INJECTION INTRAVENOUS
Status: COMPLETED | OUTPATIENT
Start: 2022-10-11 | End: 2022-10-11

## 2022-10-11 RX ORDER — FENTANYL CITRATE 50 UG/ML
INJECTION, SOLUTION INTRAMUSCULAR; INTRAVENOUS
Status: COMPLETED
Start: 2022-10-11 | End: 2022-10-11

## 2022-10-11 RX ADMIN — FENTANYL CITRATE 100 MCG: 50 INJECTION, SOLUTION INTRAMUSCULAR; INTRAVENOUS at 07:11

## 2022-10-11 RX ADMIN — ROCURONIUM BROMIDE 40 MG: 10 INJECTION, SOLUTION INTRAVENOUS at 07:14

## 2022-10-11 RX ADMIN — ROCURONIUM BROMIDE 20 MG: 10 INJECTION, SOLUTION INTRAVENOUS at 07:52

## 2022-10-11 RX ADMIN — SODIUM CHLORIDE, POTASSIUM CHLORIDE, SODIUM LACTATE AND CALCIUM CHLORIDE: 600; 310; 30; 20 INJECTION, SOLUTION INTRAVENOUS at 07:08

## 2022-10-11 RX ADMIN — HYDROMORPHONE HYDROCHLORIDE 0.5 MG: 1 INJECTION, SOLUTION INTRAMUSCULAR; INTRAVENOUS; SUBCUTANEOUS at 09:00

## 2022-10-11 RX ADMIN — LIDOCAINE HYDROCHLORIDE 80 MG: 20 INJECTION, SOLUTION EPIDURAL; INFILTRATION; INTRACAUDAL; PERINEURAL at 07:11

## 2022-10-11 RX ADMIN — FENTANYL CITRATE 70 MCG: 50 INJECTION, SOLUTION INTRAMUSCULAR; INTRAVENOUS at 08:40

## 2022-10-11 RX ADMIN — FENTANYL CITRATE 25 MCG: 50 INJECTION, SOLUTION INTRAMUSCULAR; INTRAVENOUS at 09:31

## 2022-10-11 RX ADMIN — CLINDAMYCIN IN 5 PERCENT DEXTROSE 900 MG: 18 INJECTION, SOLUTION INTRAVENOUS at 06:43

## 2022-10-11 RX ADMIN — ONDANSETRON 4 MG: 2 INJECTION INTRAMUSCULAR; INTRAVENOUS at 07:22

## 2022-10-11 RX ADMIN — SODIUM CHLORIDE, POTASSIUM CHLORIDE, SODIUM LACTATE AND CALCIUM CHLORIDE: 600; 310; 30; 20 INJECTION, SOLUTION INTRAVENOUS at 08:42

## 2022-10-11 RX ADMIN — ONDANSETRON 4 MG: 2 INJECTION INTRAMUSCULAR; INTRAVENOUS at 09:39

## 2022-10-11 RX ADMIN — PROPOFOL 200 MG: 10 INJECTION, EMULSION INTRAVENOUS at 07:11

## 2022-10-11 RX ADMIN — MIDAZOLAM 2 MG: 1 INJECTION INTRAMUSCULAR; INTRAVENOUS at 07:08

## 2022-10-11 RX ADMIN — FENTANYL CITRATE 10 MCG: 50 INJECTION, SOLUTION INTRAMUSCULAR; INTRAVENOUS at 08:20

## 2022-10-11 RX ADMIN — FENTANYL CITRATE 25 MCG: 50 INJECTION, SOLUTION INTRAMUSCULAR; INTRAVENOUS at 09:16

## 2022-10-11 RX ADMIN — FENTANYL CITRATE 10 MCG: 50 INJECTION, SOLUTION INTRAMUSCULAR; INTRAVENOUS at 08:24

## 2022-10-11 RX ADMIN — FENTANYL CITRATE 10 MCG: 50 INJECTION, SOLUTION INTRAMUSCULAR; INTRAVENOUS at 08:26

## 2022-10-11 RX ADMIN — SUGAMMADEX 300 MG: 100 INJECTION, SOLUTION INTRAVENOUS at 08:26

## 2022-10-11 RX ADMIN — DEXAMETHASONE SODIUM PHOSPHATE 10 MG: 4 INJECTION, SOLUTION INTRAMUSCULAR; INTRAVENOUS at 07:22

## 2022-10-11 RX ADMIN — HYDROMORPHONE HYDROCHLORIDE 0.5 MG: 1 INJECTION, SOLUTION INTRAMUSCULAR; INTRAVENOUS; SUBCUTANEOUS at 08:55

## 2022-10-11 RX ADMIN — OXYCODONE 5 MG: 5 TABLET ORAL at 10:24

## 2022-10-11 ASSESSMENT — PAIN DESCRIPTION - DESCRIPTORS
DESCRIPTORS: DISCOMFORT;PRESSURE
DESCRIPTORS: ACHING;DISCOMFORT
DESCRIPTORS: ACHING;DISCOMFORT
DESCRIPTORS: DISCOMFORT;PRESSURE
DESCRIPTORS: DISCOMFORT;PRESSURE
DESCRIPTORS: CRUSHING
DESCRIPTORS: ACHING
DESCRIPTORS: CRUSHING
DESCRIPTORS: ACHING;DISCOMFORT
DESCRIPTORS: ACHING;DISCOMFORT

## 2022-10-11 ASSESSMENT — PAIN DESCRIPTION - FREQUENCY
FREQUENCY: INTERMITTENT
FREQUENCY: INTERMITTENT
FREQUENCY: CONTINUOUS

## 2022-10-11 ASSESSMENT — PAIN DESCRIPTION - LOCATION
LOCATION: ABDOMEN

## 2022-10-11 ASSESSMENT — PAIN SCALES - GENERAL
PAINLEVEL_OUTOF10: 6
PAINLEVEL_OUTOF10: 6
PAINLEVEL_OUTOF10: 5
PAINLEVEL_OUTOF10: 7
PAINLEVEL_OUTOF10: 5
PAINLEVEL_OUTOF10: 7
PAINLEVEL_OUTOF10: 0
PAINLEVEL_OUTOF10: 5
PAINLEVEL_OUTOF10: 7

## 2022-10-11 ASSESSMENT — PAIN DESCRIPTION - PAIN TYPE
TYPE: ACUTE PAIN;SURGICAL PAIN
TYPE: SURGICAL PAIN
TYPE: ACUTE PAIN;SURGICAL PAIN
TYPE: SURGICAL PAIN
TYPE: ACUTE PAIN;SURGICAL PAIN
TYPE: SURGICAL PAIN

## 2022-10-11 ASSESSMENT — PAIN DESCRIPTION - ORIENTATION
ORIENTATION: ANTERIOR

## 2022-10-11 ASSESSMENT — PAIN SCALES - WONG BAKER
WONGBAKER_NUMERICALRESPONSE: 4
WONGBAKER_NUMERICALRESPONSE: 4
WONGBAKER_NUMERICALRESPONSE: 0
WONGBAKER_NUMERICALRESPONSE: 2
WONGBAKER_NUMERICALRESPONSE: 0
WONGBAKER_NUMERICALRESPONSE: 2

## 2022-10-11 NOTE — TELEPHONE ENCOUNTER
Called Jackie Khan to inform her that her FMLA forms are ready to be picked up. She stated that there was a short term disability form that was to go with it. She also asked if she could get medication, albuterol, for the nebulizer.

## 2022-10-11 NOTE — INTERVAL H&P NOTE
Update History & Physical    The patient's History and Physical of September 28, 2022 was reviewed with the patient and I examined the patient. There was no change. The surgical site was confirmed by the patient and me. Plan: The risks, benefits, expected outcome, and alternative to the recommended procedure have been discussed with the patient. Patient understands and wants to proceed with the procedure.      Electronically signed by Natalee Almendarez MD on 10/11/2022 at 6:01 AM

## 2022-10-11 NOTE — ANESTHESIA POSTPROCEDURE EVALUATION
Department of Anesthesiology  Postprocedure Note    Patient: Thi Brandon  MRN: [id number]  YOB: 1985  Date of evaluation: 10/11/2022      Procedure Summary     Date: 10/11/22 Room / Location: [facility] OR 10 / [facility]    Anesthesia Start: [facility] Anesthesia Stop: 1329    Procedure: [address] [facility] (Abdomen) Diagnosis:       Ventral hernia without obstruction or gangrene      (Ventral hernia without obstruction or gangrene [K43.9])    Surgeons: Mook Stack MD Responsible Provider: Amy He MD    Anesthesia Type: General ASA Status: 3          Anesthesia Type: General    Luisa Phase I: Luisa Score: 10    Luisa Phase II:        Anesthesia Post Evaluation    Patient location during evaluation: PACU  Patient participation: complete - patient participated  Level of consciousness: awake  Pain score: 2  Airway patency: patent  Nausea & Vomiting: no nausea and no vomiting  Complications: no  Cardiovascular status: hemodynamically stable  Respiratory status: acceptable  Hydration status: euvolemic

## 2022-10-11 NOTE — PROGRESS NOTES
Patient still c/o 7/10 after two doses of dilaudid ordered prn in PACU. Pt still grimacing and guarding stomach. Dr. Simona Ordaz called and one time order of prn fentanyl 25mcg ordered. Will administer and reassess. VSS.

## 2022-10-11 NOTE — OP NOTE
Operative Note      Patient: Esme Crawford  YOB: 1985  MRN: [id number]    Date of Procedure: 10/11/2022    Pre-Op Diagnosis: Ventral hernia without obstruction or gangrene [K43.9]    Post-Op Diagnosis:  Incarcerated omental containing umbilical hernia       Procedure(s):  LAPAROSCOPIC ROBOTIC XI ASSISTED VENTRAL HERNIA REPAIR WITH MESH    Surgeon(s):  Traci Prieto MD    Assistant:   Resident: Stacey Webb MD    Anesthesia: General    Estimated Blood Loss (mL): Minimal    Complications: None    Specimens:   * No specimens in log *    Implants:  Implant Name Type Inv. Item Serial No.  Lot No. LRB No. Used Action   MESH SURG DIA4. Arvie Dalton CIR SEPRA [employer] [id number]  MESH SURG DIA4. 5IN CIR SEPRA [employer] [facility] [id number] N/A 1 Implanted         Drains:   [REMOVED] Open Drain 09/15/22 Midline Perineal (Removed)   Site Description Bleeding 09/16/22 1012   Dressing Status Dry; Intact 09/16/22 1012   Drainage Appearance Serosanguinous 09/16/22 1012   Status Other (Comment) 09/16/22 1012       Findings: Incarcerated omentum, reduced, primary repair of hernia defect with IPOM    Detailed Description of Procedure: The patient was brought to the operating room and positioned supine on the operating room table. Sequential compression devices were placed on the patient's lower extremities and were powered on. General anesthesia was administered and preoperative antibiotics were administered per the anesthetic record. The patient was prepped and draped in the usual sterile fashion and the procedure went forth with strict aseptic technique under maximal barrier precautions. Immediately prior to the procedure a time-out was called and the surgical checklist was reviewed and agreed upon by all present. An 8 mm incision was made at Mcgrath's point and Veress needle was inserted. Resting abdominal pressure of 8 mmHg was measured.   Carbon dioxide gas was used to insufflate the abdomen to a pressure of 15 mmHg. The Veress needle was removed and a robotic trocar was inserted. The robot laparoscope was used and there was good visualization. There is no injury identified using our method of entry into the intraperitoneal cavity. Under direct visualization 2 additional trochars were inserted at the left hemiabdomen after making a millimeter incisions using a scalpel blade and then inserting the trochars. The robot was then docked. Inspection of the anterior abdominal wall revealed omentum which traversed into the hernia sac of the umbilical hernia. Using gentle traction we were able to completely reduce the hernia contents. The omentum was inspected and was noted to be healthy and viable. We proceeded with creation of our preperitoneal flap. Using a combination of sharp dissection with the robot scissors as well as electrocautery we were able to mature flap working laterally to medially developing plane. Once we reached the area of the umbilical hernia we reduced the hernia sac with gentle traction and selective electrocautery/scissor dissection. Once this was completely reduced we were able to continue maturation of the flap from medially to laterally. We then used a permanent V-Loc suture to approximate the umbilical hernia defect which was approximately 2 to 3 cm in size. We then proceeded with intraperitoneal onlay mesh placement. This was introduced into the abdomen secured to a Anita needle which was directed through the hernia defect and retrieved extracorporeally. This held the mesh up to the anterior abdominal wall. We then used permanent V-Loc suture securing the mesh to the anterior abdominal wall in a circular fashion. Once this was performed we undocked the robot and removed insufflation of the trochars. Local anesthetic was used to localize the incisions as well as the umbilicus.     The incisions were closed with 4 Monocryl in a subcuticular fashion. Dermabond was applied. Counts were correct x2. The patient was awoken from anesthesia and brought to the PACU in stable condition. Dr. Diana Cabrera was present for this procedure.       Electronically signed by Benjamin Delgadillo MD on 10/11/2022 at 8:33 AM

## 2022-10-11 NOTE — DISCHARGE INSTRUCTIONS
[address] may be drowsy or lightheaded after receiving sedation or anesthesia. A responsible person should be with you for the next 24 hours. Please follow the instructions checked below:    DIET INSTRUCTIONS:  [x]Start with light diet and progress to your normal diet as you feel like eating. If you experience nausea or repeated episodes of vomiting which persist beyond 12-24 hours, notify your doctor. []Other     ACTIVITY INSTRUCTIONS:  [x]Rest today. Increase activity as tolerated    [x]No heavy lifting or strenuous activity for 2 weeks   [x]No driving for 1 day or while on narcotics  []Other     WOUND/DRESSING INSTRUCTIONS:  Always ensure you and your care giver clean hands before and after caring for the wound. [x]May shower      []May bathe      [x]Derma bond dressing-Do not apply lotion, gel, or liquid to wound while the derma bond is in place. []Other         MEDICATION INSTRUCTIONS:    [x]Prescriptions sent with you. Use as directed. When taking pain medications, you may experience dizziness or drowsiness. Do not drink alcohol or drive when taking these medications. [x]You may take a non-prescription headache remedy, preferably one that does not contain aspirin. Other Instructions:      FOLLOW-UP CARE:  [x]Call the office for follow-up appointment as needed    Call physician if they or any other problems occur:  Fever over 101°    Redness, swelling, hardness or warmth at the operative site  Unrelieved nausea     Foul smelling or cloudy drainage at the operative site   Unrelieved pain    Blood soaked dressing.  (Some oozing may be normal)

## 2022-10-11 NOTE — PROGRESS NOTES
Patient c/o 6/10-7/10 still after fentanyl. One additional dose of 25 mcg ordered. Will reassess and administer. VSS.

## 2022-10-11 NOTE — TELEPHONE ENCOUNTER
Mother calling in she got the nebulzier and supplies in the mail. She states she has asthma and just had sx and has a hernia. So she needs this to help her not cough. Mom states no solution was sent to the pharmacy. Will you order neb solution? She has never used it before.  Mom is not on communication form

## 2022-10-11 NOTE — ANESTHESIA PRE PROCEDURE
Department of Anesthesiology  Preprocedure Note       Name:  Shemar Vizcaino   Age:  44 y.o.  :  1985                                          MRN:  [id number]         Date:  10/11/2022      Surgeon: Amrita Song):  Bethanie Gallardo MD    Procedure: Procedure(s):  LAPAROSCOPIC ROBOTIC XI ASSISTED VENTRAL HERNIA REPAIR WITH MESH POSSIBLE OPEN    Medications prior to admission:   Prior to Admission medications    Medication Sig Start Date End Date Taking? Authorizing Provider   propranolol (INDERAL LA) 60 MG extended release capsule take 1 capsule by mouth twice a day 10/3/22   Osseo L Tolbert, DO   pantoprazole (PROTONIX) 20 MG tablet take 1 tablet by mouth every morning before breakfast 10/3/22   Osseo L Tolbert, DO   fluticasone (FLOVENT HFA) 110 MCG/ACT inhaler Inhale 2 puffs into the lungs 2 times daily 22  Osseo L Tolbert, DO   ondansetron (ZOFRAN-ODT) 4 MG disintegrating tablet Take 1 tablet by mouth 3 times daily as needed for Nausea or Vomiting 22   Liane Mae, DO   albuterol sulfate HFA (VENTOLIN HFA) 108 (90 Base) MCG/ACT inhaler Inhale 2 puffs into the lungs 4 times daily as needed for Wheezing 22   Osseo L Tolbert, DO   amLODIPine (NORVASC) 5 MG tablet Take 1 tablet by mouth in the morning. 8/10/22   Osseo L Tolbert, DO   vitamin D (CHOLECALCIFEROL) 25 MCG (1000 UT) TABS tablet Take 1,000 Units by mouth in the morning.     Historical Provider, MD   citalopram (CELEXA) 40 MG tablet Take 40 mg by mouth every morning 5/10/22   Historical Provider, MD   traZODone (DESYREL) 50 MG tablet Take 50 mg by mouth nightly 5/10/22   Historical Provider, MD       Current medications:    Current Facility-Administered Medications   Medication Dose Route Frequency Provider Last Rate Last Admin    sodium chloride flush 0.9 % injection 5-40 mL  5-40 mL IntraVENous 2 times per day Bethanie Gallardo MD        sodium chloride flush 0.9 % injection 5-40 mL  5-40 mL IntraVENous PRN MD [facility] lactated ringers infusion   IntraVENous Continuous Mook Stack MD        clindamycin (CLEOCIN) 900 mg in dextrose 5 % 50 mL IVPB  900 mg IntraVENous On Call to [address] [place] [address] MD Carol           Allergies: Allergies   Allergen Reactions    Pcn [Penicillins] Rash       Problem List:    Patient Active Problem List   Diagnosis Code    GERD with esophagitis K21.00    Allergic rhinitis J30.9    Congenital spondylolysis, lumbosacral region Q76.2    Degeneration of lumbar or lumbosacral intervertebral disc M51.37    Depression F32. A    Hemochromatosis, hereditary ([address] E83.110    Back pain M54.9    Class 3 severe obesity due to excess calories without serious comorbidity with body mass index (BMI) of 40.0 to 44.9 in adult ([facility]) E66.01, Z68.41    Essential hypertension I10    Paresthesias R20.2    Chronic generalized pain disorder R52, G89.29    DDD (degenerative disc disease), cervical M50.30    DDD (degenerative disc disease), lumbar M51.36    Dizziness and giddiness R42    Perianal abscess K61.0       Past Medical History:        Diagnosis Date    Abnormal Pap smear of cervix     Back pain     Chronic generalized pain disorder 08/13/2021    Chronic sinusitis     with facial pain    Class 3 severe obesity due to excess calories without serious comorbidity with body mass index (BMI) of 40.0 to 44.9 in adult [facility])     DDD (degenerative disc disease), cervical 08/13/2021    Depression 06/06/2011    Essential hypertension     Gastroesophageal reflux disease without esophagitis     Hemochromatosis     Migraines     born with heart murmur    Paresthesias 08/10/2021    Seasonal allergies     Ventral hernia        Past Surgical History:        Procedure Laterality Date    RECTAL SURGERY N/A 9/15/2022    DRAINAGE PERIANAL ABSCESS performed by Mook Stack MD at [facility] N/A 02/25/2016    FUNCTIONAL ENDOSCOPIC SINUS SURGERY WITH BILATERAL MAXILLARY, FRONTAL & SPENOID ANTROSTOMIES, SUBMUCCOSAL RESECTION OF INFERIOR TURBINATES    TUBAL LIGATION  07/22/2021       Social History:    Social History     Tobacco Use    Smoking status: Never    Smokeless tobacco: Never   Substance Use Topics    Alcohol use: No     Alcohol/week: 0.0 standard drinks                                Counseling given: Not Answered      Vital Signs (Current):   Vitals:    10/06/22 1202 10/11/22 0600   BP:  133/77   Pulse:  82   Resp:  16   Temp:  36.1 °C (97 °F)   SpO2:  97%   Weight: 291 lb (132 kg)    Height: 5' 9\" (1.753 m)                                               BP Readings from Last 3 Encounters:   10/11/22 133/77   09/30/22 118/72   09/26/22 130/78       NPO Status: Time of last liquid consumption: 0000                        Time of last solid consumption: 0000                        Date of last liquid consumption: 10/10/22                        Date of last solid food consumption: 10/10/22    BMI:   Wt Readings from Last 3 Encounters:   10/06/22 291 lb (132 kg)   09/30/22 291 lb 12.8 oz (132.4 kg)   09/26/22 292 lb (132.5 kg)     Body mass index is 42.97 kg/m².     CBC:   Lab Results   Component Value Date/Time    WBC 11.0 09/25/2022 01:20 AM    RBC 4.53 09/25/2022 01:20 AM    HGB 13.5 09/25/2022 01:20 AM    HCT 40.7 09/25/2022 01:20 AM    MCV 89.8 09/25/2022 01:20 AM    RDW 12.1 09/25/2022 01:20 AM     09/25/2022 01:20 AM       CMP:   Lab Results   Component Value Date/Time     09/25/2022 01:20 AM    K 4.0 09/25/2022 01:20 AM     09/25/2022 01:20 AM    CO2 24 09/25/2022 01:20 AM    BUN 13 09/25/2022 01:20 AM    CREATININE 0.7 09/25/2022 01:20 AM    GFRAA >60 09/25/2022 01:20 AM    AGRATIO 1.3 09/09/2021 04:28 PM    LABGLOM >60 09/25/2022 01:20 AM    GLUCOSE 95 09/25/2022 01:20 AM    GLUCOSE 89 01/10/2012 07:37 PM    PROT 7.0 09/25/2022 01:20 AM    CALCIUM 9.4 09/25/2022 01:20 AM    BILITOT 0.2 09/25/2022 01:20 AM    ALKPHOS 86 09/25/2022 01:20 AM    AST 22 09/25/2022 01:20 AM    ALT 48 09/25/2022 01:20 AM       POC Tests: No results for input(s): POCGLU, POCNA, POCK, POCCL, POCBUN, POCHEMO, POCHCT in the last 72 hours. Coags: No results found for: PROTIME, INR, APTT    HCG (If Applicable):   Lab Results   Component Value Date    PREGTESTUR NEGATIVE 09/25/2022        ABGs: No results found for: PHART, PO2ART, CTL8LPB, HLJ8IWN, BEART, D9MAHVAY     Type & Screen (If Applicable):  No results found for: LABABO, LABRH    Drug/Infectious Status (If Applicable):  No results found for: HIV, HEPCAB    COVID-19 Screening (If Applicable):   Lab Results   Component Value Date/Time    COVID19 Not Detected 09/25/2022 01:20 AM    COVID19 Not Detected 12/10/2020 12:00 AM           Anesthesia Evaluation  Patient summary reviewed and Nursing notes reviewed  Airway: Mallampati: II  TM distance: >3 FB   Neck ROM: limited  Mouth opening: > = 3 FB   Dental: normal exam         Pulmonary:normal exam    (+) asthma:           Patient did not smoke on day of surgery. Cardiovascular:    (+) hypertension: no interval change,         Rhythm: regular  Rate: normal                    Neuro/Psych:   (+) headaches:,             GI/Hepatic/Renal:   (+) GERD: well controlled, morbid obesity          Endo/Other:                      ROS comment: Lumbar disc disease Abdominal:             Vascular: Other Findings:           Anesthesia Plan      general     ASA 3       Induction: intravenous. Anesthetic plan and risks discussed with patient and spouse. Plan discussed with attending.               NPO>MN      Governor Needs, APRN - CRNA   10/11/2022

## 2022-10-13 ENCOUNTER — TELEPHONE (OUTPATIENT)
Dept: FAMILY MEDICINE CLINIC | Age: 37
End: 2022-10-13

## 2022-10-13 NOTE — TELEPHONE ENCOUNTER
Pt called stating she is aware her [facility] paperwork is waiting to be picked up but she also had long term disability forms with that paperwork. She is wanting to know if you filled those papers out as well. She states this is a time sensitive matter and her work is requesting the forms.  Please advise

## 2022-10-16 ENCOUNTER — HOSPITAL ENCOUNTER (EMERGENCY)
Age: 37
Discharge: HOME | End: 2022-10-16
Attending: EMERGENCY MEDICINE
Payer: COMMERCIAL

## 2022-10-16 ENCOUNTER — APPOINTMENT (OUTPATIENT)
Dept: ULTRASOUND IMAGING | Age: 37
End: 2022-10-16
Payer: COMMERCIAL

## 2022-10-16 VITALS
HEART RATE: 62 BPM | WEIGHT: 290 LBS | DIASTOLIC BLOOD PRESSURE: 80 MMHG | RESPIRATION RATE: 18 BRPM | SYSTOLIC BLOOD PRESSURE: 131 MMHG | TEMPERATURE: 97.7 F | HEIGHT: 69 IN | BODY MASS INDEX: 42.95 KG/M2 | OXYGEN SATURATION: 99 %

## 2022-10-16 DIAGNOSIS — R60.0 MILD PERIPHERAL EDEMA: Primary | ICD-10-CM

## 2022-10-16 LAB
ALBUMIN SERPL-MCNC: 3.9 G/DL (ref 3.5–5.2)
ALP BLD-CCNC: 93 U/L (ref 35–104)
ALT SERPL-CCNC: 24 U/L (ref 0–32)
ANION GAP SERPL CALCULATED.3IONS-SCNC: 12 MMOL/L (ref 7–16)
AST SERPL-CCNC: 23 U/L (ref 0–31)
BASOPHILS ABSOLUTE: 0.05 E9/L (ref 0–0.2)
BASOPHILS RELATIVE PERCENT: 0.6 % (ref 0–2)
BILIRUB SERPL-MCNC: 0.2 MG/DL (ref 0–1.2)
BUN BLDV-MCNC: 11 MG/DL (ref 6–20)
CALCIUM SERPL-MCNC: 9.1 MG/DL (ref 8.6–10.2)
CHLORIDE BLD-SCNC: 101 MMOL/L (ref 98–107)
CO2: 24 MMOL/L (ref 22–29)
CREAT SERPL-MCNC: 0.7 MG/DL (ref 0.5–1)
EOSINOPHILS ABSOLUTE: 0.63 E9/L (ref 0.05–0.5)
EOSINOPHILS RELATIVE PERCENT: 7 % (ref 0–6)
GFR AFRICAN AMERICAN: >60
GFR NON-AFRICAN AMERICAN: >60 ML/MIN/1.73
GLUCOSE BLD-MCNC: 86 MG/DL (ref 74–99)
HCT VFR BLD CALC: 39.6 % (ref 34–48)
HEMOGLOBIN: 13.1 G/DL (ref 11.5–15.5)
IMMATURE GRANULOCYTES #: 0.07 E9/L
IMMATURE GRANULOCYTES %: 0.8 % (ref 0–5)
LYMPHOCYTES ABSOLUTE: 2.57 E9/L (ref 1.5–4)
LYMPHOCYTES RELATIVE PERCENT: 28.4 % (ref 20–42)
MCH RBC QN AUTO: 29.7 PG (ref 26–35)
MCHC RBC AUTO-ENTMCNC: 33.1 % (ref 32–34.5)
MCV RBC AUTO: 89.8 FL (ref 80–99.9)
MONOCYTES ABSOLUTE: 0.55 E9/L (ref 0.1–0.95)
MONOCYTES RELATIVE PERCENT: 6.1 % (ref 2–12)
NEUTROPHILS ABSOLUTE: 5.18 E9/L (ref 1.8–7.3)
NEUTROPHILS RELATIVE PERCENT: 57.1 % (ref 43–80)
PDW BLD-RTO: 12.2 FL (ref 11.5–15)
PLATELET # BLD: 359 E9/L (ref 130–450)
PMV BLD AUTO: 8.9 FL (ref 7–12)
POTASSIUM REFLEX MAGNESIUM: 3.6 MMOL/L (ref 3.5–5)
PRO-BNP: 312 PG/ML (ref 0–125)
RBC # BLD: 4.41 E12/L (ref 3.5–5.5)
SODIUM BLD-SCNC: 137 MMOL/L (ref 132–146)
TOTAL PROTEIN: 7 G/DL (ref 6.4–8.3)
WBC # BLD: 9.1 E9/L (ref 4.5–11.5)

## 2022-10-16 PROCEDURE — 99284 EMERGENCY DEPT VISIT MOD MDM: CPT

## 2022-10-16 PROCEDURE — 80053 COMPREHEN METABOLIC PANEL: CPT

## 2022-10-16 PROCEDURE — 93971 EXTREMITY STUDY: CPT

## 2022-10-16 PROCEDURE — 83880 ASSAY OF NATRIURETIC PEPTIDE: CPT

## 2022-10-16 PROCEDURE — 85025 COMPLETE CBC W/AUTO DIFF WBC: CPT

## 2022-10-16 ASSESSMENT — PAIN DESCRIPTION - LOCATION: LOCATION: LEG

## 2022-10-16 ASSESSMENT — PAIN SCALES - GENERAL: PAINLEVEL_OUTOF10: 7

## 2022-10-16 ASSESSMENT — PAIN DESCRIPTION - FREQUENCY: FREQUENCY: INTERMITTENT

## 2022-10-16 ASSESSMENT — PAIN DESCRIPTION - ORIENTATION: ORIENTATION: LEFT

## 2022-10-16 ASSESSMENT — PAIN - FUNCTIONAL ASSESSMENT: PAIN_FUNCTIONAL_ASSESSMENT: NONE - DENIES PAIN

## 2022-10-16 ASSESSMENT — PAIN DESCRIPTION - PAIN TYPE: TYPE: ACUTE PAIN

## 2022-10-16 NOTE — ED NOTES
4:11 PM EDT    I received this patient at sign out from Dr. Rojelio Skiff   I have discussed the patient's initial exam, treatment and plan of care with the out going physician. I have introduced my self to the patient / family and have answered their questions to this point. I have examined the patient myself and reviewed ordered tests / medications and reviewed any available results to this point. If a resident is involved in the Emergency Department care, I have discussed my findings and plan with them as well. Patient's ultrasound was negative for DVT. Her labs were not worrisome. Patient has no chest pain shortness of breath. Discussed peripheral edema as a cause of her swelling of her legs. She is to follow-up with her PCP. She was given instructions to keep her legs elevated compression dressings. She is to return if symptoms worsen.      Nanette Loomis,   10/16/22 1735

## 2022-10-16 NOTE — ED PROVIDER NOTES
HPI:  10/16/22,   Time: 2:26 PM EDT         Delgado Mtz is a 44 y.o. female presenting to the ED for pain and swelling in the left leg, beginning 1 day ago. The complaint has been persistent, moderate in severity, and worsened by nothing. Patient denies hemoptysis shortness of breath. Patient had surgery 4 days ago. Patient denies nausea vomiting diarrhea melena hematemesis medic easy dysuria hematuria frequency. She has no history of DVT or PE in the past.    ROS:   Pertinent positives and negatives are stated within HPI, all other systems reviewed and are negative.  --------------------------------------------- PAST HISTORY ---------------------------------------------  Past Medical History:  has a past medical history of Abnormal Pap smear of cervix, Back pain, Chronic generalized pain disorder, Chronic sinusitis, Class 3 severe obesity due to excess calories without serious comorbidity with body mass index (BMI) of 40.0 to 44.9 in adult [facility]), DDD (degenerative disc disease), cervical, Depression, Essential hypertension, Gastroesophageal reflux disease without esophagitis, Hemochromatosis, Migraines, Paresthesias, Seasonal allergies, and Ventral hernia. Past Surgical History:  has a past surgical history that includes sinus surgery (N/A, 02/25/2016); Tubal ligation (07/22/2021); Rectal surgery (N/A, 9/15/2022); and hernia repair (N/A, 10/11/2022). Social History:  reports that she has never smoked. She has never used smokeless tobacco. She reports that she does not drink alcohol and does not use drugs. Family History: family history includes Arthritis in her maternal grandmother and mother; High Blood Pressure in her father; Other in her maternal aunt, maternal uncle, mother, paternal aunt, and paternal uncle. The patients home medications have been reviewed.     Allergies: Pcn [penicillins]    -------------------------------------------------- RESULTS -------------------------------------------------  All laboratory and radiology results have been personally reviewed by myself   LABS:  Results for orders placed or performed during the hospital encounter of 10/16/22   CBC with Auto Differential   Result Value Ref Range    WBC 9.1 4.5 - 11.5 E9/L    RBC 4.41 3.50 - 5.50 E12/L    Hemoglobin 13.1 11.5 - 15.5 g/dL    Hematocrit 39.6 34.0 - 48.0 %    MCV 89.8 80.0 - 99.9 fL    MCH 29.7 26.0 - 35.0 pg    MCHC 33.1 32.0 - 34.5 %    RDW 12.2 11.5 - 15.0 fL    Platelets 943 089 - 046 E9/L    MPV 8.9 7.0 - 12.0 fL    Neutrophils % 57.1 43.0 - 80.0 %    Immature Granulocytes % 0.8 0.0 - 5.0 %    Lymphocytes % 28.4 20.0 - 42.0 %    Monocytes % 6.1 2.0 - 12.0 %    Eosinophils % 7.0 (H) 0.0 - 6.0 %    Basophils % 0.6 0.0 - 2.0 %    Neutrophils Absolute 5.18 1.80 - 7.30 E9/L    Immature Granulocytes # 0.07 E9/L    Lymphocytes Absolute 2.57 1.50 - 4.00 E9/L    Monocytes Absolute 0.55 0.10 - 0.95 E9/L    Eosinophils Absolute 0.63 (H) 0.05 - 0.50 E9/L    Basophils Absolute 0.05 0.00 - 0.20 E9/L   Comprehensive Metabolic Panel w/ Reflex to MG   Result Value Ref Range    Sodium 137 132 - 146 mmol/L    Potassium reflex Magnesium 3.6 3.5 - 5.0 mmol/L    Chloride 101 98 - 107 mmol/L    CO2 24 22 - 29 mmol/L    Anion Gap 12 7 - 16 mmol/L    Glucose 86 74 - 99 mg/dL    BUN 11 6 - 20 mg/dL    Creatinine 0.7 0.5 - 1.0 mg/dL    GFR Non-African American >60 >=60 mL/min/1.73    GFR African American >60     Calcium 9.1 8.6 - 10.2 mg/dL    Total Protein 7.0 6.4 - 8.3 g/dL    Albumin 3.9 3.5 - 5.2 g/dL    Total Bilirubin 0.2 0.0 - 1.2 mg/dL    Alkaline Phosphatase 93 35 - 104 U/L    ALT 24 0 - 32 U/L    AST 23 0 - 31 U/L   Brain Natriuretic Peptide   Result Value Ref Range    Pro- (H) 0 - 125 pg/mL       RADIOLOGY:  Interpreted by Radiologist.  US DUP LOWER EXTREMITY LEFT BOB   Final Result   No evidence of DVT in the left lower extremity.       RECOMMENDATIONS:   Unavailable ------------------------- NURSING NOTES AND VITALS REVIEWED ---------------------------   The nursing notes within the ED encounter and vital signs as below have been reviewed. /80   Pulse 62   Temp 97.7 °F (36.5 °C) (Temporal)   Resp 18   Ht 5' 9\" (1.753 m)   Wt 290 lb (131.5 kg)   LMP 09/16/2022   SpO2 99%   BMI 42.83 kg/m²   Oxygen Saturation Interpretation: Normal      ---------------------------------------------------PHYSICAL EXAM--------------------------------------      Constitutional/General: Alert and oriented x3, well appearing, non toxic in NAD  Head: NC/AT  Eyes: PERRL, EOMI  Mouth: Oropharynx clear, handling secretions, no trismus  Neck: Supple, full ROM, no meningeal signs  Pulmonary: Lungs clear to auscultation bilaterally, no wheezes, rales, or rhonchi. Not in respiratory distress  Cardiovascular:  Regular rate and rhythm, no murmurs, gallops, or rubs. 2+ distal pulses  Abdomen: Soft, non tender, non distended, healing laparoscopic incisions  Extremities: Moves all extremities x 4. Warm and well perfused. Tender left calf and pretibial region without erythema ecchymosis cord or Homans. Normal well perfused foot  Skin: warm and dry without rash  Neurologic: GCS 15,  Psych: Normal Affect      ------------------------------ ED COURSE/MEDICAL DECISION MAKING----------------------  Medications - No data to display      Medical Decision Making:    The patient's ultrasound was negative. Counseling: The emergency provider has spoken with the patient and discussed todays results, in addition to providing specific details for the plan of care and counseling regarding the diagnosis and prognosis. Questions are answered at this time and they are agreeable with the plan.      --------------------------------- IMPRESSION AND DISPOSITION ---------------------------------    IMPRESSION  1.  Mild peripheral edema        DISPOSITION  Disposition: Discharge to home  Patient condition is stable                 Sharyle Packer, MD  10/19/22 0437

## 2022-10-20 ENCOUNTER — OFFICE VISIT (OUTPATIENT)
Dept: FAMILY MEDICINE CLINIC | Age: 37
End: 2022-10-20
Payer: COMMERCIAL

## 2022-10-20 VITALS
HEIGHT: 69 IN | BODY MASS INDEX: 43.4 KG/M2 | SYSTOLIC BLOOD PRESSURE: 124 MMHG | DIASTOLIC BLOOD PRESSURE: 76 MMHG | TEMPERATURE: 97.8 F | RESPIRATION RATE: 16 BRPM | HEART RATE: 73 BPM | OXYGEN SATURATION: 99 % | WEIGHT: 293 LBS

## 2022-10-20 DIAGNOSIS — K42.9 UMBILICAL HERNIA WITHOUT OBSTRUCTION AND WITHOUT GANGRENE: ICD-10-CM

## 2022-10-20 DIAGNOSIS — I10 ESSENTIAL HYPERTENSION: Primary | ICD-10-CM

## 2022-10-20 DIAGNOSIS — K61.0 PERIANAL ABSCESS: ICD-10-CM

## 2022-10-20 PROCEDURE — G8427 DOCREV CUR MEDS BY ELIG CLIN: HCPCS | Performed by: FAMILY MEDICINE

## 2022-10-20 PROCEDURE — 1036F TOBACCO NON-USER: CPT | Performed by: FAMILY MEDICINE

## 2022-10-20 PROCEDURE — G8484 FLU IMMUNIZE NO ADMIN: HCPCS | Performed by: FAMILY MEDICINE

## 2022-10-20 PROCEDURE — 99213 OFFICE O/P EST LOW 20 MIN: CPT | Performed by: FAMILY MEDICINE

## 2022-10-20 PROCEDURE — G8417 CALC BMI ABV UP PARAM F/U: HCPCS | Performed by: FAMILY MEDICINE

## 2022-10-20 ASSESSMENT — ENCOUNTER SYMPTOMS
VOMITING: 0
SINUS PAIN: 0
CHEST TIGHTNESS: 0
SHORTNESS OF BREATH: 0
EYE REDNESS: 0
DIARRHEA: 0
TROUBLE SWALLOWING: 0
SORE THROAT: 0
BACK PAIN: 0
ALLERGIC/IMMUNOLOGIC NEGATIVE: 1
PHOTOPHOBIA: 0
ABDOMINAL PAIN: 0
BLOOD IN STOOL: 0
EYE DISCHARGE: 0
COUGH: 0
EYE PAIN: 0
NAUSEA: 0

## 2022-10-20 NOTE — PROGRESS NOTES
10/20/22  Rajeev Neely Brothers : 1985 Sex: female  Age: 44 y.o. Assessment and Plan:  Judy Welch was seen today for follow-up. Diagnoses and all orders for this visit:    Essential hypertension  Blood pressure well controlled with current medications. Perianal abscess  Next scheduled for surgical follow-up is . Continues to spot intermittently from this area. Umbilical hernia without obstruction and without gangrene  Good healing present, will be rechecked on the  when she is seen for her rectal abscess. Is to slowly continue to increase her activity as tolerated. Recheck again in mid December, should be ready for return to work by the first of the year. Should she not improve, or worsen in any way, she should present back sooner. Return in about 2 months (around 2022). Chief Complaint   Patient presents with    Follow-up       Turns for recheck. She had umbilical hernia repair approximately 2 weeks ago was preceded 2 weeks earlier by a perirectal abscess incision and drainage. She is still intermittently spotting from the I&D site, otherwise is slowly improving, doing a bit more each day. [facility] papers were completed and have her anticipated return to work date 2023. She should meet this estimated date, will recheck her again in  to confirm this. Review of Systems   Constitutional:  Negative for appetite change, fatigue and unexpected weight change. HENT:  Negative for congestion, ear pain, hearing loss, sinus pain, sore throat and trouble swallowing. Eyes:  Negative for photophobia, pain, discharge and redness. Respiratory:  Negative for cough, chest tightness and shortness of breath. Cardiovascular:  Negative for chest pain, palpitations and leg swelling. Gastrointestinal:  Negative for abdominal pain, blood in stool, diarrhea, nausea and vomiting. Endocrine: Negative.     Genitourinary:  Negative for dysuria, flank pain, frequency and hematuria. Musculoskeletal:  Negative for arthralgias, back pain, joint swelling and myalgias. Skin: Negative. Allergic/Immunologic: Negative. Neurological:  Negative for dizziness, seizures, syncope, weakness, light-headedness, numbness and headaches. Hematological:  Negative for adenopathy. Does not bruise/bleed easily. Psychiatric/Behavioral: Negative.          Current Outpatient Medications:     sennosides-docusate sodium (SENOKOT-S) 8.6-50 MG tablet, Take 2 tablets by mouth 2 times daily, Disp: 28 tablet, Rfl: 0    albuterol (PROVENTIL) (2.5 MG/3ML) 0.083% nebulizer solution, Take 3 mLs by nebulization every 6 hours as needed for Wheezing, Disp: 120 each, Rfl: 3    propranolol (INDERAL LA) 60 MG extended release capsule, take 1 capsule by mouth twice a day, Disp: 60 capsule, Rfl: 2    pantoprazole (PROTONIX) 20 MG tablet, take 1 tablet by mouth every morning before breakfast, Disp: 30 tablet, Rfl: 2    fluticasone (FLOVENT HFA) 110 MCG/ACT inhaler, Inhale 2 puffs into the lungs 2 times daily, Disp: 12 g, Rfl: 3    ondansetron (ZOFRAN-ODT) 4 MG disintegrating tablet, Take 1 tablet by mouth 3 times daily as needed for Nausea or Vomiting, Disp: 21 tablet, Rfl: 0    albuterol sulfate HFA (VENTOLIN HFA) 108 (90 Base) MCG/ACT inhaler, Inhale 2 puffs into the lungs 4 times daily as needed for Wheezing, Disp: 18 g, Rfl: 0    amLODIPine (NORVASC) 5 MG tablet, Take 1 tablet by mouth in the morning., Disp: 30 tablet, Rfl: 5    vitamin D (CHOLECALCIFEROL) 25 MCG (1000 UT) TABS tablet, Take 1,000 Units by mouth in the morning., Disp: , Rfl:     citalopram (CELEXA) 40 MG tablet, Take 40 mg by mouth every morning, Disp: , Rfl:     traZODone (DESYREL) 50 MG tablet, Take 50 mg by mouth nightly, Disp: , Rfl:   Allergies   Allergen Reactions    Pcn [Penicillins] Rash       Past Medical History:   Diagnosis Date    Abnormal Pap smear of cervix     Back pain     Chronic generalized pain disorder 08/13/2021    Chronic sinusitis     with facial pain    Class 3 severe obesity due to excess calories without serious comorbidity with body mass index (BMI) of 40.0 to 44.9 in adult [facility])     DDD (degenerative disc disease), cervical 08/13/2021    Depression 06/06/2011    Essential hypertension     Gastroesophageal reflux disease without esophagitis     Hemochromatosis     Migraines     born with heart murmur    Paresthesias 08/10/2021    Seasonal allergies     Ventral hernia      Past Surgical History:   Procedure Laterality Date    HERNIA REPAIR N/A 10/11/2022    LAPAROSCOPIC ROBOTIC XI ASSISTED VENTRAL HERNIA REPAIR WITH MESH performed by Natalee Almendarez MD [facility] [employer] N/A 9/15/2022    DRAINAGE PERIANAL ABSCESS performed by Natalee Almendarez MD at [facility] N/A 02/25/2016    FUNCTIONAL ENDOSCOPIC SINUS SURGERY WITH BILATERAL MAXILLARY, FRONTAL & SPENOID ANTROSTOMIES, SUBMUCCOSAL RESECTION OF INFERIOR TURBINATES    TUBAL LIGATION  07/22/2021     Family History   Problem Relation Age of Onset    Arthritis Mother     Other Mother         sinus problems    High Blood Pressure Father     Other Maternal Aunt         migraine    Other Maternal Uncle         migraine    Other Paternal Aunt         migraine    Other Paternal Uncle         migraine    Arthritis Maternal Grandmother      Social History     Socioeconomic History    Marital status:      Spouse name: Not on file    Number of children: Not on file    Years of education: Not on file    Highest education level: Not on file   Occupational History    Not on file   Tobacco Use    Smoking status: Never    Smokeless tobacco: Never   Vaping Use    Vaping Use: Never used   Substance and Sexual Activity    Alcohol use: No     Alcohol/week: 0.0 standard drinks    Drug use: No    Sexual activity: Not on file   Other Topics Concern    Not on file   Social History Narrative    Not on file     Social Determinants of Health     Financial Resource Strain: Unknown    Difficulty of Paying Living Expenses: Patient refused   Food Insecurity: No Food Insecurity    Worried About Running Out of Food in the Last Year: Never true    Ran Out of Food in the Last Year: Never true   Transportation Needs: Not on file   Physical Activity: Not on file   Stress: Not on file   Social Connections: Not on file   Intimate Partner Violence: Not on file   Housing Stability: Not on file       Vitals:    10/20/22 1014   BP: 124/76   Pulse: 73   Resp: 16   Temp: 97.8 °F (36.6 °C)   TempSrc: Temporal   SpO2: 99%   Weight: 293 lb (132.9 kg)   Height: 5' 9\" (1.753 m)       Physical Exam        Seen By:  Dorian Marte DO

## 2022-10-24 LAB
FUNGUS (MYCOLOGY) CULTURE: NORMAL
FUNGUS STAIN: NORMAL

## 2022-10-31 ENCOUNTER — TELEPHONE (OUTPATIENT)
Dept: FAMILY MEDICINE CLINIC | Age: 37
End: 2022-10-31

## 2022-10-31 NOTE — TELEPHONE ENCOUNTER
[address] [place] [address]        [phone]  Ext [phone]          She is calling about the amount of time this patient will be off work for her current health related issue. She stated that the duration allowable for this diagnosis (K61,0) Is 31.9 days. Is there certain considerations that could back up this amount of time?

## 2022-11-01 NOTE — TELEPHONE ENCOUNTER
I tried to reach [facility] to advise of Dr Jw nesbitt, but she was not available. I did let Aamir Pinto know what the reasoning was for the call. She took down the information provided by Dr Trisha Simmons and added it to her documentation.

## 2022-12-01 ENCOUNTER — PREP FOR PROCEDURE (OUTPATIENT)
Dept: SURGERY | Age: 37
End: 2022-12-01

## 2022-12-01 RX ORDER — SODIUM CHLORIDE 0.9 % (FLUSH) 0.9 %
5-40 SYRINGE (ML) INJECTION EVERY 12 HOURS SCHEDULED
Status: CANCELLED | OUTPATIENT
Start: 2022-12-01

## 2022-12-01 RX ORDER — SODIUM CHLORIDE, SODIUM LACTATE, POTASSIUM CHLORIDE, CALCIUM CHLORIDE 600; 310; 30; 20 MG/100ML; MG/100ML; MG/100ML; MG/100ML
INJECTION, SOLUTION INTRAVENOUS CONTINUOUS
Status: CANCELLED | OUTPATIENT
Start: 2022-12-01

## 2022-12-01 RX ORDER — SODIUM CHLORIDE 0.9 % (FLUSH) 0.9 %
5-40 SYRINGE (ML) INJECTION PRN
Status: CANCELLED | OUTPATIENT
Start: 2022-12-01

## 2022-12-01 NOTE — H&P
Name: Ailyn Gomes             : 1985 Sex: F  Age: 44 yrs  Acct#:  [id number]          Patient was referred by . Patient's primary care provider is Vishnu Uribe D.O..  CC:  Follow-up    HPI: Doing well after her hernia repair. No issues. Reviewed activity restrictions. Still having some drainage from the perianal skin. Meds Prior to Visit:  Diflucan  100 mg  1 by mouth 1 tablet every three days  Hydrocodone Bitartrate/Acetaminophen  5-325 mg  take one tablet every 6 hours as needed for pain  Pantoprazole Sodium  40 mg  1 by mouth every day  Amlodipine Besylate  2.5 mg   Propranolol HCL  10 mg      Allergies:  Penicillin        Wt Prior: 292lb as of 22    Exam:  Constitution:  Non-toxic, no acute distress  Heart :  RRR  Lungs CTA bilaterally  Abdomen: Soft and nondistended, well-healed incisions  Extremeties: No rash, cyanosis, or jaundice  Rectal: Persistent communication right anterior position           Assessment #1: Hx K60.3 Anal fistula   Care Plan:              Comments       :  Rectal exam under anesthesia, possible seton, possible fistulotomy     Assessment #2: Hx K43.6 Other and unspecified ventral hernia with obstruction, without gangrene   Care Plan:                      Time spent reviewing records, discussing findings, answering questions, reviewing laboratory studies, radiologic exams, and other diagnostics, and reviewing the diagnostic and therapeutic plan: [ ] minutes    Voice recognition software was used in the preparation of this document. Despite all efforts to prevent them, transcription errors may have occurred.   Seen by:

## 2022-12-01 NOTE — H&P (VIEW-ONLY)
Name: Rajendra Camargo             : 1985 Sex: F  Age: 44 yrs  Acct#:  [id number]          Patient was referred by . Patient's primary care provider is Rigo Tillman D.O..  CC:  Follow-up    HPI: Doing well after her hernia repair. No issues. Reviewed activity restrictions. Still having some drainage from the perianal skin. Meds Prior to Visit:  Diflucan  100 mg  1 by mouth 1 tablet every three days  Hydrocodone Bitartrate/Acetaminophen  5-325 mg  take one tablet every 6 hours as needed for pain  Pantoprazole Sodium  40 mg  1 by mouth every day  Amlodipine Besylate  2.5 mg   Propranolol HCL  10 mg      Allergies:  Penicillin        Wt Prior: 292lb as of 22    Exam:  Constitution:  Non-toxic, no acute distress  Heart :  RRR  Lungs CTA bilaterally  Abdomen: Soft and nondistended, well-healed incisions  Extremeties: No rash, cyanosis, or jaundice  Rectal: Persistent communication right anterior position           Assessment #1: Hx K60.3 Anal fistula   Care Plan:              Comments       :  Rectal exam under anesthesia, possible seton, possible fistulotomy     Assessment #2: Hx K43.6 Other and unspecified ventral hernia with obstruction, without gangrene   Care Plan:                      Time spent reviewing records, discussing findings, answering questions, reviewing laboratory studies, radiologic exams, and other diagnostics, and reviewing the diagnostic and therapeutic plan: [ ] minutes    Voice recognition software was used in the preparation of this document. Despite all efforts to prevent them, transcription errors may have occurred.   Seen by:

## 2022-12-02 RX ORDER — ESKETAMINE HYDROCHLORIDE 28 MG/.2ML
SOLUTION NASAL WEEKLY
COMMUNITY
Start: 2022-10-25

## 2022-12-02 NOTE — PROGRESS NOTES
[facility] PRE-ADMISSION TESTING INSTRUCTIONS    The Preadmission Testing patient is instructed accordingly using the following criteria (check applicable):    ARRIVAL INSTRUCTIONS:  [x] Parking the day of Surgery is located in the Main Entrance lot. Upon entering the door, make an immediate right to the surgery reception desk    [x] Bring photo ID and insurance card    [] Bring in a copy of Living will or Durable Power of  papers. [x] Please be sure to arrange transportation to and from the hospital     Please arrange for someone to be with you the re[x]mainder of the day due to having anesthesia      GENERAL INSTRUCTIONS:    [x] Nothing by mouth after midnight, including gum, candy, mints or water    [x] You may brush your teeth, but do not swallow any water    [] Take medications as instructed with 1-2 oz of water    [x] Stop herbal supplements and vitamins 5 days prior to procedure    [x] Follow preop dosing of blood thinners per physician instructions    [] Do not take insulin or oral diabetic medications    [] If diabetic and have low blood sugar or feel symptomatic, take 1-2oz apple juice or glucose tablets    [] Bring inhalers day of surgery    [] Bring C-PAP/ Bi-Pap day of surgery    [] Bring urine specimen day of surgery    [x] Antibacterial Soap shower or bath AM of Surgery, no lotion, powders or creams to surgical site    [] Follow bowel prep as instructed per surgeon    [x] No tobacco products within 24 hours of surgery     [x] No alcohol or illegal drug use within 24 hours of surgery.     [x] Jewelry, body piercing's, eyeglasses, contact lenses and dentures are not permitted into surgery (bring cases)      [] Please do not wear any nail polish or make up on the day of surgery    [] If not already done, you can expect a call from registration    [x] If surgeon requests a time change you will be notified the day prior to surgery    [] If you receive a survey after surgery we would greatly appreciate your comments    [] Parent/guardian of a minor must accompany their child and remain on the premises  the entire time they are under our care     [] Pediatric patients may bring favorite toy, blanket or comfort item with them    [] A caregiver or family member must remain with the patient during their stay if they are mentally handicapped, have dementia, disoriented or unable to use a call light or would be a safety concern if left unattended    [x] Please notify surgeon if you develop any illness between now and time of surgery (cold, cough, sore throat, fever, nausea, vomiting) or any signs of infections  including skin, wounds, and dental.    [] Other instructions    EDUCATIONAL MATERIALS PROVIDED:    [] PAT Preoperative Education Packet/Booklet     [] Medication List    [] Fluoroscopy Information Pamphlet    [] Transfusion bracelet applied with instructions    [] Joint replacement video reviewed    [] Shower with antibacterial soap and use CHG wipes provided the evening before surgery as instructed

## 2022-12-06 ENCOUNTER — HOSPITAL ENCOUNTER (OUTPATIENT)
Age: 37
Setting detail: OUTPATIENT SURGERY
Discharge: HOME | End: 2022-12-06
Attending: SURGERY | Admitting: SURGERY
Payer: COMMERCIAL

## 2022-12-06 ENCOUNTER — ANESTHESIA (OUTPATIENT)
Dept: OPERATING ROOM | Age: 37
End: 2022-12-06
Payer: COMMERCIAL

## 2022-12-06 ENCOUNTER — ANESTHESIA EVENT (OUTPATIENT)
Dept: OPERATING ROOM | Age: 37
End: 2022-12-06
Payer: COMMERCIAL

## 2022-12-06 VITALS
HEIGHT: 69 IN | HEART RATE: 74 BPM | OXYGEN SATURATION: 97 % | BODY MASS INDEX: 42.95 KG/M2 | DIASTOLIC BLOOD PRESSURE: 80 MMHG | TEMPERATURE: 97.3 F | WEIGHT: 290 LBS | SYSTOLIC BLOOD PRESSURE: 142 MMHG | RESPIRATION RATE: 19 BRPM

## 2022-12-06 DIAGNOSIS — K60.3 PERIANAL FISTULA: ICD-10-CM

## 2022-12-06 DIAGNOSIS — G89.18 POSTOPERATIVE PAIN: ICD-10-CM

## 2022-12-06 DIAGNOSIS — Z01.818 PRE-OP EXAM: Primary | ICD-10-CM

## 2022-12-06 LAB
HCG, URINE, POC: NEGATIVE
Lab: NORMAL
NEGATIVE QC PASS/FAIL: NORMAL
POSITIVE QC PASS/FAIL: NORMAL

## 2022-12-06 PROCEDURE — 2500000003 HC RX 250 WO HCPCS: Performed by: SURGERY

## 2022-12-06 PROCEDURE — 7100000010 HC PHASE II RECOVERY - FIRST 15 MIN: Performed by: SURGERY

## 2022-12-06 PROCEDURE — 3700000000 HC ANESTHESIA ATTENDED CARE: Performed by: SURGERY

## 2022-12-06 PROCEDURE — 6360000002 HC RX W HCPCS: Performed by: SURGERY

## 2022-12-06 PROCEDURE — 6370000000 HC RX 637 (ALT 250 FOR IP): Performed by: SURGERY

## 2022-12-06 PROCEDURE — 3700000001 HC ADD 15 MINUTES (ANESTHESIA): Performed by: SURGERY

## 2022-12-06 PROCEDURE — 3600000002 HC SURGERY LEVEL 2 BASE: Performed by: SURGERY

## 2022-12-06 PROCEDURE — 2580000003 HC RX 258: Performed by: SURGERY

## 2022-12-06 PROCEDURE — 6360000002 HC RX W HCPCS: Performed by: NURSE ANESTHETIST, CERTIFIED REGISTERED

## 2022-12-06 PROCEDURE — 2709999900 HC NON-CHARGEABLE SUPPLY: Performed by: SURGERY

## 2022-12-06 PROCEDURE — 7100000011 HC PHASE II RECOVERY - ADDTL 15 MIN: Performed by: SURGERY

## 2022-12-06 PROCEDURE — 3600000012 HC SURGERY LEVEL 2 ADDTL 15MIN: Performed by: SURGERY

## 2022-12-06 RX ORDER — SODIUM CHLORIDE 0.9 % (FLUSH) 0.9 %
5-40 SYRINGE (ML) INJECTION EVERY 12 HOURS SCHEDULED
Status: CANCELLED | OUTPATIENT
Start: 2022-12-06

## 2022-12-06 RX ORDER — DIBUCAINE 0.28 G/28G
OINTMENT TOPICAL PRN
Status: DISCONTINUED | OUTPATIENT
Start: 2022-12-06 | End: 2022-12-06 | Stop reason: ALTCHOICE

## 2022-12-06 RX ORDER — OXYCODONE HYDROCHLORIDE AND ACETAMINOPHEN 5; 325 MG/1; MG/1
1 TABLET ORAL EVERY 6 HOURS PRN
Qty: 20 TABLET | Refills: 0 | Status: SHIPPED | OUTPATIENT
Start: 2022-12-06 | End: 2022-12-11

## 2022-12-06 RX ORDER — DIBUCAINE 0.28 G/28G
OINTMENT TOPICAL
Qty: 28 G | Refills: 0
Start: 2022-12-06 | End: 2022-12-16

## 2022-12-06 RX ORDER — SODIUM CHLORIDE, SODIUM LACTATE, POTASSIUM CHLORIDE, CALCIUM CHLORIDE 600; 310; 30; 20 MG/100ML; MG/100ML; MG/100ML; MG/100ML
INJECTION, SOLUTION INTRAVENOUS CONTINUOUS
Status: DISCONTINUED | OUTPATIENT
Start: 2022-12-06 | End: 2022-12-06 | Stop reason: HOSPADM

## 2022-12-06 RX ORDER — SODIUM CHLORIDE 9 MG/ML
INJECTION, SOLUTION INTRAVENOUS PRN
Status: CANCELLED | OUTPATIENT
Start: 2022-12-06

## 2022-12-06 RX ORDER — MIDAZOLAM HYDROCHLORIDE 1 MG/ML
INJECTION INTRAMUSCULAR; INTRAVENOUS PRN
Status: DISCONTINUED | OUTPATIENT
Start: 2022-12-06 | End: 2022-12-06 | Stop reason: SDUPTHER

## 2022-12-06 RX ORDER — SODIUM CHLORIDE 0.9 % (FLUSH) 0.9 %
5-40 SYRINGE (ML) INJECTION PRN
Status: DISCONTINUED | OUTPATIENT
Start: 2022-12-06 | End: 2022-12-06 | Stop reason: HOSPADM

## 2022-12-06 RX ORDER — SODIUM CHLORIDE 0.9 % (FLUSH) 0.9 %
5-40 SYRINGE (ML) INJECTION EVERY 12 HOURS SCHEDULED
Status: DISCONTINUED | OUTPATIENT
Start: 2022-12-06 | End: 2022-12-06 | Stop reason: HOSPADM

## 2022-12-06 RX ORDER — SODIUM CHLORIDE 0.9 % (FLUSH) 0.9 %
5-40 SYRINGE (ML) INJECTION PRN
Status: CANCELLED | OUTPATIENT
Start: 2022-12-06

## 2022-12-06 RX ORDER — PROPOFOL 10 MG/ML
INJECTION, EMULSION INTRAVENOUS PRN
Status: DISCONTINUED | OUTPATIENT
Start: 2022-12-06 | End: 2022-12-06 | Stop reason: SDUPTHER

## 2022-12-06 RX ORDER — LIDOCAINE HYDROCHLORIDE AND EPINEPHRINE 10; 10 MG/ML; UG/ML
INJECTION, SOLUTION INFILTRATION; PERINEURAL PRN
Status: DISCONTINUED | OUTPATIENT
Start: 2022-12-06 | End: 2022-12-06 | Stop reason: ALTCHOICE

## 2022-12-06 RX ORDER — PROCHLORPERAZINE EDISYLATE 5 MG/ML
5 INJECTION INTRAMUSCULAR; INTRAVENOUS
Status: CANCELLED | OUTPATIENT
Start: 2022-12-06 | End: 2022-12-07

## 2022-12-06 RX ORDER — FENTANYL CITRATE 50 UG/ML
INJECTION, SOLUTION INTRAMUSCULAR; INTRAVENOUS PRN
Status: DISCONTINUED | OUTPATIENT
Start: 2022-12-06 | End: 2022-12-06 | Stop reason: SDUPTHER

## 2022-12-06 RX ADMIN — CEFAZOLIN 3000 MG: 10 INJECTION, POWDER, FOR SOLUTION INTRAVENOUS at 09:24

## 2022-12-06 RX ADMIN — PROPOFOL 150 MG: 10 INJECTION, EMULSION INTRAVENOUS at 09:28

## 2022-12-06 RX ADMIN — SODIUM CHLORIDE, POTASSIUM CHLORIDE, SODIUM LACTATE AND CALCIUM CHLORIDE: 600; 310; 30; 20 INJECTION, SOLUTION INTRAVENOUS at 09:16

## 2022-12-06 RX ADMIN — MIDAZOLAM 2 MG: 1 INJECTION INTRAMUSCULAR; INTRAVENOUS at 09:32

## 2022-12-06 RX ADMIN — PROPOFOL 75 MCG/KG/MIN: 10 INJECTION, EMULSION INTRAVENOUS at 09:29

## 2022-12-06 RX ADMIN — FENTANYL CITRATE 100 MCG: 50 INJECTION, SOLUTION INTRAMUSCULAR; INTRAVENOUS at 09:28

## 2022-12-06 RX ADMIN — CEFAZOLIN 3000 MG: 10 INJECTION, POWDER, FOR SOLUTION INTRAVENOUS at 09:32

## 2022-12-06 ASSESSMENT — PAIN - FUNCTIONAL ASSESSMENT: PAIN_FUNCTIONAL_ASSESSMENT: 0-10

## 2022-12-06 NOTE — INTERVAL H&P NOTE
Update History & Physical    The patient's History and Physical of December 1, 2022 was reviewed with the patient and I examined the patient. There was no change. The surgical site was confirmed by the patient and me. Plan: The risks, benefits, expected outcome, and alternative to the recommended procedure have been discussed with the patient. Patient understands and wants to proceed with the procedure.      Electronically signed by Marlo Huffman MD on 12/6/2022 at 8:56 AM

## 2022-12-06 NOTE — ANESTHESIA POSTPROCEDURE EVALUATION
Department of Anesthesiology  Postprocedure Note    Patient: Anne Porras  MRN: [id number]  YOB: 1985  Date of evaluation: 12/6/2022      Procedure Summary     Date: 12/06/22 Room / Location: [facility] OR 07 / [facility]    Anesthesia Start: 8349 Anesthesia Stop: 4758    Procedure: RECTAL EXAMINATION UNDER ANESTHESIA WITH FISTULOTOMY (Anus) Diagnosis:       Anal fistula      (Anal fistula [K60.3])    Surgeons: Cristopher Ryan MD Responsible Provider: Keerthi Sparks MD    Anesthesia Type: MAC ASA Status: 3          Anesthesia Type: MAC    Luisa Phase I: Luisa Score: 10    Luisa Phase II:        Anesthesia Post Evaluation    Patient location during evaluation: PACU  Patient participation: complete - patient participated  Level of consciousness: awake and alert  Pain score: 3  Airway patency: patent  Nausea & Vomiting: no nausea and no vomiting  Complications: no  Cardiovascular status: hemodynamically stable  Respiratory status: acceptable, spontaneous ventilation and room air  Hydration status: euvolemic

## 2022-12-06 NOTE — OP NOTE
Operative Note      Patient: Nazanin Velarde  YOB: 1985  MRN: [id number]    Date of Procedure: 12/6/2022    Pre-Op Diagnosis: Anal fistula [K60.3]    Post-Op Diagnosis: Same       Procedure(s):  RECTAL EXAMINATION UNDER ANESTHESIA WITH FISTULOTOMY    Surgeon(s):  Michelle Thompson MD    Assistant:   Resident: Brennen Watson DO    Anesthesia: General    Estimated Blood Loss (mL): Minimal    Complications: None    Specimens:   * No specimens in log *    Implants:  * No implants in log *      Drains: * No LDAs found *    Findings: right anteriolateral fistula tracking anteriorly and superficial  to sphincer muscles        HISTORY: Nazanin Velarde is a 44 y.o. female with history of perianal abscess with chronic draining wound near the anus. Exam under anesthesia possible fistulotomy, possible seton fistulotomy was recommended. The risks benefits and alternatives of the procedure were discussed with the patient who stated understanding and agreed to proceed. DESCRIPTION OF PROCEDURE: The patient was brought to the operating room and positioned supine on the OR table. Sequential compression devices were placed on the patient's lower extremities and functioning. Preoperative antibiotics were not indicated. Anesthesia was obtained without complication as per the anesthesia record. Immediately prior to the procedure a time-out was called and the surgical checklist was reviewed and agreed upon by all present. The patient was prepped and draped in the usual sterile fashion. Digital rectal exam performed revealing internal hemorrhoids but no palpable masses. In the right anterolateral perianal skin there was a granulomatous wound at site of previous incision and drainage of perianal abscess.   Using a Angiocath hydrogen peroxide was instilled and irrigated into the previous abscess site however no clear internal fistulous tract was identified on rectal exam.  The tract was probed using lacrimal probes and a superficial track identified tracking in a radial fashion anteriorly. This tract was noted to be superficial and decision made to perform open fistulotomy. The fistulous tract was incised and opened using electrocautery. Granulomatous tissue was fulgurated. Hemostasis achieved with electrocautery. Dibucaine ointment applied to wound and sterile 4 x 4's and ABD were applied as dressing      Needle, sponge, and instrument counts were reported as correct x2. Dr. Jacy Kunz was present and scrubbed throughout the case. The patient tolerated the procedure well without complications. She was transferred to the recovery area in good condition.     Electronically signed by Krzysztof Pham DO on 12/6/22 at 9:51 AM EST

## 2022-12-06 NOTE — ANESTHESIA PRE PROCEDURE
Department of Anesthesiology  Preprocedure Note       Name:  Madi Smyth   Age:  44 y.o.  :  1985                                          MRN:  [id number]         Date:  2022      Surgeon: Nisa Andrade):  Deneen Garcia MD    Procedure: Procedure(s):  RECTAL EXAMINATION UNDER ANESTHESIA POSSIBLE FISTULOTOMY POSSIBLE FISTULECTOMY POSSIBLE SETON    Medications prior to admission:   Prior to Admission medications    Medication Sig Start Date End Date Taking? Authorizing Provider   SPRAVATO, 84 MG DOSE, 28 MG/DEVICE SOPK nasal solution once a week 10/25/22   Historical Provider, MD   sennosides-docusate sodium (SENOKOT-S) 8.6-50 MG tablet Take 2 tablets by mouth 2 times daily 10/11/22   Alonso Fraire MD   albuterol (PROVENTIL) (2.5 MG/3ML) 0.083% nebulizer solution Take 3 mLs by nebulization every 6 hours as needed for Wheezing 10/11/22   [place] L Tomasz, DO   propranolol (INDERAL LA) 60 MG extended release capsule take 1 capsule by mouth twice a day 10/3/22   [place] L Tomasz, DO   pantoprazole (PROTONIX) 20 MG tablet take 1 tablet by mouth every morning before breakfast 10/3/22   Lorena Arreola, DO   fluticasone (FLOVENT HFA) 110 MCG/ACT inhaler Inhale 2 puffs into the lungs 2 times daily 22  [place] L Tomasz, DO   ondansetron (ZOFRAN-ODT) 4 MG disintegrating tablet Take 1 tablet by mouth 3 times daily as needed for Nausea or Vomiting 22   Jolee Klinefelter, DO   albuterol sulfate HFA (VENTOLIN HFA) 108 (90 Base) MCG/ACT inhaler Inhale 2 puffs into the lungs 4 times daily as needed for Wheezing 22   [place] L Tolbert, DO   amLODIPine (NORVASC) 5 MG tablet Take 1 tablet by mouth in the morning. 8/10/22   [place] L Tolbert, DO   vitamin D (CHOLECALCIFEROL) 25 MCG (1000 UT) TABS tablet Take 1,000 Units by mouth in the morning.     Historical Provider, MD   citalopram (CELEXA) 40 MG tablet Take 40 mg by mouth every morning 5/10/22   Historical Provider, MD   traZODone (DESYREL) 50 MG tablet Take 50 mg by mouth nightly 5/10/22   Historical Provider, MD       Current medications:    No current facility-administered medications for this visit. No current outpatient medications on file. Facility-Administered Medications Ordered in Other Visits   Medication Dose Route Frequency Provider Last Rate Last Admin    sodium chloride flush 0.9 % injection 5-40 mL  5-40 mL IntraVENous 2 times per day Kenrick Monroy MD        sodium chloride flush 0.9 % injection 5-40 mL  5-40 mL IntraVENous PRN Kenrick Monroy MD        lactated ringers infusion   IntraVENous Continuous Kenrick Monroy MD        ceFAZolin (ANCEF) 3,000 mg in dextrose 5 % 100 mL IVPB  3,000 mg IntraVENous On Call to [address] [place] [address] MD Carol           Allergies: Allergies   Allergen Reactions    Pcn [Penicillins] Rash       Problem List:    Patient Active Problem List   Diagnosis Code    GERD with esophagitis K21.00    Allergic rhinitis J30.9    Congenital spondylolysis, lumbosacral region Q76.2    Degeneration of lumbar or lumbosacral intervertebral disc M51.37    Depression F32. A    Hemochromatosis, hereditary ([facility] [address] E83.110    Back pain M54.9    Class 3 severe obesity due to excess calories without serious comorbidity with body mass index (BMI) of 40.0 to 44.9 in adult ([facility]) E66.01, Z68.41    Essential hypertension I10    Paresthesias R20.2    Chronic generalized pain disorder R52, G89.29    DDD (degenerative disc disease), cervical M50.30    DDD (degenerative disc disease), lumbar M51.36    Dizziness and giddiness R42    Perianal abscess C73.1    Umbilical hernia without obstruction and without gangrene K42.9       Past Medical History:        Diagnosis Date    Abnormal Pap smear of cervix     Back pain     Chronic generalized pain disorder 08/13/2021    Chronic sinusitis     with facial pain    Class 3 severe obesity due to excess calories without serious comorbidity with body mass index (BMI) of 40.0 to 44.9 in adult ([facility]     DDD (degenerative disc disease), cervical 08/13/2021    Depression 06/06/2011    Essential hypertension     Gastroesophageal reflux disease without esophagitis     Hemochromatosis     Migraines     born with heart murmur    Paresthesias 08/10/2021    Seasonal allergies     Ventral hernia        Past Surgical History:        Procedure Laterality Date    HERNIA REPAIR N/A 10/11/2022    LAPAROSCOPIC ROBOTIC XI ASSISTED VENTRAL HERNIA REPAIR WITH MESH performed by Foster Weathers MD at [address] [place] [address] N/A 9/15/2022    DRAINAGE PERIANAL ABSCESS performed by Foster Weathers MD at [facility] N/A 02/25/2016    FUNCTIONAL ENDOSCOPIC SINUS SURGERY WITH BILATERAL MAXILLARY, FRONTAL & SPENOID ANTROSTOMIES, SUBMUCCOSAL RESECTION OF INFERIOR TURBINATES    TUBAL LIGATION  07/22/2021       Social History:    Social History     Tobacco Use    Smoking status: Never    Smokeless tobacco: Never   Substance Use Topics    Alcohol use: No     Alcohol/week: 0.0 standard drinks                                Counseling given: Not Answered      Vital Signs (Current): There were no vitals filed for this visit.                                            BP Readings from Last 3 Encounters:   10/20/22 124/76   10/16/22 131/80   10/11/22 126/77       NPO Status:                                                                                 BMI:   Wt Readings from Last 3 Encounters:   12/02/22 292 lb (132.5 kg)   10/20/22 293 lb (132.9 kg)   10/16/22 290 lb (131.5 kg)     There is no height or weight on file to calculate BMI.    CBC:   Lab Results   Component Value Date/Time    WBC 9.1 10/16/2022 03:34 PM    RBC 4.41 10/16/2022 03:34 PM    HGB 13.1 10/16/2022 03:34 PM    HCT 39.6 10/16/2022 03:34 PM    MCV 89.8 10/16/2022 03:34 PM    RDW 12.2 10/16/2022 03:34 PM     10/16/2022 03:34 PM       CMP:   Lab Results   Component Value Date/Time     10/16/2022 03:34 PM    K 3.6 10/16/2022 03:34 PM  10/16/2022 03:34 PM    CO2 24 10/16/2022 03:34 PM    BUN 11 10/16/2022 03:34 PM    CREATININE 0.7 10/16/2022 03:34 PM    GFRAA >60 10/16/2022 03:34 PM    AGRATIO 1.3 09/09/2021 04:28 PM    LABGLOM >60 10/16/2022 03:34 PM    GLUCOSE 86 10/16/2022 03:34 PM    GLUCOSE 89 01/10/2012 07:37 PM    PROT 7.0 10/16/2022 03:34 PM    CALCIUM 9.1 10/16/2022 03:34 PM    BILITOT 0.2 10/16/2022 03:34 PM    ALKPHOS 93 10/16/2022 03:34 PM    AST 23 10/16/2022 03:34 PM    ALT 24 10/16/2022 03:34 PM       POC Tests: No results for input(s): POCGLU, POCNA, POCK, POCCL, POCBUN, POCHEMO, POCHCT in the last 72 hours. Coags: No results found for: PROTIME, INR, APTT    HCG (If Applicable):   Lab Results   Component Value Date    PREGTESTUR NEGATIVE 09/25/2022        ABGs: No results found for: PHART, PO2ART, WSA1HNT, MHR5VLZ, BEART, F8ICMDNL     Type & Screen (If Applicable):  No results found for: LABABO, LABRH    Drug/Infectious Status (If Applicable):  No results found for: HIV, HEPCAB    COVID-19 Screening (If Applicable):   Lab Results   Component Value Date/Time    COVID19 Not Detected 09/25/2022 01:20 AM    COVID19 Not Detected 12/10/2020 12:00 AM           Anesthesia Evaluation  Patient summary reviewed and Nursing notes reviewed no history of anesthetic complications:   Airway: Mallampati: II  TM distance: >3 FB   Neck ROM: limited  Mouth opening: > = 3 FB   Dental: normal exam         Pulmonary:normal exam    (+) asthma (childhood): exercise-induced asthma, allergic asthma,           Patient did not smoke on day of surgery. Cardiovascular:    (+) hypertension: no interval change,         Rhythm: regular  Rate: normal                    Neuro/Psych:   (+) headaches:,             GI/Hepatic/Renal:   (+) GERD: well controlled, morbid obesity          Endo/Other:                      ROS comment: Lumbar disc disease Abdominal:             Vascular: negative vascular ROS.          Other Findings: Anesthesia Plan      general     ASA 3       Induction: intravenous. MIPS: Postoperative opioids intended and Prophylactic antiemetics administered. Anesthetic plan and risks discussed with patient and spouse. Plan discussed with attending.               NPO>MN      Diego Devries MD   12/6/2022

## 2022-12-06 NOTE — DISCHARGE INSTRUCTIONS
Anal Fistulotomy: What to Expect at [address] had an anal fistulotomy. This surgery opens and drains an anal fistula and helps it heal.  You may be worried about having a bowel movement after your surgery. You will likely have some pain and bleeding with bowel movements for the first 1 to 2 weeks. You can make your bowel movements less painful by getting enough fiber and fluids. And you can use stool softeners or laxatives. Sitting in warm water (sitz bath) after bowel movements will also help. You may notice a small amount of pus or blood draining from the opening of your fistula. This is normal in the days after your surgery. You can put a gauze pad over the opening of the fistula to absorb the drainage, if needed. Most people can go back to work and their normal routine 1 to 2 weeks after surgery. It will probably take several weeks to several months for your fistula to completely heal. This depends on the size of your fistula and how much surgery you had. This care sheet gives you a general idea about how long it will take for you to recover. But each person recovers at a different pace. Follow the steps below to get better as quickly as possible. How can you care for yourself at home? Activity    Rest when you feel tired. Getting enough sleep will help you recover. Try to walk each day. Start by walking a little more than you did the day before. Bit by bit, increase the amount you walk. Walking boosts blood flow and helps prevent pneumonia and constipation. You may drive when you are no longer taking pain medicine and can quickly move your foot from the gas pedal to the brake. You must also be able to sit comfortably for a long period of time, even if you do not plan to go far. You might get caught in traffic. Most people are able to return to work within 1 to 2 weeks after surgery. Shower or take baths as usual. Pat your anal area dry with a towel when you are done. Diet    You can eat your normal diet. If your stomach is upset, try bland, low-fat foods like plain rice, broiled chicken, toast, and yogurt. Drink plenty of fluids (unless your doctor tells you not to). Include high-fiber foods, such as fruits, vegetables, beans, and whole grains, in your diet each day. You may notice that your bowel movements are not regular right after your surgery. This is common. Try to avoid constipation and straining with bowel movements. You may want to take a fiber supplement every day. If you have not had a bowel movement after a couple of days, ask your doctor about taking a mild laxative. Medicines    Your doctor will tell you if and when you can restart your medicines. He or she will also give you instructions about taking any new medicines. If you stopped taking aspirin or some other blood thinner, your doctor will tell you when to start taking it again. Be safe with medicines. Take pain medicines exactly as directed. If the doctor gave you a prescription medicine for pain, take it as prescribed. If you are not taking a prescription pain medicine, take an over-the-counter medicine such as acetaminophen (Tylenol), ibuprofen (Advil, Motrin), or naproxen (Aleve). Read and follow all instructions on the label. Do not take two or more pain medicines at the same time unless the doctor told you to. Many pain medicines have acetaminophen, which is Tylenol. Too much acetaminophen (Tylenol) can be harmful. If your doctor prescribed antibiotics, take them as directed. Do not stop taking them just because you feel better. You need to take the full course of antibiotics. If you think your pain medicine is making you sick to your stomach: Take your medicine after meals (unless your doctor has told you not to). Ask your doctor for a different pain medicine.    Incision care    You may have gauze and bandages over the opening of your fistula, or you may have a string coming from the fistula. Your doctor will tell you how to take care of these. After a bowel movement, use a baby wipe or take a shower or sitz bath to gently clean the anal area. Other instructions    Place a maxi pad or gauze in your underwear to absorb drainage from your fistula while it heals. Sit in a few inches of warm water (sitz bath) for 15 to 20 minutes. Then pat the area dry. Do this as long as you have pain in your anal area. Apply ice several times a day for 10 to 20 minutes at a time. Put a thin cloth between your skin and the ice. Support your feet with a small step stool when you sit on the toilet. This helps flex your hips and places your pelvis in a squatting position. This can make bowel movements easier after surgery. Try lying on your stomach with a pillow under your hips to decrease swelling. Follow-up care is a key part of your treatment and safety. Be sure to make and go to all appointments, and call your doctor if you are having problems. It's also a good idea to know your test results and keep a list of the medicines you take. When should you call for help? Call 911 anytime you think you may need emergency care. For example, call if:    You passed out (lost consciousness). You are short of breath. Call your doctor now or seek immediate medical care if:    You are sick to your stomach and cannot drink fluids. You have signs of a blood clot in your leg (called a deep vein thrombosis), such as:  Pain in your calf, back of the knee, thigh, or groin. Redness and swelling in your leg or groin. You have signs of infection, such as: Increased pain, swelling, warmth, or redness. Red streaks leading from the incision. Pus draining from the incision. A fever. You cannot pass stools or gas. Bright red blood has soaked through the bandage over your incision. You have pain that does not get better after you take pain medicine.    Watch closely for any changes in your health, and be sure to contact your doctor if you have any problems. Where can you learn more? Go to https://[email or web address].[employer]Sh[employer]. org and sign in to your [employer] account. Enter D656 in the [employer] box to learn more about \"Anal Fistulotomy: What to Expect at Home. \"     If you do not have an account, please click on the \"Sign Up Now\" link. Current as of: January 20, 2022               Content Version: 13.4  © 2006-2022 [employer]wise[employer]. Care instructions adapted under license by [facility] ([facility]). If you have questions about a medical condition or this instruction, always ask your healthcare professional. [address] any warranty or liability for your use of this information.

## 2022-12-21 ENCOUNTER — OFFICE VISIT (OUTPATIENT)
Dept: FAMILY MEDICINE CLINIC | Age: 37
End: 2022-12-21
Payer: COMMERCIAL

## 2022-12-21 VITALS
HEIGHT: 69 IN | WEIGHT: 293 LBS | RESPIRATION RATE: 16 BRPM | BODY MASS INDEX: 43.4 KG/M2 | OXYGEN SATURATION: 98 % | DIASTOLIC BLOOD PRESSURE: 86 MMHG | HEART RATE: 70 BPM | SYSTOLIC BLOOD PRESSURE: 124 MMHG | TEMPERATURE: 98.7 F

## 2022-12-21 DIAGNOSIS — K21.00 GASTROESOPHAGEAL REFLUX DISEASE WITH ESOPHAGITIS WITHOUT HEMORRHAGE: ICD-10-CM

## 2022-12-21 DIAGNOSIS — R20.2 PARESTHESIAS: ICD-10-CM

## 2022-12-21 DIAGNOSIS — I10 ESSENTIAL HYPERTENSION: ICD-10-CM

## 2022-12-21 DIAGNOSIS — F33.41 RECURRENT MAJOR DEPRESSIVE DISORDER, IN PARTIAL REMISSION (HCC): Primary | ICD-10-CM

## 2022-12-21 DIAGNOSIS — E66.01 CLASS 3 SEVERE OBESITY DUE TO EXCESS CALORIES WITHOUT SERIOUS COMORBIDITY WITH BODY MASS INDEX (BMI) OF 40.0 TO 44.9 IN ADULT (HCC): ICD-10-CM

## 2022-12-21 PROCEDURE — 99213 OFFICE O/P EST LOW 20 MIN: CPT | Performed by: FAMILY MEDICINE

## 2022-12-21 PROCEDURE — 1036F TOBACCO NON-USER: CPT | Performed by: FAMILY MEDICINE

## 2022-12-21 PROCEDURE — 3078F DIAST BP <80 MM HG: CPT | Performed by: FAMILY MEDICINE

## 2022-12-21 PROCEDURE — G8484 FLU IMMUNIZE NO ADMIN: HCPCS | Performed by: FAMILY MEDICINE

## 2022-12-21 PROCEDURE — G8427 DOCREV CUR MEDS BY ELIG CLIN: HCPCS | Performed by: FAMILY MEDICINE

## 2022-12-21 PROCEDURE — G8417 CALC BMI ABV UP PARAM F/U: HCPCS | Performed by: FAMILY MEDICINE

## 2022-12-21 PROCEDURE — 3074F SYST BP LT 130 MM HG: CPT | Performed by: FAMILY MEDICINE

## 2022-12-21 RX ORDER — PROPRANOLOL HCL 60 MG
CAPSULE, EXTENDED RELEASE 24HR ORAL
Qty: 180 CAPSULE | Refills: 1 | Status: SHIPPED | OUTPATIENT
Start: 2022-12-21

## 2022-12-21 RX ORDER — AMLODIPINE BESYLATE 5 MG/1
5 TABLET ORAL DAILY
Qty: 90 TABLET | Refills: 1 | Status: SHIPPED | OUTPATIENT
Start: 2022-12-21

## 2022-12-21 RX ORDER — PANTOPRAZOLE SODIUM 20 MG/1
TABLET, DELAYED RELEASE ORAL
Qty: 90 TABLET | Refills: 1 | Status: SHIPPED | OUTPATIENT
Start: 2022-12-21

## 2022-12-21 ASSESSMENT — ENCOUNTER SYMPTOMS
COUGH: 0
BLOOD IN STOOL: 0
BACK PAIN: 0
EYE REDNESS: 0
TROUBLE SWALLOWING: 0
EYE PAIN: 0
SHORTNESS OF BREATH: 0
ALLERGIC/IMMUNOLOGIC NEGATIVE: 1
PHOTOPHOBIA: 0
ABDOMINAL PAIN: 0
SINUS PAIN: 0
DIARRHEA: 0
CHEST TIGHTNESS: 0
SORE THROAT: 0
VOMITING: 0
EYE DISCHARGE: 0
NAUSEA: 0

## 2022-12-21 NOTE — PROGRESS NOTES
22  Purvi Settler Brothers : 1985 Sex: female  Age: 44 y.o. Assessment and Plan:  Andrew Carcamo was seen today for other and leg swelling. Diagnoses and all orders for this visit:    Recurrent major depressive disorder, in partial remission ([facility] [address]    Essential hypertension  -     propranolol (INDERAL LA) 60 MG extended release capsule; take 1 capsule by mouth twice a day  -     amLODIPine (NORVASC) 5 MG tablet; Take 1 tablet by mouth daily    Gastroesophageal reflux disease with esophagitis without hemorrhage  -     pantoprazole (PROTONIX) 20 MG tablet; take 1 tablet by mouth every morning before breakfast    Class 3 severe obesity due to excess calories without serious comorbidity with body mass index (BMI) of 40.0 to 44.9 in [facility])    Paresthesias    Her recovery from recent surgery continues problematic. 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 This will allow her to recover totally from her recent surgeries. Return in about 2 months (around 2023). Chief Complaint   Patient presents with    Other     [facility]     Leg Swelling       The patient is here for blood pressure check. Patient has been taking their medications as prescribed. No recent changes to their medications. No headache or visual disturbances. No dizziness or tinnitus. No chest pain, palpitations, or dyspnea. No nausea and vomiting. No numbness, tingling and or weakness to the extremities. No fevers or chills. No recent illness. She had another is surgery earlier this month with a fistula from perirectal abscess. This is the third surgery that she has had over the last 3 months. Her [facility] paperwork hires at the end of this month. Will be writing more on term disability forms for me to complete. Review of Systems   Constitutional:  Negative for appetite change, fatigue and unexpected weight change.    HENT:  Negative for congestion, ear pain, hearing loss, sinus pain, sore throat and trouble swallowing. Eyes:  Negative for photophobia, pain, discharge and redness. Respiratory:  Negative for cough, chest tightness and shortness of breath. Cardiovascular:  Positive for leg swelling. Negative for chest pain and palpitations. Gastrointestinal:  Negative for abdominal pain, blood in stool, diarrhea, nausea and vomiting. Endocrine: Negative. Genitourinary:  Negative for dysuria, flank pain, frequency and hematuria. Musculoskeletal:  Negative for arthralgias, back pain, joint swelling and myalgias. Skin: Negative. Allergic/Immunologic: Negative. Neurological:  Positive for numbness. Negative for dizziness, seizures, syncope, weakness, light-headedness and headaches. Hematological:  Negative for adenopathy. Does not bruise/bleed easily. Psychiatric/Behavioral: Negative.          Current Outpatient Medications:     propranolol (INDERAL LA) 60 MG extended release capsule, take 1 capsule by mouth twice a day, Disp: 180 capsule, Rfl: 1    pantoprazole (PROTONIX) 20 MG tablet, take 1 tablet by mouth every morning before breakfast, Disp: 90 tablet, Rfl: 1    amLODIPine (NORVASC) 5 MG tablet, Take 1 tablet by mouth daily, Disp: 90 tablet, Rfl: 1    SPRAVATO, 84 MG DOSE, 28 MG/DEVICE SOPK nasal solution, once a week, Disp: , Rfl:     albuterol (PROVENTIL) (2.5 MG/3ML) 0.083% nebulizer solution, Take 3 mLs by nebulization every 6 hours as needed for Wheezing, Disp: 120 each, Rfl: 3    fluticasone (FLOVENT HFA) 110 MCG/ACT inhaler, Inhale 2 puffs into the lungs 2 times daily, Disp: 12 g, Rfl: 3    albuterol sulfate HFA (VENTOLIN HFA) 108 (90 Base) MCG/ACT inhaler, Inhale 2 puffs into the lungs 4 times daily as needed for Wheezing, Disp: 18 g, Rfl: 0    citalopram (CELEXA) 40 MG tablet, Take 40 mg by mouth every morning, Disp: , Rfl:     traZODone (DESYREL) 50 MG tablet, Take 50 mg by mouth nightly, Disp: , Rfl:     sennosides-docusate sodium (SENOKOT-S) 8.6-50 MG tablet, Take 2 tablets by mouth 2 times daily (Patient not taking: Reported on 12/21/2022), Disp: 28 tablet, Rfl: 0    ondansetron (ZOFRAN-ODT) 4 MG disintegrating tablet, Take 1 tablet by mouth 3 times daily as needed for Nausea or Vomiting (Patient not taking: Reported on 12/21/2022), Disp: 21 tablet, Rfl: 0    vitamin D (CHOLECALCIFEROL) 25 MCG (1000 UT) TABS tablet, Take 1,000 Units by mouth in the morning.  (Patient not taking: Reported on 12/21/2022), Disp: , Rfl:   Allergies   Allergen Reactions    Pcn [Penicillins] Rash       Past Medical History:   Diagnosis Date    Abnormal Pap smear of cervix     Back pain     Chronic generalized pain disorder 08/13/2021    Chronic sinusitis     with facial pain    Class 3 severe obesity due to excess calories without serious comorbidity with body mass index (BMI) of 40.0 to 44.9 in adult [facility])     DDD (degenerative disc disease), cervical 08/13/2021    Depression 06/06/2011    Essential hypertension     Gastroesophageal reflux disease without esophagitis     Hemochromatosis     Migraines     born with heart murmur    Paresthesias 08/10/2021    Seasonal allergies     Ventral hernia      Past Surgical History:   Procedure Laterality Date    ANAL FISTULOTOMY Right 12/06/2022    HERNIA REPAIR N/A 10/11/2022    LAPAROSCOPIC ROBOTIC XI ASSISTED VENTRAL HERNIA REPAIR WITH MESH performed by Jerilee Peabody, MD at [address] [place] [address] N/A 09/15/2022    DRAINAGE PERIANAL ABSCESS performed by Jerilee Peabody, MD at [address] [place] [address] N/A 12/06/2022    RECTAL EXAMINATION UNDER ANESTHESIA WITH FISTULOTOMY performed by Jerilee Peabody, MD at [address] [place] [address] N/A 02/25/2016    FUNCTIONAL ENDOSCOPIC SINUS SURGERY WITH BILATERAL MAXILLARY, FRONTAL & SPENOID ANTROSTOMIES, SUBMUCCOSAL RESECTION OF INFERIOR TURBINATES    TUBAL LIGATION  07/22/2021     Family History   Problem Relation Age of Onset    Arthritis Mother     Other Mother         sinus problems    High Blood Pressure Father     Other Maternal Aunt         migraine    Other Maternal Uncle         migraine    Other Paternal Aunt         migraine    Other Paternal Uncle         migraine    Arthritis Maternal Grandmother      Social History     Socioeconomic History    Marital status:      Spouse name: Not on file    Number of children: Not on file    Years of education: Not on file    Highest education level: Not on file   Occupational History    Not on file   Tobacco Use    Smoking status: Never    Smokeless tobacco: Never   Vaping Use    Vaping Use: Never used   Substance and Sexual Activity    Alcohol use: No     Alcohol/week: 0.0 standard drinks    Drug use: No    Sexual activity: Not on file   Other Topics Concern    Not on file   Social History Narrative    Not on file     Social Determinants of Health     Financial Resource Strain: Unknown    Difficulty of Paying Living Expenses: Patient refused   Food Insecurity: No Food Insecurity    Worried About Running Out of Food in the Last Year: Never true    Ran Out of Food in the Last Year: Never true   Transportation Needs: Not on file   Physical Activity: Not on file   Stress: Not on file   Social Connections: Not on file   Intimate Partner Violence: Not on file   Housing Stability: Not on file       Vitals:    12/21/22 0909   BP: 124/86   Pulse: 70   Resp: 16   Temp: 98.7 °F (37.1 °C)   TempSrc: Temporal   SpO2: 98%   Weight: 300 lb (136.1 kg)   Height: 5' 9\" (1.753 m)       Physical Exam  Vitals and nursing note reviewed. Constitutional:       Appearance: She is well-developed. She is obese. HENT:      Head: Atraumatic. Right Ear: External ear normal.      Left Ear: External ear normal.      Nose: Nose normal.      Mouth/Throat:      Pharynx: No oropharyngeal exudate. Eyes:      Conjunctiva/sclera: Conjunctivae normal.      Pupils: Pupils are equal, round, and reactive to light. Neck:      Thyroid: No thyromegaly. Trachea: No tracheal deviation. Cardiovascular:      Rate and Rhythm: Normal rate and regular rhythm. Heart sounds: No murmur heard. No friction rub. No gallop. Pulmonary:      Effort: Pulmonary effort is normal. No respiratory distress. Breath sounds: Normal breath sounds. Abdominal:      General: Bowel sounds are normal.      Palpations: Abdomen is soft. Musculoskeletal:         General: No tenderness or deformity. Normal range of motion. Cervical back: Normal range of motion and neck supple. Right lower leg: Edema present. Left lower leg: Edema present. Lymphadenopathy:      Cervical: No cervical adenopathy. Skin:     General: Skin is warm and dry. Capillary Refill: Capillary refill takes less than 2 seconds. Findings: No rash. Neurological:      Mental Status: She is alert and oriented to person, place, and time. Sensory: No sensory deficit. Motor: No abnormal muscle tone.       Coordination: Coordination normal.      Deep Tendon Reflexes: Reflexes normal.           Seen By:  Vera Kelly DO

## 2023-01-24 DIAGNOSIS — J45.21 MILD INTERMITTENT ASTHMA WITH ACUTE EXACERBATION: ICD-10-CM

## 2023-01-24 RX ORDER — DEXAMETHASONE 4 MG/1
TABLET ORAL
Qty: 12 G | Refills: 3 | Status: SHIPPED | OUTPATIENT
Start: 2023-01-24

## 2023-01-24 NOTE — TELEPHONE ENCOUNTER
Last Appointment:  12/21/2022  Future Appointments   Date Time Provider Ab Smith   2/22/2023  9:30 AM [place] DO REMI [facility]   7/25/2023  8:40 AM TICO [facility]

## 2023-02-08 ENCOUNTER — OFFICE VISIT (OUTPATIENT)
Dept: FAMILY MEDICINE CLINIC | Age: 38
End: 2023-02-08
Payer: COMMERCIAL

## 2023-02-08 VITALS
RESPIRATION RATE: 16 BRPM | HEART RATE: 65 BPM | WEIGHT: 293 LBS | HEIGHT: 69 IN | OXYGEN SATURATION: 98 % | DIASTOLIC BLOOD PRESSURE: 88 MMHG | TEMPERATURE: 98.5 F | BODY MASS INDEX: 43.4 KG/M2 | SYSTOLIC BLOOD PRESSURE: 128 MMHG

## 2023-02-08 DIAGNOSIS — M54.50 CHRONIC BILATERAL LOW BACK PAIN WITHOUT SCIATICA: ICD-10-CM

## 2023-02-08 DIAGNOSIS — U09.9 LONG COVID: ICD-10-CM

## 2023-02-08 DIAGNOSIS — R20.2 PARESTHESIAS: Primary | ICD-10-CM

## 2023-02-08 DIAGNOSIS — J02.8 ACUTE PHARYNGITIS DUE TO OTHER SPECIFIED ORGANISMS: ICD-10-CM

## 2023-02-08 DIAGNOSIS — G89.29 CHRONIC BILATERAL LOW BACK PAIN WITHOUT SCIATICA: ICD-10-CM

## 2023-02-08 PROCEDURE — 99214 OFFICE O/P EST MOD 30 MIN: CPT | Performed by: FAMILY MEDICINE

## 2023-02-08 PROCEDURE — G8427 DOCREV CUR MEDS BY ELIG CLIN: HCPCS | Performed by: FAMILY MEDICINE

## 2023-02-08 PROCEDURE — G8417 CALC BMI ABV UP PARAM F/U: HCPCS | Performed by: FAMILY MEDICINE

## 2023-02-08 PROCEDURE — 3079F DIAST BP 80-89 MM HG: CPT | Performed by: FAMILY MEDICINE

## 2023-02-08 PROCEDURE — 1036F TOBACCO NON-USER: CPT | Performed by: FAMILY MEDICINE

## 2023-02-08 PROCEDURE — 3074F SYST BP LT 130 MM HG: CPT | Performed by: FAMILY MEDICINE

## 2023-02-08 PROCEDURE — G8484 FLU IMMUNIZE NO ADMIN: HCPCS | Performed by: FAMILY MEDICINE

## 2023-02-08 RX ORDER — TRAZODONE HYDROCHLORIDE 100 MG/1
TABLET ORAL
COMMUNITY
Start: 2023-01-27

## 2023-02-08 RX ORDER — BREXPIPRAZOLE 0.5 MG/1
TABLET ORAL
COMMUNITY
Start: 2023-01-13

## 2023-02-08 RX ORDER — AZITHROMYCIN 250 MG/1
250 TABLET, FILM COATED ORAL SEE ADMIN INSTRUCTIONS
Qty: 6 TABLET | Refills: 0 | Status: SHIPPED | OUTPATIENT
Start: 2023-02-08 | End: 2023-02-13

## 2023-02-08 SDOH — ECONOMIC STABILITY: HOUSING INSECURITY
IN THE LAST 12 MONTHS, WAS THERE A TIME WHEN YOU DID NOT HAVE A STEADY PLACE TO SLEEP OR SLEPT IN A SHELTER (INCLUDING NOW)?: NO

## 2023-02-08 SDOH — ECONOMIC STABILITY: INCOME INSECURITY: HOW HARD IS IT FOR YOU TO PAY FOR THE VERY BASICS LIKE FOOD, HOUSING, MEDICAL CARE, AND HEATING?: NOT HARD AT ALL

## 2023-02-08 SDOH — ECONOMIC STABILITY: FOOD INSECURITY: WITHIN THE PAST 12 MONTHS, YOU WORRIED THAT YOUR FOOD WOULD RUN OUT BEFORE YOU GOT MONEY TO BUY MORE.: NEVER TRUE

## 2023-02-08 SDOH — ECONOMIC STABILITY: FOOD INSECURITY: WITHIN THE PAST 12 MONTHS, THE FOOD YOU BOUGHT JUST DIDN'T LAST AND YOU DIDN'T HAVE MONEY TO GET MORE.: NEVER TRUE

## 2023-02-08 ASSESSMENT — ENCOUNTER SYMPTOMS
DIARRHEA: 0
NAUSEA: 0
SORE THROAT: 0
EYE PAIN: 0
COUGH: 0
BLOOD IN STOOL: 0
ALLERGIC/IMMUNOLOGIC NEGATIVE: 1
TROUBLE SWALLOWING: 0
EYE DISCHARGE: 0
SINUS PAIN: 0
CHEST TIGHTNESS: 0
ABDOMINAL PAIN: 0
BACK PAIN: 1
EYE REDNESS: 0
PHOTOPHOBIA: 0
VOMITING: 0
SHORTNESS OF BREATH: 0

## 2023-02-08 ASSESSMENT — PATIENT HEALTH QUESTIONNAIRE - PHQ9
5. POOR APPETITE OR OVEREATING: 2
10. IF YOU CHECKED OFF ANY PROBLEMS, HOW DIFFICULT HAVE THESE PROBLEMS MADE IT FOR YOU TO DO YOUR WORK, TAKE CARE OF THINGS AT HOME, OR GET ALONG WITH OTHER PEOPLE: 1
2. FEELING DOWN, DEPRESSED OR HOPELESS: 1
6. FEELING BAD ABOUT YOURSELF - OR THAT YOU ARE A FAILURE OR HAVE LET YOURSELF OR YOUR FAMILY DOWN: 0
4. FEELING TIRED OR HAVING LITTLE ENERGY: 3
1. LITTLE INTEREST OR PLEASURE IN DOING THINGS: 1
7. TROUBLE CONCENTRATING ON THINGS, SUCH AS READING THE NEWSPAPER OR WATCHING TELEVISION: 0
SUM OF ALL RESPONSES TO PHQ QUESTIONS 1-9: 10
8. MOVING OR SPEAKING SO SLOWLY THAT OTHER PEOPLE COULD HAVE NOTICED. OR THE OPPOSITE, BEING SO FIGETY OR RESTLESS THAT YOU HAVE BEEN MOVING AROUND A LOT MORE THAN USUAL: 0
3. TROUBLE FALLING OR STAYING ASLEEP: 3
SUM OF ALL RESPONSES TO PHQ QUESTIONS 1-9: 10
SUM OF ALL RESPONSES TO PHQ9 QUESTIONS 1 & 2: 2
9. THOUGHTS THAT YOU WOULD BE BETTER OFF DEAD, OR OF HURTING YOURSELF: 0

## 2023-02-08 NOTE — PROGRESS NOTES
23  Rosa M Conroy Brothers : 1985 Sex: female  Age: 43 y.o. Assessment and Plan:  Yoselin Watkins was seen today for extremity weakness, pharyngitis and other. Diagnoses and all orders for this visit:    Paresthesias  -     MRI LUMBAR SPINE WO CONTRAST; Future  -     Handicap Placard MISC; by Does not apply route 5 years    Chronic bilateral low back pain without sciatica  -     MRI LUMBAR SPINE WO CONTRAST; Future  -     Handicap Placard MISC; by Does not apply route 5 years    Long COVID  Recommend follow-up at [address] [place] [address] in [facility]. Referral will be placed if needed. Acute pharyngitis due to other specified organisms  -     azithromycin (ZITHROMAX) 250 MG tablet; Take 1 tablet by mouth See Admin Instructions for 5 days 500mg on day 1 followed by 250mg on days 2 - 5  Symptomatic treatment including Tylenol, fluids, rest, Mucinex, Claritin, coolmist vaporizer. If complaints do not improve, or worsen in any way, present back to the office. Return in about 4 weeks (around 3/8/2023). Chief Complaint   Patient presents with    Extremity Weakness     leg    Pharyngitis    Other     Dry mouth and nose        Patient presents with this of her legs bilaterally. This has been ongoing since her COVID infection and gradually getting progressively worse. It is different than the paresthesias she has had in the past have been worked up at [facility] clinic. She is also complaining of a great deal of back discomfort, also since her surgeries. Pain, stiffness, decreased range of motion of the lumbar spine. MRI of that area in  showed mild aminal stenosis at L5-S1. She is exposed to strep couple weeks ago was had sore throat and congestion for the last couple of weeks. Mild discomfort on swallowing but denies fever, chills, nausea, vomiting. Review of Systems   Constitutional:  Negative for appetite change, fatigue and unexpected weight change.    HENT: Negative for congestion, ear pain, hearing loss, sinus pain, sore throat and trouble swallowing. Eyes:  Negative for photophobia, pain, discharge and redness. Respiratory:  Negative for cough, chest tightness and shortness of breath. Cardiovascular:  Negative for chest pain, palpitations and leg swelling. Gastrointestinal:  Negative for abdominal pain, blood in stool, diarrhea, nausea and vomiting. Endocrine: Negative. Genitourinary:  Negative for dysuria, flank pain, frequency and hematuria. Musculoskeletal:  Positive for arthralgias, back pain and myalgias. Negative for joint swelling. Lumbar spine   Skin: Negative. Allergic/Immunologic: Negative. Neurological:  Positive for weakness and numbness. Negative for dizziness, seizures, syncope, light-headedness and headaches. Hematological:  Negative for adenopathy. Does not bruise/bleed easily. Psychiatric/Behavioral: Negative.          Current Outpatient Medications:     REXULTI 0.5 MG TABS tablet, TAKE 1 TABLET BY MOUTH NIGHTLY, Disp: , Rfl:     traZODone (DESYREL) 100 MG tablet, take 1 tablet by mouth every evening, Disp: , Rfl:     azithromycin (ZITHROMAX) 250 MG tablet, Take 1 tablet by mouth See Admin Instructions for 5 days 500mg on day 1 followed by 250mg on days 2 - 5, Disp: 6 tablet, Rfl: 0    Handicap Placard MISC, by Does not apply route 5 years, Disp: 1 each, Rfl: 0    FLOVENT  MCG/ACT inhaler, inhale 2 puffs by mouth and INTO THE LUNGS twice a day, Disp: 12 g, Rfl: 3    propranolol (INDERAL LA) 60 MG extended release capsule, take 1 capsule by mouth twice a day, Disp: 180 capsule, Rfl: 1    pantoprazole (PROTONIX) 20 MG tablet, take 1 tablet by mouth every morning before breakfast, Disp: 90 tablet, Rfl: 1    amLODIPine (NORVASC) 5 MG tablet, Take 1 tablet by mouth daily, Disp: 90 tablet, Rfl: 1    SPRAVATO, 84 MG DOSE, 28 MG/DEVICE SOPK nasal solution, once a week, Disp: , Rfl:     sennosides-docusate sodium (SENOKOT-S) 8.6-50 MG tablet, Take 2 tablets by mouth 2 times daily, Disp: 28 tablet, Rfl: 0    albuterol (PROVENTIL) (2.5 MG/3ML) 0.083% nebulizer solution, Take 3 mLs by nebulization every 6 hours as needed for Wheezing, Disp: 120 each, Rfl: 3    albuterol sulfate HFA (VENTOLIN HFA) 108 (90 Base) MCG/ACT inhaler, Inhale 2 puffs into the lungs 4 times daily as needed for Wheezing, Disp: 18 g, Rfl: 0    citalopram (CELEXA) 40 MG tablet, Take 40 mg by mouth every morning, Disp: , Rfl:     traZODone (DESYREL) 50 MG tablet, Take 50 mg by mouth nightly, Disp: , Rfl:     ondansetron (ZOFRAN-ODT) 4 MG disintegrating tablet, Take 1 tablet by mouth 3 times daily as needed for Nausea or Vomiting (Patient not taking: Reported on 2/8/2023), Disp: 21 tablet, Rfl: 0    vitamin D (CHOLECALCIFEROL) 25 MCG (1000 UT) TABS tablet, Take 1,000 Units by mouth in the morning.  (Patient not taking: No sig reported), Disp: , Rfl:   Allergies   Allergen Reactions    Pcn [Penicillins] Rash       Past Medical History:   Diagnosis Date    Abnormal Pap smear of cervix     Back pain     Chronic generalized pain disorder 08/13/2021    Chronic sinusitis     with facial pain    Class 3 severe obesity due to excess calories without serious comorbidity with body mass index (BMI) of 40.0 to 44.9 in adult [facility])     DDD (degenerative disc disease), cervical 08/13/2021    Depression 06/06/2011    Essential hypertension     Gastroesophageal reflux disease without esophagitis     Hemochromatosis     Migraines     born with heart murmur    Paresthesias 08/10/2021    Seasonal allergies     Ventral hernia      Past Surgical History:   Procedure Laterality Date    ANAL FISTULOTOMY Right 12/06/2022    HERNIA REPAIR N/A 10/11/2022    LAPAROSCOPIC ROBOTIC XI ASSISTED VENTRAL HERNIA REPAIR WITH MESH performed [facility] Kend[facility] N/A 09/15/2022    DRAINAGE PERIANAL ABSCESS performed by Kendrick Love MD at [place] [address] 12/06/2022    RECTAL EXAMINATION UNDER ANESTHESIA WITH FISTULOTOMY performed by Bo Hale MD at [facility] N/A 02/25/2016    FUNCTIONAL ENDOSCOPIC SINUS SURGERY WITH BILATERAL MAXILLARY, FRONTAL & SPENOID ANTROSTOMIES, SUBMUCCOSAL RESECTION OF INFERIOR TURBINATES    TUBAL LIGATION  07/22/2021     Family History   Problem Relation Age of Onset    Arthritis Mother     Other Mother         sinus problems    High Blood Pressure Father     Other Maternal Aunt         migraine    Other Maternal Uncle         migraine    Other Paternal Aunt         migraine    Other Paternal Uncle         migraine    Arthritis Maternal Grandmother      Social History     Socioeconomic History    Marital status:      Spouse name: Not on file    Number of children: Not on file    Years of education: Not on file    Highest education level: Not on file   Occupational History    Not on file   Tobacco Use    Smoking status: Never    Smokeless tobacco: Never   Vaping Use    Vaping Use: Never used   Substance and Sexual Activity    Alcohol use: No     Alcohol/week: 0.0 standard drinks    Drug use: No    Sexual activity: Not on file   Other Topics Concern    Not on file   Social History Narrative    Not on file     Social Determinants of Health     Financial Resource Strain: Low Risk     Difficulty of Paying Living Expenses: Not hard at all   Food Insecurity: No Food Insecurity    Worried About Running Out of Food in the Last Year: Never true    [address] in the Last Year: Never true   Transportation Needs: Unknown    Lack of Transportation (Medical): Not on file    Lack of Transportation (Non-Medical):  No   Physical Activity: Not on file   Stress: Not on file   Social Connections: Not on file   Intimate Partner Violence: Not on file   Housing Stability: Unknown    Unable to Pay for Housing in the Last Year: Not on file    Number of Places Lived in the Last Year: Not on file    Unstable Housing in the Last Year: No Vitals:    02/08/23 0854   BP: 128/88   Pulse: 65   Resp: 16   Temp: 98.5 °F (36.9 °C)   TempSrc: Temporal   SpO2: 98%   Weight: (!) 304 lb (137.9 kg)   Height: 5' 9\" (1.753 m)       Physical Exam        Seen By:  Hao Vera DO

## 2023-02-22 ENCOUNTER — TELEPHONE (OUTPATIENT)
Dept: FAMILY MEDICINE CLINIC | Age: 38
End: 2023-02-22

## 2023-02-22 DIAGNOSIS — F06.4 ANXIETY DISORDER DUE TO GENERAL MEDICAL CONDITION: Primary | ICD-10-CM

## 2023-02-22 RX ORDER — LORAZEPAM 0.5 MG/1
TABLET ORAL
Qty: 2 TABLET | Refills: 0 | Status: SHIPPED | OUTPATIENT
Start: 2023-02-22 | End: 2023-02-25

## 2023-02-22 NOTE — TELEPHONE ENCOUNTER
Clarissepop Sanchez is getting the MRI done on Saturday. She is asking for something to help her relax during the procedure. Please send to [facility] in [place]. She does have someone to drive her there and back.

## 2023-02-25 ENCOUNTER — HOSPITAL ENCOUNTER (OUTPATIENT)
Dept: MRI IMAGING | Age: 38
Discharge: HOME | End: 2023-02-27

## 2023-02-25 DIAGNOSIS — M54.50 CHRONIC BILATERAL LOW BACK PAIN WITHOUT SCIATICA: ICD-10-CM

## 2023-02-25 DIAGNOSIS — R20.2 PARESTHESIAS: ICD-10-CM

## 2023-02-25 DIAGNOSIS — G89.29 CHRONIC BILATERAL LOW BACK PAIN WITHOUT SCIATICA: ICD-10-CM

## 2023-03-08 ENCOUNTER — OFFICE VISIT (OUTPATIENT)
Dept: FAMILY MEDICINE CLINIC | Age: 38
End: 2023-03-08
Payer: COMMERCIAL

## 2023-03-08 VITALS
SYSTOLIC BLOOD PRESSURE: 120 MMHG | HEART RATE: 71 BPM | DIASTOLIC BLOOD PRESSURE: 80 MMHG | TEMPERATURE: 97.5 F | HEIGHT: 69 IN | RESPIRATION RATE: 17 BRPM | BODY MASS INDEX: 43.4 KG/M2 | WEIGHT: 293 LBS | OXYGEN SATURATION: 98 %

## 2023-03-08 DIAGNOSIS — J02.8 ACUTE PHARYNGITIS DUE TO OTHER SPECIFIED ORGANISMS: Primary | ICD-10-CM

## 2023-03-08 DIAGNOSIS — U09.9 LONG COVID: ICD-10-CM

## 2023-03-08 PROCEDURE — G8484 FLU IMMUNIZE NO ADMIN: HCPCS | Performed by: FAMILY MEDICINE

## 2023-03-08 PROCEDURE — 3079F DIAST BP 80-89 MM HG: CPT | Performed by: FAMILY MEDICINE

## 2023-03-08 PROCEDURE — 3074F SYST BP LT 130 MM HG: CPT | Performed by: FAMILY MEDICINE

## 2023-03-08 PROCEDURE — G8427 DOCREV CUR MEDS BY ELIG CLIN: HCPCS | Performed by: FAMILY MEDICINE

## 2023-03-08 PROCEDURE — 99213 OFFICE O/P EST LOW 20 MIN: CPT | Performed by: FAMILY MEDICINE

## 2023-03-08 PROCEDURE — G8417 CALC BMI ABV UP PARAM F/U: HCPCS | Performed by: FAMILY MEDICINE

## 2023-03-08 PROCEDURE — 1036F TOBACCO NON-USER: CPT | Performed by: FAMILY MEDICINE

## 2023-03-08 ASSESSMENT — ENCOUNTER SYMPTOMS
BLOOD IN STOOL: 0
ABDOMINAL PAIN: 0
VOMITING: 0
EYE PAIN: 0
SORE THROAT: 0
CHEST TIGHTNESS: 0
EYE DISCHARGE: 0
TROUBLE SWALLOWING: 0
SHORTNESS OF BREATH: 0
PHOTOPHOBIA: 0
SINUS PAIN: 0
EYE REDNESS: 0
COUGH: 0
DIARRHEA: 0
NAUSEA: 0
ALLERGIC/IMMUNOLOGIC NEGATIVE: 1
BACK PAIN: 0

## 2023-03-08 NOTE — PROGRESS NOTES
3/8/23  Itzel Gomez Brothers : 1985 Sex: female  Age: 43 y.o. Assessment and Plan:  Lola Donaldson was seen today for pharyngitis and other. Diagnoses and all orders for this visit:    Acute pharyngitis due to other specified organisms  She is to continue and finish Z-Shemar. Zyrtec and Flonase nasal spray for her upper respiratory congestion. Long COVID  Continue care at the [facility] for this complaint. No follow-ups on file. Chief Complaint   Patient presents with    Pharyngitis    Other     Check up for long Covid ,after visiting clinic       Congestion, pressure, drainage, facial tenderness, mild headache, throat, onset weeks ago. She began a Z-Shemar which she had at home and is now feeling a little better. Denies fever, chills, diaphoresis, nausea, vomiting, decreased oral intake. Denies other GI or  complaints. OTC treatments minimally effective. Patient now seeing [facility] in their [facility] clinic for treatment. Vitamin D was low, supplemented and she is now starting to feel better. She has appointments for pulmonology cardiology and rheumatology pending. Review of Systems   Constitutional:  Negative for appetite change, fatigue and unexpected weight change. HENT:  Negative for congestion, ear pain, hearing loss, sinus pain, sore throat and trouble swallowing. Eyes:  Negative for photophobia, pain, discharge and redness. Respiratory:  Negative for cough, chest tightness and shortness of breath. Cardiovascular:  Negative for chest pain, palpitations and leg swelling. Gastrointestinal:  Negative for abdominal pain, blood in stool, diarrhea, nausea and vomiting. Endocrine: Negative. Genitourinary:  Negative for dysuria, flank pain, frequency and hematuria. Musculoskeletal:  Positive for arthralgias and myalgias. Negative for back pain and joint swelling. Legs bilaterally   Skin: Negative.     Allergic/Immunologic: Negative. Neurological:  Negative for dizziness, seizures, syncope, weakness, light-headedness, numbness and headaches. Hematological:  Negative for adenopathy. Does not bruise/bleed easily. Psychiatric/Behavioral: Negative.          Current Outpatient Medications:     REXULTI 0.5 MG TABS tablet, TAKE 1 TABLET BY MOUTH NIGHTLY, Disp: , Rfl:     traZODone (DESYREL) 100 MG tablet, take 1 tablet by mouth every evening, Disp: , Rfl:     Handicap Placard MISC, by Does not apply route 5 years, Disp: 1 each, Rfl: 0    FLOVENT  MCG/ACT inhaler, inhale 2 puffs by mouth and INTO THE LUNGS twice a day, Disp: 12 g, Rfl: 3    propranolol (INDERAL LA) 60 MG extended release capsule, take 1 capsule by mouth twice a day, Disp: 180 capsule, Rfl: 1    pantoprazole (PROTONIX) 20 MG tablet, take 1 tablet by mouth every morning before breakfast, Disp: 90 tablet, Rfl: 1    amLODIPine (NORVASC) 5 MG tablet, Take 1 tablet by mouth daily, Disp: 90 tablet, Rfl: 1    SPRAVATO, 84 MG DOSE, 28 MG/DEVICE SOPK nasal solution, once a week, Disp: , Rfl:     albuterol (PROVENTIL) (2.5 MG/3ML) 0.083% nebulizer solution, Take 3 mLs by nebulization every 6 hours as needed for Wheezing, Disp: 120 each, Rfl: 3    albuterol sulfate HFA (VENTOLIN HFA) 108 (90 Base) MCG/ACT inhaler, Inhale 2 puffs into the lungs 4 times daily as needed for Wheezing, Disp: 18 g, Rfl: 0    vitamin D (CHOLECALCIFEROL) 25 MCG (1000 UT) TABS tablet, Take 1,000 Units by mouth daily, Disp: , Rfl:     citalopram (CELEXA) 40 MG tablet, Take 40 mg by mouth every morning, Disp: , Rfl:     traZODone (DESYREL) 50 MG tablet, Take 50 mg by mouth nightly, Disp: , Rfl:   Allergies   Allergen Reactions    Pcn [Penicillins] Rash       Past Medical History:   Diagnosis Date    Abnormal Pap smear of cervix     Back pain     Chronic generalized pain disorder 08/13/2021    Chronic sinusitis     with facial pain    Class 3 severe obesity due to excess calories without serious comorbidity with body mass index (BMI) of 40.0 to 44.9 in adult [facility])     DDD (degenerative disc disease), cervical 08/13/2021    Depression 06/06/2011    Essential hypertension     Gastroesophageal reflux disease without esophagitis     Hemochromatosis     Migraines     born with heart murmur    Paresthesias 08/10/2021    Seasonal allergies     Ventral hernia      Past Surgical History:   Procedure Laterality Date    ANAL FISTULOTOMY Right 12/06/2022    HERNIA REPAIR N/A 10/11/2022    LAPAROSCOPIC ROBOTIC XI ASSISTED VENTRAL HERNIA REPAIR WITH MESH performed by Dina Gotti MD at [facility] N/A 09/15/2022    DRAINAGE PERIANAL ABSCESS performed by Dina Gotti MD at [facility] N/A 12/06/2022    RECTAL EXAMINATION UNDER ANESTHESIA WITH FISTULOTOMY performed by Dina Gotti MD at [address] [place] [address] N/A 02/25/2016    FUNCTIONAL ENDOSCOPIC SINUS SURGERY WITH BILATERAL MAXILLARY, FRONTAL & SPENOID ANTROSTOMIES, SUBMUCCOSAL RESECTION OF INFERIOR TURBINATES    TUBAL LIGATION  07/22/2021     Family History   Problem Relation Age of Onset    Arthritis Mother     Other Mother         sinus problems    High Blood Pressure Father     Other Maternal Aunt         migraine    Other Maternal Uncle         migraine    Other Paternal Aunt         migraine    Other Paternal Uncle         migraine    Arthritis Maternal Grandmother      Social History     Socioeconomic History    Marital status:      Spouse name: Not on file    Number of children: Not on file    Years of education: Not on file    Highest education level: Not on file   Occupational History    Not on file   Tobacco Use    Smoking status: Never    Smokeless tobacco: Never   Vaping Use    Vaping Use: Never used   Substance and Sexual Activity    Alcohol use: No     Alcohol/week: 0.0 standard drinks    Drug use: No    Sexual activity: Not on file   Other Topics Concern    Not on file   Social History Narrative    Not on file Social Determinants of Health     Financial Resource Strain: Low Risk     Difficulty of Paying Living Expenses: Not hard at all   Food Insecurity: No Food Insecurity    Worried About [address] [facility] in the Last Year: Never true    [facility] in the Last Year: Never true   Transportation Needs: Unknown    Lack of Transportation (Medical): Not on file    Lack of Transportation (Non-Medical): No   Physical Activity: Not on file   Stress: Not on file   Social Connections: Not on file   Intimate Partner Violence: Not on file   Housing Stability: Unknown    Unable to Pay for Housing in the Last Year: Not on file    Number of Places Lived in the Last Year: Not on file    Unstable Housing in the Last Year: No       Vitals:    03/08/23 0906   BP: 120/80   Pulse: 71   Resp: 17   Temp: 97.5 °F (36.4 °C)   TempSrc: Temporal   SpO2: 98%   Weight: (!) 308 lb (139.7 kg)   Height: 5' 9\" (1.753 m)       Physical Exam  Vitals and nursing note reviewed. Constitutional:       Appearance: She is well-developed. She is obese. HENT:      Head: Atraumatic. Right Ear: External ear normal.      Left Ear: External ear normal.      Nose: Nose normal.      Mouth/Throat:      Pharynx: No oropharyngeal exudate. Eyes:      Conjunctiva/sclera: Conjunctivae normal.      Pupils: Pupils are equal, round, and reactive to light. Neck:      Thyroid: No thyromegaly. Trachea: No tracheal deviation. Cardiovascular:      Rate and Rhythm: Normal rate and regular rhythm. Heart sounds: No murmur heard. No friction rub. No gallop. Pulmonary:      Effort: Pulmonary effort is normal. No respiratory distress. Breath sounds: Normal breath sounds. Abdominal:      General: Bowel sounds are normal.      Palpations: Abdomen is soft. Musculoskeletal:         General: No tenderness or deformity. Normal range of motion. Cervical back: Normal range of motion and neck supple.       Comments: Pain, stiffness, spasm, legs bilaterally. Lymphadenopathy:      Cervical: No cervical adenopathy. Skin:     General: Skin is warm and dry. Capillary Refill: Capillary refill takes less than 2 seconds. Findings: No rash. Neurological:      Mental Status: She is alert and oriented to person, place, and time. Sensory: No sensory deficit. Motor: No abnormal muscle tone.       Coordination: Coordination normal.      Deep Tendon Reflexes: Reflexes normal.           Seen By:  Jade Fairbanks DO

## 2023-05-19 ENCOUNTER — OFFICE VISIT (OUTPATIENT)
Dept: FAMILY MEDICINE CLINIC | Age: 38
End: 2023-05-19
Payer: COMMERCIAL

## 2023-05-19 VITALS
HEIGHT: 69 IN | RESPIRATION RATE: 18 BRPM | TEMPERATURE: 97.8 F | DIASTOLIC BLOOD PRESSURE: 84 MMHG | BODY MASS INDEX: 43.4 KG/M2 | HEART RATE: 88 BPM | OXYGEN SATURATION: 98 % | SYSTOLIC BLOOD PRESSURE: 128 MMHG | WEIGHT: 293 LBS

## 2023-05-19 DIAGNOSIS — U09.9 LONG COVID: Primary | ICD-10-CM

## 2023-05-19 PROCEDURE — G8427 DOCREV CUR MEDS BY ELIG CLIN: HCPCS | Performed by: FAMILY MEDICINE

## 2023-05-19 PROCEDURE — 1036F TOBACCO NON-USER: CPT | Performed by: FAMILY MEDICINE

## 2023-05-19 PROCEDURE — G8417 CALC BMI ABV UP PARAM F/U: HCPCS | Performed by: FAMILY MEDICINE

## 2023-05-19 PROCEDURE — 3074F SYST BP LT 130 MM HG: CPT | Performed by: FAMILY MEDICINE

## 2023-05-19 PROCEDURE — 99213 OFFICE O/P EST LOW 20 MIN: CPT | Performed by: FAMILY MEDICINE

## 2023-05-19 PROCEDURE — 3079F DIAST BP 80-89 MM HG: CPT | Performed by: FAMILY MEDICINE

## 2023-05-19 RX ORDER — BREXPIPRAZOLE 1 MG/1
1 TABLET ORAL NIGHTLY
COMMUNITY
Start: 2023-05-15

## 2023-05-19 RX ORDER — DEXTROMETHORPHAN HYDROBROMIDE, BUPROPION HYDROCHLORIDE 105; 45 MG/1; MG/1
TABLET, MULTILAYER, EXTENDED RELEASE ORAL
COMMUNITY

## 2023-05-19 RX ORDER — CITALOPRAM 10 MG/1
10 TABLET ORAL DAILY
COMMUNITY
Start: 2023-05-15

## 2023-05-19 ASSESSMENT — ENCOUNTER SYMPTOMS
SINUS PAIN: 0
SORE THROAT: 0
TROUBLE SWALLOWING: 0
VOMITING: 0
NAUSEA: 0
ABDOMINAL PAIN: 0
BLOOD IN STOOL: 0
EYE REDNESS: 0
CHEST TIGHTNESS: 0
EYE PAIN: 0
SHORTNESS OF BREATH: 0
BACK PAIN: 0
PHOTOPHOBIA: 0
DIARRHEA: 0
EYE DISCHARGE: 0
ALLERGIC/IMMUNOLOGIC NEGATIVE: 1
COUGH: 0

## 2023-05-19 NOTE — PROGRESS NOTES
Running Out of Food in the Last Year: Never true    Ran Out of Food in the Last Year: Never true   Transportation Needs: Unknown    Lack of Transportation (Medical): Not on file    Lack of Transportation (Non-Medical): No   Physical Activity: Not on file   Stress: Not on file   Social Connections: Not on file   Intimate Partner Violence: Not on file   Housing Stability: Unknown    Unable to Pay for Housing in the Last Year: Not on file    Number of Places Lived in the Last Year: Not on file    Unstable Housing in the Last Year: No       Vitals:    05/19/23 1422   BP: 128/84   Pulse: 88   Resp: 18   Temp: 97.8 °F (36.6 °C)   TempSrc: Temporal   SpO2: 98%   Weight: (!) 306 lb (138.8 kg)   Height: 5' 9\" (1.753 m)       Physical Exam  Vitals and nursing note reviewed. Constitutional:       Appearance: She is well-developed. She is obese. HENT:      Head: Atraumatic. Right Ear: External ear normal.      Left Ear: External ear normal.      Nose: Nose normal.      Mouth/Throat:      Pharynx: No oropharyngeal exudate. Eyes:      Conjunctiva/sclera: Conjunctivae normal.      Pupils: Pupils are equal, round, and reactive to light. Neck:      Thyroid: No thyromegaly. Trachea: No tracheal deviation. Cardiovascular:      Rate and Rhythm: Normal rate and regular rhythm. Heart sounds: No murmur heard. No friction rub. No gallop. Pulmonary:      Effort: Pulmonary effort is normal. No respiratory distress. Breath sounds: Normal breath sounds. Abdominal:      General: Bowel sounds are normal.      Palpations: Abdomen is soft. Musculoskeletal:         General: No tenderness or deformity. Normal range of motion. Cervical back: Normal range of motion and neck supple. Lymphadenopathy:      Cervical: No cervical adenopathy. Skin:     General: Skin is warm and dry. Capillary Refill: Capillary refill takes less than 2 seconds. Findings: No rash.    Neurological:      Mental Status:

## 2023-05-24 ENCOUNTER — OFFICE VISIT (OUTPATIENT)
Dept: FAMILY MEDICINE CLINIC | Age: 38
End: 2023-05-24
Payer: COMMERCIAL

## 2023-05-24 VITALS
TEMPERATURE: 98 F | DIASTOLIC BLOOD PRESSURE: 80 MMHG | HEIGHT: 69 IN | BODY MASS INDEX: 43.4 KG/M2 | OXYGEN SATURATION: 99 % | WEIGHT: 293 LBS | SYSTOLIC BLOOD PRESSURE: 122 MMHG | HEART RATE: 77 BPM

## 2023-05-24 DIAGNOSIS — J45.21 MILD INTERMITTENT ASTHMA WITH ACUTE EXACERBATION: Primary | ICD-10-CM

## 2023-05-24 DIAGNOSIS — J02.9 SORE THROAT: ICD-10-CM

## 2023-05-24 DIAGNOSIS — R05.9 COUGH, UNSPECIFIED TYPE: ICD-10-CM

## 2023-05-24 LAB
Lab: NORMAL
PERFORMING INSTRUMENT: NORMAL
QC PASS/FAIL: NORMAL
S PYO AG THROAT QL: NORMAL
SARS-COV-2, POC: NORMAL

## 2023-05-24 PROCEDURE — G8427 DOCREV CUR MEDS BY ELIG CLIN: HCPCS | Performed by: NURSE PRACTITIONER

## 2023-05-24 PROCEDURE — 99214 OFFICE O/P EST MOD 30 MIN: CPT | Performed by: NURSE PRACTITIONER

## 2023-05-24 PROCEDURE — 87880 STREP A ASSAY W/OPTIC: CPT | Performed by: NURSE PRACTITIONER

## 2023-05-24 PROCEDURE — 3079F DIAST BP 80-89 MM HG: CPT | Performed by: NURSE PRACTITIONER

## 2023-05-24 PROCEDURE — 1036F TOBACCO NON-USER: CPT | Performed by: NURSE PRACTITIONER

## 2023-05-24 PROCEDURE — 3074F SYST BP LT 130 MM HG: CPT | Performed by: NURSE PRACTITIONER

## 2023-05-24 PROCEDURE — 87426 SARSCOV CORONAVIRUS AG IA: CPT | Performed by: NURSE PRACTITIONER

## 2023-05-24 PROCEDURE — G8417 CALC BMI ABV UP PARAM F/U: HCPCS | Performed by: NURSE PRACTITIONER

## 2023-05-24 PROCEDURE — 96372 THER/PROPH/DIAG INJ SC/IM: CPT | Performed by: NURSE PRACTITIONER

## 2023-05-24 RX ORDER — BENZONATATE 200 MG/1
200 CAPSULE ORAL 3 TIMES DAILY PRN
Qty: 30 CAPSULE | Refills: 0 | Status: SHIPPED | OUTPATIENT
Start: 2023-05-24 | End: 2023-06-03

## 2023-05-24 RX ORDER — METHYLPREDNISOLONE ACETATE 40 MG/ML
40 INJECTION, SUSPENSION INTRA-ARTICULAR; INTRALESIONAL; INTRAMUSCULAR; SOFT TISSUE ONCE
Status: COMPLETED | OUTPATIENT
Start: 2023-05-24 | End: 2023-05-24

## 2023-05-24 RX ORDER — METHYLPREDNISOLONE 4 MG/1
TABLET ORAL
Qty: 1 KIT | Refills: 0 | Status: SHIPPED | OUTPATIENT
Start: 2023-05-24

## 2023-05-24 RX ADMIN — METHYLPREDNISOLONE ACETATE 40 MG: 40 INJECTION, SUSPENSION INTRA-ARTICULAR; INTRALESIONAL; INTRAMUSCULAR; SOFT TISSUE at 10:06

## 2023-05-24 NOTE — PROGRESS NOTES
This report was transcribed using voice recognition software. Every effort was made to ensure accuracy; however, inadvertent computerized transcription errors may be present.

## 2023-05-26 ENCOUNTER — TELEPHONE (OUTPATIENT)
Dept: FAMILY MEDICINE CLINIC | Age: 38
End: 2023-05-26

## 2023-06-27 DIAGNOSIS — I10 ESSENTIAL HYPERTENSION: ICD-10-CM

## 2023-06-27 DIAGNOSIS — K21.00 GASTROESOPHAGEAL REFLUX DISEASE WITH ESOPHAGITIS WITHOUT HEMORRHAGE: ICD-10-CM

## 2023-06-27 RX ORDER — PROPRANOLOL HCL 60 MG
CAPSULE, EXTENDED RELEASE 24HR ORAL
Qty: 180 CAPSULE | Refills: 1 | Status: SHIPPED | OUTPATIENT
Start: 2023-06-27

## 2023-06-27 RX ORDER — AMLODIPINE BESYLATE 5 MG/1
TABLET ORAL
Qty: 90 TABLET | Refills: 1 | Status: SHIPPED | OUTPATIENT
Start: 2023-06-27

## 2023-06-27 RX ORDER — PANTOPRAZOLE SODIUM 20 MG/1
TABLET, DELAYED RELEASE ORAL
Qty: 90 TABLET | Refills: 1 | Status: SHIPPED | OUTPATIENT
Start: 2023-06-27

## 2023-08-18 ENCOUNTER — OFFICE VISIT (OUTPATIENT)
Dept: FAMILY MEDICINE CLINIC | Age: 38
End: 2023-08-18
Payer: COMMERCIAL

## 2023-08-18 VITALS
BODY MASS INDEX: 42.95 KG/M2 | WEIGHT: 290 LBS | HEIGHT: 69 IN | OXYGEN SATURATION: 98 % | DIASTOLIC BLOOD PRESSURE: 82 MMHG | RESPIRATION RATE: 17 BRPM | SYSTOLIC BLOOD PRESSURE: 124 MMHG | TEMPERATURE: 97.8 F | HEART RATE: 66 BPM

## 2023-08-18 DIAGNOSIS — U09.9 LONG COVID: ICD-10-CM

## 2023-08-18 DIAGNOSIS — I10 ESSENTIAL HYPERTENSION: Primary | ICD-10-CM

## 2023-08-18 PROCEDURE — 3079F DIAST BP 80-89 MM HG: CPT | Performed by: FAMILY MEDICINE

## 2023-08-18 PROCEDURE — G8417 CALC BMI ABV UP PARAM F/U: HCPCS | Performed by: FAMILY MEDICINE

## 2023-08-18 PROCEDURE — 99213 OFFICE O/P EST LOW 20 MIN: CPT | Performed by: FAMILY MEDICINE

## 2023-08-18 PROCEDURE — G8427 DOCREV CUR MEDS BY ELIG CLIN: HCPCS | Performed by: FAMILY MEDICINE

## 2023-08-18 PROCEDURE — 3074F SYST BP LT 130 MM HG: CPT | Performed by: FAMILY MEDICINE

## 2023-08-18 PROCEDURE — 1036F TOBACCO NON-USER: CPT | Performed by: FAMILY MEDICINE

## 2023-08-18 RX ORDER — BUPROPION HYDROCHLORIDE 150 MG/1
150 TABLET ORAL DAILY
COMMUNITY
Start: 2023-08-07

## 2023-08-18 RX ORDER — VORTIOXETINE 10 MG/1
10 TABLET, FILM COATED ORAL DAILY
COMMUNITY
Start: 2023-08-07

## 2023-08-18 ASSESSMENT — ENCOUNTER SYMPTOMS
SHORTNESS OF BREATH: 0
EYE REDNESS: 0
ABDOMINAL PAIN: 0
NAUSEA: 0
EYE PAIN: 0
PHOTOPHOBIA: 0
DIARRHEA: 0
BACK PAIN: 0
VOMITING: 0
COUGH: 0
EYE DISCHARGE: 0
ALLERGIC/IMMUNOLOGIC NEGATIVE: 1
CHEST TIGHTNESS: 0
BLOOD IN STOOL: 0
SINUS PAIN: 0
TROUBLE SWALLOWING: 0
SORE THROAT: 0

## 2023-08-18 NOTE — PROGRESS NOTES
23  Sarah Landaverde Brothers : 1985 Sex: female  Age: 43 y.o. Assessment and Plan:  Natalie Nesbitt was seen today for post-covid symptoms. Diagnoses and all orders for this visit:    Essential hypertension    Long COVID    She is to continue medications as prescribed. Anticipate recheck in 3 months which can be done through [facility] provider    No follow-ups on file. Chief Complaint   Patient presents with    Post-COVID Symptoms       Patient returns for recheck visit. Slowly and gradually improving. Covered from her previous surgeries from last year. Post COVID plaints are gradually improving as well. She is planning to return to work again next year. Review of Systems   Constitutional:  Positive for fatigue. Negative for appetite change and unexpected weight change. HENT:  Negative for congestion, ear pain, hearing loss, sinus pain, sore throat and trouble swallowing. Eyes:  Negative for photophobia, pain, discharge and redness. Respiratory:  Negative for cough, chest tightness and shortness of breath. Cardiovascular:  Negative for chest pain, palpitations and leg swelling. Gastrointestinal:  Negative for abdominal pain, blood in stool, diarrhea, nausea and vomiting. Endocrine: Negative. Genitourinary:  Negative for dysuria, flank pain, frequency and hematuria. Musculoskeletal:  Negative for arthralgias, back pain, joint swelling and myalgias. Skin: Negative. Allergic/Immunologic: Negative. Neurological:  Negative for dizziness, seizures, syncope, weakness, light-headedness, numbness and headaches. Hematological:  Negative for adenopathy. Does not bruise/bleed easily. Psychiatric/Behavioral: Negative.          Current Outpatient Medications:     TRINTELLIX 10 MG TABS tablet, Take 1 tablet by mouth daily, Disp: , Rfl:     buPROPion (WELLBUTRIN XL) 150 MG extended release tablet, Take 1 tablet by mouth daily, Disp: , Rfl:     pantoprazole (PROTONIX) 20 MG tablet,

## 2023-11-20 SDOH — HEALTH STABILITY: PHYSICAL HEALTH: ON AVERAGE, HOW MANY DAYS PER WEEK DO YOU ENGAGE IN MODERATE TO STRENUOUS EXERCISE (LIKE A BRISK WALK)?: 2 DAYS

## 2023-11-20 SDOH — HEALTH STABILITY: PHYSICAL HEALTH: ON AVERAGE, HOW MANY MINUTES DO YOU ENGAGE IN EXERCISE AT THIS LEVEL?: 30 MIN

## 2023-11-21 ENCOUNTER — OFFICE VISIT (OUTPATIENT)
Dept: PRIMARY CARE CLINIC | Age: 38
End: 2023-11-21
Payer: COMMERCIAL

## 2023-11-21 VITALS
SYSTOLIC BLOOD PRESSURE: 110 MMHG | BODY MASS INDEX: 44.89 KG/M2 | WEIGHT: 293 LBS | TEMPERATURE: 97 F | HEART RATE: 76 BPM | OXYGEN SATURATION: 97 % | DIASTOLIC BLOOD PRESSURE: 70 MMHG

## 2023-11-21 DIAGNOSIS — G89.29 CHRONIC GENERALIZED PAIN DISORDER: Chronic | ICD-10-CM

## 2023-11-21 DIAGNOSIS — E83.110 HEMOCHROMATOSIS, HEREDITARY (HCC): ICD-10-CM

## 2023-11-21 DIAGNOSIS — R52 CHRONIC GENERALIZED PAIN DISORDER: Chronic | ICD-10-CM

## 2023-11-21 DIAGNOSIS — F33.41 RECURRENT MAJOR DEPRESSIVE DISORDER, IN PARTIAL REMISSION (HCC): ICD-10-CM

## 2023-11-21 DIAGNOSIS — E66.01 CLASS 3 SEVERE OBESITY DUE TO EXCESS CALORIES WITHOUT SERIOUS COMORBIDITY WITH BODY MASS INDEX (BMI) OF 40.0 TO 44.9 IN ADULT (HCC): ICD-10-CM

## 2023-11-21 DIAGNOSIS — I10 ESSENTIAL HYPERTENSION: Primary | ICD-10-CM

## 2023-11-21 DIAGNOSIS — I10 ESSENTIAL HYPERTENSION: ICD-10-CM

## 2023-11-21 DIAGNOSIS — K21.00 GASTROESOPHAGEAL REFLUX DISEASE WITH ESOPHAGITIS WITHOUT HEMORRHAGE: ICD-10-CM

## 2023-11-21 LAB
ABSOLUTE IMMATURE GRANULOCYTE: 0.04 K/UL (ref 0–0.58)
BASOPHILS ABSOLUTE: 0.1 K/UL (ref 0–0.2)
BASOPHILS RELATIVE PERCENT: 1 % (ref 0–2)
EOSINOPHILS ABSOLUTE: 0.29 K/UL (ref 0.05–0.5)
EOSINOPHILS RELATIVE PERCENT: 4 % (ref 0–6)
HCT VFR BLD CALC: 42.3 % (ref 34–48)
HEMOGLOBIN: 12.8 G/DL (ref 11.5–15.5)
IMMATURE GRANULOCYTES: 1 % (ref 0–5)
LYMPHOCYTES ABSOLUTE: 3.04 K/UL (ref 1.5–4)
LYMPHOCYTES RELATIVE PERCENT: 37 % (ref 20–42)
MCH RBC QN AUTO: 26.5 PG (ref 26–35)
MCHC RBC AUTO-ENTMCNC: 30.3 G/DL (ref 32–34.5)
MCV RBC AUTO: 87.6 FL (ref 80–99.9)
MONOCYTES ABSOLUTE: 0.41 K/UL (ref 0.1–0.95)
MONOCYTES RELATIVE PERCENT: 5 % (ref 2–12)
NEUTROPHILS ABSOLUTE: 4.24 K/UL (ref 1.8–7.3)
NEUTROPHILS RELATIVE PERCENT: 52 % (ref 43–80)
PDW BLD-RTO: 14.1 % (ref 11.5–15)
PLATELET # BLD: 384 K/UL (ref 130–450)
PMV BLD AUTO: 9.1 FL (ref 7–12)
RBC # BLD: 4.83 M/UL (ref 3.5–5.5)
WBC # BLD: 8.1 K/UL (ref 4.5–11.5)

## 2023-11-21 PROCEDURE — 3074F SYST BP LT 130 MM HG: CPT | Performed by: NURSE PRACTITIONER

## 2023-11-21 PROCEDURE — G8427 DOCREV CUR MEDS BY ELIG CLIN: HCPCS | Performed by: NURSE PRACTITIONER

## 2023-11-21 PROCEDURE — 99214 OFFICE O/P EST MOD 30 MIN: CPT | Performed by: NURSE PRACTITIONER

## 2023-11-21 PROCEDURE — 1036F TOBACCO NON-USER: CPT | Performed by: NURSE PRACTITIONER

## 2023-11-21 PROCEDURE — 3078F DIAST BP <80 MM HG: CPT | Performed by: NURSE PRACTITIONER

## 2023-11-21 PROCEDURE — G8484 FLU IMMUNIZE NO ADMIN: HCPCS | Performed by: NURSE PRACTITIONER

## 2023-11-21 PROCEDURE — G8417 CALC BMI ABV UP PARAM F/U: HCPCS | Performed by: NURSE PRACTITIONER

## 2023-11-21 RX ORDER — SULFAMETHOXAZOLE AND TRIMETHOPRIM 800; 160 MG/1; MG/1
1 TABLET ORAL 2 TIMES DAILY
COMMUNITY
Start: 2023-11-13

## 2023-11-21 RX ORDER — BREXPIPRAZOLE 2 MG/1
2 TABLET ORAL
COMMUNITY
Start: 2023-09-11

## 2023-11-21 RX ORDER — PANTOPRAZOLE SODIUM 20 MG/1
20 TABLET, DELAYED RELEASE ORAL
Qty: 90 TABLET | Refills: 1 | Status: SHIPPED | OUTPATIENT
Start: 2023-11-21

## 2023-11-21 RX ORDER — PROPRANOLOL HCL 60 MG
60 CAPSULE, EXTENDED RELEASE 24HR ORAL 2 TIMES DAILY
Qty: 180 CAPSULE | Refills: 1 | Status: SHIPPED | OUTPATIENT
Start: 2023-11-21

## 2023-11-21 RX ORDER — CITALOPRAM 40 MG/1
40 TABLET ORAL DAILY
COMMUNITY
Start: 2023-11-13

## 2023-11-21 RX ORDER — AMLODIPINE BESYLATE 5 MG/1
5 TABLET ORAL DAILY
Qty: 90 TABLET | Refills: 1 | Status: SHIPPED | OUTPATIENT
Start: 2023-11-21

## 2023-11-21 ASSESSMENT — ENCOUNTER SYMPTOMS: RESPIRATORY NEGATIVE: 1

## 2023-11-21 NOTE — PROGRESS NOTES
Subjective  CC: Patient presents to establish. HPI:   Patient previously saw Dr. Luis Fernando Ca, she is establishing with me as he is retiring. We reviewed her past medical history, family history, and social history. She does follow with podiatry, recent stress fracture. She also follows with gynecology Dr. Kevyn Rodrigues, as well as psychiatry, Riley Melendez.  She feels mental health conditions are all stable. She was recently treated for Bartholin gland cyst, has follow-up with gynecology in the near future. She was also diagnosed with possible long COVID after infection in September 2022. Around the same time, she had a rectal abscess, which required drainage. 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 The patient also had a bladder sling placed earlier this year. She feels she is more stable at this point in time, and would like to discuss weight loss. She has not previously been on medication although she has seen a health , and was seen by the [facility] weight clinic. She is really not interested in medication at this time, but is open to surgical intervention. Unfortunately, because of the symptoms that have been attributed to [address], the patient has not been employed. 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 ROS:  Review of Systems   Constitutional:         Fatigue   HENT: Negative. Eyes:         Wears glasses   Respiratory: Negative.      Cardiovascular:         Hypertension   Gastrointestinal:         Hx rectal abscess, complicated by fistula and need further repair   Musculoskeletal:         Lumbar pain   Psychiatric/Behavioral:          Depression, anxiety          Current Outpatient Medications:     REXULTI 2 MG TABS tablet, Take 1 tablet by mouth, Disp: , Rfl:

## 2023-11-22 LAB
ALBUMIN SERPL-MCNC: 4.1 G/DL (ref 3.5–5.2)
ALP BLD-CCNC: 120 U/L (ref 35–104)
ALT SERPL-CCNC: 18 U/L (ref 0–32)
ANION GAP SERPL CALCULATED.3IONS-SCNC: 16 MMOL/L (ref 7–16)
AST SERPL-CCNC: 16 U/L (ref 0–31)
BILIRUB SERPL-MCNC: 0.3 MG/DL (ref 0–1.2)
BUN BLDV-MCNC: 11 MG/DL (ref 6–20)
CALCIUM SERPL-MCNC: 9.3 MG/DL (ref 8.6–10.2)
CHLORIDE BLD-SCNC: 102 MMOL/L (ref 98–107)
CHOLESTEROL: 217 MG/DL
CO2: 20 MMOL/L (ref 22–29)
CREAT SERPL-MCNC: 0.8 MG/DL (ref 0.5–1)
GFR SERPL CREATININE-BSD FRML MDRD: >60 ML/MIN/1.73M2
GLUCOSE BLD-MCNC: 135 MG/DL (ref 74–99)
HDLC SERPL-MCNC: 55 MG/DL
LDL CHOLESTEROL: 123 MG/DL
POTASSIUM SERPL-SCNC: 4.2 MMOL/L (ref 3.5–5)
SODIUM BLD-SCNC: 138 MMOL/L (ref 132–146)
TOTAL PROTEIN: 7.2 G/DL (ref 6.4–8.3)
TRIGL SERPL-MCNC: 197 MG/DL
TSH SERPL DL<=0.05 MIU/L-ACNC: 0.69 UIU/ML (ref 0.27–4.2)
VLDLC SERPL CALC-MCNC: 39 MG/DL

## 2023-11-24 ENCOUNTER — TELEPHONE (OUTPATIENT)
Dept: BARIATRICS/WEIGHT MGMT | Age: 38
End: 2023-11-24

## 2023-11-24 DIAGNOSIS — R73.09 ABNORMAL GLUCOSE: ICD-10-CM

## 2023-11-24 DIAGNOSIS — R73.09 ABNORMAL GLUCOSE: Primary | ICD-10-CM

## 2023-11-24 LAB — HBA1C MFR BLD: 5.5 % (ref 4–5.6)

## 2023-11-24 NOTE — TELEPHONE ENCOUNTER
A first call was made in an attempt to reach this patient for a referral we received. Checking with insurance.

## 2024-01-03 ENCOUNTER — TELEPHONE (OUTPATIENT)
Dept: BARIATRICS/WEIGHT MGMT | Age: 39
End: 2024-01-03

## 2024-01-03 NOTE — TELEPHONE ENCOUNTER
A second call was made in an attempt to reach this patient for a referral we received. I left a detailed voicemail to call our office to schedule an initial consult.    Ok to schedule if insurance covers

## 2024-01-11 ENCOUNTER — TELEPHONE (OUTPATIENT)
Dept: PRIMARY CARE CLINIC | Age: 39
End: 2024-01-11

## 2024-01-11 DIAGNOSIS — E66.01 CLASS 3 SEVERE OBESITY DUE TO EXCESS CALORIES WITHOUT SERIOUS COMORBIDITY WITH BODY MASS INDEX (BMI) OF 40.0 TO 44.9 IN ADULT (HCC): Primary | ICD-10-CM

## 2024-01-11 RX ORDER — TIRZEPATIDE 2.5 MG/.5ML
2.5 INJECTION, SOLUTION SUBCUTANEOUS WEEKLY
Qty: 2 ML | Refills: 0 | Status: SHIPPED | OUTPATIENT
Start: 2024-01-11

## 2024-01-23 ENCOUNTER — TELEPHONE (OUTPATIENT)
Dept: PRIMARY CARE CLINIC | Age: 39
End: 2024-01-23

## 2024-01-23 ENCOUNTER — OFFICE VISIT (OUTPATIENT)
Dept: PRIMARY CARE CLINIC | Age: 39
End: 2024-01-23
Payer: COMMERCIAL

## 2024-01-23 VITALS
DIASTOLIC BLOOD PRESSURE: 72 MMHG | BODY MASS INDEX: 44.89 KG/M2 | HEART RATE: 71 BPM | OXYGEN SATURATION: 99 % | SYSTOLIC BLOOD PRESSURE: 118 MMHG | HEIGHT: 69 IN | TEMPERATURE: 97.8 F

## 2024-01-23 DIAGNOSIS — J31.2 CHRONIC PHARYNGITIS: ICD-10-CM

## 2024-01-23 DIAGNOSIS — J31.2 CHRONIC PHARYNGITIS: Primary | ICD-10-CM

## 2024-01-23 PROCEDURE — 3074F SYST BP LT 130 MM HG: CPT | Performed by: NURSE PRACTITIONER

## 2024-01-23 PROCEDURE — 99212 OFFICE O/P EST SF 10 MIN: CPT | Performed by: NURSE PRACTITIONER

## 2024-01-23 PROCEDURE — 3078F DIAST BP <80 MM HG: CPT | Performed by: NURSE PRACTITIONER

## 2024-01-23 ASSESSMENT — PATIENT HEALTH QUESTIONNAIRE - PHQ9
SUM OF ALL RESPONSES TO PHQ QUESTIONS 1-9: 4
5. POOR APPETITE OR OVEREATING: 3
1. LITTLE INTEREST OR PLEASURE IN DOING THINGS: 0
10. IF YOU CHECKED OFF ANY PROBLEMS, HOW DIFFICULT HAVE THESE PROBLEMS MADE IT FOR YOU TO DO YOUR WORK, TAKE CARE OF THINGS AT HOME, OR GET ALONG WITH OTHER PEOPLE: 0
SUM OF ALL RESPONSES TO PHQ9 QUESTIONS 1 & 2: 0
3. TROUBLE FALLING OR STAYING ASLEEP: 0
7. TROUBLE CONCENTRATING ON THINGS, SUCH AS READING THE NEWSPAPER OR WATCHING TELEVISION: 0
4. FEELING TIRED OR HAVING LITTLE ENERGY: 1
6. FEELING BAD ABOUT YOURSELF - OR THAT YOU ARE A FAILURE OR HAVE LET YOURSELF OR YOUR FAMILY DOWN: 0
9. THOUGHTS THAT YOU WOULD BE BETTER OFF DEAD, OR OF HURTING YOURSELF: 0
SUM OF ALL RESPONSES TO PHQ QUESTIONS 1-9: 4
SUM OF ALL RESPONSES TO PHQ QUESTIONS 1-9: 4
2. FEELING DOWN, DEPRESSED OR HOPELESS: 0
SUM OF ALL RESPONSES TO PHQ QUESTIONS 1-9: 4
8. MOVING OR SPEAKING SO SLOWLY THAT OTHER PEOPLE COULD HAVE NOTICED. OR THE OPPOSITE, BEING SO FIGETY OR RESTLESS THAT YOU HAVE BEEN MOVING AROUND A LOT MORE THAN USUAL: 0

## 2024-01-23 ASSESSMENT — ENCOUNTER SYMPTOMS: RESPIRATORY NEGATIVE: 1

## 2024-01-23 NOTE — TELEPHONE ENCOUNTER
The patient told me that her labs done in December were denied because they were coded as obesity.  She states that she called billing, and billing told her she needed to talk with me about changing the codes.  I reviewed this, the only one that was coded as obesity was TSH, and I changed this to hypertension.  I am uncertain how to make sure this is actually coded correctly per billing.

## 2024-01-23 NOTE — PROGRESS NOTES
Subjective  CC: Patient presents to discuss sore throat.    HPI:   The patient was seen in November for acute sinusitis and given an antibiotic, although she states she did not take this.  She states since that time, she has had a sore throat, primarily on the right side.  She does report pain with swallowing, but no choking or difficulty swallowing.  She does have a history of GERD, but does not report an increase in symptoms of this.  She reports that has been consistent for the last 2 months.    The patient was prescribed a GLP-1 inhibitor, but this was denied through insurance.  We discussed other weight loss options.    ROS:  Review of Systems   Constitutional:         Fatigue   HENT: Negative.     Eyes:         Wears glasses   Respiratory: Negative.     Cardiovascular:         Hypertension   Gastrointestinal:         Hx rectal abscess, complicated by fistula and need further repair   Musculoskeletal:         Lumbar pain   Psychiatric/Behavioral:          Depression, anxiety          Current Outpatient Medications:     citalopram (CELEXA) 40 MG tablet, Take 1 tablet by mouth daily, Disp: , Rfl:     propranolol (INDERAL LA) 60 MG extended release capsule, Take 1 capsule by mouth 2 times daily, Disp: 180 capsule, Rfl: 1    pantoprazole (PROTONIX) 20 MG tablet, Take 1 tablet by mouth every morning (before breakfast), Disp: 90 tablet, Rfl: 1    amLODIPine (NORVASC) 5 MG tablet, Take 1 tablet by mouth daily, Disp: 90 tablet, Rfl: 1    buPROPion (WELLBUTRIN XL) 150 MG extended release tablet, Take 1 tablet by mouth daily, Disp: , Rfl:     Handicap Placard MISC, by Does not apply route 5 years, Disp: 1 each, Rfl: 0    traZODone (DESYREL) 50 MG tablet, Take 1 tablet by mouth nightly, Disp: , Rfl:    Allergies   Allergen Reactions    Latex Rash    Pcn [Penicillins] Rash        PMH:  Past Medical History:   Diagnosis Date    Abnormal Pap smear of cervix     normal since    Chronic back pain     Chronic generalized pain

## 2024-01-26 LAB
CULTURE: NORMAL
CULTURE: NORMAL
SPECIMEN DESCRIPTION: NORMAL

## 2024-02-14 ENCOUNTER — TELEPHONE (OUTPATIENT)
Dept: ENT CLINIC | Age: 39
End: 2024-02-14

## 2024-02-14 NOTE — TELEPHONE ENCOUNTER
Reviewed message with Dr Douglass. Please call PCP to order swallow study and esophagram on patient.

## 2024-02-14 NOTE — TELEPHONE ENCOUNTER
LOV 2021 Evonne. c/o New issue Throat pain RT side, difficulty swallowing x 3 mos. No fever currently/no swelling noted or redness by PCP. Patient PCP, Dr Tatiana Rodrigues did throat culture, neg per patient. PCP recommended her to be seen ENT. Please advise patient [phone]

## 2024-02-15 ENCOUNTER — TELEPHONE (OUTPATIENT)
Dept: PRIMARY CARE CLINIC | Age: 39
End: 2024-02-15

## 2024-02-15 NOTE — TELEPHONE ENCOUNTER
Last Appointment:  1/23/2024  Future Appointments   Date Time Provider Department Center   4/25/2024  9:30 AM Tatiana Rodrigues APRN - CNP [facility]      Dr. Douglass's office is requesting a swallow study and an esophogram be done before patient is scheduled.    Electronically signed by Melissa Simental LPN on 2/15/2024 at 3:20 PM

## 2024-02-15 NOTE — TELEPHONE ENCOUNTER
Spoke with Melissa at [facility]. She will have esophogram and swallow study ordered for pt.   Electronically signed by Aury Guerin on 2/15/2024 at 3:20 PM

## 2024-02-16 DIAGNOSIS — J31.2 CHRONIC PHARYNGITIS: Primary | ICD-10-CM

## 2024-02-16 NOTE — TELEPHONE ENCOUNTER
Pt notified, she wants to have it done at [facility], given phone number for scheduling.

## 2024-03-07 NOTE — TELEPHONE ENCOUNTER
Pt scheduled for Modified Barium swallow study on 3/13/24.  Electronically signed by Aury Guerin on 3/7/2024 at 1:48 PM

## 2024-03-13 ENCOUNTER — HOSPITAL ENCOUNTER (OUTPATIENT)
Dept: GENERAL RADIOLOGY | Age: 39
Discharge: HOME | End: 2024-03-15
Payer: COMMERCIAL

## 2024-03-13 DIAGNOSIS — J31.2 CHRONIC PHARYNGITIS: ICD-10-CM

## 2024-03-13 PROCEDURE — 92611 MOTION FLUOROSCOPY/SWALLOW: CPT

## 2024-03-13 PROCEDURE — 74230 X-RAY XM SWLNG FUNCJ C+: CPT

## 2024-03-13 PROCEDURE — 2500000003 HC RX 250 WO HCPCS: Performed by: RADIOLOGY

## 2024-03-13 PROCEDURE — 92526 ORAL FUNCTION THERAPY: CPT

## 2024-03-13 RX ADMIN — BARIUM SULFATE 70 ML: 0.81 POWDER, FOR SUSPENSION ORAL at 13:35

## 2024-03-13 RX ADMIN — BARIUM SULFATE 10 ML: 400 PASTE ORAL at 13:35

## 2024-03-13 RX ADMIN — BARIUM SULFATE 120 ML: 400 SUSPENSION ORAL at 13:35

## 2024-03-13 RX ADMIN — BARIUM SULFATE 1 TABLET: 700 TABLET ORAL at 13:36

## 2024-03-13 NOTE — PROGRESS NOTES
session relative to identified deficit areas; indicated understanding of all information provided via satisfactory verbal response.    CPT Code: 61516  dysphagia tx

## 2024-03-20 NOTE — TELEPHONE ENCOUNTER
Pt had MBS on 3/13. Please advise on scheduling. Electronically signed by Aury Guerin on 3/20/2024 at 11:41 AM

## 2024-03-20 NOTE — TELEPHONE ENCOUNTER
Pt to be scheduled in 3 months per Dr. Douglass. Pt is scheduled for 6/7/24/. Electronically signed by Aury Guerin on 3/20/2024 at 1:10 PM

## 2024-04-25 ENCOUNTER — OFFICE VISIT (OUTPATIENT)
Dept: PRIMARY CARE CLINIC | Age: 39
End: 2024-04-25
Payer: COMMERCIAL

## 2024-04-25 VITALS
HEIGHT: 69 IN | BODY MASS INDEX: 43.4 KG/M2 | HEART RATE: 74 BPM | TEMPERATURE: 98.6 F | DIASTOLIC BLOOD PRESSURE: 76 MMHG | SYSTOLIC BLOOD PRESSURE: 126 MMHG | WEIGHT: 293 LBS | OXYGEN SATURATION: 98 %

## 2024-04-25 DIAGNOSIS — E66.01 CLASS 3 SEVERE OBESITY DUE TO EXCESS CALORIES WITHOUT SERIOUS COMORBIDITY WITH BODY MASS INDEX (BMI) OF 40.0 TO 44.9 IN ADULT (HCC): ICD-10-CM

## 2024-04-25 DIAGNOSIS — F33.41 RECURRENT MAJOR DEPRESSIVE DISORDER, IN PARTIAL REMISSION (HCC): ICD-10-CM

## 2024-04-25 DIAGNOSIS — K21.00 GASTROESOPHAGEAL REFLUX DISEASE WITH ESOPHAGITIS WITHOUT HEMORRHAGE: ICD-10-CM

## 2024-04-25 DIAGNOSIS — I10 ESSENTIAL HYPERTENSION: Primary | ICD-10-CM

## 2024-04-25 DIAGNOSIS — Z12.31 BREAST CANCER SCREENING BY MAMMOGRAM: ICD-10-CM

## 2024-04-25 PROCEDURE — G2211 COMPLEX E/M VISIT ADD ON: HCPCS | Performed by: NURSE PRACTITIONER

## 2024-04-25 PROCEDURE — 3078F DIAST BP <80 MM HG: CPT | Performed by: NURSE PRACTITIONER

## 2024-04-25 PROCEDURE — 3074F SYST BP LT 130 MM HG: CPT | Performed by: NURSE PRACTITIONER

## 2024-04-25 PROCEDURE — 99214 OFFICE O/P EST MOD 30 MIN: CPT | Performed by: NURSE PRACTITIONER

## 2024-04-25 RX ORDER — PROPRANOLOL HCL 60 MG
60 CAPSULE, EXTENDED RELEASE 24HR ORAL 2 TIMES DAILY
Qty: 180 CAPSULE | Refills: 1 | Status: SHIPPED | OUTPATIENT
Start: 2024-04-25

## 2024-04-25 RX ORDER — PHENAZOPYRIDINE HYDROCHLORIDE 200 MG/1
200 TABLET, FILM COATED ORAL 3 TIMES DAILY PRN
COMMUNITY
Start: 2024-04-21

## 2024-04-25 RX ORDER — AMLODIPINE BESYLATE 5 MG/1
5 TABLET ORAL DAILY
Qty: 90 TABLET | Refills: 1 | Status: SHIPPED | OUTPATIENT
Start: 2024-04-25

## 2024-04-25 RX ORDER — NITROFURANTOIN 25; 75 MG/1; MG/1
100 CAPSULE ORAL 2 TIMES DAILY
COMMUNITY
Start: 2024-04-21

## 2024-04-25 RX ORDER — PANTOPRAZOLE SODIUM 20 MG/1
20 TABLET, DELAYED RELEASE ORAL 2 TIMES DAILY
Qty: 90 TABLET | Refills: 1 | Status: SHIPPED | OUTPATIENT
Start: 2024-04-25

## 2024-04-25 SDOH — ECONOMIC STABILITY: FOOD INSECURITY: WITHIN THE PAST 12 MONTHS, YOU WORRIED THAT YOUR FOOD WOULD RUN OUT BEFORE YOU GOT MONEY TO BUY MORE.: NEVER TRUE

## 2024-04-25 SDOH — ECONOMIC STABILITY: INCOME INSECURITY: HOW HARD IS IT FOR YOU TO PAY FOR THE VERY BASICS LIKE FOOD, HOUSING, MEDICAL CARE, AND HEATING?: NOT HARD AT ALL

## 2024-04-25 SDOH — ECONOMIC STABILITY: FOOD INSECURITY: WITHIN THE PAST 12 MONTHS, THE FOOD YOU BOUGHT JUST DIDN'T LAST AND YOU DIDN'T HAVE MONEY TO GET MORE.: NEVER TRUE

## 2024-04-25 ASSESSMENT — ENCOUNTER SYMPTOMS: RESPIRATORY NEGATIVE: 1

## 2024-04-25 NOTE — PROGRESS NOTES
Subjective  CC: Patient presents for follow up.    HPI:   Patient is here for follow-up today.  She is utilizing compounded tirzepatide from a facility outside of insurance coverage, she is had significant improvement in her weight loss journey.  She feels well on this other than an increase in heartburn symptomatology.  She has had an EGD but in the remote past.  She reports that symptoms have worsened more recently.  She did have increased stressed with the hospitalization of her , and she does take Protonix daily.  We discussed a temporary increase in the dose of this, but if she does not have improvement I would recommend EGD.    She does have an appointment with ENT in June because she was having a chronic sore throat.  Her symptoms have actually improved and she was considering canceling this appointment.  She did have normal swallow study recently.    She does follow with gynecology, Dr. Lindquist, but has never had a mammogram so I will order this for her.  Her blood pressure is under good control.  She feels she is doing well in regards to depression symptomatology.    ROS:  Review of Systems   Constitutional:         Fatigue   HENT: Negative.     Eyes:         Wears glasses   Respiratory: Negative.     Cardiovascular:         Hypertension   Gastrointestinal:         Hx rectal abscess, complicated by fistula and need further repair   Musculoskeletal:         Lumbar pain   Psychiatric/Behavioral:          Depression, anxiety          Current Outpatient Medications:     nitrofurantoin, macrocrystal-monohydrate, (MACROBID) 100 MG capsule, Take 1 capsule by mouth 2 times daily, Disp: , Rfl:     phenazopyridine (PYRIDIUM) 200 MG tablet, Take 1 tablet by mouth 3 times daily as needed, Disp: , Rfl:     amLODIPine (NORVASC) 5 MG tablet, Take 1 tablet by mouth daily, Disp: 90 tablet, Rfl: 1    pantoprazole (PROTONIX) 20 MG tablet, Take 1 tablet by mouth in the morning and at bedtime, Disp: 90 tablet, Rfl: 1

## 2024-06-03 ENCOUNTER — HOSPITAL ENCOUNTER (OUTPATIENT)
Dept: MAMMOGRAPHY | Age: 39
Discharge: HOME | End: 2024-06-05
Payer: COMMERCIAL

## 2024-06-03 VITALS — BODY MASS INDEX: 41.35 KG/M2 | WEIGHT: 280 LBS

## 2024-06-03 DIAGNOSIS — Z12.31 BREAST CANCER SCREENING BY MAMMOGRAM: ICD-10-CM

## 2024-06-03 PROCEDURE — 77063 BREAST TOMOSYNTHESIS BI: CPT

## 2024-06-04 ENCOUNTER — TELEPHONE (OUTPATIENT)
Dept: GENERAL RADIOLOGY | Age: 39
End: 2024-06-04

## 2024-06-04 NOTE — TELEPHONE ENCOUNTER
Call to patient in reference to her mammogram performed on the [facility] on Tressa 3, 2024.    Instructed patient that the radiologist has recommended some additional breast imaging in order to make a final determination/result. Informed her that an Rx has been obtained by the ordering provider. Next, a  from [facility] will contact her to schedule the additional imaging study/studies. Verbalizes understanding and is agreeable to proceed.

## 2024-06-07 ENCOUNTER — OFFICE VISIT (OUTPATIENT)
Dept: ENT CLINIC | Age: 39
End: 2024-06-07
Payer: COMMERCIAL

## 2024-06-07 VITALS — HEIGHT: 69 IN | WEIGHT: 293 LBS | BODY MASS INDEX: 43.4 KG/M2

## 2024-06-07 DIAGNOSIS — G47.30 SLEEP-DISORDERED BREATHING: ICD-10-CM

## 2024-06-07 DIAGNOSIS — J35.1 LINGUAL TONSIL HYPERTROPHY: ICD-10-CM

## 2024-06-07 DIAGNOSIS — R29.818 SUSPECTED SLEEP APNEA: Primary | ICD-10-CM

## 2024-06-07 PROCEDURE — 31575 DIAGNOSTIC LARYNGOSCOPY: CPT | Performed by: OTOLARYNGOLOGY

## 2024-06-07 PROCEDURE — 99213 OFFICE O/P EST LOW 20 MIN: CPT | Performed by: OTOLARYNGOLOGY

## 2024-06-07 RX ORDER — METHYLPREDNISOLONE 4 MG/1
TABLET ORAL
COMMUNITY
Start: 2024-06-06

## 2024-06-07 NOTE — PROGRESS NOTES
Subjective:      Patient ID:  Tatiana Mtz is a 39 y.o. female.    HPI:  Chronic Tonsillitis  Patient presentswith recurrent tonsillitis. The patient reports right-sided sore throat. The symptoms have been present for 7 months.  It lasted for 4-5 months but is now improved. Occasionally comes and goes now. Takes Protonix for GERD which controls her symptoms. Denies any other tonsil infections or recurrent sore throat prior to this. She was never treated with any medications for this. Did have MBSS which was normal.  Reports she had globus which is now resolved and denies dysphagia or weight loss. Denies any changes in the voice. Has left thyroid nodule measuring 3 cm which had FNA which was benign and follows with yearly US.     Additionally reports severe snoring and believes she may have sleep apnea. She is always fatigued and wakes up frequently at night. Has not had sleep study.     History of FESS with Pascolini in 2018.     Past Medical History:   Diagnosis Date    Abnormal Pap smear of cervix     normal since    Chronic back pain     Chronic generalized pain disorder 08/13/2021    Chronic sinusitis     with facial pain    Class 3 severe obesity due to excess calories without serious comorbidity with body mass index (BMI) of 40.0 to 44.9 in adult (HCC)     DDD (degenerative disc disease), cervical 08/13/2021    Depression 06/06/2011    Essential hypertension     Gastroesophageal reflux disease without esophagitis     Hemochromatosis     Migraines     born with heart murmur    Paresthesias 08/10/2021    resolved    Seasonal allergies      Past Surgical History:   Procedure Laterality Date    ANAL FISTULOTOMY Right 12/06/2022    BLADDER SUSPENSION  03/2023    [facility]    HERNIA REPAIR N/A 10/11/2022    LAPAROSCOPIC ROBOTIC XI ASSISTED VENTRAL HERNIA REPAIR WITH MESH performed by Kingsley Pardo MD at [facility] OR    RECTAL SURGERY N/A 09/15/2022    DRAINAGE PERIANAL ABSCESS performed by Kingsley Pardo MD at [facility] OR

## 2024-06-26 ENCOUNTER — HOSPITAL ENCOUNTER (OUTPATIENT)
Dept: GENERAL RADIOLOGY | Age: 39
Discharge: HOME | End: 2024-06-28
Payer: COMMERCIAL

## 2024-06-26 DIAGNOSIS — R92.8 ABNORMAL MAMMOGRAM OF RIGHT BREAST: ICD-10-CM

## 2024-06-26 DIAGNOSIS — N63.10 MASS OF RIGHT BREAST, UNSPECIFIED QUADRANT: Primary | ICD-10-CM

## 2024-06-26 DIAGNOSIS — R92.8 ABNORMAL MAMMOGRAM: ICD-10-CM

## 2024-06-26 PROCEDURE — 76642 ULTRASOUND BREAST LIMITED: CPT

## 2024-06-26 PROCEDURE — G0279 TOMOSYNTHESIS, MAMMO: HCPCS

## 2024-06-26 NOTE — PROGRESS NOTES
Present in consult room while remote radiologist reviewed breast imaging with patient and instructed patient on breast findings and recommendation for breast biopsy via telehealth connection. Patient's questions were answered by radiologist and myself.  I instructed patient on biopsy preparation after completion of call with radiologist.  Patient was provided with biopsy appointment for right breast US guided core needle biopsy on 7/2/24. Electronically signed by Natalie Butt RN, BSN on 6/26/2024 at 11:12 AM

## 2024-07-02 ENCOUNTER — HOSPITAL ENCOUNTER (OUTPATIENT)
Dept: GENERAL RADIOLOGY | Age: 39
Discharge: HOME | End: 2024-07-04
Payer: COMMERCIAL

## 2024-07-02 DIAGNOSIS — N63.10 MASS OF RIGHT BREAST, UNSPECIFIED QUADRANT: ICD-10-CM

## 2024-07-02 DIAGNOSIS — R92.8 ABNORMAL MAMMOGRAM OF RIGHT BREAST: ICD-10-CM

## 2024-07-02 PROCEDURE — 77065 DX MAMMO INCL CAD UNI: CPT

## 2024-07-02 PROCEDURE — 19083 BX BREAST 1ST LESION US IMAG: CPT

## 2024-07-02 NOTE — PROGRESS NOTES
Met with patient prior to her breast biopsy. Instructed on ultrasound guided  breast biopsy procedure. Denies use of blood thinners or aspirin products within the past 5 days.   Instructed that results will be available in approximately 3-5 business days. She confirms that she has an active [facility] MyChart account and is agreeable to discussion of breast biopsy results by phone. Instructed that her physician will also be notified of results.  Provided with folder containing my contact information and post biopsy discharge instructions. Instructed to call me if she has any questions or concerns about her biopsy. Verbalizes understanding. Electronically signed by Natalie Butt RN, BSN on 7/2/2024 at 11:15 AM

## 2024-07-08 ENCOUNTER — TELEPHONE (OUTPATIENT)
Dept: GENERAL RADIOLOGY | Age: 39
End: 2024-07-08

## 2024-07-08 LAB — SURGICAL PATHOLOGY REPORT: NORMAL

## 2024-07-08 NOTE — TELEPHONE ENCOUNTER
Call to patient regarding her recent  breast biopsy results.  Instructed in detail on her breast biopsy pathology findings including cancer type (Invasive ductal carcinoma) and hormone receptor status (ER+/CO+/Her2-).  Instructed on next steps including following up with Tatiana Rodrigues CNP regarding a referral for breast surgery consultation.  I answered her questions to her apparent satisfaction.     Provided with my contact information and instructed patient to call me with questions or concerns.  Verbalizes understanding.  Breast pathology report routed to Tatiana Rodrigues CNP. Electronically signed by Natalie Butt RN, BSN on 7/8/2024 at 4:25 PM

## 2024-07-09 ENCOUNTER — TELEPHONE (OUTPATIENT)
Dept: PRIMARY CARE CLINIC | Age: 39
End: 2024-07-09

## 2024-07-09 ENCOUNTER — TELEPHONE (OUTPATIENT)
Dept: BREAST CENTER | Age: 39
End: 2024-07-09

## 2024-07-09 DIAGNOSIS — C50.911 INFILTRATING DUCTAL CARCINOMA OF RIGHT BREAST (HCC): Primary | ICD-10-CM

## 2024-07-09 DIAGNOSIS — C50.911 INVASIVE DUCTAL CARCINOMA OF RIGHT BREAST (HCC): Primary | ICD-10-CM

## 2024-07-09 NOTE — TELEPHONE ENCOUNTER
RN made first attempt to schedule patient for consult with [facility] breast clinic. Pt was referred to office for right breast IDC by Tatiana Rodrigues CNP.  RN left voicemail with office contact information for patient to call back and schedule referral appt.      Patient needs scheduled for NEW BREAST CANCER-Right breast IDC ER/ME(+) HER2(-)-imaging&bx [facility]-ref by Tatiana Rodrigues CNP CXR/SILVIA/FLOWSHEET/MEASURE/LABS/GENETICS     Electronically signed by Luma Wilde RN on 7/9/24 at 2:14 PM EDT

## 2024-07-09 NOTE — TELEPHONE ENCOUNTER
RN received return call from patient. Patient was referred by Tatiana Rodrigues CNP for right breast IDC. Patient was scheduled on 7/19/2024 in [facility] office @ 1:30pm with Dr. Negrete. Patient was instructed to bring a photo ID, insurance card (if applicable), and list of any current medications. Patient verbalized understanding of appointment instructions.        Electronically signed by Luma Wilde RN on 7/9/24 at 2:17 PM EDT

## 2024-07-09 NOTE — TELEPHONE ENCOUNTER
Pt called in stating she just got her biopsy results back and has breast cancer. They said she will need a referral to a breast surgeon. She would like to stay with ofe baker

## 2024-07-19 ENCOUNTER — CLINICAL DOCUMENTATION (OUTPATIENT)
Dept: CASE MANAGEMENT | Age: 39
End: 2024-07-19

## 2024-07-19 ENCOUNTER — OFFICE VISIT (OUTPATIENT)
Dept: BREAST CENTER | Age: 39
End: 2024-07-19
Payer: COMMERCIAL

## 2024-07-19 ENCOUNTER — HOSPITAL ENCOUNTER (OUTPATIENT)
Dept: GENERAL RADIOLOGY | Age: 39
Discharge: HOME | End: 2024-07-19
Attending: SURGERY
Payer: COMMERCIAL

## 2024-07-19 VITALS
WEIGHT: 293 LBS | HEART RATE: 68 BPM | RESPIRATION RATE: 14 BRPM | TEMPERATURE: 98.2 F | DIASTOLIC BLOOD PRESSURE: 82 MMHG | SYSTOLIC BLOOD PRESSURE: 138 MMHG | HEIGHT: 69 IN | OXYGEN SATURATION: 97 % | BODY MASS INDEX: 43.4 KG/M2

## 2024-07-19 DIAGNOSIS — C50.911 INVASIVE DUCTAL CARCINOMA OF RIGHT BREAST (HCC): ICD-10-CM

## 2024-07-19 DIAGNOSIS — C50.911 INVASIVE DUCTAL CARCINOMA OF RIGHT BREAST (HCC): Primary | ICD-10-CM

## 2024-07-19 DIAGNOSIS — N62 MACROMASTIA: ICD-10-CM

## 2024-07-19 LAB
ALBUMIN SERPL-MCNC: 4 G/DL (ref 3.5–5.2)
ALP SERPL-CCNC: 107 U/L (ref 35–104)
ALT SERPL-CCNC: 22 U/L (ref 0–32)
ANION GAP SERPL CALCULATED.3IONS-SCNC: 12 MMOL/L (ref 7–16)
AST SERPL-CCNC: 16 U/L (ref 0–31)
BASOPHILS # BLD: 0.08 K/UL (ref 0–0.2)
BASOPHILS NFR BLD: 1 % (ref 0–2)
BILIRUB SERPL-MCNC: 0.2 MG/DL (ref 0–1.2)
BUN SERPL-MCNC: 10 MG/DL (ref 6–20)
CALCIUM SERPL-MCNC: 8.6 MG/DL (ref 8.6–10.2)
CHLORIDE SERPL-SCNC: 104 MMOL/L (ref 98–107)
CO2 SERPL-SCNC: 24 MMOL/L (ref 22–29)
CREAT SERPL-MCNC: 0.8 MG/DL (ref 0.5–1)
EOSINOPHIL # BLD: 0.35 K/UL (ref 0.05–0.5)
EOSINOPHILS RELATIVE PERCENT: 4 % (ref 0–6)
ERYTHROCYTE [DISTWIDTH] IN BLOOD BY AUTOMATED COUNT: 14.8 % (ref 11.5–15)
GFR, ESTIMATED: >90 ML/MIN/1.73M2
GLUCOSE SERPL-MCNC: 100 MG/DL (ref 74–99)
HCT VFR BLD AUTO: 38.4 % (ref 34–48)
HGB BLD-MCNC: 12 G/DL (ref 11.5–15.5)
IMM GRANULOCYTES # BLD AUTO: 0.07 K/UL (ref 0–0.58)
IMM GRANULOCYTES NFR BLD: 1 % (ref 0–5)
LYMPHOCYTES NFR BLD: 2.92 K/UL (ref 1.5–4)
LYMPHOCYTES RELATIVE PERCENT: 33 % (ref 20–42)
MCH RBC QN AUTO: 25.9 PG (ref 26–35)
MCHC RBC AUTO-ENTMCNC: 31.3 G/DL (ref 32–34.5)
MCV RBC AUTO: 82.8 FL (ref 80–99.9)
MONOCYTES NFR BLD: 0.58 K/UL (ref 0.1–0.95)
MONOCYTES NFR BLD: 7 % (ref 2–12)
NEUTROPHILS NFR BLD: 55 % (ref 43–80)
NEUTS SEG NFR BLD: 4.83 K/UL (ref 1.8–7.3)
PLATELET # BLD AUTO: 445 K/UL (ref 130–450)
PMV BLD AUTO: 9 FL (ref 7–12)
POTASSIUM SERPL-SCNC: 3.9 MMOL/L (ref 3.5–5)
PROT SERPL-MCNC: 7.2 G/DL (ref 6.4–8.3)
RBC # BLD AUTO: 4.64 M/UL (ref 3.5–5.5)
SODIUM SERPL-SCNC: 140 MMOL/L (ref 132–146)
WBC OTHER # BLD: 8.8 K/UL (ref 4.5–11.5)

## 2024-07-19 PROCEDURE — 80053 COMPREHEN METABOLIC PANEL: CPT

## 2024-07-19 PROCEDURE — 99205 OFFICE O/P NEW HI 60 MIN: CPT | Performed by: SURGERY

## 2024-07-19 PROCEDURE — 85025 COMPLETE CBC W/AUTO DIFF WBC: CPT

## 2024-07-19 PROCEDURE — 36415 COLL VENOUS BLD VENIPUNCTURE: CPT | Performed by: SURGERY

## 2024-07-19 PROCEDURE — 71046 X-RAY EXAM CHEST 2 VIEWS: CPT

## 2024-07-19 PROCEDURE — 3075F SYST BP GE 130 - 139MM HG: CPT | Performed by: SURGERY

## 2024-07-19 PROCEDURE — 3079F DIAST BP 80-89 MM HG: CPT | Performed by: SURGERY

## 2024-07-19 PROCEDURE — 99203 OFFICE O/P NEW LOW 30 MIN: CPT | Performed by: SURGERY

## 2024-07-19 ASSESSMENT — ENCOUNTER SYMPTOMS
NAUSEA: 0
ANAL BLEEDING: 0
COUGH: 0
BACK PAIN: 1
EYES NEGATIVE: 1
VOMITING: 0
RESPIRATORY NEGATIVE: 1
ABDOMINAL DISTENTION: 0
SHORTNESS OF BREATH: 0
ALLERGIC/IMMUNOLOGIC NEGATIVE: 1
BLOOD IN STOOL: 1
DIARRHEA: 0
ABDOMINAL PAIN: 0
CONSTIPATION: 0

## 2024-07-19 NOTE — PROGRESS NOTES
Met with patient regarding her recent breast cancer diagnosis at surgical consultation appointment with . Reviewed pathology report.Instructed patient on her  breast biopsy pathology findings including cancer type (IDC) and hormone receptor status (ER+, [place]+, Her2-). Instructed on next steps including breast surgery options per 's recommendations and any additional imaging that may be required. Provided with extensive literature including \"Be A Survivor: Your guide to Breast Cancer Treatment\", chapter 4 reviewed, Your Guide to Your Breast Cancer Pathology Report, NCCN Patient Resource card,American College of Surgeons Exercises after Breast Surgery, written information from OncCountdownk.org Lymphedema:The Basics and American Cancer Society Clinical Trials.Today patient received copy of their pathology report as well as a list of [facility] Oncology providers, information on diagnosis, transportation resources and local/online support group resources.  I also provided patient with flyers from CDC.gov on Be The [place], and from Oncolink.org, Breast Cancer Concerns for Women under 45.Encouraged patient to call the office with any medical oncology questions. Patient verbalizes understanding and appreciative of nurse navigator visit. MIKY AlcazarN,RN-OCN

## 2024-07-19 NOTE — PATIENT INSTRUCTIONS
office will call to schedule consult appointment. Once seen by his office and we know plan  will call and discuss.      will call with genetic testing results.     Any questions or concerns, please contact the office at [phone].

## 2024-07-19 NOTE — PROGRESS NOTES
exhibiting uniform intense complete membrane stainin% Cold   Ischemia and Fixation Times:Meets requirements specified in latest   version of the ASCO/CAP guidelines: Yes        ASSESSMENT/PLAN:  Right breast cancer--Stage IA, ER/NV + HER2-  --CBC, CMP  --CXR  --Genetic testing  --referral to Dr. Russell for consideration of bilateral breast reduction  --I discussed right mag seed localized lumpectomy with SLNBx with bilateral oncoplastic reduction.  I explained it is up to Dr. Russell whether or not she is a candidate.  I went over the r/b/a to procedure including, but not limited to bleeding, infection, need for second surgery for margins, need for ALND.  She understands and wishes to proceed.      Rectal bleeding--I explained to her it is not ok to have blood in her stool.  She will need a colonoscopy after she has had her breast cancer surgery and recovered.  She states she has had the bleeding for a long time and has had issues in the past with fistulas and perirectal abscesses.  She has no other alarm symptoms at this time.      Michelle Negrete MD, MSc, FACS  2024  1:32 PM

## 2024-07-23 ENCOUNTER — TELEPHONE (OUTPATIENT)
Dept: PRIMARY CARE CLINIC | Age: 39
End: 2024-07-23

## 2024-07-23 NOTE — TELEPHONE ENCOUNTER
Pt called with bladder pain and ask for appt with Tatiana Rodrigues, I explained she was on vacation this week and told her to come in through the walk in. She said she would do that.

## 2024-07-27 LAB
Lab: NEGATIVE
Lab: NORMAL

## 2024-07-29 LAB
Lab: ABNORMAL
Lab: POSITIVE

## 2024-07-31 ENCOUNTER — TELEPHONE (OUTPATIENT)
Dept: SURGERY | Age: 39
End: 2024-07-31

## 2024-07-31 ENCOUNTER — OFFICE VISIT (OUTPATIENT)
Dept: SURGERY | Age: 39
End: 2024-07-31
Payer: COMMERCIAL

## 2024-07-31 VITALS — TEMPERATURE: 98.3 F | BODY MASS INDEX: 43.4 KG/M2 | WEIGHT: 293 LBS | HEIGHT: 69 IN

## 2024-07-31 DIAGNOSIS — Z15.89 MONOALLELIC MUTATION OF MITF GENE: Primary | ICD-10-CM

## 2024-07-31 DIAGNOSIS — Z15.09 MONOALLELIC MUTATION OF MITF GENE: Primary | ICD-10-CM

## 2024-07-31 DIAGNOSIS — C50.911 INVASIVE DUCTAL CARCINOMA OF RIGHT BREAST (HCC): Primary | ICD-10-CM

## 2024-07-31 PROCEDURE — 99203 OFFICE O/P NEW LOW 30 MIN: CPT | Performed by: PLASTIC SURGERY

## 2024-07-31 NOTE — TELEPHONE ENCOUNTER
I called Xiomara to go over her genetic testing results.  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  I will refer her to genetics counseling at [facility].  She also met with Dr. Russell today she states she was told she is a candidate for bilateral breast reduction.  We will go ahead and start working on scheduling her for a right Magseed localized lumpectomy with sentinel lymph node biopsy and bilateral oncoplastic reduction with myself and Dr. Russell.

## 2024-07-31 NOTE — PROGRESS NOTES
areola, inability to breast feed, seroma, hematoma, implant failure, capsular contracture, and fat necrosis were also discussed with the patient. All of her questions were answered..      Plastic and Reconstructive surgical procedure- Right Oncoplastic Breast Reduction with matching left breast reduction    I informed patient that while proceeding her breast reduction there is no definitive bra size that could be obtained as the signs for breast will be indeterminate upon the amount of tissue reduction needed to perform her lumpectomy. Furthermore described with the patient that radiation therapy changes postoperatively will change the size of her breast that is affected by her cancer side. I informed the patient that we will plan tentatively to leave her breast with her lumpectomy slightly larger in anticipation that this will become smaller after radiation therapy. Patient voices understanding that there is no guarantee with the exact size or symmetry following her bilateral breast reduction.    Based on the Schnur scale plan for removal of 1900g breast tissue to the right and 1900g left  breast mound  All of her questions were answered to her satisfaction and she agrees to proceed with the operation.    Face-to-face time greater than 45 minutes, greater than 50% in counseling, education, and coordination of care.     Photos were obtained. Chaperone present      I also discussed with the patient today secondary to her elevated BM, the possibility of postoperative wounding and the likely need and that occurrence for referral to wound care at that time.  Explained to the patient should there oncoplastic breast reduction incisions dehisce this may require several weeks to even several months of wound care and dressing changes.  The patient agrees that they understand the risks and need for wound care following surgical intervention if necessary      The perioperative course was discussed with the patient. The risks

## 2024-08-01 ENCOUNTER — TELEPHONE (OUTPATIENT)
Dept: BREAST CENTER | Age: 39
End: 2024-08-01

## 2024-08-01 NOTE — TELEPHONE ENCOUNTER
Referral has been submitted to Genetic Counselor -- Patient information has been faxed - their office will contact patient.

## 2024-08-02 ENCOUNTER — TELEPHONE (OUTPATIENT)
Dept: SURGERY | Age: 39
End: 2024-08-02

## 2024-08-05 ENCOUNTER — PREP FOR PROCEDURE (OUTPATIENT)
Dept: BREAST CENTER | Age: 39
End: 2024-08-05

## 2024-08-05 ENCOUNTER — TELEPHONE (OUTPATIENT)
Dept: SURGERY | Age: 39
End: 2024-08-05

## 2024-08-05 DIAGNOSIS — C50.911 INVASIVE DUCTAL CARCINOMA OF RIGHT BREAST (HCC): Primary | ICD-10-CM

## 2024-08-05 NOTE — TELEPHONE ENCOUNTER
Need pcp medical clearance       Right oncoplastic breast reduction matching left breast reduction   Surgery has been scheduled at [address] on 8/27/24, Pre-Admission Testing will call you prior to surgery to inform you arrival time and any other additional directions,if they are unable to reach you,please call them two days prior at [phone].  If taking Fish Oil, Vitamins, two weeks prior to surgery stop taking. If taking NSAIDS (such as Aspirin, Ibuprofen) anticoagulants please consult with your prescribing physician to get further instructions on when to stop medication prior to surgery that is scheduled, patient understood.    Pre-Auth #:  CPT Codes:   ICD 10:

## 2024-08-06 ENCOUNTER — TELEPHONE (OUTPATIENT)
Dept: PRIMARY CARE CLINIC | Age: 39
End: 2024-08-06

## 2024-08-06 NOTE — TELEPHONE ENCOUNTER
Pt needs pre op appt for breast cancer surgery on Aug. 27 2024 did not know where Tatiana wanted to work her in.

## 2024-08-08 ENCOUNTER — TELEPHONE (OUTPATIENT)
Dept: BREAST CENTER | Age: 39
End: 2024-08-08

## 2024-08-08 NOTE — TELEPHONE ENCOUNTER
JOEL Wilde scheduled patient for Right mag-seed localized lumpectomy W SLNB, B/L oncoplastic reduction on 09/3/24 @ 12pm with Dr. Negrete. Patient SLN injection @ 9am, will be injected by . Patient mag-seed is scheduled for 8/27/24 @ 9am with arrival of 8:30am @ [facility]. PT denies taking ASA/blood thinner products.  PT verbalized she understood prep instructions, and NPO after midnight. PT verbalized that she understood appointment date/time, as well as to arrive @ 10am. PT advised PAT will call to go over all medication and advise on where to park and enter the hospital at for procedure. Discussed with patient that an EKG is required for surgery, for any patient that is 50 years or older. EKG's need to be current with in the last 6 months. PT has been told to make sure they have a ride to and from procedure as they are not allowed to drive after procedure. RN instructed PT to call the office at [phone] with any questions, comments, or concerns about the procedure.     RN instructed patient to get a front closing sports bra and bring to surgery with them. Wear the supportive bra (such as a sports bra) day and night until post-op visit. Use common sense in day to day activities; if it hurts, don't do it.    RN instructed patient of No driving for 24 hours and off Narcotic pain medication. No heavy lifting for at least 3 days and no sports for a week.    RN instructed patient for moderate to severe pain, use prescribed medication as directed.  For mild pain, you may try acetaminophen or ibuprofen following the 's instructions.  Narcotics used for pain management may cause constipation.  Take a laxative such as Milk of Magnesia or prune juice if you have not moved your bowels in 2 days.    RN instructed patient that her urine may be green-blue in color as your body eliminates the blue dye that was injected into your breast before surgery.     RN instructed patient that she may use Ice

## 2024-08-12 ENCOUNTER — TELEPHONE (OUTPATIENT)
Dept: BREAST CENTER | Age: 39
End: 2024-08-12

## 2024-08-12 NOTE — TELEPHONE ENCOUNTER
RN attempted to reach patient PCP office to request them to place a referral for patient o be seen by . PCP phone just rang.       Electronically signed by Luma Wilde RN on 8/12/24 at 2:46 PM EDT     DISCHARGE

## 2024-08-13 ENCOUNTER — TELEPHONE (OUTPATIENT)
Dept: PRIMARY CARE CLINIC | Age: 39
End: 2024-08-13

## 2024-08-13 NOTE — TELEPHONE ENCOUNTER
RN contacted patient PCP and spoke to Fe in regards to submitting a referral to insurance for patient. RN advised referral is in computer to office so don't need one to office just need it submitted to insurance. Fe stated she would notify Melissa the referral lady in the office.       Electronically signed by Luma Wilde RN on 8/13/24 at 8:21 AM EDT

## 2024-08-13 NOTE — TELEPHONE ENCOUNTER
Michelle Montgomery office called and said that the insurance co has told them that we need to do a PA for this patient to see them. She has been seen already and will need to have a surgery and they can't PA it until we do ours.

## 2024-08-20 ENCOUNTER — OFFICE VISIT (OUTPATIENT)
Dept: PRIMARY CARE CLINIC | Age: 39
End: 2024-08-20
Payer: COMMERCIAL

## 2024-08-20 VITALS
DIASTOLIC BLOOD PRESSURE: 80 MMHG | HEART RATE: 91 BPM | TEMPERATURE: 97.9 F | WEIGHT: 293 LBS | SYSTOLIC BLOOD PRESSURE: 118 MMHG | BODY MASS INDEX: 43.4 KG/M2 | HEIGHT: 69 IN | OXYGEN SATURATION: 97 %

## 2024-08-20 DIAGNOSIS — K21.00 GASTROESOPHAGEAL REFLUX DISEASE WITH ESOPHAGITIS WITHOUT HEMORRHAGE: ICD-10-CM

## 2024-08-20 DIAGNOSIS — I10 ESSENTIAL HYPERTENSION: ICD-10-CM

## 2024-08-20 DIAGNOSIS — Z01.818 PRE-OP EXAM: Primary | ICD-10-CM

## 2024-08-20 DIAGNOSIS — C50.911 INVASIVE DUCTAL CARCINOMA OF RIGHT BREAST (HCC): ICD-10-CM

## 2024-08-20 PROCEDURE — 3074F SYST BP LT 130 MM HG: CPT | Performed by: NURSE PRACTITIONER

## 2024-08-20 PROCEDURE — 99214 OFFICE O/P EST MOD 30 MIN: CPT | Performed by: NURSE PRACTITIONER

## 2024-08-20 PROCEDURE — 3079F DIAST BP 80-89 MM HG: CPT | Performed by: NURSE PRACTITIONER

## 2024-08-20 PROCEDURE — 93000 ELECTROCARDIOGRAM COMPLETE: CPT | Performed by: NURSE PRACTITIONER

## 2024-08-20 RX ORDER — PANTOPRAZOLE SODIUM 20 MG/1
20 TABLET, DELAYED RELEASE ORAL 2 TIMES DAILY
Qty: 180 TABLET | Refills: 1 | Status: SHIPPED | OUTPATIENT
Start: 2024-08-20

## 2024-08-20 ASSESSMENT — ENCOUNTER SYMPTOMS: RESPIRATORY NEGATIVE: 1

## 2024-08-20 NOTE — PROGRESS NOTES
Subjective  CC: Patient presents for preop    HPI:   Patient is here today for preoperative visit.  The patient is to have a right lumpectomy and bilateral breast reduction completed on 9/3.  She does have a history of hypertension, depression, anxiety.  The patient and I reviewed her medications as well as preoperative medication management.  She does have allergies to latex and penicillin.  She has had no problems with anesthesia before.  She does not wear contacts or have any dentures.  She is able to perform 4 metabolic equivalents without any cardiac or respiratory symptomatology.  The patient's EKG is unremarkable.    She feels she is doing well with the diagnosis of breast cancer, she is referred to a  due to positive genetic testing.  Anxiety and depression are under good control, she is following with a therapist.    ROS:  Review of Systems   Constitutional:         Fatigue   HENT: Negative.     Eyes:         Wears glasses   Respiratory: Negative.     Cardiovascular:         Hypertension   Gastrointestinal:         Hx rectal abscess, complicated by fistula and need further repair   Musculoskeletal:         Lumbar pain   Psychiatric/Behavioral:          Depression, anxiety          Current Outpatient Medications:     pantoprazole (PROTONIX) 20 MG tablet, Take 1 tablet by mouth in the morning and at bedtime, Disp: 180 tablet, Rfl: 1    amLODIPine (NORVASC) 5 MG tablet, Take 1 tablet by mouth daily, Disp: 90 tablet, Rfl: 1    propranolol (INDERAL LA) 60 MG extended release capsule, Take 1 capsule by mouth 2 times daily, Disp: 180 capsule, Rfl: 1    citalopram (CELEXA) 40 MG tablet, Take 1 tablet by mouth daily, Disp: , Rfl:     buPROPion (WELLBUTRIN XL) 150 MG extended release tablet, Take 1 tablet by mouth daily, Disp: , Rfl:     Handicap Placard MISC, by Does not apply route 5 years, Disp: 1 each, Rfl: 0    traZODone (DESYREL) 50 MG tablet, Take 1 tablet by mouth nightly, Disp: , Rfl:    Allergies

## 2024-08-27 ENCOUNTER — HOSPITAL ENCOUNTER (OUTPATIENT)
Dept: GENERAL RADIOLOGY | Age: 39
Discharge: HOME | End: 2024-08-29
Attending: SURGERY
Payer: COMMERCIAL

## 2024-08-27 DIAGNOSIS — C50.911 INVASIVE DUCTAL CARCINOMA OF RIGHT BREAST (HCC): ICD-10-CM

## 2024-08-27 PROCEDURE — 19281 PERQ DEVICE BREAST 1ST IMAG: CPT

## 2024-09-03 ENCOUNTER — TELEPHONE (OUTPATIENT)
Dept: BREAST CENTER | Age: 39
End: 2024-09-03

## 2024-09-03 RX ORDER — SODIUM CHLORIDE 0.9 % (FLUSH) 0.9 %
5-40 SYRINGE (ML) INJECTION PRN
Status: CANCELLED | OUTPATIENT
Start: 2024-09-03

## 2024-09-03 RX ORDER — SODIUM CHLORIDE 0.9 % (FLUSH) 0.9 %
5-40 SYRINGE (ML) INJECTION EVERY 12 HOURS SCHEDULED
Status: CANCELLED | OUTPATIENT
Start: 2024-09-03

## 2024-09-03 RX ORDER — SODIUM CHLORIDE 9 MG/ML
INJECTION, SOLUTION INTRAVENOUS CONTINUOUS
Status: CANCELLED | OUTPATIENT
Start: 2024-09-03

## 2024-09-03 RX ORDER — SODIUM CHLORIDE 9 MG/ML
INJECTION, SOLUTION INTRAVENOUS PRN
Status: CANCELLED | OUTPATIENT
Start: 2024-09-03

## 2024-09-03 NOTE — TELEPHONE ENCOUNTER
RN was notified by  that the OR contacted her and patient surgery does need to be reschedule. RN faxed information to [facility] as was unable to get ahold of the department. RN also called  office and spoke to Maame. Maame going to check with  and let office know. RN notified Aisha mammogram tech that patient specimen has been canceled. RN canceled patient post op visit until surgery is rescheduled. Per  OR staff to contact patient and notify that surgery being canceled.         Electronically signed by Luma Wilde RN on 9/3/24 at 9:02 AM EDT

## 2024-09-06 ENCOUNTER — TELEPHONE (OUTPATIENT)
Dept: BREAST CENTER | Age: 39
End: 2024-09-06

## 2024-09-06 NOTE — TELEPHONE ENCOUNTER
RN contacted patient and notified her she is rescheduled for her surgery on 2024 @ 9am with an 7am arrival. Patient will get sentinal injection by  at 8am. Patient is having Right mag-seed localized lumpectomy with SLNB, poss ALND and B/L oncoplastic reduction with . Patient advised she will report to same location as first instructed and also same instructions.       Post op appointment rescheduled for 10/4/24 @ 3pm.     Prior Authorization Form:      DEMOGRAPHICS:                     Patient Name:  Tatiana Mtz  Patient :  1985            Insurance:  Payor: [employer] [facility] / Plan: [facility] BRONZE SILVER GOLD EDITA / Product Type: *No Product type* /   Insurance ID Number:    Payer/Plan Subscr  Sex Relation Sub. Ins. ID Effective Group Num   1. [facility]* TATIANA MTZ* 1985 Female Self [id number]                                   [address]         DIAGNOSIS & PROCEDURE:                       Procedure/Operation: Right mag-seed localized lumpectomy with SLNB, poss ALND with Joint case B/L oncoplastic reduction           CPT Code: 92788    Diagnosis:  breast cancer    ICD10 Code: C50.911    Location:  [facility]    Surgeon:      SCHEDULING INFORMATION:                          Date: 24    Time: 9am              Anesthesia:  General                                                       Status:  Outpatient        Special Comments:  N/A       Electronically signed by Luma Wilde RN on 2024 at 1:59 PM

## 2024-09-15 ENCOUNTER — ANESTHESIA EVENT (OUTPATIENT)
Dept: OPERATING ROOM | Age: 39
End: 2024-09-15
Payer: COMMERCIAL

## 2024-09-16 NOTE — H&P
[facility]  HISTORY & PHYSICAL  ATTENDING NOTE     Patient's Name/Date of Birth: Tatiana Mtz / 1985     Date: 2024           Chief Complaint   Patient presents with    Breast Cancer       Right breast IDC ER/SD+ HER2-, patient wants to know if its ok to stay using semiglutide.          Tatiana Mtz presents for evaluation of a mammographic abnormality.     PCP: Tatiana Rodrigues APRN - CNP. Gynecologist: Dr. Lindquist.     Referred by:  Tatiana Rodrigues CNP     The mammogram was performed at [facility] on 6/3/2024. The patient has not noted a change in BSE since presentation.  Patient denies nipple discharge. Patient denies a personal history of breast cancer.  Breast cancer risk factors include family hx on mother's side, delayed child bearing, infrequent SMEs, family hx of Prostate CA, and age and gender.  Ashkenazi Bahai Ancestry: No.     OBSTETRIC RELATED HISTORY:  Age of menarche was 11.   Age of menopause was N/A    Patient denies hormonal therapy. Has taken testosterone before  Patient is .   Age of first live birth was 33.   Patient did not breast feed.  Is patient interested in fertility information about fertility preservation? No     CANCER SURVEILLANCE HISTORY:  Mammograms: Yes first mammogram  Breast MRI's: No   Breast Biopsies: No   Colonoscopy: No   GI Polyps: Not Applicable   EGD: Yes   Pelvic Exam: Yes  Pap Smear: Yes   Dermatology: Yes skin screening  Lung screening: no  H/O DVT:  no  H/O Radiation:  no           Estimated body mass index is 43.93 kg/m² as calculated from the following:    Height as of this encounter: 1.76 m (5' 9.29\").    Weight as of this encounter: 136.1 kg (300 lb).  Bra Size: 42J     Because violence is so common, we ask all our patients: are you in a relationship or do you live with a person who threatens, hurts, or controls you:  no     Patient drinks little caffeinated beverages. Patient does not smoke cigarettes. Patient 
pattern of membrane staining as well as the percentage of   cancer cells showing membrane staining, using the latest ASCO/CAP   scoring criteria. Testing is performed on block A1. All controls (high   protein expression, low protein expression, negative protein   expression, and internal) are acceptable.Percentage of tumor cells   exhibiting uniform intense complete membrane stainin% Cold   Ischemia and Fixation Times:Meets requirements specified in latest   version of the ASCO/CAP guidelines: Yes          ASSESSMENT/PLAN:  Right breast cancer--Stage IA, ER/MI + HER2-  --CBC, CMP  --CXR  --Genetic testing  --referral to Dr. Russell for consideration of bilateral breast reduction  --I discussed right mag seed localized lumpectomy with SLNBx with bilateral oncoplastic reduction.  I explained it is up to Dr. Russell whether or not she is a candidate.  I went over the r/b/a to procedure including, but not limited to bleeding, infection, need for second surgery for margins, need for ALND.  She understands and wishes to proceed.       Rectal bleeding--I explained to her it is not ok to have blood in her stool.  She will need a colonoscopy after she has had her breast cancer surgery and recovered.  She states she has had the bleeding for a long time and has had issues in the past with fistulas and perirectal abscesses.  She has no other alarm symptoms at this time.     Clinical Staging:         - IA        - Discussed with patient.  National Comprehensive Cancer Network (NCCN) Guidelines Reviewed with Patient:Yes   All available patient images were reviewed  Outside slide review by pathologist initiated   Genetic testing:    - Candidate: Yes   - Counseled: Yes   - Performed: Yes  Metastatic workup:  - CBC  - CMP  - Chest X-ray  Functional assessment:  - Arm Abduction: 90 degreesand full  Plastic/Reconstructive Surgery Referral: Yes  Other Referrals:  - Medical Oncology  - Radiation Oncology  - Colonoscopy           Vascular

## 2024-09-17 ENCOUNTER — HOSPITAL ENCOUNTER (OUTPATIENT)
Dept: NUCLEAR MEDICINE | Age: 39
Discharge: HOME | End: 2024-09-19
Payer: COMMERCIAL

## 2024-09-17 ENCOUNTER — HOSPITAL ENCOUNTER (OUTPATIENT)
Dept: GENERAL RADIOLOGY | Age: 39
Setting detail: OUTPATIENT SURGERY
Discharge: HOME | End: 2024-09-19
Attending: SURGERY
Payer: COMMERCIAL

## 2024-09-17 ENCOUNTER — HOSPITAL ENCOUNTER (OUTPATIENT)
Age: 39
Setting detail: OUTPATIENT SURGERY
Discharge: HOME | End: 2024-09-17
Attending: SURGERY | Admitting: SURGERY
Payer: COMMERCIAL

## 2024-09-17 ENCOUNTER — APPOINTMENT (OUTPATIENT)
Dept: GENERAL RADIOLOGY | Age: 39
End: 2024-09-17
Payer: COMMERCIAL

## 2024-09-17 ENCOUNTER — ANESTHESIA (OUTPATIENT)
Dept: OPERATING ROOM | Age: 39
End: 2024-09-17
Payer: COMMERCIAL

## 2024-09-17 VITALS
HEART RATE: 66 BPM | TEMPERATURE: 97.4 F | SYSTOLIC BLOOD PRESSURE: 95 MMHG | OXYGEN SATURATION: 96 % | WEIGHT: 293 LBS | DIASTOLIC BLOOD PRESSURE: 65 MMHG | RESPIRATION RATE: 16 BRPM | HEIGHT: 69 IN | BODY MASS INDEX: 43.4 KG/M2

## 2024-09-17 DIAGNOSIS — G89.18 POST-OP PAIN: ICD-10-CM

## 2024-09-17 DIAGNOSIS — C50.911 MALIGNANT NEOPLASM OF RIGHT BREAST (HCC): ICD-10-CM

## 2024-09-17 DIAGNOSIS — C50.911 INVASIVE DUCTAL CARCINOMA OF RIGHT BREAST (HCC): ICD-10-CM

## 2024-09-17 DIAGNOSIS — Z01.812 PRE-OPERATIVE LABORATORY EXAMINATION: Primary | ICD-10-CM

## 2024-09-17 LAB
ERYTHROCYTE [DISTWIDTH] IN BLOOD BY AUTOMATED COUNT: 14.2 % (ref 11.5–15)
HCG, URINE, POC: NEGATIVE
HCT VFR BLD AUTO: 38.2 % (ref 34–48)
HGB BLD-MCNC: 11.7 G/DL (ref 11.5–15.5)
Lab: NORMAL
MCH RBC QN AUTO: 25.8 PG (ref 26–35)
MCHC RBC AUTO-ENTMCNC: 30.6 G/DL (ref 32–34.5)
MCV RBC AUTO: 84.3 FL (ref 80–99.9)
NEGATIVE QC PASS/FAIL: NORMAL
PLATELET # BLD AUTO: 403 K/UL (ref 130–450)
PMV BLD AUTO: 9.5 FL (ref 7–12)
POSITIVE QC PASS/FAIL: NORMAL
RBC # BLD AUTO: 4.53 M/UL (ref 3.5–5.5)
WBC OTHER # BLD: 9.2 K/UL (ref 4.5–11.5)

## 2024-09-17 PROCEDURE — 2500000003 HC RX 250 WO HCPCS: Performed by: NURSE ANESTHETIST, CERTIFIED REGISTERED

## 2024-09-17 PROCEDURE — 3700000001 HC ADD 15 MINUTES (ANESTHESIA): Performed by: SURGERY

## 2024-09-17 PROCEDURE — 3600000013 HC SURGERY LEVEL 3 ADDTL 15MIN: Performed by: SURGERY

## 2024-09-17 PROCEDURE — 2709999900 HC NON-CHARGEABLE SUPPLY: Performed by: SURGERY

## 2024-09-17 PROCEDURE — 7100000001 HC PACU RECOVERY - ADDTL 15 MIN: Performed by: SURGERY

## 2024-09-17 PROCEDURE — C1713 ANCHOR/SCREW BN/BN,TIS/BN: HCPCS | Performed by: SURGERY

## 2024-09-17 PROCEDURE — 2580000003 HC RX 258: Performed by: NURSE ANESTHETIST, CERTIFIED REGISTERED

## 2024-09-17 PROCEDURE — 38792 RA TRACER ID OF SENTINL NODE: CPT | Performed by: SURGERY

## 2024-09-17 PROCEDURE — 2720000010 HC SURG SUPPLY STERILE: Performed by: SURGERY

## 2024-09-17 PROCEDURE — 6360000002 HC RX W HCPCS: Performed by: PLASTIC SURGERY

## 2024-09-17 PROCEDURE — 6360000002 HC RX W HCPCS: Performed by: NURSE ANESTHETIST, CERTIFIED REGISTERED

## 2024-09-17 PROCEDURE — 38792 RA TRACER ID OF SENTINL NODE: CPT

## 2024-09-17 PROCEDURE — 7100000000 HC PACU RECOVERY - FIRST 15 MIN: Performed by: SURGERY

## 2024-09-17 PROCEDURE — 6370000000 HC RX 637 (ALT 250 FOR IP): Performed by: PLASTIC SURGERY

## 2024-09-17 PROCEDURE — 38900 IO MAP OF SENT LYMPH NODE: CPT | Performed by: SURGERY

## 2024-09-17 PROCEDURE — A9520 TC99 TILMANOCEPT DIAG 0.5MCI: HCPCS | Performed by: RADIOLOGY

## 2024-09-17 PROCEDURE — 85027 COMPLETE CBC AUTOMATED: CPT

## 2024-09-17 PROCEDURE — 38525 BIOPSY/REMOVAL LYMPH NODES: CPT | Performed by: SURGERY

## 2024-09-17 PROCEDURE — 19301 PARTIAL MASTECTOMY: CPT | Performed by: SURGERY

## 2024-09-17 PROCEDURE — 6360000002 HC RX W HCPCS: Performed by: SURGERY

## 2024-09-17 PROCEDURE — 6370000000 HC RX 637 (ALT 250 FOR IP): Performed by: ANESTHESIOLOGY

## 2024-09-17 PROCEDURE — 2580000003 HC RX 258: Performed by: SURGERY

## 2024-09-17 PROCEDURE — 2500000003 HC RX 250 WO HCPCS: Performed by: PLASTIC SURGERY

## 2024-09-17 PROCEDURE — 19318 BREAST REDUCTION: CPT | Performed by: PHYSICIAN ASSISTANT

## 2024-09-17 PROCEDURE — NBSRV NON-BILLABLE SERVICE: Performed by: PHYSICIAN ASSISTANT

## 2024-09-17 PROCEDURE — 2580000003 HC RX 258: Performed by: PLASTIC SURGERY

## 2024-09-17 PROCEDURE — 7100000010 HC PHASE II RECOVERY - FIRST 15 MIN: Performed by: SURGERY

## 2024-09-17 PROCEDURE — 2500000003 HC RX 250 WO HCPCS: Performed by: ANESTHESIOLOGY

## 2024-09-17 PROCEDURE — 3600000003 HC SURGERY LEVEL 3 BASE: Performed by: SURGERY

## 2024-09-17 PROCEDURE — 19318 BREAST REDUCTION: CPT | Performed by: PLASTIC SURGERY

## 2024-09-17 PROCEDURE — 3430000000 HC RX DIAGNOSTIC RADIOPHARMACEUTICAL: Performed by: RADIOLOGY

## 2024-09-17 PROCEDURE — 7100000011 HC PHASE II RECOVERY - ADDTL 15 MIN: Performed by: SURGERY

## 2024-09-17 PROCEDURE — 76098 X-RAY EXAM SURGICAL SPECIMEN: CPT | Performed by: SURGERY

## 2024-09-17 PROCEDURE — 76098 X-RAY EXAM SURGICAL SPECIMEN: CPT

## 2024-09-17 PROCEDURE — 2500000003 HC RX 250 WO HCPCS: Performed by: SURGERY

## 2024-09-17 PROCEDURE — 3700000000 HC ANESTHESIA ATTENDED CARE: Performed by: SURGERY

## 2024-09-17 RX ORDER — SODIUM CHLORIDE 0.9 % (FLUSH) 0.9 %
5-40 SYRINGE (ML) INJECTION EVERY 12 HOURS SCHEDULED
Status: DISCONTINUED | OUTPATIENT
Start: 2024-09-17 | End: 2024-09-17 | Stop reason: HOSPADM

## 2024-09-17 RX ORDER — LIDOCAINE HYDROCHLORIDE 20 MG/ML
INJECTION, SOLUTION INTRAVENOUS
Status: DISCONTINUED | OUTPATIENT
Start: 2024-09-17 | End: 2024-09-17 | Stop reason: SDUPTHER

## 2024-09-17 RX ORDER — NALOXONE HYDROCHLORIDE 0.4 MG/ML
INJECTION, SOLUTION INTRAMUSCULAR; INTRAVENOUS; SUBCUTANEOUS PRN
Status: DISCONTINUED | OUTPATIENT
Start: 2024-09-17 | End: 2024-09-17 | Stop reason: HOSPADM

## 2024-09-17 RX ORDER — EPHEDRINE SULFATE/0.9% NACL/PF 25 MG/5 ML
SYRINGE (ML) INTRAVENOUS
Status: DISCONTINUED | OUTPATIENT
Start: 2024-09-17 | End: 2024-09-17 | Stop reason: SDUPTHER

## 2024-09-17 RX ORDER — SODIUM CHLORIDE 0.9 % (FLUSH) 0.9 %
5-40 SYRINGE (ML) INJECTION PRN
Status: DISCONTINUED | OUTPATIENT
Start: 2024-09-17 | End: 2024-09-17 | Stop reason: SDUPTHER

## 2024-09-17 RX ORDER — BUPIVACAINE HYDROCHLORIDE AND EPINEPHRINE 2.5; 5 MG/ML; UG/ML
INJECTION, SOLUTION EPIDURAL; INFILTRATION; INTRACAUDAL; PERINEURAL PRN
Status: DISCONTINUED | OUTPATIENT
Start: 2024-09-17 | End: 2024-09-17 | Stop reason: ALTCHOICE

## 2024-09-17 RX ORDER — SODIUM CHLORIDE, SODIUM LACTATE, POTASSIUM CHLORIDE, CALCIUM CHLORIDE 600; 310; 30; 20 MG/100ML; MG/100ML; MG/100ML; MG/100ML
INJECTION, SOLUTION INTRAVENOUS
Status: DISCONTINUED | OUTPATIENT
Start: 2024-09-17 | End: 2024-09-17 | Stop reason: SDUPTHER

## 2024-09-17 RX ORDER — FENTANYL CITRATE 50 UG/ML
INJECTION, SOLUTION INTRAMUSCULAR; INTRAVENOUS
Status: DISCONTINUED | OUTPATIENT
Start: 2024-09-17 | End: 2024-09-17 | Stop reason: SDUPTHER

## 2024-09-17 RX ORDER — PROPOFOL 10 MG/ML
INJECTION, EMULSION INTRAVENOUS
Status: DISCONTINUED | OUTPATIENT
Start: 2024-09-17 | End: 2024-09-17 | Stop reason: SDUPTHER

## 2024-09-17 RX ORDER — DEXAMETHASONE SODIUM PHOSPHATE 10 MG/ML
INJECTION INTRAMUSCULAR; INTRAVENOUS
Status: DISCONTINUED | OUTPATIENT
Start: 2024-09-17 | End: 2024-09-17 | Stop reason: SDUPTHER

## 2024-09-17 RX ORDER — OXYCODONE AND ACETAMINOPHEN 5; 325 MG/1; MG/1
1 TABLET ORAL
Status: COMPLETED | OUTPATIENT
Start: 2024-09-17 | End: 2024-09-17

## 2024-09-17 RX ORDER — SODIUM CHLORIDE 0.9 % (FLUSH) 0.9 %
5-40 SYRINGE (ML) INJECTION PRN
Status: DISCONTINUED | OUTPATIENT
Start: 2024-09-17 | End: 2024-09-17 | Stop reason: HOSPADM

## 2024-09-17 RX ORDER — SODIUM CHLORIDE 9 MG/ML
INJECTION, SOLUTION INTRAVENOUS CONTINUOUS
Status: DISCONTINUED | OUTPATIENT
Start: 2024-09-17 | End: 2024-09-17 | Stop reason: HOSPADM

## 2024-09-17 RX ORDER — CLINDAMYCIN HCL 300 MG
300 CAPSULE ORAL 3 TIMES DAILY
Qty: 15 CAPSULE | Refills: 0 | Status: SHIPPED | OUTPATIENT
Start: 2024-09-17 | End: 2024-09-22

## 2024-09-17 RX ORDER — SODIUM CHLORIDE 9 MG/ML
INJECTION, SOLUTION INTRAVENOUS PRN
Status: DISCONTINUED | OUTPATIENT
Start: 2024-09-17 | End: 2024-09-17 | Stop reason: HOSPADM

## 2024-09-17 RX ORDER — HYDROMORPHONE HYDROCHLORIDE 2 MG/ML
INJECTION, SOLUTION INTRAMUSCULAR; INTRAVENOUS; SUBCUTANEOUS
Status: DISCONTINUED | OUTPATIENT
Start: 2024-09-17 | End: 2024-09-17 | Stop reason: SDUPTHER

## 2024-09-17 RX ORDER — SODIUM CHLORIDE 9 MG/ML
INJECTION, SOLUTION INTRAVENOUS
Status: DISCONTINUED | OUTPATIENT
Start: 2024-09-17 | End: 2024-09-17 | Stop reason: SDUPTHER

## 2024-09-17 RX ORDER — SODIUM CHLORIDE 9 MG/ML
INJECTION, SOLUTION INTRAVENOUS CONTINUOUS
Status: DISCONTINUED | OUTPATIENT
Start: 2024-09-17 | End: 2024-09-17 | Stop reason: SDUPTHER

## 2024-09-17 RX ORDER — HYDROMORPHONE HYDROCHLORIDE 1 MG/ML
0.5 INJECTION, SOLUTION INTRAMUSCULAR; INTRAVENOUS; SUBCUTANEOUS EVERY 5 MIN PRN
Status: DISCONTINUED | OUTPATIENT
Start: 2024-09-17 | End: 2024-09-17 | Stop reason: HOSPADM

## 2024-09-17 RX ORDER — MEPERIDINE HYDROCHLORIDE 25 MG/ML
12.5 INJECTION INTRAMUSCULAR; INTRAVENOUS; SUBCUTANEOUS EVERY 5 MIN PRN
Status: DISCONTINUED | OUTPATIENT
Start: 2024-09-17 | End: 2024-09-17 | Stop reason: HOSPADM

## 2024-09-17 RX ORDER — ROCURONIUM BROMIDE 10 MG/ML
INJECTION, SOLUTION INTRAVENOUS
Status: DISCONTINUED | OUTPATIENT
Start: 2024-09-17 | End: 2024-09-17 | Stop reason: SDUPTHER

## 2024-09-17 RX ORDER — PROCHLORPERAZINE EDISYLATE 5 MG/ML
5 INJECTION INTRAMUSCULAR; INTRAVENOUS
Status: DISCONTINUED | OUTPATIENT
Start: 2024-09-17 | End: 2024-09-17 | Stop reason: HOSPADM

## 2024-09-17 RX ORDER — SODIUM CHLORIDE 0.9 % (FLUSH) 0.9 %
5-40 SYRINGE (ML) INJECTION EVERY 12 HOURS SCHEDULED
Status: DISCONTINUED | OUTPATIENT
Start: 2024-09-17 | End: 2024-09-17 | Stop reason: SDUPTHER

## 2024-09-17 RX ORDER — OXYCODONE AND ACETAMINOPHEN 5; 325 MG/1; MG/1
1 TABLET ORAL EVERY 6 HOURS PRN
Qty: 15 TABLET | Refills: 0 | Status: SHIPPED | OUTPATIENT
Start: 2024-09-17 | End: 2024-09-24

## 2024-09-17 RX ORDER — ONDANSETRON 4 MG/1
4 TABLET, FILM COATED ORAL DAILY PRN
Qty: 12 TABLET | Refills: 1 | Status: SHIPPED | OUTPATIENT
Start: 2024-09-17

## 2024-09-17 RX ORDER — METHYLENE BLUE 10 MG/ML
INJECTION INTRAVENOUS PRN
Status: DISCONTINUED | OUTPATIENT
Start: 2024-09-17 | End: 2024-09-17 | Stop reason: ALTCHOICE

## 2024-09-17 RX ORDER — MIDAZOLAM HYDROCHLORIDE 1 MG/ML
INJECTION INTRAMUSCULAR; INTRAVENOUS
Status: DISCONTINUED | OUTPATIENT
Start: 2024-09-17 | End: 2024-09-17 | Stop reason: SDUPTHER

## 2024-09-17 RX ORDER — ONDANSETRON 2 MG/ML
INJECTION INTRAMUSCULAR; INTRAVENOUS
Status: DISCONTINUED | OUTPATIENT
Start: 2024-09-17 | End: 2024-09-17 | Stop reason: SDUPTHER

## 2024-09-17 RX ORDER — SODIUM CHLORIDE 9 MG/ML
INJECTION, SOLUTION INTRAMUSCULAR; INTRAVENOUS; SUBCUTANEOUS PRN
Status: DISCONTINUED | OUTPATIENT
Start: 2024-09-17 | End: 2024-09-17 | Stop reason: ALTCHOICE

## 2024-09-17 RX ORDER — SODIUM CHLORIDE 9 MG/ML
INJECTION, SOLUTION INTRAVENOUS PRN
Status: DISCONTINUED | OUTPATIENT
Start: 2024-09-17 | End: 2024-09-17 | Stop reason: SDUPTHER

## 2024-09-17 RX ADMIN — MIDAZOLAM 1 MG: 1 INJECTION INTRAMUSCULAR; INTRAVENOUS at 08:59

## 2024-09-17 RX ADMIN — HYDROMORPHONE HYDROCHLORIDE 1 MG: 2 INJECTION, SOLUTION INTRAMUSCULAR; INTRAVENOUS; SUBCUTANEOUS at 11:14

## 2024-09-17 RX ADMIN — SODIUM CHLORIDE, POTASSIUM CHLORIDE, SODIUM LACTATE AND CALCIUM CHLORIDE: 600; 310; 30; 20 INJECTION, SOLUTION INTRAVENOUS at 11:27

## 2024-09-17 RX ADMIN — PROPOFOL 200 MG: 10 INJECTION, EMULSION INTRAVENOUS at 09:05

## 2024-09-17 RX ADMIN — TILMANOCEPT 537 MICRO CURIE: KIT at 08:09

## 2024-09-17 RX ADMIN — HYDROMORPHONE HYDROCHLORIDE 1 MG: 2 INJECTION, SOLUTION INTRAMUSCULAR; INTRAVENOUS; SUBCUTANEOUS at 12:05

## 2024-09-17 RX ADMIN — FENTANYL CITRATE 50 MCG: 0.05 INJECTION, SOLUTION INTRAMUSCULAR; INTRAVENOUS at 10:06

## 2024-09-17 RX ADMIN — FENTANYL CITRATE 100 MCG: 0.05 INJECTION, SOLUTION INTRAMUSCULAR; INTRAVENOUS at 09:05

## 2024-09-17 RX ADMIN — FENTANYL CITRATE 50 MCG: 0.05 INJECTION, SOLUTION INTRAMUSCULAR; INTRAVENOUS at 09:07

## 2024-09-17 RX ADMIN — ROCURONIUM BROMIDE 50 MG: 10 INJECTION, SOLUTION INTRAVENOUS at 09:08

## 2024-09-17 RX ADMIN — ROCURONIUM BROMIDE 20 MG: 10 INJECTION, SOLUTION INTRAVENOUS at 09:49

## 2024-09-17 RX ADMIN — SODIUM CHLORIDE 3000 MG: 9 INJECTION, SOLUTION INTRAVENOUS at 09:15

## 2024-09-17 RX ADMIN — DEXAMETHASONE SODIUM PHOSPHATE 10 MG: 10 INJECTION INTRAMUSCULAR; INTRAVENOUS at 09:15

## 2024-09-17 RX ADMIN — EPHEDRINE SULFATE 5 MG: 5 INJECTION INTRAVENOUS at 11:30

## 2024-09-17 RX ADMIN — HYDROMORPHONE HYDROCHLORIDE 0.5 MG: 1 INJECTION, SOLUTION INTRAMUSCULAR; INTRAVENOUS; SUBCUTANEOUS at 13:00

## 2024-09-17 RX ADMIN — LIDOCAINE HYDROCHLORIDE 100 MG: 20 INJECTION, SOLUTION INTRAVENOUS at 09:05

## 2024-09-17 RX ADMIN — ONDANSETRON HYDROCHLORIDE 4 MG: 2 SOLUTION INTRAMUSCULAR; INTRAVENOUS at 11:36

## 2024-09-17 RX ADMIN — OXYCODONE HYDROCHLORIDE AND ACETAMINOPHEN 1 TABLET: 5; 325 TABLET ORAL at 13:54

## 2024-09-17 RX ADMIN — SODIUM CHLORIDE: 9 INJECTION, SOLUTION INTRAVENOUS at 07:43

## 2024-09-17 RX ADMIN — SODIUM CHLORIDE: 9 INJECTION, SOLUTION INTRAVENOUS at 08:43

## 2024-09-17 RX ADMIN — ROCURONIUM BROMIDE 20 MG: 10 INJECTION, SOLUTION INTRAVENOUS at 10:20

## 2024-09-17 RX ADMIN — ROCURONIUM BROMIDE 10 MG: 10 INJECTION, SOLUTION INTRAVENOUS at 10:51

## 2024-09-17 RX ADMIN — HYDROMORPHONE HYDROCHLORIDE 0.5 MG: 1 INJECTION, SOLUTION INTRAMUSCULAR; INTRAVENOUS; SUBCUTANEOUS at 13:23

## 2024-09-17 RX ADMIN — Medication 50 MG: at 09:25

## 2024-09-17 RX ADMIN — FENTANYL CITRATE 50 MCG: 0.05 INJECTION, SOLUTION INTRAMUSCULAR; INTRAVENOUS at 09:37

## 2024-09-17 RX ADMIN — MIDAZOLAM 1 MG: 1 INJECTION INTRAMUSCULAR; INTRAVENOUS at 08:55

## 2024-09-17 RX ADMIN — SUGAMMADEX 272 MG: 100 INJECTION, SOLUTION INTRAVENOUS at 11:53

## 2024-09-17 ASSESSMENT — PAIN SCALES - GENERAL
PAINLEVEL_OUTOF10: 3
PAINLEVEL_OUTOF10: 7
PAINLEVEL_OUTOF10: 5
PAINLEVEL_OUTOF10: 7
PAINLEVEL_OUTOF10: 3
PAINLEVEL_OUTOF10: 5

## 2024-09-17 ASSESSMENT — PAIN DESCRIPTION - LOCATION
LOCATION: BREAST

## 2024-09-17 ASSESSMENT — PAIN DESCRIPTION - ORIENTATION
ORIENTATION: RIGHT;LEFT
ORIENTATION: RIGHT;LEFT
ORIENTATION: LEFT;RIGHT

## 2024-09-17 ASSESSMENT — PAIN DESCRIPTION - DESCRIPTORS
DESCRIPTORS: ACHING;DISCOMFORT

## 2024-09-17 ASSESSMENT — PAIN - FUNCTIONAL ASSESSMENT: PAIN_FUNCTIONAL_ASSESSMENT: 0-10

## 2024-09-18 ENCOUNTER — OFFICE VISIT (OUTPATIENT)
Dept: SURGERY | Age: 39
End: 2024-09-18

## 2024-09-18 DIAGNOSIS — C50.911 INVASIVE DUCTAL CARCINOMA OF RIGHT BREAST (HCC): Primary | ICD-10-CM

## 2024-09-18 DIAGNOSIS — N62 MACROMASTIA: ICD-10-CM

## 2024-09-18 PROCEDURE — 99024 POSTOP FOLLOW-UP VISIT: CPT | Performed by: PLASTIC SURGERY

## 2024-09-23 ENCOUNTER — TELEPHONE (OUTPATIENT)
Dept: SURGERY | Age: 39
End: 2024-09-23

## 2024-09-23 ENCOUNTER — OFFICE VISIT (OUTPATIENT)
Dept: SURGERY | Age: 39
End: 2024-09-23

## 2024-09-23 VITALS — TEMPERATURE: 97.9 F

## 2024-09-23 DIAGNOSIS — N62 MACROMASTIA: ICD-10-CM

## 2024-09-23 DIAGNOSIS — C50.911 INVASIVE DUCTAL CARCINOMA OF RIGHT BREAST (HCC): Primary | ICD-10-CM

## 2024-09-23 LAB — SURGICAL PATHOLOGY REPORT: NORMAL

## 2024-09-23 PROCEDURE — 99024 POSTOP FOLLOW-UP VISIT: CPT | Performed by: PHYSICIAN ASSISTANT

## 2024-09-24 NOTE — PROGRESS NOTES
Subjective:    Follow up today from right lumpectomy with oncoplastic breast reduction, matching contralateral breast reduction Right breast mag-seed localized lumpectomy with sentinel lymph node biopsy, axillary content - Right and Right Oncoplastic Breast Reduction, Matching Left Breast Reduction - Bilateral 9/17/2024. Denies fever, nausea, vomiting, leg pain or swelling, pain is mild to absent.  She presents today for continued wound examination she voices no complaints with her PATRICIA drain management she continues to wear her surgical bra as directed    Objective:    LMP 09/01/2024       Left Breast Wound: Clean, dry, and intact, no drainage.  NAC with capillary refill intact. Appropriate level of edema and ecchymosis noted. flaps viable with good capillary refill at the area of trifurcation.  Steri strips intact PATRICIA drains intact < than 30 cc per 24 hours    Right Breast Wound: Clean, dry, and intact, no drainage.   NAC with capillary refill intact. Appropriate level of edema and ecchymosis noted.  flaps viable with good capillary refill at the area of trifurcation.  Steri strips intact PATRICIA drains intact < than 30 cc per 24 hours      Assessment:    Patient Active Problem List   Diagnosis    GERD with esophagitis    Allergic rhinitis    Congenital spondylolysis, lumbosacral region    Degeneration of lumbar or lumbosacral intervertebral disc    Depression    Hemochromatosis, hereditary (HCC)    Back pain    Class 3 severe obesity due to excess calories without serious comorbidity with body mass index (BMI) of 40.0 to 44.9 in adult (HCC)    Essential hypertension    Paresthesias    Chronic generalized pain disorder    DDD (degenerative disc disease), cervical    DDD (degenerative disc disease), lumbar    Dizziness and giddiness    Perianal abscess    Umbilical hernia without obstruction and without gangrene    Chronic bilateral low back pain without sciatica    Long COVID    Acute pharyngitis due to other specified

## 2024-09-30 ENCOUNTER — OFFICE VISIT (OUTPATIENT)
Dept: SURGERY | Age: 39
End: 2024-09-30

## 2024-09-30 VITALS — TEMPERATURE: 97.1 F

## 2024-09-30 DIAGNOSIS — C50.911 INVASIVE DUCTAL CARCINOMA OF RIGHT BREAST (HCC): Primary | ICD-10-CM

## 2024-09-30 DIAGNOSIS — N62 MACROMASTIA: ICD-10-CM

## 2024-09-30 PROCEDURE — 99024 POSTOP FOLLOW-UP VISIT: CPT | Performed by: PHYSICIAN ASSISTANT

## 2024-10-03 ENCOUNTER — TELEPHONE (OUTPATIENT)
Dept: SURGERY | Age: 39
End: 2024-10-03

## 2024-10-03 NOTE — TELEPHONE ENCOUNTER
Patient called in say that \"my incisions look worse than Monday and starting to be painful, and yellow discharge\". I informed her that I will speak to Michael or  and get back to her.

## 2024-10-04 ENCOUNTER — OFFICE VISIT (OUTPATIENT)
Dept: BREAST CENTER | Age: 39
End: 2024-10-04

## 2024-10-04 VITALS
SYSTOLIC BLOOD PRESSURE: 148 MMHG | TEMPERATURE: 98.9 F | RESPIRATION RATE: 12 BRPM | OXYGEN SATURATION: 98 % | HEART RATE: 79 BPM | BODY MASS INDEX: 43.4 KG/M2 | DIASTOLIC BLOOD PRESSURE: 70 MMHG | HEIGHT: 69 IN | WEIGHT: 293 LBS

## 2024-10-04 DIAGNOSIS — C50.911 INVASIVE DUCTAL CARCINOMA OF RIGHT BREAST (HCC): Primary | ICD-10-CM

## 2024-10-04 DIAGNOSIS — Z17.0 MALIGNANT NEOPLASM OF LOWER-INNER QUADRANT OF RIGHT BREAST OF FEMALE, ESTROGEN RECEPTOR POSITIVE (HCC): ICD-10-CM

## 2024-10-04 DIAGNOSIS — C50.311 MALIGNANT NEOPLASM OF LOWER-INNER QUADRANT OF RIGHT BREAST OF FEMALE, ESTROGEN RECEPTOR POSITIVE (HCC): ICD-10-CM

## 2024-10-04 PROCEDURE — 99024 POSTOP FOLLOW-UP VISIT: CPT | Performed by: SURGERY

## 2024-10-07 ENCOUNTER — TELEPHONE (OUTPATIENT)
Dept: BREAST CENTER | Age: 39
End: 2024-10-07

## 2024-10-07 ENCOUNTER — OFFICE VISIT (OUTPATIENT)
Dept: SURGERY | Age: 39
End: 2024-10-07

## 2024-10-07 VITALS — TEMPERATURE: 97.3 F

## 2024-10-07 DIAGNOSIS — C50.911 INVASIVE DUCTAL CARCINOMA OF RIGHT BREAST (HCC): Primary | ICD-10-CM

## 2024-10-07 DIAGNOSIS — N62 MACROMASTIA: ICD-10-CM

## 2024-10-07 PROBLEM — Z17.0 MALIGNANT NEOPLASM OF LOWER-INNER QUADRANT OF RIGHT BREAST OF FEMALE, ESTROGEN RECEPTOR POSITIVE (HCC): Status: ACTIVE | Noted: 2024-07-09

## 2024-10-07 PROBLEM — C50.311 MALIGNANT NEOPLASM OF LOWER-INNER QUADRANT OF RIGHT BREAST OF FEMALE, ESTROGEN RECEPTOR POSITIVE (HCC): Status: ACTIVE | Noted: 2024-07-09

## 2024-10-07 PROCEDURE — 99024 POSTOP FOLLOW-UP VISIT: CPT | Performed by: PHYSICIAN ASSISTANT

## 2024-10-07 NOTE — PROGRESS NOTES
[facility]  PROGRESS NOTE  ATTENDING NOTE    Chief Complaint   Patient presents with    Post-Op Check     P/O Right mag-seed lumpectomy SLNB W B/L oncoplastic reduction  DOS 09/17/2024     S:  38y/o F s/p right mag seed localized lumpectomy with SLNBx.  She also had bilateral oncoplastic reduction. She states she is having some pain today.  She has had her drains removed.  She states she felt really well after surgery and then after the drains came out she has been feeling a little lousy.  She denies fevers or chills.  She is having a little bit of drainage from the wounds.    BP (!) 148/70 (Site: Left Upper Arm, Position: Sitting, Cuff Size: Medium Adult)   Pulse 79   Temp 98.9 °F (37.2 °C) (Temporal)   Resp 12   Ht 1.753 m (5' 9\")   Wt 133.8 kg (295 lb)   LMP 09/01/2024   SpO2 98%   BMI 43.56 kg/m²   Gen:  NAD  Breast:  incisions are split slights on the inferior aspect of the nipple.  There are no signs of infection.  There is a small amount of serous drainage.      ASSESSMENT/PLAN:  Right breast cancer--  Stage IA< Er/MA+ HER2-, oncotype Dx 13; grade 1--s/p right lumpectomy with SLNBx  --f/u with PRS  --referral to oncology  --referral to XRT  --continue monthly SBE and bring any changes to our attention    RTC 6 months    Michelle Negrete MD, MSc, FACS  10/7/2024  8:19 AM     22-Aug-2022 08:09

## 2024-10-07 NOTE — PROGRESS NOTES
Subjective:    Follow up today from right lumpectomy with oncoplastic breast reduction, matching contralateral breast reduction Right breast mag-seed localized lumpectomy with sentinel lymph node biopsy, axillary content - Right and Right Oncoplastic Breast Reduction, Matching Left Breast Reduction - Bilateral 9/17/2024. Denies fever, nausea, vomiting, leg pain or swelling, pain is mild to absent.  She presents today for continued wound examination.      Objective:    Temp 97.3 °F (36.3 °C) (Infrared)   LMP 09/01/2024       Left Breast Wound: Clean, dry, and intact, no drainage.  NAC with capillary refill intact. Appropriate level of edema and ecchymosis noted. flaps viable with good capillary refill at the area of trifurcation.  Superficial epidermal lysis at the lateral aspect of the inframammary fold just lateral to the vertical scar line without tracking or undermining measures approximately 10 cm x 2 cm    Right Breast Wound: Clean, dry, and intact, no drainage.   NAC with capillary refill intact. Appropriate level of edema and ecchymosis noted.  flaps viable with good capillary refill at the area of trifurcation.  Superficial epidermal lysis at the right breast inframammary fold measures approximately 5 cm x 2 cm no tracking or undermining      Assessment:    Patient Active Problem List   Diagnosis    GERD with esophagitis    Allergic rhinitis    Congenital spondylolysis, lumbosacral region    Degeneration of lumbar or lumbosacral intervertebral disc    Depression    Hemochromatosis, hereditary (HCC)    Back pain    Class 3 severe obesity due to excess calories without serious comorbidity with body mass index (BMI) of 40.0 to 44.9 in adult    Essential hypertension    Paresthesias    Chronic generalized pain disorder    DDD (degenerative disc disease), cervical    DDD (degenerative disc disease), lumbar    Dizziness and giddiness    Perianal abscess    Umbilical hernia without obstruction and without gangrene

## 2024-10-07 NOTE — TELEPHONE ENCOUNTER
[facility] Oncotype Dx Breast Recurrence Score Report: 13     Scanned to patient's chart and routed results to Dr. Negrete(breast surgeon).      Electronically signed by Luma Wilde RN on 10/7/24 at 7:26 AM EDT

## 2024-10-16 ENCOUNTER — OFFICE VISIT (OUTPATIENT)
Dept: ONCOLOGY | Age: 39
End: 2024-10-16
Payer: COMMERCIAL

## 2024-10-16 ENCOUNTER — HOSPITAL ENCOUNTER (OUTPATIENT)
Dept: INFUSION THERAPY | Age: 39
Discharge: HOME | End: 2024-10-16

## 2024-10-16 ENCOUNTER — TELEPHONE (OUTPATIENT)
Dept: CASE MANAGEMENT | Age: 39
End: 2024-10-16

## 2024-10-16 VITALS
SYSTOLIC BLOOD PRESSURE: 148 MMHG | DIASTOLIC BLOOD PRESSURE: 79 MMHG | HEART RATE: 81 BPM | WEIGHT: 293 LBS | HEIGHT: 69 IN | TEMPERATURE: 99 F | OXYGEN SATURATION: 99 % | BODY MASS INDEX: 43.4 KG/M2

## 2024-10-16 DIAGNOSIS — Z17.0 MALIGNANT NEOPLASM OF LOWER-INNER QUADRANT OF RIGHT BREAST OF FEMALE, ESTROGEN RECEPTOR POSITIVE (HCC): Primary | ICD-10-CM

## 2024-10-16 DIAGNOSIS — E83.110 HEMOCHROMATOSIS, HEREDITARY (HCC): ICD-10-CM

## 2024-10-16 DIAGNOSIS — C50.311 MALIGNANT NEOPLASM OF LOWER-INNER QUADRANT OF RIGHT BREAST OF FEMALE, ESTROGEN RECEPTOR POSITIVE (HCC): Primary | ICD-10-CM

## 2024-10-16 PROCEDURE — 3078F DIAST BP <80 MM HG: CPT | Performed by: INTERNAL MEDICINE

## 2024-10-16 PROCEDURE — 99205 OFFICE O/P NEW HI 60 MIN: CPT | Performed by: INTERNAL MEDICINE

## 2024-10-16 PROCEDURE — 3077F SYST BP >= 140 MM HG: CPT | Performed by: INTERNAL MEDICINE

## 2024-10-16 NOTE — TELEPHONE ENCOUNTER
Met with patient during her initial consultation with Dr. Jarod Valles for her recent breast cancer diagnosis per Dr. Negrete. Introduced myself and explained my role with patients receiving treatment at our center. Patient was friendly and receptive. Completed nursing assessment for the center including medication review and medical, social, and surgical histories. Reviewed in detail her breast surgical pathology findings including cancer type and hormone receptor status. Patient's Oncotype score was 13, therefore she will not require adjuvant chemotherapy. She will be started on Tamoxifen. She has a consultation with Dr. Stern on Friday, 10/18. She states she is having some healing issues from her breast reduction. I informed her that we would be in touch with Dr. Russell's office in regards to when she is cleared for radiation treatments. Discussed additional ancillary services available at the center. Declines any referrals at this time. Provided with extensive written literature including Patient Resource: Breast Cancer, Breast Prehab sheet, and information on Tamoxifen from [email or web address]. Provided patient with my contact information, office hours, and encouragement to call me with questions or concerns. Patient verbalizes understanding and appreciative of nurse navigator visit. Emotional support provided and greater than 30 minutes spent with patient. Nurse navigator will continue to follow. MIKY SinghN, RN, Oncology Nurse Navigator

## 2024-10-16 NOTE — PROGRESS NOTES
[facility]ICAL [facility]  [address] [address]  Dept: [phone]  Loc: [phone]  Attending Consult Note      Reason for Visit:   Right breast cancer.    Referring Physician:  Michelle Negrete MD     PCP:  Tatiana Rodrigues APRN - CNP    History of Present Illness:     Mrs. Mtz is a very pleasant 39-year-old premenopausal lady, with a past medical history significant for chronic back pain, hypertension, and hereditary hemochromatosis, who had presented with an abnormal screening mammogram, which was her first mammogram, it was done on 6/4/2024, hide revealed an asymmetry on the right, at the 5 o'clock position, this was BI-RADS Category 0, was recommended coned-down compression imaging, which was done on 6/26/2024, along with concurrent right breast ultrasound, which had revealed a 6 mm irregular hypoechoic mass at the 6 o'clock position, she underwent on 7/2/2024 and ultrasound-guided right breast biopsy, pathology was consistent with invasive ductal carcinoma, ER positive greater than 90%, KY positive, greater than 90%, HER2/tj negative, plus by IHC.  The patient underwent on 9/17/2020 for a right breast Magseed localized lumpectomy with sentinel lymph node biopsy, right oncoplastic reduction with matching left breast reduction, pathology:    Cancer Case Summary  Procedure: Excision  Specimen laterality: Right  Histologic type: Invasive carcinoma of no special type (ductal)  Flako histologic score: 2 (tubule formation) +2 (nuclear  pleomorphism) +1 (mitotic count) = 5  Overall grade: Grade 1  Tumor size: 1.1 x 0.8 x 0.7 cm  Ductal carcinoma in situ: Low-grade DCIS is present; negative for  extensive intraductal component  Tumor extent: Not applicable  Lymphatic and/or vascular invasion: Not identified  Treatment effect in the breast: No known presurgical therapy  Margin status for invasive carcinoma: All margins 
Disp: , Rfl:        Past Medical History:   Diagnosis Date    Abnormal Pap smear of cervix     normal since    Chronic back pain     Chronic generalized pain disorder 08/13/2021    Chronic sinusitis     with facial pain    Class 3 severe obesity due to excess calories without serious comorbidity with body mass index (BMI) of 40.0 to 44.9 in adult     DDD (degenerative disc disease), cervical 08/13/2021    Depression 06/06/2011    Essential hypertension     Gastroesophageal reflux disease without esophagitis     Hemochromatosis     Invasive ductal carcinoma of breast (HCC) 07/08/2024    Migraines     born with heart murmur    Paresthesias 08/10/2021    resolved    Seasonal allergies        Past Surgical History:   Procedure Laterality Date    ANAL FISTULOTOMY Right 12/06/2022    BLADDER SUSPENSION  03/2023    Apostalis    BREAST BIOPSY Right 9/17/2024    Right breast mag-seed localized lumpectomy with sentinel lymph node biopsy, axillary content performed by Michelle Negrete MD at [facility] OR    BREAST REDUCTION SURGERY Bilateral 9/17/2024    RIGHT ONCOPLASTIC BREAST REDUCTION, MATCHING LEFT BREAST REDUCTION performed by aGnesh Russell MD at [facility] OR    HERNIA REPAIR N/A 10/11/2022    LAPAROSCOPIC ROBOTIC XI ASSISTED VENTRAL HERNIA REPAIR WITH MESH performed by Kingsley Pardo MD at [facility] OR    RECTAL SURGERY N/A 09/15/2022    DRAINAGE PERIANAL ABSCESS performed by Kingsley Pardo MD at [facility] OR    RECTAL SURGERY N/A 12/06/2022    RECTAL EXAMINATION UNDER ANESTHESIA WITH FISTULOTOMY performed by Kingsley Pardo MD at [facility] OR    SINUS SURGERY N/A 02/25/2016    FUNCTIONAL ENDOSCOPIC SINUS SURGERY WITH BILATERAL MAXILLARY, FRONTAL & SPENOID ANTROSTOMIES, SUBMUCCOSAL RESECTION OF INFERIOR TURBINATES    TUBAL LIGATION  07/22/2021    US BREAST BIOPSY W LOC DEVICE 1ST LESION RIGHT Right 7/2/2024    US BREAST BIOPSY W LOC DEVICE 1ST LESION RIGHT 7/2/2024 [facility] ABDU BCC       Family History   Problem Relation Age of Onset    Cancer

## 2024-10-17 PROBLEM — Z01.812 PRE-OPERATIVE LABORATORY EXAMINATION: Status: RESOLVED | Noted: 2024-09-17 | Resolved: 2024-10-17

## 2024-10-18 ENCOUNTER — HOSPITAL ENCOUNTER (OUTPATIENT)
Dept: RADIATION ONCOLOGY | Age: 39
Discharge: HOME | End: 2024-10-18

## 2024-10-18 VITALS
RESPIRATION RATE: 18 BRPM | TEMPERATURE: 98.2 F | HEART RATE: 74 BPM | WEIGHT: 293 LBS | SYSTOLIC BLOOD PRESSURE: 120 MMHG | DIASTOLIC BLOOD PRESSURE: 67 MMHG | BODY MASS INDEX: 44.33 KG/M2

## 2024-10-18 DIAGNOSIS — C50.311 MALIGNANT NEOPLASM OF LOWER-INNER QUADRANT OF RIGHT BREAST OF FEMALE, ESTROGEN RECEPTOR POSITIVE (HCC): Primary | ICD-10-CM

## 2024-10-18 DIAGNOSIS — Z17.0 MALIGNANT NEOPLASM OF LOWER-INNER QUADRANT OF RIGHT BREAST OF FEMALE, ESTROGEN RECEPTOR POSITIVE (HCC): Primary | ICD-10-CM

## 2024-10-18 ASSESSMENT — PAIN DESCRIPTION - LOCATION: LOCATION: BREAST

## 2024-10-18 ASSESSMENT — PAIN SCALES - GENERAL: PAINLEVEL_OUTOF10: 2

## 2024-10-18 ASSESSMENT — PAIN DESCRIPTION - ORIENTATION: ORIENTATION: RIGHT

## 2024-10-18 NOTE — PROGRESS NOTES
Tatiana HENNY Brothers  1985 39 y.o.      Referring Physician: Dr Negrete    PCP: Tatiana Rodrigues, TICO - CNP     Vitals:    10/18/24 1045   BP: 120/67   Pulse: 74   Resp: 18   Temp: 98.2 °F (36.8 °C)        Wt Readings from Last 3 Encounters:   10/18/24 (!) 136.2 kg (300 lb 3.2 oz)   10/16/24 (!) 136.4 kg (300 lb 12.8 oz)   10/04/24 133.8 kg (295 lb)        Body mass index is 44.33 kg/m².               Chief Complaint: No chief complaint on file.         Cancer Staging   Malignant neoplasm of lower-inner quadrant of right breast of female, estrogen receptor positive (HCC)  Staging form: Breast, AJCC 8th Edition  - Clinical stage from 2024: Stage IA (cT1b, cN0, cM0, G1, ER+, TX+, HER2-) - Signed by Michelle Negrete MD on 2024  - Pathologic stage from 10/7/2024: Stage IA (pT1c, pN0(sn), cM0, G1, ER+, TX+, HER2-, Oncotype DX score: 13) - Signed by Michelle Negrete MD on 10/7/2024      Prior Radiation Therapy? NO    Concurrent Chemo/radiation? NO    Prior Chemotherapy? NO    Prior Hormonal Therapy? Yes to birth control at least 12 - 15 yrs off and on    Head and Neck Cancer? No, patient does NOT have HN cancer.      LMP:  - 24    Age at first Menses: 11    : 1, first live birth at age 33    Para: 1        Current Outpatient Medications   Medication Sig Dispense Refill    pantoprazole (PROTONIX) 20 MG tablet Take 1 tablet by mouth in the morning and at bedtime 180 tablet 1    amLODIPine (NORVASC) 5 MG tablet Take 1 tablet by mouth daily 90 tablet 1    propranolol (INDERAL LA) 60 MG extended release capsule Take 1 capsule by mouth 2 times daily 180 capsule 1    citalopram (CELEXA) 40 MG tablet Take 1 tablet by mouth daily      buPROPion (WELLBUTRIN XL) 150 MG extended release tablet Take 1 tablet by mouth daily      Handicap Placard MISC by Does not apply route 5 years 1 each 0    traZODone (DESYREL) 50 MG tablet Take 1 tablet by mouth nightly       No current facility-administered 
spine.  No gross muscle weakness.  Muscle size, tone and strength are normal. No involuntary movements.    Extremities:  No lower extremity edema, ulcerations, tenderness, varicosities or erythema. Coordination appears adequate.  Sensory function appears intact.    Skin:  Warm and dry.  Examination of color, turgor and pigmentation was relatively normal. No bruises or skin rashes.   Neurological/Psychiatric: General behavior, level of consciousness, thought content is normal. The patient's emotional status is normal.  Cranial nerves II-XII are grossly intact.        RADIATION SAFETY AND ONCOLOGIC TREATMENT SUPPORT:    - Previous radiation history:  No  - History of autoimmune or connective tissue disease:  No  - Nutritional support/ PEG:  Not applicable  - Dental evaluation:  Not applicable  -  requested:  Not asked for.  - Oncology Nurse navigator requested:  - Transportation for daily treatment:  Self    TUMOR MARKERS: No results found for: \"PSA\", \"CEA\", \"\", \"AG9756\", \"\"    IMAGING REVIEWED:  No results found.    PATHOLOGY REVIEWED:      IMPRESSION:   The patient presents with early stage invasive ductal cancer with good biological features and Oncotype DX of 13.  SP partial mastectomy, sentinel node biopsy and oncoplastic reduction; at this point she is a good candidate for an adjuvant course of radiation therapy to the right breast.    DISCUSSION/PLAN:       I have discussed at length with the patient the role of radiation therapy in decreasing the risk of ipsilateral recurrence of breast cancer.  I also have discussed at length the risk of radio-induced  cancer for a patient that has a long life expectancy.  I have reviewed the literature that states that for people age 40 or younger the risk of developing a second cancer is relatively high year and related to the dose of adjuvant radiation therapy.  To this extent according to the literature it would be important to pay attention to the

## 2024-10-21 ENCOUNTER — TELEPHONE (OUTPATIENT)
Dept: SURGERY | Age: 39
End: 2024-10-21

## 2024-10-21 ENCOUNTER — TELEPHONE (OUTPATIENT)
Dept: PRIMARY CARE CLINIC | Age: 39
End: 2024-10-21

## 2024-10-21 NOTE — TELEPHONE ENCOUNTER
Patient stated that she has had diarrhea since last Tues, she is not having any fevers. She said it's unusual for her. She said she hasn't called her PCP yet but wanted to ask you first. She said she is taking OTC medicine but it hasn't helped.

## 2024-10-21 NOTE — TELEPHONE ENCOUNTER
Patient called in stating she has had diarrhea since last Tuesday and nothing is helping.  She tried otc meds but they did not work.  She said that she has not had any nausea or vomitting.  Does complain of cramping, also feels dizzy and weak but thinks she could be dehydrated.  She said that she had a lumpectomy a month ago and was on antibiotics for 5 days but that was a month ago and she doesn't think the diarrhea is from that.   Please advise.

## 2024-10-28 ENCOUNTER — OFFICE VISIT (OUTPATIENT)
Dept: SURGERY | Age: 39
End: 2024-10-28

## 2024-10-28 VITALS — TEMPERATURE: 97.9 F

## 2024-10-28 DIAGNOSIS — N62 MACROMASTIA: ICD-10-CM

## 2024-10-28 DIAGNOSIS — C50.911 INVASIVE DUCTAL CARCINOMA OF RIGHT BREAST (HCC): Primary | ICD-10-CM

## 2024-10-28 PROCEDURE — 99024 POSTOP FOLLOW-UP VISIT: CPT | Performed by: PHYSICIAN ASSISTANT

## 2024-10-28 NOTE — PROGRESS NOTES
wound healing as this is her limiting factor to starting radiation therapy.  She voices understanding okay to transition from surgical bra to a sports bra at this time okay to shower over the wound sites no bath pool or hot tub at this time.    I advised patient she will continue to follow with radiation oncology in regards to timing of starting radiation therapy to the right breast mound she will need to be well-healed we will plan to see her back in another 3 weeks if she continues to heal as expected.  She understands that she may continue to have spitting sutures as well as dissolvable staples to the bilateral breast inframammary folds.      F/U in 3 weeks    Call office with concerns or signs of infection.    JUDSON Miller

## 2024-10-29 ENCOUNTER — OFFICE VISIT (OUTPATIENT)
Dept: PRIMARY CARE CLINIC | Age: 39
End: 2024-10-29
Payer: COMMERCIAL

## 2024-10-29 VITALS
SYSTOLIC BLOOD PRESSURE: 108 MMHG | HEIGHT: 69 IN | DIASTOLIC BLOOD PRESSURE: 70 MMHG | OXYGEN SATURATION: 98 % | HEART RATE: 77 BPM | BODY MASS INDEX: 43.37 KG/M2 | TEMPERATURE: 97.9 F | WEIGHT: 292.8 LBS

## 2024-10-29 DIAGNOSIS — K21.00 GASTROESOPHAGEAL REFLUX DISEASE WITH ESOPHAGITIS WITHOUT HEMORRHAGE: Primary | ICD-10-CM

## 2024-10-29 DIAGNOSIS — R19.7 DIARRHEA, UNSPECIFIED TYPE: ICD-10-CM

## 2024-10-29 DIAGNOSIS — R42 DIZZINESS: ICD-10-CM

## 2024-10-29 DIAGNOSIS — I10 ESSENTIAL HYPERTENSION: ICD-10-CM

## 2024-10-29 DIAGNOSIS — R53.83 OTHER FATIGUE: ICD-10-CM

## 2024-10-29 LAB
ALBUMIN SERPL-MCNC: 4 G/DL (ref 3.5–5.2)
ALP SERPL-CCNC: 120 U/L (ref 35–104)
ALT SERPL-CCNC: 46 U/L (ref 0–32)
ANION GAP SERPL CALCULATED.3IONS-SCNC: 11 MMOL/L (ref 7–16)
AST SERPL-CCNC: 28 U/L (ref 0–31)
BACTERIA URNS QL MICRO: ABNORMAL
BASOPHILS # BLD: 0.08 K/UL (ref 0–0.2)
BASOPHILS NFR BLD: 1 % (ref 0–2)
BILIRUB SERPL-MCNC: 0.2 MG/DL (ref 0–1.2)
BILIRUB UR QL STRIP: ABNORMAL
BUN SERPL-MCNC: 10 MG/DL (ref 6–20)
CALCIUM SERPL-MCNC: 9.8 MG/DL (ref 8.6–10.2)
CHLORIDE SERPL-SCNC: 102 MMOL/L (ref 98–107)
CLARITY UR: CLEAR
CO2 SERPL-SCNC: 24 MMOL/L (ref 22–29)
COLOR UR: ABNORMAL
CREAT SERPL-MCNC: 0.8 MG/DL (ref 0.5–1)
CRYSTALS URNS MICRO: ABNORMAL /HPF
EOSINOPHIL # BLD: 0.49 K/UL (ref 0.05–0.5)
EOSINOPHILS RELATIVE PERCENT: 4 % (ref 0–6)
EPI CELLS #/AREA URNS HPF: ABNORMAL /HPF
ERYTHROCYTE [DISTWIDTH] IN BLOOD BY AUTOMATED COUNT: 13.6 % (ref 11.5–15)
GFR, ESTIMATED: >90 ML/MIN/1.73M2
GLUCOSE SERPL-MCNC: 98 MG/DL (ref 74–99)
GLUCOSE UR STRIP-MCNC: NEGATIVE MG/DL
HCG UR QL: NEGATIVE
HCG, URINE, POC: NEGATIVE
HCT VFR BLD AUTO: 35.6 % (ref 34–48)
HGB BLD-MCNC: 10.7 G/DL (ref 11.5–15.5)
HGB UR QL STRIP.AUTO: NEGATIVE
IMM GRANULOCYTES # BLD AUTO: 0.1 K/UL (ref 0–0.58)
IMM GRANULOCYTES NFR BLD: 1 % (ref 0–5)
KETONES UR STRIP-MCNC: 15 MG/DL
LEUKOCYTE ESTERASE UR QL STRIP: NEGATIVE
LIPASE SERPL-CCNC: 16 U/L (ref 13–60)
LYMPHOCYTES NFR BLD: 3.27 K/UL (ref 1.5–4)
LYMPHOCYTES RELATIVE PERCENT: 28 % (ref 20–42)
Lab: NORMAL
MCH RBC QN AUTO: 24.7 PG (ref 26–35)
MCHC RBC AUTO-ENTMCNC: 30.1 G/DL (ref 32–34.5)
MCV RBC AUTO: 82 FL (ref 80–99.9)
MONOCYTES NFR BLD: 0.79 K/UL (ref 0.1–0.95)
MONOCYTES NFR BLD: 7 % (ref 2–12)
NEGATIVE QC PASS/FAIL: NORMAL
NEUTROPHILS NFR BLD: 60 % (ref 43–80)
NEUTS SEG NFR BLD: 7.02 K/UL (ref 1.8–7.3)
NITRITE UR QL STRIP: NEGATIVE
PH UR STRIP: 6 [PH] (ref 5–9)
PLATELET # BLD AUTO: 504 K/UL (ref 130–450)
PMV BLD AUTO: 8.3 FL (ref 7–12)
POSITIVE QC PASS/FAIL: NORMAL
POTASSIUM SERPL-SCNC: 3.7 MMOL/L (ref 3.5–5)
PROT SERPL-MCNC: 7.2 G/DL (ref 6.4–8.3)
PROT UR STRIP-MCNC: ABNORMAL MG/DL
RBC # BLD AUTO: 4.34 M/UL (ref 3.5–5.5)
RBC #/AREA URNS HPF: ABNORMAL /HPF
SODIUM SERPL-SCNC: 137 MMOL/L (ref 132–146)
SP GR UR STRIP: >1.03 (ref 1–1.03)
UROBILINOGEN UR STRIP-ACNC: 0.2 EU/DL (ref 0–1)
WBC #/AREA URNS HPF: ABNORMAL /HPF
WBC OTHER # BLD: 11.8 K/UL (ref 4.5–11.5)

## 2024-10-29 PROCEDURE — 3074F SYST BP LT 130 MM HG: CPT | Performed by: NURSE PRACTITIONER

## 2024-10-29 PROCEDURE — 96361 HYDRATE IV INFUSION ADD-ON: CPT

## 2024-10-29 PROCEDURE — 3078F DIAST BP <80 MM HG: CPT | Performed by: NURSE PRACTITIONER

## 2024-10-29 PROCEDURE — 84703 CHORIONIC GONADOTROPIN ASSAY: CPT

## 2024-10-29 PROCEDURE — 96374 THER/PROPH/DIAG INJ IV PUSH: CPT

## 2024-10-29 PROCEDURE — 99284 EMERGENCY DEPT VISIT MOD MDM: CPT

## 2024-10-29 PROCEDURE — 83690 ASSAY OF LIPASE: CPT

## 2024-10-29 PROCEDURE — 85025 COMPLETE CBC W/AUTO DIFF WBC: CPT

## 2024-10-29 PROCEDURE — 81001 URINALYSIS AUTO W/SCOPE: CPT

## 2024-10-29 PROCEDURE — 99213 OFFICE O/P EST LOW 20 MIN: CPT | Performed by: NURSE PRACTITIONER

## 2024-10-29 PROCEDURE — 96375 TX/PRO/DX INJ NEW DRUG ADDON: CPT

## 2024-10-29 PROCEDURE — 99283 EMERGENCY DEPT VISIT LOW MDM: CPT

## 2024-10-29 PROCEDURE — 80053 COMPREHEN METABOLIC PANEL: CPT

## 2024-10-29 RX ORDER — AMLODIPINE BESYLATE 5 MG/1
5 TABLET ORAL DAILY
Qty: 90 TABLET | Refills: 1 | Status: SHIPPED | OUTPATIENT
Start: 2024-10-29

## 2024-10-29 RX ORDER — PROPRANOLOL HYDROCHLORIDE 60 MG/1
60 CAPSULE, EXTENDED RELEASE ORAL 2 TIMES DAILY
Qty: 180 CAPSULE | Refills: 1 | Status: SHIPPED | OUTPATIENT
Start: 2024-10-29

## 2024-10-29 ASSESSMENT — ENCOUNTER SYMPTOMS: RESPIRATORY NEGATIVE: 1

## 2024-10-29 ASSESSMENT — PAIN - FUNCTIONAL ASSESSMENT: PAIN_FUNCTIONAL_ASSESSMENT: 0-10

## 2024-10-29 ASSESSMENT — PAIN SCALES - GENERAL: PAINLEVEL_OUTOF10: 4

## 2024-10-29 ASSESSMENT — LIFESTYLE VARIABLES
HOW MANY STANDARD DRINKS CONTAINING ALCOHOL DO YOU HAVE ON A TYPICAL DAY: PATIENT DOES NOT DRINK
HOW OFTEN DO YOU HAVE A DRINK CONTAINING ALCOHOL: NEVER

## 2024-10-29 ASSESSMENT — PAIN DESCRIPTION - LOCATION: LOCATION: ABDOMEN

## 2024-10-29 NOTE — ED TRIAGE NOTES
Department of Emergency Medicine    FIRST PROVIDER TRIAGE NOTE             Independent MLP           10/29/24  6:42 PM EDT    Date of Encounter: 10/29/24   MRN: [id number]    Vitals:    10/29/24 1840   BP: 127/71   Pulse: 87   Resp: 16   Temp: 97.5 °F (36.4 °C)   SpO2: 100%   Weight: 132.5 kg (292 lb)   Height: 1.753 m (5' 9\")      HPI: Tatiana Mtz is a 39 y.o. female who presents to the ED for Abdominal Cramping, Diarrhea (diarrhea for past 14 days, patient has abdominal cramping), Dizziness, and Fatigue     ROS: Negative for cp or sob.    Physical Exam:   Gen Appearance/Constitutional: alert  CV: regular rate     Initial Plan of Care: All treatment areas with department are currently occupied.     Plan to order/Initiate the following while awaiting opening in ED: Triage evaluation  labs.    Provider-Patient relationship only established for Provider In Triage (PIT).  Full assessment, HPI, and examination not performed, therefore, it is not yet possible to state whether or not an emergency medical condition exists.  Supervisor request for CODY to initiate contact and input an assessment note in triage during high volume surges.     Initial Plan of Care: Initiate Treatment-Testing, Proceed toTreatment Area When Bed Available for ED Attending/MLP to Continue Care  Secondary to high volume, low staffing, and/or boarding- patient to await bed availability.    This ends my PIT-Patient relationship.  Care of patient relinquished after triage    Electronically signed by Cammie Ballesteros PA-C   DD: 10/29/24

## 2024-10-29 NOTE — PROGRESS NOTES
Subjective  CC: Patient presents for follow-up.    HPI:   The patient did have right lumpectomy at the beginning of September.  She reports she has had complications with recovery, including reaction to drains as well as complicated wound healing.  However, over the last 2 weeks, she has had significant amounts of diarrhea, up to 15 times per day.  Stool is gelatinous and foul-smelling.  She reports abdominal bloating, generalized feelings of weakness, and tells me today she had blood in the stool.  She has previously had blood in her stool, we were planning to do a scope after she had the lumpectomy.  She reports that she has had almost no appetite, she has tried to maintain hydration but has had difficulty doing so.  She feels tired, somewhat dizzy, as well as weak.  She did drive herself here.    The patient is planning to start radiation in the near future and was also sent for genetic testing.    ROS:  Review of Systems   Constitutional:         Fatigue   HENT: Negative.     Eyes:         Wears glasses   Respiratory: Negative.     Cardiovascular:         Hypertension   Gastrointestinal:         Hx rectal abscess, complicated by fistula and need further repair   Musculoskeletal:         Lumbar pain   Psychiatric/Behavioral:          Depression, anxiety          Current Outpatient Medications:     amLODIPine (NORVASC) 5 MG tablet, Take 1 tablet by mouth daily, Disp: 90 tablet, Rfl: 1    propranolol (INDERAL LA) 60 MG extended release capsule, Take 1 capsule by mouth 2 times daily, Disp: 180 capsule, Rfl: 1    pantoprazole (PROTONIX) 20 MG tablet, Take 1 tablet by mouth in the morning and at bedtime, Disp: 180 tablet, Rfl: 1    citalopram (CELEXA) 40 MG tablet, Take 1 tablet by mouth daily, Disp: , Rfl:     buPROPion (WELLBUTRIN XL) 150 MG extended release tablet, Take 1 tablet by mouth daily, Disp: , Rfl:     Handicap Placard MISC, by Does not apply route 5 years, Disp: 1 each, Rfl: 0    traZODone (DESYREL) 50 MG

## 2024-10-30 ENCOUNTER — TELEPHONE (OUTPATIENT)
Dept: PRIMARY CARE CLINIC | Age: 39
End: 2024-10-30

## 2024-10-30 ENCOUNTER — HOSPITAL ENCOUNTER (EMERGENCY)
Age: 39
Discharge: HOME | End: 2024-10-30
Payer: COMMERCIAL

## 2024-10-30 VITALS
WEIGHT: 292 LBS | HEIGHT: 69 IN | SYSTOLIC BLOOD PRESSURE: 127 MMHG | TEMPERATURE: 97.5 F | HEART RATE: 73 BPM | OXYGEN SATURATION: 100 % | BODY MASS INDEX: 43.25 KG/M2 | RESPIRATION RATE: 16 BRPM | DIASTOLIC BLOOD PRESSURE: 71 MMHG

## 2024-10-30 DIAGNOSIS — R74.01 TRANSAMINITIS: ICD-10-CM

## 2024-10-30 DIAGNOSIS — R74.8 ALKALINE PHOSPHATASE ELEVATION: ICD-10-CM

## 2024-10-30 DIAGNOSIS — R19.7 DIARRHEA OF PRESUMED INFECTIOUS ORIGIN: Primary | ICD-10-CM

## 2024-10-30 DIAGNOSIS — E86.0 DEHYDRATION: ICD-10-CM

## 2024-10-30 DIAGNOSIS — R10.9 ABDOMINAL CRAMPING: ICD-10-CM

## 2024-10-30 LAB
C DIFF GDH + TOXINS A+B STL QL IA.RAPID: POSITIVE
DATE, STOOL #1: NORMAL
G LAMBLIA AG STL QL IA: NEGATIVE
HEMOCCULT SP1 STL QL: NEGATIVE
ROTAVIRUS ANTIGEN: NEGATIVE
SOURCE, 60200063: NORMAL
SOURCE: NORMAL
SPECIMEN DESCRIPTION: ABNORMAL
SPECIMEN DESCRIPTION: NORMAL
SPECIMEN SOURCE: NORMAL
STREP A, MOLECULAR: NEGATIVE
TIME, STOOL #1: 223

## 2024-10-30 PROCEDURE — 87427 SHIGA-LIKE TOXIN AG IA: CPT

## 2024-10-30 PROCEDURE — 87329 GIARDIA AG IA: CPT

## 2024-10-30 PROCEDURE — 87449 NOS EACH ORGANISM AG IA: CPT

## 2024-10-30 PROCEDURE — 82270 OCCULT BLOOD FECES: CPT

## 2024-10-30 PROCEDURE — 6360000002 HC RX W HCPCS

## 2024-10-30 PROCEDURE — 96361 HYDRATE IV INFUSION ADD-ON: CPT

## 2024-10-30 PROCEDURE — 82705 FATS/LIPIDS FECES QUAL: CPT

## 2024-10-30 PROCEDURE — 2580000003 HC RX 258

## 2024-10-30 PROCEDURE — 96374 THER/PROPH/DIAG INJ IV PUSH: CPT

## 2024-10-30 PROCEDURE — 87046 STOOL CULTR AEROBIC BACT EA: CPT

## 2024-10-30 PROCEDURE — 96375 TX/PRO/DX INJ NEW DRUG ADDON: CPT

## 2024-10-30 PROCEDURE — 87324 CLOSTRIDIUM AG IA: CPT

## 2024-10-30 PROCEDURE — 87651 STREP A DNA AMP PROBE: CPT

## 2024-10-30 PROCEDURE — 87425 ROTAVIRUS AG IA: CPT

## 2024-10-30 PROCEDURE — 87077 CULTURE AEROBIC IDENTIFY: CPT

## 2024-10-30 PROCEDURE — 87045 FECES CULTURE AEROBIC BACT: CPT

## 2024-10-30 RX ORDER — DICYCLOMINE HCL 20 MG
20 TABLET ORAL 4 TIMES DAILY
Qty: 40 TABLET | Refills: 0 | Status: SHIPPED | OUTPATIENT
Start: 2024-10-30

## 2024-10-30 RX ORDER — DICYCLOMINE HYDROCHLORIDE 10 MG/ML
20 INJECTION INTRAMUSCULAR ONCE
Status: DISCONTINUED | OUTPATIENT
Start: 2024-10-30 | End: 2024-10-30

## 2024-10-30 RX ORDER — METOCLOPRAMIDE HYDROCHLORIDE 5 MG/ML
10 INJECTION INTRAMUSCULAR; INTRAVENOUS ONCE
Status: COMPLETED | OUTPATIENT
Start: 2024-10-30 | End: 2024-10-30

## 2024-10-30 RX ORDER — 0.9 % SODIUM CHLORIDE 0.9 %
1000 INTRAVENOUS SOLUTION INTRAVENOUS ONCE
Status: COMPLETED | OUTPATIENT
Start: 2024-10-30 | End: 2024-10-30

## 2024-10-30 RX ORDER — DIPHENHYDRAMINE HYDROCHLORIDE 50 MG/ML
25 INJECTION INTRAMUSCULAR; INTRAVENOUS ONCE
Status: COMPLETED | OUTPATIENT
Start: 2024-10-30 | End: 2024-10-30

## 2024-10-30 RX ORDER — VANCOMYCIN HYDROCHLORIDE 125 MG/1
125 CAPSULE ORAL 4 TIMES DAILY
Qty: 40 CAPSULE | Refills: 0 | Status: SHIPPED | OUTPATIENT
Start: 2024-10-30 | End: 2024-11-09

## 2024-10-30 RX ADMIN — DIPHENHYDRAMINE HYDROCHLORIDE 25 MG: 50 INJECTION INTRAMUSCULAR; INTRAVENOUS at 02:11

## 2024-10-30 RX ADMIN — SODIUM CHLORIDE 1000 ML: 9 INJECTION, SOLUTION INTRAVENOUS at 01:36

## 2024-10-30 RX ADMIN — METOCLOPRAMIDE 10 MG: 5 INJECTION, SOLUTION INTRAMUSCULAR; INTRAVENOUS at 02:11

## 2024-10-30 NOTE — ED PROVIDER NOTES
Independent CODY Visit        [facility]  Department of Emergency Medicine   ED  Encounter Note  Admit Date/RoomTime: 10/30/2024 12:14 AM  ED Room: JAMES/JAMES    NAME: Tatiana Mtz  : 1985  MRN: [id number]     Chief Complaint:  Abdominal Cramping, Diarrhea (diarrhea for past 14 days, patient has abdominal cramping), Dizziness, and Fatigue    History of Present Illness   History provided by the patient and chart review.     Tatiana Mtz is a 39 y.o. old female who presents to the emergency department by private vehicle, for evaluation of multiple concerns. On 2024, she underwent a lumpectomy and was placed on antibiotic prophylaxis for 5 days.  She states that at this time she also did take 2 days of stool softener.  Two weeks ago she developed bilateral lower abdominal cramping with diarrhea, stating that sometimes she has 10-15 episodes a day and sometimes only 2 to 3 per day.  At times, stools are soft formed.  Today she did notice a small amount of bright red blood in the diarrhea.  She denies any nausea or vomiting.  She denies any urinary symptoms including burning, frequency, urgency.  She is also complaining of a frontal headache and a sore throat x 2 days.  She states that she was exposed to her niece who just recently tested positive for strep throat. There has been no similar episodes in the past .   Since onset the symptoms have been persistent.  The pain is associated with anorexia.  The pain is aggravated by nothing in particular and relieved by nothing.  She has been intermittently taking Imodium without improvement.  There has been NO back pain, fever, chills, sweating, nausea, vomiting, constipation, dark/black stools, urinary frequency, dysuria, hematuria, or urinary urgency.  She is due to start radiation therapy for breast cancer in 2 weeks.  She has not yet received any radiation or chemotherapy treatment.  She states that there are no plans for

## 2024-10-30 NOTE — TELEPHONE ENCOUNTER
Patient states that she saw you yesterday and was advised to be seen at the ED. She said she went to [facility] and they determined that she does have Cdiff and they told her to see you asap. I advised that you do not work on Wednesdays, so I would send a message to see what you suggest now.

## 2024-10-30 NOTE — DISCHARGE INSTRUCTIONS
Drink plenty of fluids.    Follow-up closely with your primary doctor.  You will need monitoring of your labs to evaluate for electrolyte abnormalities decreased kidney function if your symptoms persist.    Return the ER should you develop fever, onset of vomiting, worsening diarrhea, worsening blood in the stool, if you feel very fatigued, dizzy, passout, or if you have worsening abdominal pain or back pain.

## 2024-10-31 NOTE — ED NOTES
Positive C. difficile results noted.  Prescription called in to [facility] for oral vancomycin solution to take 500 mg every 6 hours for 14 days.     Padma Salcedo, APRN - CNP  10/31/24 0057

## 2024-11-02 LAB
FAT QUALITATIVE SPLIT STOOL: NORMAL
FECAL NEUTRAL FAT: NORMAL
MICROORGANISM SPEC CULT: NORMAL
MICROORGANISM SPEC CULT: NORMAL
SPECIMEN DESCRIPTION: NORMAL

## 2024-11-05 ENCOUNTER — HOSPITAL ENCOUNTER (OUTPATIENT)
Dept: RADIATION ONCOLOGY | Age: 39
Discharge: HOME | End: 2024-11-05
Payer: COMMERCIAL

## 2024-11-05 ENCOUNTER — HOSPITAL ENCOUNTER (OUTPATIENT)
Dept: OCCUPATIONAL THERAPY | Age: 39
Setting detail: THERAPIES SERIES
Discharge: HOME | End: 2024-11-05
Payer: COMMERCIAL

## 2024-11-05 DIAGNOSIS — C50.311 MALIGNANT NEOPLASM OF LOWER-INNER QUADRANT OF RIGHT BREAST OF FEMALE, ESTROGEN RECEPTOR POSITIVE (HCC): Primary | ICD-10-CM

## 2024-11-05 DIAGNOSIS — Z17.0 MALIGNANT NEOPLASM OF LOWER-INNER QUADRANT OF RIGHT BREAST OF FEMALE, ESTROGEN RECEPTOR POSITIVE (HCC): Primary | ICD-10-CM

## 2024-11-05 PROCEDURE — 99212 OFFICE O/P EST SF 10 MIN: CPT | Performed by: RADIOLOGY

## 2024-11-05 PROCEDURE — 97165 OT EVAL LOW COMPLEX 30 MIN: CPT | Performed by: OCCUPATIONAL THERAPIST

## 2024-11-05 NOTE — PROGRESS NOTES
treatment, plan and goals: [x] Yes    [] No   This patient demonstrates the ability to follow the plan of care and progress towards goals.  Rehab potential is good       Assessment: Secondary Lymphedema; impaired shoulder range of motion, Stage 2, Affecting the Right Breast; Right Upper Extremity.  Patient will benefit from a Complete Decongestive Therapy protocol using manual lymphatic drainage techniques, skilled multilayer compression bandaging using short stretch bandages and foam padding, instruction in a home program of self massage and exercise, compression garment fitting and instruction in independent management strategies for lymphedema.           Goal Formulation: Patient  Time In: 1000            Time Out: 1115                      Timed Code Treatment Minutes: 75 minutes      CODE  Minutes  Units   37071 OT Eval Low 75 1   93074 OT Eval Medium     74677 OT Eval High     70678 Fluidotherapy     37086 Manual     40381 Therapeutic Ex     72004 Therapeutic Activity     67351 ADL/COMP Tech Train     80963 Neuromuscular Re-Ed     95196 OrthoManagementTraining     66507 Paraffin     47608 Electrical Stim - Attended     85215 Iontophoresis     71324 Ultrasound      Other     Total  75 1        Electronically signed by: TALITA Anders/L, CLT-VITALY      Physician's Certification / Comments      Frequency/Duration 2-3 / week for 12 visits.   Certification period From: 5Nov. 2024  To: 28Jan. 0225     I have reviewed the Plan of Care established for skilled therapy services and certify that the services are required and that they will be provided while the patient is under my care.     Physician's Comments/Revisions:                   Physicians's Printed Name:  Zuri Carrizales MD                                   Physician's Signature:                                                               Date:      Please review Patient's OT evaluation and if you agree sign/date and fax back to us at our [facility]

## 2024-11-06 NOTE — PROGRESS NOTES
Patient is here today for skin check with Dr Stern, left breast appears and the right still need a few days to heal.. I sent an email to Anna to schedule patient for simulation on 11/19/2024.  
pantoprazole (PROTONIX) 20 MG tablet Take 1 tablet by mouth in the morning and at bedtime 180 tablet 1    citalopram (CELEXA) 40 MG tablet Take 1 tablet by mouth daily      buPROPion (WELLBUTRIN XL) 150 MG extended release tablet Take 1 tablet by mouth daily      Handicap Placard MISC by Does not apply route 5 years 1 each 0    traZODone (DESYREL) 50 MG tablet Take 1 tablet by mouth nightly       No current facility-administered medications for this encounter.       FAMILY HISTORY:  Family History   Problem Relation Age of Onset    Cancer Mother 40        Skin, Squamous cell carcinoma    Arthritis Mother     Other Mother         sinus problems    Thyroid Disease Mother     High Blood Pressure Father     Hemochromatosis Brother     Hemochromatosis Brother     Cancer Maternal Aunt 60        Squamous cell carcinoma    Other Maternal Aunt         migraine    Breast Cancer Maternal Aunt 50 - 59    Prostate Cancer Maternal Uncle     Other Maternal Uncle         migraine    Other Paternal Aunt         migraine    Other Paternal Uncle         migraine    Arthritis Maternal Grandmother     Prostate Cancer Maternal Grandfather     Cancer Maternal Grandfather 70 - 79        melanoma    Cancer Maternal Cousin 11        abdominal tumor(likely Wilms)    No Known Problems Son        SOCIAL HISTORY:       reports that she has never smoked. She has never used smokeless tobacco..   reports no history of alcohol use..   reports no history of drug use.    REVIEW OF SYSTEMS:  Obtained from the patient, chart review and nursing assessment.  Please refer to the HPI, the review of system is otherwise negative.    PHYSICAL EXAMINATION:      There were no vitals filed for this visit.    Wt Readings from Last 3 Encounters:   10/29/24 132.5 kg (292 lb)   10/29/24 132.8 kg (292 lb 12.8 oz)   10/18/24 (!) 136.2 kg (300 lb 3.2 oz)       KARNOSKY PERFORMANCE STATUS: 100    Constitutional: A well developed, well nourished 39 y.o. female who is

## 2024-11-07 ENCOUNTER — HOSPITAL ENCOUNTER (OUTPATIENT)
Dept: OCCUPATIONAL THERAPY | Age: 39
Setting detail: THERAPIES SERIES
Discharge: HOME | End: 2024-11-07
Payer: COMMERCIAL

## 2024-11-07 DIAGNOSIS — M79.672 LEFT FOOT PAIN: Primary | ICD-10-CM

## 2024-11-07 PROCEDURE — 97140 MANUAL THERAPY 1/> REGIONS: CPT | Performed by: OCCUPATIONAL THERAPIST

## 2024-11-07 PROCEDURE — 97535 SELF CARE MNGMENT TRAINING: CPT | Performed by: OCCUPATIONAL THERAPIST

## 2024-11-07 NOTE — PROGRESS NOTES
Patient progressing toward goal.    (Total:  5min)    4.  Patient will increase active/passive shoulder flexion/abduction by 15 degrees to increase ability to complete radiation CT mapping for radiation treatments.          LT weeks   Patient will be independent with self management of lymphedema including understanding garment wear schedule, self compression bandaging, HEP and self care.    2.  Patient will be fitted for appropriate compression garments for long term management of lymphedema.    3.   Patient will present with decreased limb volume in the involved extremity from minimal to trace  for improved functional mobility.     4.  Patient will increase active/passive shoulder flexion/abduction by 15 degrees to increase ability to complete radiation CT mapping for radiation treatments.           Restrictions/Precautions:  [] No blood pressure/blood draw in: [] Left Upper Extremity     [] Right Upper Extremity        [x] Fall Risk       Intervention:   []Other    Pain:  [] No    [] Yes  Location:  Pain Rating (0-10 pain scale):  Pain Description:  Interruption of Treatment [] Yes  [] No    Came to Clinic:  [] Bandaged    []Unbandaged    [] With Stocking     [] With Sleeve     [] Kinesiotaped    [] Unna Boot    [] With Alternative Compression:    Skin Integrity:  [] Normal [] Dry  []Rough []Moist []Rash  Location of problem area/s:  [] Wound: Location/Description   [] Fibrosis: Location/Description  [] Keratosis: Location/Description  [] Papillomas: Location/Description  [x] Other:  patient with incision healing along base of breast from flank to center of sternum bilaterally.  Patient with additional incision from base of breast to nipple at midline of breast and circling around areola of breast bilaterally.  All incision sites healing well with no appearance of drainage or open areas.      Color:  [] Normal [] Mottled [] Flushed [] Other/Description  Location of problem area/s:    Edema:  Edema noted

## 2024-11-08 DIAGNOSIS — M79.672 PAIN IN LEFT FOOT: Primary | ICD-10-CM

## 2024-11-11 ENCOUNTER — HOSPITAL ENCOUNTER (OUTPATIENT)
Dept: OCCUPATIONAL THERAPY | Age: 39
Setting detail: THERAPIES SERIES
Discharge: HOME | End: 2024-11-11
Payer: COMMERCIAL

## 2024-11-11 PROCEDURE — 97535 SELF CARE MNGMENT TRAINING: CPT | Performed by: OCCUPATIONAL THERAPIST

## 2024-11-11 PROCEDURE — 97140 MANUAL THERAPY 1/> REGIONS: CPT | Performed by: OCCUPATIONAL THERAPIST

## 2024-11-11 NOTE — PROGRESS NOTES
Location/Description  [] Papillomas: Location/Description  [x] Other:  patient with incision healing along base of breast from flank to center of sternum bilaterally.  Patient with additional incision from base of breast to nipple at midline of breast and circling around areola of breast bilaterally.  All incision sites healing well with no appearance of drainage or open areas.      Color:  [] Normal [] Mottled [] Flushed [] Other/Description  Location of problem area/s:    Edema:  Edema noted bilateral breast    Subjective:  patient attended treatment session alone.  Patient with no new issues noted.    Objective:  [] Measurement [x]Manual Lymph Drainage  []Bandaging   []Kinesiotaping [x] Education    []Self Massage   []Self Bandaging [] Exercise    []Wound Care  []Hygiene  [] Other        Assessment:  Knowledge of home program:   [] Good [] Fair  [] Poor  Patient is programming at home:             [] Yes  [] No  Family is assisting with programming: [] Yes  [] No  Home programming is consistent:             [] Yes  [] No  Other:   Response to treatment:  good  Instructions for patient:   [x] Verbal [x] Written  Instructions addressed:  manual lymphatic drainage massage        Time In: 1305            Time Out: 1415                      Timed Code Treatment Minutes: 70 minutes      CODE  Minutes  Units   69725 OT Eval Low     90363 OT Eval Medium     92562 OT Eval High     29757 Fluidotherapy     91136 Manual 60 4   06995 Therapeutic Ex     34286 Therapeutic Activity     75117 ADL/COMP Tech Train 10 1   08312 Neuromuscular Re-Ed     79956 OrthoManagementTraining     91510 Paraffin     74816 Electrical Stim - Attended     79794 Iontophoresis     41721 Ultrasound      Other     Total  70 5       Plan: Continue to address:  [x]Bandaging  [x] Self Bandaging/Family Assist  [x]MLD  [x]Self Massage  [x]Exercise  [x]Education  []Obtain referral for garments  []Medical Hold  []Discharge to Home Program  Rosalio Brizuela

## 2024-11-14 ENCOUNTER — HOSPITAL ENCOUNTER (OUTPATIENT)
Dept: OCCUPATIONAL THERAPY | Age: 39
Setting detail: THERAPIES SERIES
Discharge: HOME | End: 2024-11-14
Payer: COMMERCIAL

## 2024-11-14 PROCEDURE — 97140 MANUAL THERAPY 1/> REGIONS: CPT | Performed by: OCCUPATIONAL THERAPIST

## 2024-11-14 PROCEDURE — 97110 THERAPEUTIC EXERCISES: CPT | Performed by: OCCUPATIONAL THERAPIST

## 2024-11-14 NOTE — PROGRESS NOTES
Occupational Therapy        OCCUPATIONAL THERAPY PROGRESS NOTE  [facility]  [address] [facility]  [address]              Phone: [phone]             Fax: [phone]     Date:  2024  Initial Evaluation Date: 2024     Evaluating Therapist: TALITA Anders/L, CLT-VITALY    Patient Name:  Tatiana Mtz    :  1985    Restrictions/Precautions:   fall risk  Diagnosis:  Malignant neoplasm of lower-inner quadrant of right breast of female, estrogen receptor positive (C50.311, Z17.0)          Date of Surgery/Injury: n/a    Insurance/Certification information:  [facility]   [id number]   Plan of care signed (Y/N): N  Visit# / total visits: Evaluation + Visit #3/20    Referring Practitioner:  Zuri Carrizales MD      NPI: [id number]   Specific Practitioner Orders: Evaluation and Treatment    Assessment of current deficits   []Pain  []Skin Integrity   [x]Lymphedema   []Functional transfers/mobility   []ADLs   []Strength    []Cognition  []IADLs   []Safety Awareness   []  Motor Endurance    []Fine Motor Coordination   []Balance   []Vision/perception  []Sensation []Gross Motor Coordination  [x]ROM     OT PLAN OF CARE   OT POC based on physician orders, patient diagnosis and results of clinical assessment    Frequency/Duration:  2-3x per week for 12 treatment sessions from 2024 through 2025   Specific OT Treatment to include:     Plan of Care:     [x]97140-Manual Lymph Drainage and Combined Decongestive Therapy  [x]06041- Skilled Multilayer Short Stretch Compression Bandaging/ Therapeutic Exercise  [x]Skin Care Education [x]HEP including Self MLD Education and/or Self Bandaging  [x]Education for Lymphedema Risks/Precautions     [x] Caregiver Education   []other:      Patient Specific Goal: to be alycia to be as comfortable as possible during radiation      STG: 3 weeks   Patient will demonstrate knowledge and understanding for lymphedema

## 2024-11-18 ENCOUNTER — TELEPHONE (OUTPATIENT)
Dept: OCCUPATIONAL THERAPY | Age: 39
End: 2024-11-18

## 2024-11-18 NOTE — TELEPHONE ENCOUNTER
OCCUPATIONAL THERAPY PROGRESS NOTE  Phone: [phone]  Fax: [phone]     Date: 2024  Patient Name: Tatiana Mtz        : 1985     Patient cancelled due to having mapping performed on Tuesday this week at the time her therapy appointment is scheduled. Will continue on next follow date.       Katelyn DAVIDSON, CLT  [id number] Date: 2024

## 2024-11-19 ENCOUNTER — HOSPITAL ENCOUNTER (OUTPATIENT)
Dept: RADIATION ONCOLOGY | Age: 39
Discharge: HOME | End: 2024-11-19
Payer: COMMERCIAL

## 2024-11-19 ENCOUNTER — APPOINTMENT (OUTPATIENT)
Dept: OCCUPATIONAL THERAPY | Age: 39
End: 2024-11-19
Payer: COMMERCIAL

## 2024-11-19 PROCEDURE — 77332 RADIATION TREATMENT AID(S): CPT | Performed by: RADIOLOGY

## 2024-11-19 PROCEDURE — 77290 THER RAD SIMULAJ FIELD CPLX: CPT | Performed by: RADIOLOGY

## 2024-11-20 ENCOUNTER — OFFICE VISIT (OUTPATIENT)
Dept: SURGERY | Age: 39
End: 2024-11-20

## 2024-11-20 VITALS
TEMPERATURE: 98.1 F | SYSTOLIC BLOOD PRESSURE: 120 MMHG | DIASTOLIC BLOOD PRESSURE: 84 MMHG | HEART RATE: 70 BPM | OXYGEN SATURATION: 99 %

## 2024-11-20 DIAGNOSIS — C50.911 INVASIVE DUCTAL CARCINOMA OF RIGHT BREAST (HCC): ICD-10-CM

## 2024-11-20 DIAGNOSIS — N62 MACROMASTIA: Primary | ICD-10-CM

## 2024-11-20 PROCEDURE — 99024 POSTOP FOLLOW-UP VISIT: CPT | Performed by: PHYSICIAN ASSISTANT

## 2024-11-20 NOTE — PROGRESS NOTES
Subjective:    Follow up today from right lumpectomy with oncoplastic breast reduction, matching contralateral breast reduction Right breast mag-seed localized lumpectomy with sentinel lymph node biopsy, axillary content - Right and Right Oncoplastic Breast Reduction, Matching Left Breast Reduction - Bilateral 9/17/2024. Denies fever, nausea, vomiting, leg pain or swelling, pain is absent she presents today for continued wound examination.  She is planned to start radiation therapy next week.  She presents today for continued wound examination    Objective:    /84 (Site: Left Lower Arm, Position: Sitting, Cuff Size: Medium Adult)   Pulse 70   Temp 98.1 °F (36.7 °C) (Infrared)   LMP 10/21/2024 (Approximate)   SpO2 99%       Left Breast Wound: Clean, dry, and intact, no drainage.   NAC with capillary refill intact. Appropriate level of edema scars well-healed no open wounds at this time    Right Breast Wound: Clean, dry, and intact, no drainage.   NAC with capillary refill intact. Appropriate level of edema scars well-healed no open wounds at this time      Assessment:    Patient Active Problem List   Diagnosis    GERD with esophagitis    Allergic rhinitis    Congenital spondylolysis, lumbosacral region    Degeneration of lumbar or lumbosacral intervertebral disc    Depression    Hemochromatosis, hereditary (HCC)    Back pain    Class 3 severe obesity due to excess calories without serious comorbidity with body mass index (BMI) of 40.0 to 44.9 in adult    Essential hypertension    Paresthesias    Chronic generalized pain disorder    DDD (degenerative disc disease), cervical    DDD (degenerative disc disease), lumbar    Dizziness and giddiness    Perianal abscess    Umbilical hernia without obstruction and without gangrene    Chronic bilateral low back pain without sciatica    Long COVID    Acute pharyngitis due to other specified organisms    Malignant neoplasm of lower-inner quadrant of right breast of

## 2024-11-21 ENCOUNTER — HOSPITAL ENCOUNTER (OUTPATIENT)
Dept: OCCUPATIONAL THERAPY | Age: 39
Setting detail: THERAPIES SERIES
Discharge: HOME | End: 2024-11-21
Payer: COMMERCIAL

## 2024-11-21 PROCEDURE — 97535 SELF CARE MNGMENT TRAINING: CPT

## 2024-11-21 PROCEDURE — 97140 MANUAL THERAPY 1/> REGIONS: CPT

## 2024-11-21 NOTE — PROGRESS NOTES
Occupational Therapy      OCCUPATIONAL THERAPY DAILY TREATMENT NOTE  Phone: [phone]  Fax: [phone]     Date:  2024  Initial Evaluation Date: 2024                           Evaluating Therapist: TALITA Anders/L, CLT-VITALY     Patient Name:  Tatiana Mtz                :  1985     Restrictions/Precautions:   fall risk  Diagnosis:  Malignant neoplasm of lower-inner quadrant of right breast of female, estrogen receptor positive (C50.311, Z17.0)                                                              Date of Surgery/Injury: n/a     Insurance/Certification information:  [employer]                [id number]   Plan of care signed (Y/N): N  Visit# / total visits: Evaluation + Visit #     Referring Practitioner:  Zuri Carrizales MD                                  NPI: [id number]   Specific Practitioner Orders: Evaluation and Treatment     Assessment of current deficits   []Pain  []Skin Integrity   [x]Lymphedema   []Functional transfers/mobility   []ADLs   []Strength    []Cognition  []IADLs   []Safety Awareness   []  Motor Endurance    []Fine Motor Coordination   []Balance   []Vision/perception  []Sensation []Gross Motor Coordination  [x]ROM      OT PLAN OF CARE   OT POC based on physician orders, patient diagnosis and results of clinical assessment     Frequency/Duration:  2-3x per week for 12 treatment sessions from 2024 through 2025   Specific OT Treatment to include:      Plan of Care:   [x]97140-Manual Lymph Drainage and Combined Decongestive Therapy  [x]30270- Skilled Multilayer Short Stretch Compression Bandaging/ Therapeutic Exercise  [x]Skin Care Education           [x]HEP including Self MLD Education and/or Self Bandaging  [x]Education for Lymphedema Risks/Precautions     [x] Caregiver Education   []other:        Patient Specific Goal: to be able to be as comfortable as possible during radiation     STG: 3 weeks   Patient will demonstrate

## 2024-11-26 ENCOUNTER — APPOINTMENT (OUTPATIENT)
Dept: OCCUPATIONAL THERAPY | Age: 39
End: 2024-11-26
Payer: COMMERCIAL

## 2024-11-26 ENCOUNTER — HOSPITAL ENCOUNTER (OUTPATIENT)
Dept: OCCUPATIONAL THERAPY | Age: 39
Setting detail: THERAPIES SERIES
Discharge: HOME | End: 2024-11-26
Payer: COMMERCIAL

## 2024-11-26 PROCEDURE — 97140 MANUAL THERAPY 1/> REGIONS: CPT | Performed by: OCCUPATIONAL THERAPIST

## 2024-11-26 NOTE — PROGRESS NOTES
circling around areola of breast bilaterally.  All incision sites healing well with no appearance of drainage or open areas.      Color:  [] Normal [] Mottled [] Flushed [] Other/Description  Location of problem area/s:    Edema:  Edema noted bilateral breast    Subjective:  patient attended treatment session alone.  Patient with no new issues noted.    Objective:  [] Measurement [x]Manual Lymph Drainage  []Bandaging   []Kinesiotaping [x] Education    []Self Massage   []Self Bandaging [] Exercise    []Wound Care  []Hygiene  [] Other        Assessment:  Knowledge of home program:   [] Good [] Fair  [] Poor  Patient is programming at home:             [] Yes  [] No  Family is assisting with programming: [] Yes  [] No  Home programming is consistent:             [] Yes  [] No  Other:   Response to treatment:  good  Instructions for patient:   [x] Verbal [x] Written  Instructions addressed:  manual lymphatic drainage massage        Time In: 0900            Time Out: 1000                      Timed Code Treatment Minutes: 60 minutes      CODE  Minutes  Units   58529 OT Eval Low     96249 OT Eval Medium     04811 OT Eval High     18849 Fluidotherapy     29189 Manual 55 4   95123 Therapeutic Ex     90702 Therapeutic Activity     69968 ADL/COMP Tech Train     84679 Neuromuscular Re-Ed     35559 OrthoManagementTraining     71084 Paraffin     51489 Electrical Stim - Attended     72841 Iontophoresis     15789 Ultrasound      Other     Total  55 4       Plan: Continue to address:  [x]Bandaging  [x] Self Bandaging/Family Assist  [x]MLD  [x]Self Massage  [x]Exercise  [x]Education  []Obtain referral for garments  []Medical Hold  []Discharge to Home Program  TALITA Anders/FELIPE, DELMY

## 2024-12-03 ENCOUNTER — HOSPITAL ENCOUNTER (OUTPATIENT)
Dept: OCCUPATIONAL THERAPY | Age: 39
Setting detail: THERAPIES SERIES
Discharge: HOME | End: 2024-12-03
Payer: COMMERCIAL

## 2024-12-03 NOTE — PROGRESS NOTES
Occupational Therapy          [facility]  [facility]  [address] [address]  Dept: [phone]  Loc: [phone]   [facility] OT Fax: [phone]    Cancellation/No Show Note      Date:  12/3/2024    Patient Name:  Tatiana Mtz     :  1985         PT ID: [id number]    Total missed visits including today: 0       Total number of no shows: 0    For today's appointment patient:   [x]  Cancelled   []  Rescheduled appointment   []  No-show     Reason given by patient:   []  Patient ill   []  Conflicting appointment   []  No transportation   []  Conflict with work   []  No reason given   []  Other:    Comments:                    Comments:  patient called and cancelled scheduled appointment secondary to family illness.  Patient next appointment scheduled.    Electronically signed by:  Rosalio Brizuela OTR/L, CLT-VITALY

## 2024-12-04 ENCOUNTER — HOSPITAL ENCOUNTER (OUTPATIENT)
Dept: RADIATION ONCOLOGY | Age: 39
Discharge: HOME | End: 2024-12-04
Payer: COMMERCIAL

## 2024-12-04 PROCEDURE — 77295 3-D RADIOTHERAPY PLAN: CPT | Performed by: RADIOLOGY

## 2024-12-04 PROCEDURE — 77300 RADIATION THERAPY DOSE PLAN: CPT | Performed by: RADIOLOGY

## 2024-12-04 PROCEDURE — 77334 RADIATION TREATMENT AID(S): CPT | Performed by: RADIOLOGY

## 2024-12-05 ENCOUNTER — HOSPITAL ENCOUNTER (OUTPATIENT)
Dept: OCCUPATIONAL THERAPY | Age: 39
Setting detail: THERAPIES SERIES
Discharge: HOME | End: 2024-12-05
Payer: COMMERCIAL

## 2024-12-05 PROCEDURE — 97535 SELF CARE MNGMENT TRAINING: CPT | Performed by: OCCUPATIONAL THERAPIST

## 2024-12-05 PROCEDURE — 97140 MANUAL THERAPY 1/> REGIONS: CPT | Performed by: OCCUPATIONAL THERAPIST

## 2024-12-05 PROCEDURE — 97110 THERAPEUTIC EXERCISES: CPT | Performed by: OCCUPATIONAL THERAPIST

## 2024-12-05 NOTE — PROGRESS NOTES
Occupational Therapy        OCCUPATIONAL THERAPY PROGRESS NOTE  [facility]  [address] [facility]  [address]              Phone: [phone]             Fax: [phone]     Date:  2024  Initial Evaluation Date: 2024     Evaluating Therapist: TALITA Anders/L, CLT-VITALY    Patient Name:  Tatiana Mtz    :  1985    Restrictions/Precautions:   fall risk  Diagnosis:  Malignant neoplasm of lower-inner quadrant of right breast of female, estrogen receptor positive (C50.311, Z17.0)          Date of Surgery/Injury: n/a    Insurance/Certification information:  [facility]   [id number]   Plan of care signed (Y/N): Yes  Visit# / total visits: Evaluation + Visit #    Referring Practitioner:  Zuri Carrizales MD      NPI: [id number]   Specific Practitioner Orders: Evaluation and Treatment    Assessment of current deficits   []Pain  []Skin Integrity   [x]Lymphedema   []Functional transfers/mobility   []ADLs   []Strength    []Cognition  []IADLs   []Safety Awareness   []  Motor Endurance    []Fine Motor Coordination   []Balance   []Vision/perception  []Sensation []Gross Motor Coordination  [x]ROM     OT PLAN OF CARE   OT POC based on physician orders, patient diagnosis and results of clinical assessment    Frequency/Duration:  2-3x per week for 12 treatment sessions from 2024 through 2025   Specific OT Treatment to include:     Plan of Care:     [x]97140-Manual Lymph Drainage and Combined Decongestive Therapy  [x]07970- Skilled Multilayer Short Stretch Compression Bandaging/ Therapeutic Exercise  [x]Skin Care Education [x]HEP including Self MLD Education and/or Self Bandaging  [x]Education for Lymphedema Risks/Precautions     [x] Caregiver Education   []other:      Patient Specific Goal: to be alycia to be as comfortable as possible during radiation      STG: 3 weeks   Patient will demonstrate knowledge and understanding for lymphedema

## 2024-12-06 ENCOUNTER — HOSPITAL ENCOUNTER (OUTPATIENT)
Dept: RADIATION ONCOLOGY | Age: 39
Discharge: HOME | End: 2024-12-06
Payer: COMMERCIAL

## 2024-12-06 PROCEDURE — 77280 THER RAD SIMULAJ FIELD SMPL: CPT | Performed by: RADIOLOGY

## 2024-12-09 ENCOUNTER — TELEPHONE (OUTPATIENT)
Dept: OCCUPATIONAL THERAPY | Age: 39
End: 2024-12-09

## 2024-12-09 ENCOUNTER — HOSPITAL ENCOUNTER (OUTPATIENT)
Dept: RADIATION ONCOLOGY | Age: 39
Discharge: HOME | End: 2024-12-09
Payer: COMMERCIAL

## 2024-12-09 PROCEDURE — 77412 RADIATION TX DELIVERY LVL 3: CPT | Performed by: RADIOLOGY

## 2024-12-09 NOTE — TELEPHONE ENCOUNTER
[facility]  [facility]CCUPATIONAL THERAPY  [address] [place] [id number]  Dept: [phone]  Loc: [phone]   [facility] OT Fax: [phone]    Cancellation/No Show Note      Date:  2024    Patient Name:  Tatiana Mtz     :  1985         PT ID: [id number]    Total missed visits including today: 0       Total number of no shows: 0    For today's appointment patient:   [x]  Cancelled   []  Rescheduled appointment   []  No-show     Reason given by patient:   []  Patient ill   []  Conflicting appointment   []  No transportation   []  Conflict with work   []  No reason given   []  Other:    Comments:                    Comments:  patient cancelled scheduled lymphedema appointment secondary to beginning radiation therapy.  Patient next appointment scheduled.    Electronically signed by:  TALITA Anders/L, CLT-VITALY

## 2024-12-10 ENCOUNTER — APPOINTMENT (OUTPATIENT)
Dept: OCCUPATIONAL THERAPY | Age: 39
End: 2024-12-10
Payer: COMMERCIAL

## 2024-12-10 ENCOUNTER — HOSPITAL ENCOUNTER (OUTPATIENT)
Dept: RADIATION ONCOLOGY | Age: 39
Discharge: HOME | End: 2024-12-10
Payer: COMMERCIAL

## 2024-12-10 PROCEDURE — 77412 RADIATION TX DELIVERY LVL 3: CPT | Performed by: RADIOLOGY

## 2024-12-11 ENCOUNTER — HOSPITAL ENCOUNTER (OUTPATIENT)
Dept: RADIATION ONCOLOGY | Age: 39
Discharge: HOME | End: 2024-12-11
Payer: COMMERCIAL

## 2024-12-11 ENCOUNTER — TELEPHONE (OUTPATIENT)
Dept: RADIATION ONCOLOGY | Age: 39
End: 2024-12-11

## 2024-12-11 VITALS
BODY MASS INDEX: 44.6 KG/M2 | DIASTOLIC BLOOD PRESSURE: 82 MMHG | HEART RATE: 72 BPM | TEMPERATURE: 98.6 F | SYSTOLIC BLOOD PRESSURE: 148 MMHG | WEIGHT: 293 LBS | RESPIRATION RATE: 18 BRPM

## 2024-12-11 DIAGNOSIS — T66.XXXA ADVERSE EFFECT OF RADIATION, INITIAL ENCOUNTER: Primary | ICD-10-CM

## 2024-12-11 DIAGNOSIS — Z17.0 MALIGNANT NEOPLASM OF LOWER-INNER QUADRANT OF RIGHT BREAST OF FEMALE, ESTROGEN RECEPTOR POSITIVE (HCC): Primary | ICD-10-CM

## 2024-12-11 DIAGNOSIS — C50.311 MALIGNANT NEOPLASM OF LOWER-INNER QUADRANT OF RIGHT BREAST OF FEMALE, ESTROGEN RECEPTOR POSITIVE (HCC): Primary | ICD-10-CM

## 2024-12-11 PROCEDURE — 77412 RADIATION TX DELIVERY LVL 3: CPT | Performed by: RADIOLOGY

## 2024-12-11 RX ORDER — BETAMETHASONE DIPROPIONATE 0.05 %
OINTMENT (GRAM) TOPICAL
Qty: 50 G | Refills: 2 | Status: SHIPPED | OUTPATIENT
Start: 2024-12-11

## 2024-12-11 NOTE — PROGRESS NOTES
DEPARTMENT OF RADIATION ONCOLOGY   ON TREATMENT VISIT       12/11/2024      NAME:  Tatiana Mtz    YOB: 1985    DIAGNOSIS:   Pathological stage Ia (pT1c, pN0 (SN),cM0, G1, ER/PA positive, HER2 negative) invasive ductal carcinoma of the right breast, SP partial mastectomy, sentinel node biopsy and bilateral oncoplastic reduction.  2 sentinel nodes negative, no LVSI, negative margins with the closest 1 at less than 1 mm, DCIS low-grade.  SUBJECTIVE:   Tatiana Mtz has now received 801 cGy in 3 fractions directed to the right breast.  Erythema grade 2, the breast feels slightly warmer than the contralateral.  Recommended the use of betamethasone and Aquaphor.      Past medical, surgical, social and family histories reviewed and updated as indicated.    PAIN: No    ALLERGIES:  Latex and Pcn [penicillins]         Current Outpatient Medications   Medication Sig Dispense Refill    dicyclomine (BENTYL) 20 MG tablet Take 1 tablet by mouth 4 times daily (Patient not taking: Reported on 11/20/2024) 40 tablet 0    amLODIPine (NORVASC) 5 MG tablet Take 1 tablet by mouth daily 90 tablet 1    propranolol (INDERAL LA) 60 MG extended release capsule Take 1 capsule by mouth 2 times daily 180 capsule 1    pantoprazole (PROTONIX) 20 MG tablet Take 1 tablet by mouth in the morning and at bedtime 180 tablet 1    citalopram (CELEXA) 40 MG tablet Take 1 tablet by mouth daily      buPROPion (WELLBUTRIN XL) 150 MG extended release tablet Take 1 tablet by mouth daily      Handicap Placard MISC by Does not apply route 5 years 1 each 0    traZODone (DESYREL) 50 MG tablet Take 1 tablet by mouth nightly       No current facility-administered medications for this encounter.         OBJECTIVE:  Alert and fully ambulatory. Pleasant and conversant.      Physical Examination:General appearance - alert, well appearing, and in no distress and overweight:  Constitutional: A well developed, well nourished 39 y.o. female who

## 2024-12-11 NOTE — TELEPHONE ENCOUNTER
I called Dr Tolbert and BLANKA Rodrigues, NP times 2 no answer, calling re:  elevated BP, pt asymptomatic.

## 2024-12-11 NOTE — PROGRESS NOTES
Tatiana Mtz  2024  Wt Readings from Last 3 Encounters:   24 (!) 137 kg (302 lb)   10/29/24 132.5 kg (292 lb)   10/29/24 132.8 kg (292 lb 12.8 oz)     Body mass index is 44.6 kg/m².        Treatment Area:Right breast    Patient was seen today for weekly visit.     Comfort Alteration  KPS:90%  Fatigue: None    Nutritional Alteration  Anorexia: No   Nausea: No   Vomiting: No     Skin Alteration   Sensation:no issues    Radiation Dermatitis:  no issues, moisturizing    Mucous Membrane Alteration  Drainage: No  Lymphedema: No    Emotional  Coping: effective    Sexuality Alteration  na    Injury, potential bleeding or infection: na        Lab Results   Component Value Date    WBC 11.8 (H) 10/29/2024    HGB 10.7 (L) 10/29/2024    HCT 35.6 10/29/2024     (H) 10/29/2024         BP (!) 154/92   Pulse 72   Temp 98.6 °F (37 °C) (Temporal)   Resp 18   Wt (!) 137 kg (302 lb)   BMI 44.60 kg/m²   BP within normal range? no   -if no, manually recheck in 5-10 min  NEW BP readin/82  BP within normal range? no   -if no, notify attending provider for further instruction      Assessment/Plan:Completed 3/15 fractions; 801/4005 cGy    Joann Grace RN

## 2024-12-12 ENCOUNTER — HOSPITAL ENCOUNTER (OUTPATIENT)
Dept: RADIATION ONCOLOGY | Age: 39
Discharge: HOME | End: 2024-12-12
Payer: COMMERCIAL

## 2024-12-12 PROCEDURE — 77412 RADIATION TX DELIVERY LVL 3: CPT | Performed by: RADIOLOGY

## 2024-12-13 ENCOUNTER — HOSPITAL ENCOUNTER (OUTPATIENT)
Dept: RADIATION ONCOLOGY | Age: 39
Discharge: HOME | End: 2024-12-13
Payer: COMMERCIAL

## 2024-12-13 ENCOUNTER — OFFICE VISIT (OUTPATIENT)
Dept: PODIATRY | Age: 39
End: 2024-12-13
Payer: COMMERCIAL

## 2024-12-13 VITALS
WEIGHT: 293 LBS | HEART RATE: 71 BPM | HEIGHT: 68 IN | BODY MASS INDEX: 44.41 KG/M2 | DIASTOLIC BLOOD PRESSURE: 82 MMHG | OXYGEN SATURATION: 96 % | SYSTOLIC BLOOD PRESSURE: 129 MMHG | TEMPERATURE: 97.9 F

## 2024-12-13 DIAGNOSIS — M72.2 PLANTAR FASCIITIS: Primary | ICD-10-CM

## 2024-12-13 DIAGNOSIS — M79.672 PAIN IN LEFT FOOT: ICD-10-CM

## 2024-12-13 DIAGNOSIS — R26.2 DIFFICULTY WALKING: ICD-10-CM

## 2024-12-13 PROCEDURE — 77336 RADIATION PHYSICS CONSULT: CPT | Performed by: RADIOLOGY

## 2024-12-13 PROCEDURE — 3079F DIAST BP 80-89 MM HG: CPT | Performed by: PODIATRIST

## 2024-12-13 PROCEDURE — 20550 NJX 1 TENDON SHEATH/LIGAMENT: CPT | Performed by: PODIATRIST

## 2024-12-13 PROCEDURE — 77412 RADIATION TX DELIVERY LVL 3: CPT | Performed by: RADIOLOGY

## 2024-12-13 PROCEDURE — 99204 OFFICE O/P NEW MOD 45 MIN: CPT | Performed by: PODIATRIST

## 2024-12-13 PROCEDURE — 3074F SYST BP LT 130 MM HG: CPT | Performed by: PODIATRIST

## 2024-12-13 PROCEDURE — 77417 THER RADIOLOGY PORT IMAGE(S): CPT | Performed by: RADIOLOGY

## 2024-12-13 RX ORDER — METHYLPREDNISOLONE ACETATE 40 MG/ML
40 INJECTION, SUSPENSION INTRA-ARTICULAR; INTRALESIONAL; INTRAMUSCULAR; SOFT TISSUE ONCE
Status: COMPLETED | OUTPATIENT
Start: 2024-12-13 | End: 2024-12-13

## 2024-12-13 RX ADMIN — METHYLPREDNISOLONE ACETATE 40 MG: 40 INJECTION, SUSPENSION INTRA-ARTICULAR; INTRALESIONAL; INTRAMUSCULAR; SOFT TISSUE at 09:30

## 2024-12-13 NOTE — PROGRESS NOTES
Patient is in today for evaluation of left foot pain. Patient says she started to have some pain on the top of her left foot that had bruising and did get diagnosed with a stress fracture last summer. Patient says she did have some issues near her toes a few months later as well. Patient says about a month ago she noticed she was getting the same similar pain again. Pcp is Tatiana Rodrigues APRN - CNP  Last ov 10/29/24  
localized lumpectomy with sentinel lymph node biopsy, axillary content performed by Michelle Negrete MD at [facility] OR    BREAST REDUCTION SURGERY Bilateral 9/17/2024    RIGHT ONCOPLASTIC BREAST REDUCTION, MATCHING LEFT BREAST REDUCTION performed by Ganesh Russell MD at [facility] OR    HERNIA REPAIR N/A 10/11/2022    LAPAROSCOPIC ROBOTIC XI ASSISTED VENTRAL HERNIA REPAIR WITH MESH performed by Kingsley Pardo MD at [facility] OR    RECTAL SURGERY N/A 09/15/2022    DRAINAGE PERIANAL ABSCESS performed by Kingsley Pardo MD at [facility] OR    RECTAL SURGERY N/A 12/06/2022    RECTAL EXAMINATION UNDER ANESTHESIA WITH FISTULOTOMY performed by Kingsley Pardo MD at [facility] OR    SINUS SURGERY N/A 02/25/2016    FUNCTIONAL ENDOSCOPIC SINUS SURGERY WITH BILATERAL MAXILLARY, FRONTAL & SPENOID ANTROSTOMIES, SUBMUCCOSAL RESECTION OF INFERIOR TURBINATES    TUBAL LIGATION  07/22/2021    US BREAST BIOPSY W LOC DEVICE 1ST LESION RIGHT Right 7/2/2024    US BREAST BIOPSY W LOC DEVICE 1ST LESION RIGHT 7/2/2024 [facility]     Social History     Tobacco Use    Smoking status: Never    Smokeless tobacco: Never   Vaping Use    Vaping status: Never Used   Substance Use Topics    Alcohol use: No    Drug use: No           Diagnostic studies:    No results found.      Procedures:    Plantar Fascitis Injection:  It was discussed in detail with the patient the use of injections to help treat plantar fascitis.  The patient was made aware of the possibility of a steroid flare, which is an increase in pain that presents itself a few hours after cortisone injection.  This is a non allergic reaction.  If this occurs, the patient was instructed to take anti-inflammatories and rest, ice and elevate the extremity.  The pain will subside in 24 to 48 hours.  Potential risks, complications, alternative treatments ad procedure prognosis were explained to the patient.  Verbal informed consent was obtained from the patient.  Antiseptic preparation of the skin of the left heel was

## 2024-12-16 ENCOUNTER — HOSPITAL ENCOUNTER (OUTPATIENT)
Dept: RADIATION ONCOLOGY | Age: 39
Discharge: HOME | End: 2024-12-16
Payer: COMMERCIAL

## 2024-12-16 PROCEDURE — 77412 RADIATION TX DELIVERY LVL 3: CPT | Performed by: RADIOLOGY

## 2024-12-17 ENCOUNTER — HOSPITAL ENCOUNTER (OUTPATIENT)
Dept: RADIATION ONCOLOGY | Age: 39
Discharge: HOME | End: 2024-12-17
Payer: COMMERCIAL

## 2024-12-17 PROCEDURE — 77412 RADIATION TX DELIVERY LVL 3: CPT | Performed by: RADIOLOGY

## 2024-12-18 ENCOUNTER — HOSPITAL ENCOUNTER (OUTPATIENT)
Dept: RADIATION ONCOLOGY | Age: 39
Discharge: HOME | End: 2024-12-18
Payer: COMMERCIAL

## 2024-12-18 VITALS
TEMPERATURE: 98.6 F | DIASTOLIC BLOOD PRESSURE: 74 MMHG | HEART RATE: 77 BPM | RESPIRATION RATE: 18 BRPM | SYSTOLIC BLOOD PRESSURE: 121 MMHG

## 2024-12-18 DIAGNOSIS — C50.311 MALIGNANT NEOPLASM OF LOWER-INNER QUADRANT OF RIGHT BREAST OF FEMALE, ESTROGEN RECEPTOR POSITIVE (HCC): Primary | ICD-10-CM

## 2024-12-18 DIAGNOSIS — Z17.0 MALIGNANT NEOPLASM OF LOWER-INNER QUADRANT OF RIGHT BREAST OF FEMALE, ESTROGEN RECEPTOR POSITIVE (HCC): Primary | ICD-10-CM

## 2024-12-18 PROCEDURE — 77412 RADIATION TX DELIVERY LVL 3: CPT | Performed by: RADIOLOGY

## 2024-12-18 NOTE — PROGRESS NOTES
DEPARTMENT OF RADIATION ONCOLOGY   ON TREATMENT VISIT       12/18/2024      NAME:  Tatiana Mtz    YOB: 1985    DIAGNOSIS: Pathological stage Ia (pT1c, pN0 (SN),cM0, G1, ER/MA positive, HER2 negative) invasive ductal carcinoma of the right breast, SP partial mastectomy, sentinel node biopsy and bilateral oncoplastic reduction.  2 sentinel nodes negative, no LVSI, negative margins with the closest 1 at less than 1 mm, DCIS low-grade.    SUBJECTIVE:   Tatiana Mtz has now received 2136 cGy in 8 fractions directed to the right breast.  Mild erythema.      Past medical, surgical, social and family histories reviewed and updated as indicated.    PAIN: No    ALLERGIES:  Latex and Pcn [penicillins]         Current Outpatient Medications   Medication Sig Dispense Refill    betamethasone dipropionate 0.05 % ointment Apply topically daily. 50 g 2    amLODIPine (NORVASC) 5 MG tablet Take 1 tablet by mouth daily 90 tablet 1    propranolol (INDERAL LA) 60 MG extended release capsule Take 1 capsule by mouth 2 times daily 180 capsule 1    pantoprazole (PROTONIX) 20 MG tablet Take 1 tablet by mouth in the morning and at bedtime 180 tablet 1    citalopram (CELEXA) 40 MG tablet Take 1 tablet by mouth daily      buPROPion (WELLBUTRIN XL) 150 MG extended release tablet Take 1 tablet by mouth daily      Handicap Placard MISC by Does not apply route 5 years 1 each 0    traZODone (DESYREL) 50 MG tablet Take 1 tablet by mouth nightly       No current facility-administered medications for this encounter.         OBJECTIVE:  Alert and fully ambulatory. Pleasant and conversant.      Physical Examination:General appearance - alert, well appearing, and in no distress and overweight:  Constitutional: A well developed, well nourished 39 y.o. female who is alert, oriented, cooperative and in no apparent distress.  HEENT:   Skin:  Warm and dry.  No obvious rashes.    Vitals:    12/18/24 1126   BP: 121/74   Pulse: 77

## 2024-12-18 NOTE — PROGRESS NOTES
Tatiana Mtz  12/18/2024  Wt Readings from Last 3 Encounters:   12/13/24 136.1 kg (300 lb)   12/11/24 (!) 137 kg (302 lb)   10/29/24 132.5 kg (292 lb)     There is no height or weight on file to calculate BMI.        Treatment Area:right breast    Patient was seen today for weekly visit.     Comfort Alteration  KPS:90%  Fatigue: Mild to moderate    Nutritional Alteration  Anorexia: No   Nausea: No   Vomiting: No     Skin Alteration   Sensation:no itching    Radiation Dermatitis:  yes    Mucous Membrane Alteration  Drainage: No  Lymphedema: No    Emotional  Coping: effective    Sexuality Alteration  na    Injury, potential bleeding or infection: na        Lab Results   Component Value Date    WBC 11.8 (H) 10/29/2024    HGB 10.7 (L) 10/29/2024    HCT 35.6 10/29/2024     (H) 10/29/2024         /74   Pulse 77   Temp 98.6 °F (37 °C) (Temporal)   Resp 18   BP within normal range? yes       Assessment/Plan:Completed  8/15 fractions; 2136/4005 cGy    Joann Grace RN

## 2024-12-19 ENCOUNTER — OFFICE VISIT (OUTPATIENT)
Dept: FAMILY MEDICINE CLINIC | Age: 39
End: 2024-12-19

## 2024-12-19 ENCOUNTER — HOSPITAL ENCOUNTER (OUTPATIENT)
Dept: RADIATION ONCOLOGY | Age: 39
Discharge: HOME | End: 2024-12-19
Payer: COMMERCIAL

## 2024-12-19 VITALS
DIASTOLIC BLOOD PRESSURE: 78 MMHG | BODY MASS INDEX: 44.41 KG/M2 | WEIGHT: 293 LBS | TEMPERATURE: 98.5 F | HEART RATE: 83 BPM | HEIGHT: 68 IN | SYSTOLIC BLOOD PRESSURE: 124 MMHG | RESPIRATION RATE: 18 BRPM | OXYGEN SATURATION: 97 %

## 2024-12-19 DIAGNOSIS — J06.9 ACUTE UPPER RESPIRATORY INFECTION, UNSPECIFIED: Primary | ICD-10-CM

## 2024-12-19 DIAGNOSIS — R05.9 COUGH, UNSPECIFIED TYPE: ICD-10-CM

## 2024-12-19 DIAGNOSIS — J01.90 ACUTE NON-RECURRENT SINUSITIS, UNSPECIFIED LOCATION: ICD-10-CM

## 2024-12-19 DIAGNOSIS — R09.82 POSTNASAL DRIP: ICD-10-CM

## 2024-12-19 DIAGNOSIS — J02.9 SORE THROAT: ICD-10-CM

## 2024-12-19 LAB
INFLUENZA A ANTIBODY: NEGATIVE
INFLUENZA B ANTIBODY: NEGATIVE
Lab: NORMAL
PERFORMING INSTRUMENT: NORMAL
QC PASS/FAIL: NORMAL
S PYO AG THROAT QL: NORMAL
SARS-COV-2, POC: NORMAL

## 2024-12-19 PROCEDURE — 77412 RADIATION TX DELIVERY LVL 3: CPT | Performed by: RADIOLOGY

## 2024-12-19 RX ORDER — ALBUTEROL SULFATE 90 UG/1
2 INHALANT RESPIRATORY (INHALATION) 4 TIMES DAILY PRN
Qty: 18 G | Refills: 0 | Status: SHIPPED | OUTPATIENT
Start: 2024-12-19

## 2024-12-19 RX ORDER — BENZONATATE 100 MG/1
100 CAPSULE ORAL 3 TIMES DAILY PRN
Qty: 21 CAPSULE | Refills: 0 | Status: SHIPPED | OUTPATIENT
Start: 2024-12-19 | End: 2024-12-26

## 2024-12-19 RX ORDER — FLUTICASONE PROPIONATE 50 MCG
1 SPRAY, SUSPENSION (ML) NASAL DAILY
Qty: 16 G | Refills: 0 | Status: SHIPPED | OUTPATIENT
Start: 2024-12-19

## 2024-12-19 RX ORDER — HYDROXYZINE HYDROCHLORIDE 10 MG/5ML
4 SYRUP ORAL EVERY 6 HOURS PRN
Qty: 20 TABLET | Refills: 0 | Status: SHIPPED | OUTPATIENT
Start: 2024-12-19

## 2024-12-19 NOTE — PROGRESS NOTES
24  Tatiana Mtz : 1985 Sex: female  Age 39 y.o.      Subjective:  Chief Complaint   Patient presents with    Drainage    Pharyngitis    Headache    Ear Pain         HPI:     History of Present Illness  The patient is a 39-year-old female who presents for evaluation of a persistent cough.    She has been experiencing this cough for approximately 3 weeks, which has not shown signs of improvement. Additionally, she reports significant postnasal drainage, severe sore throat, ear pain, intense headache, and facial discomfort that started about 4 days ago. She has not received her influenza vaccine this season due to a recent bout of C. difficile infection.    She is currently undergoing radiation therapy for breast cancer, diagnosed in 2024. She underwent breast surgery in 2024 and was diagnosed with C. difficile infection 4 weeks post-surgery. She had COVID-19 infection in 2024. Her radiation oncologist has given her clearance to proceed with any necessary treatments.    IMMUNIZATIONS  She has not received her influenza vaccine this season.            ROS:   Unless otherwise stated in this report the patient's positive and negative responses for review of systems for constitutional, eyes, ENT, cardiovascular, respiratory, gastrointestinal, neurological, , musculoskeletal, and integument systems and related systems to the presenting problem are either stated in the history of present illness or were not pertinent or were negative for the symptoms and/or complaints related to the presenting medical problem.  Positives and pertinent negatives as per HPI.  All others reviewed and are negative.      PMH:     Past Medical History:   Diagnosis Date    Abnormal Pap smear of cervix     normal since    Chronic back pain     Chronic generalized pain disorder 2021    Chronic sinusitis     with facial pain    Class 3 severe obesity due to excess calories without serious

## 2024-12-20 ENCOUNTER — HOSPITAL ENCOUNTER (OUTPATIENT)
Dept: RADIATION ONCOLOGY | Age: 39
Discharge: HOME | End: 2024-12-20
Payer: COMMERCIAL

## 2024-12-20 PROCEDURE — 77336 RADIATION PHYSICS CONSULT: CPT | Performed by: RADIOLOGY

## 2024-12-20 PROCEDURE — 77412 RADIATION TX DELIVERY LVL 3: CPT | Performed by: RADIOLOGY

## 2024-12-20 PROCEDURE — 77417 THER RADIOLOGY PORT IMAGE(S): CPT | Performed by: RADIOLOGY

## 2024-12-23 ENCOUNTER — HOSPITAL ENCOUNTER (OUTPATIENT)
Dept: RADIATION ONCOLOGY | Age: 39
Discharge: HOME | End: 2024-12-23
Payer: COMMERCIAL

## 2024-12-23 PROCEDURE — 77412 RADIATION TX DELIVERY LVL 3: CPT | Performed by: RADIOLOGY

## 2024-12-24 ENCOUNTER — HOSPITAL ENCOUNTER (OUTPATIENT)
Dept: RADIATION ONCOLOGY | Age: 39
Discharge: HOME | End: 2024-12-24
Payer: COMMERCIAL

## 2024-12-24 PROCEDURE — 77412 RADIATION TX DELIVERY LVL 3: CPT | Performed by: RADIOLOGY

## 2024-12-26 ENCOUNTER — HOSPITAL ENCOUNTER (OUTPATIENT)
Dept: RADIATION ONCOLOGY | Age: 39
Discharge: HOME | End: 2024-12-26
Payer: COMMERCIAL

## 2024-12-26 VITALS
DIASTOLIC BLOOD PRESSURE: 64 MMHG | RESPIRATION RATE: 18 BRPM | HEART RATE: 75 BPM | TEMPERATURE: 98.4 F | SYSTOLIC BLOOD PRESSURE: 115 MMHG

## 2024-12-26 DIAGNOSIS — L53.9 ERYTHEMA: Primary | ICD-10-CM

## 2024-12-26 DIAGNOSIS — Z17.0 MALIGNANT NEOPLASM OF LOWER-INNER QUADRANT OF RIGHT BREAST OF FEMALE, ESTROGEN RECEPTOR POSITIVE (HCC): Primary | ICD-10-CM

## 2024-12-26 DIAGNOSIS — C50.311 MALIGNANT NEOPLASM OF LOWER-INNER QUADRANT OF RIGHT BREAST OF FEMALE, ESTROGEN RECEPTOR POSITIVE (HCC): Primary | ICD-10-CM

## 2024-12-26 PROCEDURE — 77412 RADIATION TX DELIVERY LVL 3: CPT | Performed by: RADIOLOGY

## 2024-12-26 RX ORDER — SILVER SULFADIAZINE 10 MG/G
CREAM TOPICAL
Qty: 50 G | Refills: 2 | Status: SHIPPED | OUTPATIENT
Start: 2024-12-26

## 2024-12-26 NOTE — PROGRESS NOTES
Tatiana Mtz  12/26/2024  Wt Readings from Last 3 Encounters:   12/19/24 136.1 kg (300 lb)   12/13/24 136.1 kg (300 lb)   12/11/24 (!) 137 kg (302 lb)     There is no height or weight on file to calculate BMI.        Treatment Area:Right breast    Patient was seen today for weekly visit.     Comfort Alteration  KPS:90%  Fatigue: Moderate    Nutritional Alteration  Anorexia: No   Nausea: No   Vomiting: No     Skin Alteration   Sensation:no itching    Radiation Dermatitis:  redness to breast fold    Mucous Membrane Alteration  Drainage: No  Lymphedema: No    Emotional  Coping: effective    Sexuality Alteration  na    Injury, potential bleeding or infection: na        Lab Results   Component Value Date    WBC 11.8 (H) 10/29/2024    HGB 10.7 (L) 10/29/2024    HCT 35.6 10/29/2024     (H) 10/29/2024         /64   Pulse 75   Temp 98.4 °F (36.9 °C) (Temporal)   Resp 18   BP within normal range? yes   tion      Assessment/Plan:Completed 13/15 fractions; 3471/4005 cGy    Joann Grace RN

## 2024-12-26 NOTE — PROGRESS NOTES
DEPARTMENT OF RADIATION ONCOLOGY   ON TREATMENT VISIT       12/26/2024      NAME:  Tatiana Mtz    YOB: 1985    DIAGNOSIS: Pathological stage Ia (pT1c, pN0 (SN),cM0, G1, ER/MT positive, HER2 negative) invasive ductal carcinoma of the right breast, SP partial mastectomy, sentinel node biopsy and bilateral oncoplastic reduction.  2 sentinel nodes negative, no LVSI, negative margins with the closest 1 at less than 1 mm, DCIS low-grade.    SUBJECTIVE:   Tatiana Mtz has now received 3471 cGy in 13 fractions directed to the right breast.  Minimal erythema, no peeling, minimal miliaria.  Continue to use hydrocortisone and Silvadene together with Aquaphor.      Past medical, surgical, social and family histories reviewed and updated as indicated.    PAIN: No         ALLERGIES:  Latex and Pcn [penicillins]         Current Outpatient Medications   Medication Sig Dispense Refill    silver sulfADIAZINE (SILVADENE) 1 % cream Apply topically 3 to 4  times daily.  Okay to mix with Aquaphor and Desitin in equal parts 50 g 2    albuterol sulfate HFA (PROVENTIL;VENTOLIN;PROAIR) 108 (90 Base) MCG/ACT inhaler Inhale 2 puffs into the lungs 4 times daily as needed for Wheezing 18 g 0    chlorpheniramine (ALLER-CHLOR) 4 MG tablet Take 1 tablet by mouth every 6 hours as needed for Allergies 20 tablet 0    benzonatate (TESSALON) 100 MG capsule Take 1 capsule by mouth 3 times daily as needed for Cough 21 capsule 0    fluticasone (FLONASE) 50 MCG/ACT nasal spray 1 spray by Each Nostril route daily 16 g 0    betamethasone dipropionate 0.05 % ointment Apply topically daily. 50 g 2    amLODIPine (NORVASC) 5 MG tablet Take 1 tablet by mouth daily 90 tablet 1    propranolol (INDERAL LA) 60 MG extended release capsule Take 1 capsule by mouth 2 times daily 180 capsule 1    pantoprazole (PROTONIX) 20 MG tablet Take 1 tablet by mouth in the morning and at bedtime 180 tablet 1    citalopram (CELEXA) 40 MG tablet Take 1

## 2024-12-27 ENCOUNTER — HOSPITAL ENCOUNTER (OUTPATIENT)
Dept: RADIATION ONCOLOGY | Age: 39
Discharge: HOME | End: 2024-12-27
Payer: COMMERCIAL

## 2024-12-27 ENCOUNTER — OFFICE VISIT (OUTPATIENT)
Dept: PODIATRY | Age: 39
End: 2024-12-27

## 2024-12-27 VITALS
HEART RATE: 75 BPM | HEIGHT: 69 IN | BODY MASS INDEX: 43.4 KG/M2 | WEIGHT: 293 LBS | TEMPERATURE: 98.2 F | OXYGEN SATURATION: 98 %

## 2024-12-27 DIAGNOSIS — M72.2 PLANTAR FASCIITIS: Primary | ICD-10-CM

## 2024-12-27 DIAGNOSIS — M79.672 PAIN IN LEFT FOOT: ICD-10-CM

## 2024-12-27 PROCEDURE — 77412 RADIATION TX DELIVERY LVL 3: CPT | Performed by: RADIOLOGY

## 2024-12-27 RX ORDER — TRIAMCINOLONE ACETONIDE 40 MG/ML
40 INJECTION, SUSPENSION INTRA-ARTICULAR; INTRAMUSCULAR ONCE
Status: COMPLETED | OUTPATIENT
Start: 2024-12-27 | End: 2024-12-27

## 2024-12-27 RX ADMIN — TRIAMCINOLONE ACETONIDE 40 MG: 40 INJECTION, SUSPENSION INTRA-ARTICULAR; INTRAMUSCULAR at 08:47

## 2024-12-27 NOTE — PROGRESS NOTES
Patient is in today for 2 week left foot pain/injection follow up. Patient says she had increased pain for a few days following the injection and is still currently having pain with improvement. Pcp is Tatiana Rodrigues APRN - CNP  Last ov 10/29/24  
1    citalopram (CELEXA) 40 MG tablet, Take 1 tablet by mouth daily, Disp: , Rfl:     buPROPion (WELLBUTRIN XL) 150 MG extended release tablet, Take 1 tablet by mouth daily, Disp: , Rfl:     Handicap Placard MISC, by Does not apply route 5 years, Disp: 1 each, Rfl: 0    traZODone (DESYREL) 50 MG tablet, Take 1 tablet by mouth nightly, Disp: , Rfl:     Allergies:  Allergies   Allergen Reactions    Latex Rash    Pcn [Penicillins] Rash       Vitals:    12/27/24 0835   Pulse: 75   Temp: 98.2 °F (36.8 °C)   SpO2: 98%   Weight: 136.1 kg (300 lb)   Height: 1.753 m (5' 9\")       Exam:  Neurovascular status unchanged.  Fasciitis issues improved plantar left foot.  No appreciable edema or ecchymotic skin changes noted left lower extremity.  No paresthesias noted tarsal tunnel region left foot.  Improved gait and range of motion noted upon evaluation left lower extremity.      Diagnostic Studies:     No results found.        Procedures:    Plantar Fascitis Injection:  It was discussed in detail with the patient the use of injections to help treat plantar fascitis.  The patient was made aware of the possibility of a steroid flare, which is an increase in pain that presents itself a few hours after cortisone injection.  This is a non allergic reaction.  If this occurs, the patient was instructed to take anti-inflammatories and rest, ice and elevate the extremity.  The pain will subside in 24 to 48 hours.  Potential risks, complications, alternative treatments ad procedure prognosis were explained to the patient.  Verbal informed consent was obtained from the patient.  Antiseptic preparation of the skin of the left heel was performed.  Initially, Ethyl Chloride spray was utilized.  Then a total of 3 cc's of equal mixture of 1% lidocaine plain, and Kenalog 40 were administered into the symptomatic heel in efforts to decrease pain, swelling, and inflammation.  Patient tolerated the injection well.      Plan Per Assessment  Tatiana

## 2024-12-30 ENCOUNTER — HOSPITAL ENCOUNTER (OUTPATIENT)
Dept: RADIATION ONCOLOGY | Age: 39
Discharge: HOME | End: 2024-12-30
Payer: COMMERCIAL

## 2024-12-30 PROCEDURE — 77336 RADIATION PHYSICS CONSULT: CPT | Performed by: RADIOLOGY

## 2024-12-30 PROCEDURE — 77412 RADIATION TX DELIVERY LVL 3: CPT | Performed by: RADIOLOGY

## 2024-12-30 PROCEDURE — 77427 RADIATION TX MANAGEMENT X5: CPT | Performed by: RADIOLOGY

## 2024-12-30 NOTE — PROGRESS NOTES
Tatiana Mtz  12/30/2024  7:30 AM          Current Outpatient Medications   Medication Sig Dispense Refill    silver sulfADIAZINE (SILVADENE) 1 % cream Apply topically 3 to 4  times daily.  Okay to mix with Aquaphor and Desitin in equal parts 50 g 2    albuterol sulfate HFA (PROVENTIL;VENTOLIN;PROAIR) 108 (90 Base) MCG/ACT inhaler Inhale 2 puffs into the lungs 4 times daily as needed for Wheezing 18 g 0    chlorpheniramine (ALLER-CHLOR) 4 MG tablet Take 1 tablet by mouth every 6 hours as needed for Allergies 20 tablet 0    fluticasone (FLONASE) 50 MCG/ACT nasal spray 1 spray by Each Nostril route daily 16 g 0    betamethasone dipropionate 0.05 % ointment Apply topically daily. 50 g 2    amLODIPine (NORVASC) 5 MG tablet Take 1 tablet by mouth daily 90 tablet 1    propranolol (INDERAL LA) 60 MG extended release capsule Take 1 capsule by mouth 2 times daily 180 capsule 1    pantoprazole (PROTONIX) 20 MG tablet Take 1 tablet by mouth in the morning and at bedtime 180 tablet 1    citalopram (CELEXA) 40 MG tablet Take 1 tablet by mouth daily      buPROPion (WELLBUTRIN XL) 150 MG extended release tablet Take 1 tablet by mouth daily      Handicap Placard MISC by Does not apply route 5 years 1 each 0    traZODone (DESYREL) 50 MG tablet Take 1 tablet by mouth nightly       No current facility-administered medications for this encounter.          This is an up-to-date medication list.    Please take this list to your next care provider, and discard any previous medication lists.

## 2024-12-31 ENCOUNTER — HOSPITAL ENCOUNTER (OUTPATIENT)
Dept: OCCUPATIONAL THERAPY | Age: 39
Setting detail: THERAPIES SERIES
Discharge: HOME | End: 2024-12-31
Payer: COMMERCIAL

## 2024-12-31 NOTE — PROGRESS NOTES
Occupational Therapy          [facility]  MARY OCCUPATIONAL THERAPY  [address] [address]  Dept: [phone]  Loc: [phone]   [facility] OT Fax: [phone]    Cancellation/No Show Note      Date:  2024    Patient Name:  Tatiana Mtz     :  1985         PT ID: [id number]    Total missed visits including today: 0       Total number of no shows: 0    For today's appointment patient:   [x]  Cancelled   []  Rescheduled appointment   []  No-show     Reason given by patient:   []  Patient ill   []  Conflicting appointment   []  No transportation   []  Conflict with work   []  No reason given   []  Other:    Comments:                    Comments:  patient called clinic and cancelled scheduled lymphedema appointment secondary to illness.  Therapist contacted patient and patient to call once feeling better.  Patient has just finished radiation and will benefit from holding before returning to clinic in order for her skin to begin to heal.    Electronically signed by:  TALITA Anders/L, CLT-VITALY

## 2025-01-03 ENCOUNTER — OFFICE VISIT (OUTPATIENT)
Dept: FAMILY MEDICINE CLINIC | Age: 40
End: 2025-01-03
Payer: COMMERCIAL

## 2025-01-03 VITALS
WEIGHT: 293 LBS | TEMPERATURE: 97.7 F | HEART RATE: 66 BPM | HEIGHT: 67 IN | SYSTOLIC BLOOD PRESSURE: 124 MMHG | RESPIRATION RATE: 18 BRPM | DIASTOLIC BLOOD PRESSURE: 78 MMHG | OXYGEN SATURATION: 98 % | BODY MASS INDEX: 45.99 KG/M2

## 2025-01-03 DIAGNOSIS — J45.21 MILD INTERMITTENT ASTHMA WITH EXACERBATION: ICD-10-CM

## 2025-01-03 DIAGNOSIS — J32.9 SINOBRONCHITIS: Primary | ICD-10-CM

## 2025-01-03 DIAGNOSIS — J40 SINOBRONCHITIS: Primary | ICD-10-CM

## 2025-01-03 DIAGNOSIS — I10 ESSENTIAL HYPERTENSION: ICD-10-CM

## 2025-01-03 PROCEDURE — 99214 OFFICE O/P EST MOD 30 MIN: CPT | Performed by: NURSE PRACTITIONER

## 2025-01-03 PROCEDURE — 3078F DIAST BP <80 MM HG: CPT | Performed by: NURSE PRACTITIONER

## 2025-01-03 PROCEDURE — 3074F SYST BP LT 130 MM HG: CPT | Performed by: NURSE PRACTITIONER

## 2025-01-03 RX ORDER — AMLODIPINE BESYLATE 5 MG/1
5 TABLET ORAL DAILY
Qty: 90 TABLET | Refills: 1 | Status: SHIPPED | OUTPATIENT
Start: 2025-01-03

## 2025-01-03 RX ORDER — PREDNISONE 20 MG/1
20 TABLET ORAL 2 TIMES DAILY
Qty: 10 TABLET | Refills: 0 | Status: SHIPPED | OUTPATIENT
Start: 2025-01-03 | End: 2025-01-08

## 2025-01-03 RX ORDER — PROPRANOLOL HYDROCHLORIDE 60 MG/1
60 CAPSULE, EXTENDED RELEASE ORAL 2 TIMES DAILY
Qty: 180 CAPSULE | Refills: 1 | Status: SHIPPED | OUTPATIENT
Start: 2025-01-03

## 2025-01-03 RX ORDER — ALBUTEROL SULFATE 0.83 MG/ML
2.5 SOLUTION RESPIRATORY (INHALATION) EVERY 4 HOURS PRN
Qty: 30 EACH | Refills: 0 | Status: SHIPPED | OUTPATIENT
Start: 2025-01-03

## 2025-01-03 RX ORDER — DOXYCYCLINE 100 MG/1
100 CAPSULE ORAL 2 TIMES DAILY
Qty: 20 CAPSULE | Refills: 0 | Status: SHIPPED | OUTPATIENT
Start: 2025-01-03 | End: 2025-01-13

## 2025-01-03 NOTE — TELEPHONE ENCOUNTER
Name of Medication(s) Requested:  Requested Prescriptions     Pending Prescriptions Disp Refills    amLODIPine (NORVASC) 5 MG tablet 90 tablet 1     Sig: Take 1 tablet by mouth daily    propranolol (INDERAL LA) 60 MG extended release capsule 180 capsule 1     Sig: Take 1 capsule by mouth 2 times daily       Medication is on current medication list Yes    Dosage and directions were verified? Yes    Quantity verified: 90 day supply     Pharmacy Verified?  Yes    Last Appointment:  10/29/2024    Future appts:  Future Appointments   Date Time Provider Department Center   1/8/2025  9:30 AM Zuri Carrizales MD [facility] ONC [facility]   1/8/2025  9:45 AM [facility] FAST TRACK 2 [facility] Med Onc [facility]   1/31/2025  9:00 AM Miri Stern MD [facility] RAD ONC [facility]   4/7/2025  9:00 AM [place], Mechelle E, APRN - CNP [facility]   5/1/2025  9:00 AM [place], Tatiana, APRN - CNP [facility] DEP        (If no appt send self scheduling link. .REFILLAPPT)  Scheduling request sent?     [] Yes  [x] No    Does patient need updated?  [] Yes  [x] No

## 2025-01-08 ENCOUNTER — OFFICE VISIT (OUTPATIENT)
Dept: ONCOLOGY | Age: 40
End: 2025-01-08
Payer: COMMERCIAL

## 2025-01-08 ENCOUNTER — HOSPITAL ENCOUNTER (OUTPATIENT)
Dept: INFUSION THERAPY | Age: 40
Discharge: HOME | End: 2025-01-08

## 2025-01-08 VITALS
TEMPERATURE: 97.8 F | DIASTOLIC BLOOD PRESSURE: 95 MMHG | HEART RATE: 62 BPM | WEIGHT: 293 LBS | BODY MASS INDEX: 45.99 KG/M2 | OXYGEN SATURATION: 100 % | RESPIRATION RATE: 16 BRPM | HEIGHT: 67 IN | SYSTOLIC BLOOD PRESSURE: 167 MMHG

## 2025-01-08 DIAGNOSIS — C50.311 MALIGNANT NEOPLASM OF LOWER-INNER QUADRANT OF RIGHT BREAST OF FEMALE, ESTROGEN RECEPTOR POSITIVE (HCC): Primary | ICD-10-CM

## 2025-01-08 DIAGNOSIS — Z17.0 MALIGNANT NEOPLASM OF LOWER-INNER QUADRANT OF RIGHT BREAST OF FEMALE, ESTROGEN RECEPTOR POSITIVE (HCC): Primary | ICD-10-CM

## 2025-01-08 DIAGNOSIS — E83.110 HEMOCHROMATOSIS, HEREDITARY (HCC): ICD-10-CM

## 2025-01-08 PROCEDURE — 3080F DIAST BP >= 90 MM HG: CPT | Performed by: INTERNAL MEDICINE

## 2025-01-08 PROCEDURE — 3077F SYST BP >= 140 MM HG: CPT | Performed by: INTERNAL MEDICINE

## 2025-01-08 PROCEDURE — 99214 OFFICE O/P EST MOD 30 MIN: CPT | Performed by: INTERNAL MEDICINE

## 2025-01-08 RX ORDER — TAMOXIFEN CITRATE 20 MG/1
20 TABLET ORAL DAILY
Qty: 90 TABLET | Refills: 1 | Status: SHIPPED | OUTPATIENT
Start: 2025-01-08 | End: 2026-01-03

## 2025-01-08 NOTE — PROGRESS NOTES
[facility] MEDICAL ONCOLOGY  [address] [facility] [address]  Dept: [phone]  Loc: [phone]  Attending progress note      Reason for Visit:   Right breast cancer.    Referring Physician:  Michelle Negrete MD     PCP:  Tatiana Rodrigues APRN - CNP    History of Present Illness:     Mrs. Mtz is a very pleasant 39-year-old premenopausal lady, with a past medical history significant for chronic back pain, hypertension, and hereditary hemochromatosis, who had presented with an abnormal screening mammogram, which was her first mammogram, it was done on 6/4/2024, hide revealed an asymmetry on the right, at the 5 o'clock position, this was BI-RADS Category 0, was recommended coned-down compression imaging, which was done on 6/26/2024, along with concurrent right breast ultrasound, which had revealed a 6 mm irregular hypoechoic mass at the 6 o'clock position, she underwent on 7/2/2024 and ultrasound-guided right breast biopsy, pathology was consistent with invasive ductal carcinoma, ER positive greater than 90%, OH positive, greater than 90%, HER2/tj negative, plus by IHC.  The patient underwent on 9/17/2020 for a right breast Mag[facility] localized lumpectomy with sentinel lymph node biopsy, right oncoplastic reduction with matching left breast reduction, pathology:    Cancer Case Summary  Procedure: Excision  Specimen laterality: Right  Histologic type: Invasive carcinoma of no special type (ductal)  [place] histologic score: 2 (tubule formation) +2 (nuclear  pleomorphism) +1 (mitotic count) = 5  Overall grade: Grade 1  Tumor size: 1.1 x 0.8 x 0.7 cm  Ductal carcinoma in situ: Low-grade DCIS is present; negative for  extensive intraductal component  Tumor extent: Not applicable  Lymphatic and/or vascular invasion: Not identified  Treatment effect in the breast: No known presurgical therapy  Margin status for invasive carcinoma: All margins

## 2025-01-17 DIAGNOSIS — I10 ESSENTIAL HYPERTENSION: ICD-10-CM

## 2025-01-17 RX ORDER — PROPRANOLOL HYDROCHLORIDE 60 MG/1
60 CAPSULE, EXTENDED RELEASE ORAL 2 TIMES DAILY
Qty: 180 CAPSULE | Refills: 1 | OUTPATIENT
Start: 2025-01-17

## 2025-01-31 ENCOUNTER — HOSPITAL ENCOUNTER (OUTPATIENT)
Dept: RADIATION ONCOLOGY | Age: 40
Discharge: HOME | End: 2025-01-31

## 2025-01-31 VITALS
TEMPERATURE: 98 F | HEART RATE: 68 BPM | DIASTOLIC BLOOD PRESSURE: 64 MMHG | WEIGHT: 293 LBS | RESPIRATION RATE: 18 BRPM | BODY MASS INDEX: 47.77 KG/M2 | SYSTOLIC BLOOD PRESSURE: 117 MMHG

## 2025-01-31 DIAGNOSIS — Z17.0 MALIGNANT NEOPLASM OF LOWER-INNER QUADRANT OF RIGHT BREAST OF FEMALE, ESTROGEN RECEPTOR POSITIVE (HCC): Primary | ICD-10-CM

## 2025-01-31 DIAGNOSIS — C50.311 MALIGNANT NEOPLASM OF LOWER-INNER QUADRANT OF RIGHT BREAST OF FEMALE, ESTROGEN RECEPTOR POSITIVE (HCC): Primary | ICD-10-CM

## 2025-01-31 NOTE — PROGRESS NOTES
DEPARTMENT OF RADIATION ONCOLOGY   Follow up visit        2025    NAME:  Tatiana Mtz    :  1985 40 y.o. female     PCP: Tatiana Rodrigues APRN - CNP    DIAGNOSIS:  1. Malignant neoplasm of lower-inner quadrant of right breast of female, estrogen receptor positive (HCC)      Pathological stage Ia (pT1c, pN0 (SN),cM0, G1, ER/FL positive, HER2 negative) invasive ductal carcinoma of the right breast, SP partial mastectomy, sentinel node biopsy and bilateral oncoplastic reduction.  2 sentinel nodes negative, no LVSI, negative margins with the closest 1 at less than 1 mm, DCIS low-grade.    STAGING: Cancer Staging   Malignant neoplasm of lower-inner quadrant of right breast of female, estrogen receptor positive (HCC)  Staging form: Breast, AJCC 8th Edition  - Clinical stage from 2024: Stage IA (cT1b, cN0, cM0, G1, ER+, FL+, HER2-) - Signed by Michelle Negrete MD on 2024  - Pathologic stage from 10/7/2024: Stage IA (pT1c, pN0(sn), cM0, G1, ER+, FL+, HER2-, Oncotype DX score: 13) - Signed by Michelle Negrete MD on 10/7/2024      RECENT HISTORY: Tatiana Mtz is now 1 months out from completion of RT to the right breast.  The patient is here for routine follow-up, on physical examination there are no signs of radiation dermatitis, no masses are palpated bilaterally.  The patient complains of pain at the level of the oncoplastic reduction on the left, where stitches are coming out from the skin in a painful way.    Past medical, surgical, social and family histories reviewed and updated as indicated.    ALLERGIES:  Latex and Pcn [penicillins]       MEDICATIONS:   Current Outpatient Medications:     tamoxifen (NOLVADEX) 20 MG tablet, Take 1 tablet by mouth daily, Disp: 90 tablet, Rfl: 1    albuterol (PROVENTIL) (2.5 MG/3ML) 0.083% nebulizer solution, Take 3 mLs by nebulization every 4 hours as needed for Shortness of Breath or Wheezing, Disp: 30 each, Rfl: 0    amLODIPine (NORVASC) 5

## 2025-01-31 NOTE — PROGRESS NOTES
Tatiana Mtz  1/31/2025  9:10 AM      Vitals:    01/31/25 0907   Pulse: 68   Resp: 18   Temp: 98 °F (36.7 °C)    :    Wt Readings from Last 3 Encounters:   01/31/25 (!) 138.3 kg (305 lb)   01/08/25 133.8 kg (295 lb)   01/03/25 136.1 kg (300 lb)                Current Outpatient Medications:     tamoxifen (NOLVADEX) 20 MG tablet, Take 1 tablet by mouth daily, Disp: 90 tablet, Rfl: 1    albuterol (PROVENTIL) (2.5 MG/3ML) 0.083% nebulizer solution, Take 3 mLs by nebulization every 4 hours as needed for Shortness of Breath or Wheezing, Disp: 30 each, Rfl: 0    amLODIPine (NORVASC) 5 MG tablet, Take 1 tablet by mouth daily, Disp: 90 tablet, Rfl: 1    propranolol (INDERAL LA) 60 MG extended release capsule, Take 1 capsule by mouth 2 times daily, Disp: 180 capsule, Rfl: 1    silver sulfADIAZINE (SILVADENE) 1 % cream, Apply topically 3 to 4  times daily.  Okay to mix with Aquaphor and Desitin in equal parts, Disp: 50 g, Rfl: 2    albuterol sulfate HFA (PROVENTIL;VENTOLIN;PROAIR) 108 (90 Base) MCG/ACT inhaler, Inhale 2 puffs into the lungs 4 times daily as needed for Wheezing, Disp: 18 g, Rfl: 0    chlorpheniramine (ALLER-CHLOR) 4 MG tablet, Take 1 tablet by mouth every 6 hours as needed for Allergies, Disp: 20 tablet, Rfl: 0    fluticasone (FLONASE) 50 MCG/ACT nasal spray, 1 spray by Each Nostril route daily, Disp: 16 g, Rfl: 0    betamethasone dipropionate 0.05 % ointment, Apply topically daily., Disp: 50 g, Rfl: 2    pantoprazole (PROTONIX) 20 MG tablet, Take 1 tablet by mouth in the morning and at bedtime, Disp: 180 tablet, Rfl: 1    citalopram (CELEXA) 40 MG tablet, Take 1 tablet by mouth daily, Disp: , Rfl:     buPROPion (WELLBUTRIN XL) 150 MG extended release tablet, Take 1 tablet by mouth daily, Disp: , Rfl:     Handicap Placard MISC, by Does not apply route 5 years, Disp: 1 each, Rfl: 0    traZODone (DESYREL) 50 MG tablet, Take 1 tablet by mouth nightly, Disp: , Rfl:       Patient is seen today in follow up

## 2025-02-12 ENCOUNTER — HOSPITAL ENCOUNTER (OUTPATIENT)
Dept: INFUSION THERAPY | Age: 40
Discharge: HOME | End: 2025-02-12

## 2025-02-12 ENCOUNTER — TELEPHONE (OUTPATIENT)
Dept: CASE MANAGEMENT | Age: 40
End: 2025-02-12

## 2025-02-12 ENCOUNTER — OFFICE VISIT (OUTPATIENT)
Dept: ONCOLOGY | Age: 40
End: 2025-02-12
Payer: COMMERCIAL

## 2025-02-12 VITALS
TEMPERATURE: 97.9 F | OXYGEN SATURATION: 100 % | HEART RATE: 64 BPM | BODY MASS INDEX: 45.99 KG/M2 | SYSTOLIC BLOOD PRESSURE: 131 MMHG | WEIGHT: 293 LBS | DIASTOLIC BLOOD PRESSURE: 77 MMHG | HEIGHT: 67 IN

## 2025-02-12 DIAGNOSIS — Z76.89 ENCOUNTER TO ESTABLISH CARE: ICD-10-CM

## 2025-02-12 DIAGNOSIS — Z17.0 MALIGNANT NEOPLASM OF LOWER-INNER QUADRANT OF RIGHT BREAST OF FEMALE, ESTROGEN RECEPTOR POSITIVE (HCC): Primary | ICD-10-CM

## 2025-02-12 DIAGNOSIS — C50.311 MALIGNANT NEOPLASM OF LOWER-INNER QUADRANT OF RIGHT BREAST OF FEMALE, ESTROGEN RECEPTOR POSITIVE (HCC): Primary | ICD-10-CM

## 2025-02-12 DIAGNOSIS — E83.110 HEMOCHROMATOSIS, HEREDITARY (HCC): ICD-10-CM

## 2025-02-12 PROCEDURE — 3075F SYST BP GE 130 - 139MM HG: CPT | Performed by: INTERNAL MEDICINE

## 2025-02-12 PROCEDURE — 3078F DIAST BP <80 MM HG: CPT | Performed by: INTERNAL MEDICINE

## 2025-02-12 PROCEDURE — 99214 OFFICE O/P EST MOD 30 MIN: CPT | Performed by: INTERNAL MEDICINE

## 2025-02-12 RX ORDER — TAMOXIFEN CITRATE 20 MG/1
20 TABLET ORAL DAILY
Qty: 90 TABLET | Refills: 1 | Status: SHIPPED | OUTPATIENT
Start: 2025-02-12 | End: 2026-02-07

## 2025-02-12 NOTE — PROGRESS NOTES
[facility] MEDICAL ONCOLOGY  [address] [address]  Dept: [phone]  Loc: [phone]  Attending progress note      Reason for Visit:   Right breast cancer.    Referring Physician:  Michelle Negrete MD     PCP:  Tatiana Rodrigues APRN - CNP    History of Present Illness:     Mrs. Mtz is a very pleasant 40-year-old premenopausal lady, with a past medical history significant for chronic back pain, hypertension, and hereditary hemochromatosis, who had presented with an abnormal screening mammogram, which was her first mammogram, it was done on 6/4/2024, hide revealed an asymmetry on the right, at the 5 o'clock position, this was BI-RADS Category 0, was recommended coned-down compression imaging, which was done on 6/26/2024, along with concurrent right breast ultrasound, which had revealed a 6 mm irregular hypoechoic mass at the 6 o'clock position, she underwent on 7/2/2024 and ultrasound-guided right breast biopsy, pathology was consistent with invasive ductal carcinoma, ER positive greater than 90%, IA positive, greater than 90%, HER2/tj negative, plus by IHC.  The patient underwent on 9/17/2020 for a right breast Magseed localized lumpectomy with sentinel lymph node biopsy, right oncoplastic reduction with matching left breast reduction, pathology:    Cancer Case Summary  Procedure: Excision  Specimen laterality: Right  Histologic type: Invasive carcinoma of no special type (ductal)  [place] histologic score: 2 (tubule formation) +2 (nuclear  pleomorphism) +1 (mitotic count) = 5  Overall grade: Grade 1  Tumor size: 1.1 x 0.8 x 0.7 cm  Ductal carcinoma in situ: Low-grade DCIS is present; negative for  extensive intraductal component  Tumor extent: Not applicable  Lymphatic and/or vascular invasion: Not identified  Treatment effect in the breast: No known presurgical therapy  Margin status for invasive carcinoma: All margins

## 2025-02-12 NOTE — TELEPHONE ENCOUNTER
Met with patient during her follow up appointment with Dr. Jarod cedillo. Patient completed her active treatment on 12/30/2024 for her stage IA right breast cancer diagnosis. States that she is doing well besides some mild fatigue. Reports that her appetite is good and she is sleeping well. Upon inquiring, states that she is doing well with the Tamoxifen medication daily. Denies any joint pain side effects. Admits to continued fatigue and some hot flashes as night. Reviewed daily Calcium and Vitamin D supplement recommendations and NCCN DEXA Scan guidelines while on hormone therapy for breast cancer. Provided support and encouragement. Instructed patient in detail on her Cancer Treatment Summary and Survivorship Care Plan. Provided with a written copy. Aware that a copy will be sent to her PCP as well. Provided written copies of Patient Resource Booklet: Cancer Survivorship, Nutrition Following Cancer Treatment: Oncology Nutrition Guide, [facility] support group pamphlet, and Thriving and Surviving Post-Cancer Treatment: Nutritional and Emotional Support Services packet. Patient aware of mammogram due annually. Instructed patient to call with any questions or concerns. Verbally agreed. Patient appreciative of visit and information. Jaymie BOLAÑOSN, RN, Oncology Nurse Navigator

## 2025-02-17 DIAGNOSIS — K21.00 GASTROESOPHAGEAL REFLUX DISEASE WITH ESOPHAGITIS WITHOUT HEMORRHAGE: ICD-10-CM

## 2025-02-18 ENCOUNTER — TELEPHONE (OUTPATIENT)
Dept: OBGYN | Age: 40
End: 2025-02-18

## 2025-02-18 RX ORDER — PANTOPRAZOLE SODIUM 20 MG/1
20 TABLET, DELAYED RELEASE ORAL 2 TIMES DAILY
Qty: 180 TABLET | Refills: 1 | Status: SHIPPED | OUTPATIENT
Start: 2025-02-18

## 2025-02-18 NOTE — TELEPHONE ENCOUNTER
Pt has a referral to establish care with Dr Knight.  She is scheduled at the f/a appt in June.  Pt said that she just finished radiation for breast cancer and she was hoping to be seen sooner.  Please advise on scheduling/if pt can be seen sooner?

## 2025-02-19 ENCOUNTER — OFFICE VISIT (OUTPATIENT)
Dept: SURGERY | Age: 40
End: 2025-02-19
Payer: COMMERCIAL

## 2025-02-19 VITALS — TEMPERATURE: 97.8 F | HEART RATE: 68 BPM | OXYGEN SATURATION: 99 %

## 2025-02-19 DIAGNOSIS — C50.911 INVASIVE DUCTAL CARCINOMA OF RIGHT BREAST (HCC): Primary | ICD-10-CM

## 2025-02-19 PROCEDURE — 99213 OFFICE O/P EST LOW 20 MIN: CPT | Performed by: PLASTIC SURGERY

## 2025-02-19 NOTE — PROGRESS NOTES
neoplasm of lower-inner quadrant of right breast of female, estrogen receptor positive (HCC)    Malignant neoplasm of right breast (HCC)       Plan:     Educated patient that I do recommend a supportive yet soft bra especially to address the lymphedema on the right side.  They are reassured that there is no evidence of infection.  Scars are well-healed.  Recommend continued silicone therapy and massage to aid in harshad-incisional pain.  I did briefly discuss with the patient that the trifurcation area could possibly be excised if the pain persists after 1 year.  Patient is hesitant to have any scar revisions at this time.    F/U 6 months or as needed.    Call office with concerns or signs of infection.        This document is generated, in part, by voice recognition software and thus  syntax and grammatical errors are possible.    Ganesh Russell MD  8:59 AM  2/19/2025

## 2025-02-21 ENCOUNTER — OFFICE VISIT (OUTPATIENT)
Dept: PODIATRY | Age: 40
End: 2025-02-21

## 2025-02-21 VITALS
HEART RATE: 72 BPM | WEIGHT: 293 LBS | HEIGHT: 69 IN | TEMPERATURE: 97.5 F | OXYGEN SATURATION: 100 % | BODY MASS INDEX: 43.4 KG/M2

## 2025-02-21 DIAGNOSIS — M72.2 PLANTAR FASCIITIS: Primary | ICD-10-CM

## 2025-02-21 DIAGNOSIS — R26.2 DIFFICULTY WALKING: ICD-10-CM

## 2025-02-21 DIAGNOSIS — R60.0 LOCALIZED EDEMA: ICD-10-CM

## 2025-02-21 DIAGNOSIS — M79.672 PAIN IN LEFT FOOT: ICD-10-CM

## 2025-02-21 RX ORDER — NAPROXEN 500 MG/1
500 TABLET ORAL 2 TIMES DAILY WITH MEALS
Qty: 60 TABLET | Refills: 3 | Status: SHIPPED | OUTPATIENT
Start: 2025-02-21

## 2025-02-21 NOTE — PROGRESS NOTES
25     Tatiana INMAN Brothada    : 1985   Sex: female    Age: 40 y.o.    Patient's PCP/Provider is:  Tatiana Rodrigues APRN - CNP    Subjective:  Patient is seen today for follow-up regarding continued care regarding chronic fasciitis issues left lower extremity.  Patient still having recalcitrant pain into the left heel and arch region with all multiple treatments provided in the recent past.  Patient denies any recent injuries or changes in activities.  Patient is starting to get compensatory pain into the right foot as well.  Patient denies any additional abnormalities at this time.    Chief Complaint   Patient presents with    Foot Pain     Plantars fasciitis bilateral       ROS:  Const: Positives and pertinent negatives as per HPI.    Musculo: Denies symptoms other than stated above.  Neuro: Denies symptoms other than stated above.  Skin: Denies symptoms other than stated above.    Current Medications:    Current Outpatient Medications:     naproxen (NAPROSYN) 500 MG tablet, Take 1 tablet by mouth 2 times daily (with meals), Disp: 60 tablet, Rfl: 3    pantoprazole (PROTONIX) 20 MG tablet, Take 1 tablet by mouth in the morning and at bedtime, Disp: 180 tablet, Rfl: 1    tamoxifen (NOLVADEX) 20 MG tablet, Take 1 tablet by mouth daily, Disp: 90 tablet, Rfl: 1    albuterol (PROVENTIL) (2.5 MG/3ML) 0.083% nebulizer solution, Take 3 mLs by nebulization every 4 hours as needed for Shortness of Breath or Wheezing, Disp: 30 each, Rfl: 0    amLODIPine (NORVASC) 5 MG tablet, Take 1 tablet by mouth daily, Disp: 90 tablet, Rfl: 1    propranolol (INDERAL LA) 60 MG extended release capsule, Take 1 capsule by mouth 2 times daily, Disp: 180 capsule, Rfl: 1    albuterol sulfate HFA (PROVENTIL;VENTOLIN;PROAIR) 108 (90 Base) MCG/ACT inhaler, Inhale 2 puffs into the lungs 4 times daily as needed for Wheezing, Disp: 18 g, Rfl: 0    fluticasone (FLONASE) 50 MCG/ACT nasal spray, 1 spray by Each Nostril route daily, Disp: 16 g,

## 2025-02-21 NOTE — PROGRESS NOTES
Patient here for bilateral Plantars Fasciitis. Patient states she is doing no better.  Tatiana Rodrigues APRN - CNP last visit 10/19/2024   Electronically signed by Crorie Chase LPN on 2/21/2025 at 8:08 AM

## 2025-03-04 DIAGNOSIS — Z17.0 MALIGNANT NEOPLASM OF LOWER-INNER QUADRANT OF RIGHT BREAST OF FEMALE, ESTROGEN RECEPTOR POSITIVE (HCC): Primary | ICD-10-CM

## 2025-03-04 DIAGNOSIS — C50.311 MALIGNANT NEOPLASM OF LOWER-INNER QUADRANT OF RIGHT BREAST OF FEMALE, ESTROGEN RECEPTOR POSITIVE (HCC): Primary | ICD-10-CM

## 2025-03-07 ENCOUNTER — TELEPHONE (OUTPATIENT)
Dept: OCCUPATIONAL THERAPY | Age: 40
End: 2025-03-07

## 2025-03-07 NOTE — TELEPHONE ENCOUNTER
Therapist received referral for lymphedema clinic.  Therapist called patient to schedule and patient unable to discuss secondary to leaving to  her children from school.  Patient will call back to schedule.  If therapist does not hear from patient he will call 10Mar. 2025.

## 2025-03-14 ENCOUNTER — OFFICE VISIT (OUTPATIENT)
Dept: PODIATRY | Age: 40
End: 2025-03-14
Payer: COMMERCIAL

## 2025-03-14 VITALS — HEIGHT: 69 IN | WEIGHT: 293 LBS | BODY MASS INDEX: 43.4 KG/M2

## 2025-03-14 DIAGNOSIS — R26.2 DIFFICULTY WALKING: ICD-10-CM

## 2025-03-14 DIAGNOSIS — M72.2 PLANTAR FASCIITIS: Primary | ICD-10-CM

## 2025-03-14 DIAGNOSIS — M79.672 PAIN IN LEFT FOOT: ICD-10-CM

## 2025-03-14 PROCEDURE — 99213 OFFICE O/P EST LOW 20 MIN: CPT | Performed by: PODIATRIST

## 2025-03-14 NOTE — PROGRESS NOTES
3/14/25     Tatiana INMAN Brothada    : 1985   Sex: female    Age: 40 y.o.    Patient's PCP/Provider is:  Tatiana Rodrigues APRN - CNP    Subjective:  Patient is seen today for follow-up regarding continued care regarding plantar fascial issues to her left lower extremity.  Patient unable to perform due to denial by insurance company.  Patient did need to discuss other treatment options available for care at this time until a potential MRI can be performed.  No other additional abnormalities noted at this time.    Chief Complaint   Patient presents with    Follow-up     Mri denial        ROS:  Const: Positives and pertinent negatives as per HPI.    Musculo: Denies symptoms other than stated above.  Neuro: Denies symptoms other than stated above.  Skin: Denies symptoms other than stated above.    Current Medications:    Current Outpatient Medications:     naproxen (NAPROSYN) 500 MG tablet, Take 1 tablet by mouth 2 times daily (with meals), Disp: 60 tablet, Rfl: 3    pantoprazole (PROTONIX) 20 MG tablet, Take 1 tablet by mouth in the morning and at bedtime, Disp: 180 tablet, Rfl: 1    tamoxifen (NOLVADEX) 20 MG tablet, Take 1 tablet by mouth daily, Disp: 90 tablet, Rfl: 1    albuterol (PROVENTIL) (2.5 MG/3ML) 0.083% nebulizer solution, Take 3 mLs by nebulization every 4 hours as needed for Shortness of Breath or Wheezing, Disp: 30 each, Rfl: 0    amLODIPine (NORVASC) 5 MG tablet, Take 1 tablet by mouth daily, Disp: 90 tablet, Rfl: 1    propranolol (INDERAL LA) 60 MG extended release capsule, Take 1 capsule by mouth 2 times daily, Disp: 180 capsule, Rfl: 1    albuterol sulfate HFA (PROVENTIL;VENTOLIN;PROAIR) 108 (90 Base) MCG/ACT inhaler, Inhale 2 puffs into the lungs 4 times daily as needed for Wheezing, Disp: 18 g, Rfl: 0    fluticasone (FLONASE) 50 MCG/ACT nasal spray, 1 spray by Each Nostril route daily, Disp: 16 g, Rfl: 0    betamethasone dipropionate 0.05 % ointment, Apply topically daily., Disp: 50 g, Rfl:

## 2025-03-14 NOTE — PROGRESS NOTES
Patient is in today to discuss further treatment options for her left foot as the MRI was denied by her insurance. Pcp is Tatiana Rodrigues APRN - CNP  Last ov 10/29/24

## 2025-03-18 ENCOUNTER — HOSPITAL ENCOUNTER (OUTPATIENT)
Dept: OCCUPATIONAL THERAPY | Age: 40
Setting detail: THERAPIES SERIES
Discharge: HOME | End: 2025-03-18
Payer: COMMERCIAL

## 2025-03-18 PROCEDURE — 97165 OT EVAL LOW COMPLEX 30 MIN: CPT | Performed by: OCCUPATIONAL THERAPIST

## 2025-03-18 NOTE — PROGRESS NOTES
Occupational Therapy      OCCUPATIONAL THERAPY INITIAL LYMPHEDEMA EVALUATION    [facility]  [address] [facility]  [address]   Phone: [phone]  Fax: [phone]     Date:  3/18/2025  Initial Evaluation Date: 2025     Evaluating Therapist: TALITA Anders/L, CLT-VITALY    Patient Name:  Tatiana Mtz    :  1985    Restrictions/Precautions:   fall risk  Diagnosis:  Malignant neoplasm of lower-inner quadrant of right breast of female, estrogen receptor positive (C50.2311, Z17.0)       Date of Surgery/Injury: n/a    Insurance/Certification information:  [facility]     [id number]  Insurance Auth:  #[id number] for 6 visits from 2025 through 2025  Plan of care signed (Y/N): N  Visit# / total visits: Evaluation + visit #0/6    Referring Practitioner:  Zuri Carrizales MD    NPI:  [id number]  Specific Practitioner Orders: Evaluation and Treatment    Assessment of current deficits   []Pain  []Skin Integrity   [x]Lymphedema   []Functional transfers/mobility   []ADLs   []Strength    []Cognition  []IADLs   []Safety Awareness   []  Motor Endurance    []Fine Motor Coordination   []Balance   []Vision/perception  []Sensation []Gross Motor Coordination  [x]ROM     OT PLAN OF CARE   OT POC based on physician orders, patient diagnosis and results of clinical assessment    Frequency/Duration:  1-3x per week for 12 treatment sessions from 2025 through 2025  Specific OT Treatment to include:     Plan of Care:     [x]97140-Manual Lymph Drainage and Combined Decongestive Therapy  [x]16507- Skilled Multilayer Short Stretch Compression Bandaging/ Therapeutic Exercise  [x]Skin Care Education [x]HEP including Self MLD Education and/or Self Bandaging  [x]Education for Lymphedema Risks/Precautions     [x] Caregiver Education   []other:      Patient Specific Goal: to reduce lymphedema in chest and to reduce pain      STG: 3 weeks   Patient will demonstrate

## 2025-03-19 LAB — SURGICAL PATHOLOGY REPORT: NORMAL

## 2025-03-20 ENCOUNTER — OFFICE VISIT (OUTPATIENT)
Dept: FAMILY MEDICINE CLINIC | Age: 40
End: 2025-03-20
Payer: COMMERCIAL

## 2025-03-20 VITALS
TEMPERATURE: 97.8 F | HEART RATE: 75 BPM | WEIGHT: 293 LBS | SYSTOLIC BLOOD PRESSURE: 124 MMHG | BODY MASS INDEX: 43.4 KG/M2 | RESPIRATION RATE: 18 BRPM | HEIGHT: 69 IN | OXYGEN SATURATION: 96 % | DIASTOLIC BLOOD PRESSURE: 80 MMHG

## 2025-03-20 DIAGNOSIS — R05.1 ACUTE COUGH: ICD-10-CM

## 2025-03-20 DIAGNOSIS — J45.901 ACUTE BRONCHITIS WITH ASTHMA WITH ACUTE EXACERBATION: Primary | ICD-10-CM

## 2025-03-20 DIAGNOSIS — J20.9 ACUTE BRONCHITIS WITH ASTHMA WITH ACUTE EXACERBATION: Primary | ICD-10-CM

## 2025-03-20 PROCEDURE — 99214 OFFICE O/P EST MOD 30 MIN: CPT | Performed by: NURSE PRACTITIONER

## 2025-03-20 PROCEDURE — 3079F DIAST BP 80-89 MM HG: CPT | Performed by: NURSE PRACTITIONER

## 2025-03-20 PROCEDURE — 3074F SYST BP LT 130 MM HG: CPT | Performed by: NURSE PRACTITIONER

## 2025-03-20 RX ORDER — AZITHROMYCIN 250 MG/1
TABLET, FILM COATED ORAL
Qty: 6 TABLET | Refills: 0 | Status: SHIPPED | OUTPATIENT
Start: 2025-03-20

## 2025-03-20 RX ORDER — PREDNISONE 20 MG/1
20 TABLET ORAL 2 TIMES DAILY
Qty: 10 TABLET | Refills: 0 | Status: SHIPPED | OUTPATIENT
Start: 2025-03-20 | End: 2025-03-25

## 2025-03-20 NOTE — PROGRESS NOTES
Resp: 18   Temp: 97.8 °F (36.6 °C)   TempSrc: Temporal   SpO2: 96%   Weight: (!) 142 kg (313 lb)   Height: 1.753 m (5' 9\")       Physical Exam (PE)    Physical Exam  Constitutional:       Appearance: Normal appearance.   HENT:      Head: Normocephalic.      Right Ear: Tympanic membrane, ear canal and external ear normal.      Left Ear: Tympanic membrane, ear canal and external ear normal.      Nose: Rhinorrhea present. No congestion.      Mouth/Throat:      Mouth: Mucous membranes are moist.      Pharynx: Oropharynx is clear. No oropharyngeal exudate or posterior oropharyngeal erythema.   Eyes:      Pupils: Pupils are equal, round, and reactive to light.   Cardiovascular:      Rate and Rhythm: Normal rate and regular rhythm.      Pulses: Normal pulses.      Heart sounds: Normal heart sounds.   Pulmonary:      Effort: Pulmonary effort is normal.      Breath sounds: Normal breath sounds. No wheezing, rhonchi or rales.   Abdominal:      General: Bowel sounds are normal.      Palpations: Abdomen is soft.   Musculoskeletal:         General: Normal range of motion.      Cervical back: Normal range of motion and neck supple.   Lymphadenopathy:      Cervical: No cervical adenopathy.   Skin:     General: Skin is warm and dry.      Capillary Refill: Capillary refill takes less than 2 seconds.   Neurological:      General: No focal deficit present.      Mental Status: She is alert and oriented to person, place, and time.   Psychiatric:         Mood and Affect: Mood normal.         Behavior: Behavior normal.          Testing:   (All laboratory and radiology results have been personally reviewed by myself)  Labs:  No results found for this visit on 03/20/25.    Imaging:  All Radiology results interpreted by Radiologist unless otherwise noted.  No orders to display       Assessment / Plan:   The patient's vitals, allergies, medications, and past medical history have been reviewed.  Tatiana was seen today for cough and

## 2025-03-25 ENCOUNTER — HOSPITAL ENCOUNTER (OUTPATIENT)
Dept: OCCUPATIONAL THERAPY | Age: 40
Setting detail: THERAPIES SERIES
Discharge: HOME | End: 2025-03-25
Payer: COMMERCIAL

## 2025-03-25 PROCEDURE — 97140 MANUAL THERAPY 1/> REGIONS: CPT | Performed by: OCCUPATIONAL THERAPIST

## 2025-03-25 PROCEDURE — 97535 SELF CARE MNGMENT TRAINING: CPT | Performed by: OCCUPATIONAL THERAPIST

## 2025-03-25 NOTE — PROGRESS NOTES
Occupational Therapy        OCCUPATIONAL THERAPY PROGRESS NOTE  [facility]  [address] [facility]  [address]              Phone: [phone]             Fax: [phone]     Date:  3/25/2025  Initial Evaluation Date: 2025     Evaluating Therapist: TALITA Anders/L, CLT-VITALY    Patient Name:  Tatiana Mtz    :  1985    Restrictions/Precautions:   fall risk  Diagnosis:  Malignant neoplasm of lower-inner quadrant of right breast of female, estrogen receptor positive (C50.2311, Z17.0)                      Date of Surgery/Injury: n/a    Insurance/Certification information:  [facility]   [id number]   Plan of care signed (Y/N): N  Visit# / total visits: Evaluation + Visit #1    Referring Practitioner:  Zuri Carrizales MD      NPI: [id number]   Specific Practitioner Orders: Evaluation and Treatment    Assessment of current deficits   []Pain  []Skin Integrity   [x]Lymphedema   []Functional transfers/mobility   []ADLs   []Strength    []Cognition  []IADLs   []Safety Awareness   []  Motor Endurance    []Fine Motor Coordination   []Balance   []Vision/perception  []Sensation []Gross Motor Coordination  [x]ROM     OT PLAN OF CARE   OT POC based on physician orders, patient diagnosis and results of clinical assessment    Frequency/Duration:  1-3x per week for 12 treatment sessions from 2025 through 2025   Specific OT Treatment to include:     Plan of Care:     [x]97140-Manual Lymph Drainage and Combined Decongestive Therapy  [x]13003- Skilled Multilayer Short Stretch Compression Bandaging/ Therapeutic Exercise  [x]Skin Care Education [x]HEP including Self MLD Education and/or Self Bandaging  [x]Education for Lymphedema Risks/Precautions     [x] Caregiver Education   []other:      Patient Specific Goal: to reduce lymphedema in chest and to reduce pain     STG: 3 weeks   Patient will demonstrate knowledge and understanding for lymphedema precautions,

## 2025-03-27 ENCOUNTER — HOSPITAL ENCOUNTER (OUTPATIENT)
Dept: OCCUPATIONAL THERAPY | Age: 40
Setting detail: THERAPIES SERIES
Discharge: HOME | End: 2025-03-27
Payer: COMMERCIAL

## 2025-03-27 PROCEDURE — 97535 SELF CARE MNGMENT TRAINING: CPT | Performed by: OCCUPATIONAL THERAPIST

## 2025-03-27 PROCEDURE — 97140 MANUAL THERAPY 1/> REGIONS: CPT | Performed by: OCCUPATIONAL THERAPIST

## 2025-03-27 NOTE — PROGRESS NOTES
Occupational Therapy        OCCUPATIONAL THERAPY PROGRESS NOTE  [facility]  [address] [facility]  [address]              Phone: [phone]             Fax: [phone]     Date:  3/27/2025  Initial Evaluation Date: 2025     Evaluating Therapist: TALITA Anders/L, CLT-VITALY    Patient Name:  Tatiana Mtz    :  1985    Restrictions/Precautions:   fall risk  Diagnosis:  Malignant neoplasm of lower-inner quadrant of right breast of female, estrogen receptor positive (C50.2311, Z17.0)                      Date of Surgery/Injury: n/a    Insurance/Certification information:  [facility]   [id number]   Plan of care signed (Y/N): N  Visit# / total visits: Evaluation + Visit #2    Referring Practitioner:  Zuri Carrizales MD      NPI: [id number]   Specific Practitioner Orders: Evaluation and Treatment    Assessment of current deficits   []Pain  []Skin Integrity   [x]Lymphedema   []Functional transfers/mobility   []ADLs   []Strength    []Cognition  []IADLs   []Safety Awareness   []  Motor Endurance    []Fine Motor Coordination   []Balance   []Vision/perception  []Sensation []Gross Motor Coordination  [x]ROM     OT PLAN OF CARE   OT POC based on physician orders, patient diagnosis and results of clinical assessment    Frequency/Duration:  1-3x per week for 12 treatment sessions from 2025 through 2025   Specific OT Treatment to include:     Plan of Care:     [x]97140-Manual Lymph Drainage and Combined Decongestive Therapy  [x]77890- Skilled Multilayer Short Stretch Compression Bandaging/ Therapeutic Exercise  [x]Skin Care Education [x]HEP including Self MLD Education and/or Self Bandaging  [x]Education for Lymphedema Risks/Precautions     [x] Caregiver Education   []other:      Patient Specific Goal: to reduce lymphedema in chest and to reduce pain     STG: 3 weeks   Patient will demonstrate knowledge and understanding for lymphedema precautions,

## 2025-03-28 NOTE — PROGRESS NOTES
alcohol to 3 drinks or less per week.  Continue tamoxifen 20 mg daily at the discretion of medical oncology team.  Aspirin 81 mg by mouth once daily while on tamoxifen.  Annual GYN exam.  Annual eye exam.  Osteoporosis prevention or treatment: Ensure adequate calcium and vitamin D intake; if dietary intake is inadequate, dietary supplementation is recommended. Females and males should consume:    Calcium: 1,000 to 1,200 mg/day   Vitamin D: 800 to 1,000 units daily      Continue follow up with Medical Oncology, Primary Care, and all specialties as directed.  RTC June bilateral screening mammogram same day and as needed.      I spent a total of 30 minutes on the date of the service which included preparing to see the patient (including review of medical oncology notes) , face-to-face patient care, completing clinical documentation, obtaining and/or reviewing separately obtained history, performing a medically appropriate examination, counseling and educating the patient/family/caregiver, ordering medications, tests, or procedures, communicating with other HCPs (not separately reported), independently interpreting results (not separately reported), communicating results to the patient/family/caregiver and care coordination (not separately reported).  In addition, all questions were answered to her apparent satisfaction.      This document is generated, in part, by voice recognition software and thus syntax and grammatical errors are possible.    Mechelle Meehan RN (Terrie), MSN, APRN-CNP, AOCNP  Advanced Oncology Certified Nurse Practitioner  [facility]-Department of Breast Surgery   Office Phone [phone]; Fax [phone]  Located within the [facility]

## 2025-03-31 ENCOUNTER — TELEPHONE (OUTPATIENT)
Dept: CASE MANAGEMENT | Age: 40
End: 2025-03-31

## 2025-03-31 NOTE — TELEPHONE ENCOUNTER
Patient name:    Tatiana Mtz                                                                                  YOB: 1985  PCP:                  Tatiana Rodrigues, APRN - CNP    Reason for Call:   Tatiana Mtz was called today for survivorship follow up upon completion of treatment for stage IA right breast cancer.    NCCN Survivorship Assessment Completed. See below for assessment.    Follow up visits with oncology specialist(s) and PCP: I educated the patient on the importance of continuing regular care with her PCP, including continued screenings for cancer and other chronic illness.I explained to the patient that they will need to continue to see their oncology specialist(s) as scheduled for continued cancer surveillance and/or treatment and to continue with any currently ordered therapy treatments.     Patient verbalizes understanding of the education during the visit today. Patient denies any concerns today and promises to call myself or their medical oncologist if a concern or need arises.       Survivorship Care Survey    Cardiac Health   1. Do you have shortness of breath or chest pain after physical activities (eg, climbing stairs) or exercise? No  2. Do you have shortness of breath when lying flat, wake up at night needing to get air, or have persistent leg swelling? No    Anxiety, Depression,Trauma, and Distress  3. In the past two weeks, have you been bothered more than half the days by little interest or pleasure in doing things? No  4. In the past two weeks, have you been bothered more than half the days by feeling down, depressed, or hopeless? No  5. Has stress, worry, anger, fear of recurrence, or distress about effects of cancer treatment interfered with your life? No    Cognitive Function  6. Do you have difficulties with multitasking or paying attention? No  7. Do you have difficulties with remembering things? No  8. Does your thinking seem slow? No    Fatigue

## 2025-04-01 ENCOUNTER — APPOINTMENT (OUTPATIENT)
Dept: OCCUPATIONAL THERAPY | Age: 40
End: 2025-04-01
Payer: COMMERCIAL

## 2025-04-03 ENCOUNTER — HOSPITAL ENCOUNTER (OUTPATIENT)
Dept: OCCUPATIONAL THERAPY | Age: 40
Setting detail: THERAPIES SERIES
Discharge: HOME | End: 2025-04-03
Payer: COMMERCIAL

## 2025-04-03 PROCEDURE — 97140 MANUAL THERAPY 1/> REGIONS: CPT | Performed by: OCCUPATIONAL THERAPIST

## 2025-04-03 NOTE — PROGRESS NOTES
Occupational Therapy        OCCUPATIONAL THERAPY PROGRESS NOTE  [facility]  [address] [facility]  [address]              Phone: [phone]             Fax: [phone]     Date:  4/3/2025  Initial Evaluation Date: 2025     Evaluating Therapist: TALITA Anders/L, CLT-VITALY    Patient Name:  Tatiana Mtz    :  1985    Restrictions/Precautions:   fall risk  Diagnosis:  Malignant neoplasm of lower-inner quadrant of right breast of female, estrogen receptor positive (C50.2311, Z17.0)                      Date of Surgery/Injury: n/a    Insurance/Certification information:  [facility]   [id number]   Plan of care signed (Y/N): N  Visit# / total visits: Evaluation + Visit #3    Referring Practitioner:  Zuri Carrizales MD      NPI: [id number]   Specific Practitioner Orders: Evaluation and Treatment    Assessment of current deficits   []Pain  []Skin Integrity   [x]Lymphedema   []Functional transfers/mobility   []ADLs   []Strength    []Cognition  []IADLs   []Safety Awareness   []  Motor Endurance    []Fine Motor Coordination   []Balance   []Vision/perception  []Sensation []Gross Motor Coordination  [x]ROM     OT PLAN OF CARE   OT POC based on physician orders, patient diagnosis and results of clinical assessment    Frequency/Duration:  1-3x per week for 12 treatment sessions from 2025 through 2025   Specific OT Treatment to include:     Plan of Care:     [x]97140-Manual Lymph Drainage and Combined Decongestive Therapy  [x]37801- Skilled Multilayer Short Stretch Compression Bandaging/ Therapeutic Exercise  [x]Skin Care Education [x]HEP including Self MLD Education and/or Self Bandaging  [x]Education for Lymphedema Risks/Precautions     [x] Caregiver Education   []other:      Patient Specific Goal: to reduce lymphedema in chest and to reduce pain     STG: 3 weeks   Patient will demonstrate knowledge and understanding for lymphedema precautions,

## 2025-04-07 ENCOUNTER — OFFICE VISIT (OUTPATIENT)
Dept: BREAST CENTER | Age: 40
End: 2025-04-07
Payer: COMMERCIAL

## 2025-04-07 VITALS
OXYGEN SATURATION: 97 % | TEMPERATURE: 97 F | BODY MASS INDEX: 44.41 KG/M2 | RESPIRATION RATE: 22 BRPM | SYSTOLIC BLOOD PRESSURE: 136 MMHG | HEIGHT: 68 IN | HEART RATE: 78 BPM | WEIGHT: 293 LBS | DIASTOLIC BLOOD PRESSURE: 78 MMHG

## 2025-04-07 DIAGNOSIS — Z12.31 VISIT FOR SCREENING MAMMOGRAM: Primary | ICD-10-CM

## 2025-04-07 DIAGNOSIS — K62.5 RECTAL BLEEDING: ICD-10-CM

## 2025-04-07 PROBLEM — R42 DIZZINESS AND GIDDINESS: Status: RESOLVED | Noted: 2021-08-23 | Resolved: 2025-04-07

## 2025-04-07 PROBLEM — N39.3 STRESS INCONTINENCE OF URINE: Status: ACTIVE | Noted: 2023-06-04

## 2025-04-07 PROBLEM — K61.0 PERIANAL ABSCESS: Status: RESOLVED | Noted: 2022-09-14 | Resolved: 2025-04-07

## 2025-04-07 PROBLEM — C50.911 MALIGNANT NEOPLASM OF RIGHT BREAST: Status: RESOLVED | Noted: 2024-08-05 | Resolved: 2025-04-07

## 2025-04-07 PROBLEM — J02.8 ACUTE PHARYNGITIS DUE TO OTHER SPECIFIED ORGANISMS: Status: RESOLVED | Noted: 2023-02-08 | Resolved: 2025-04-07

## 2025-04-07 PROBLEM — Z15.89 MONOALLELIC MUTATION OF MITF GENE: Status: ACTIVE | Noted: 2024-12-23

## 2025-04-07 PROBLEM — N81.11 CYSTOCELE, MIDLINE: Status: ACTIVE | Noted: 2023-06-04

## 2025-04-07 PROBLEM — N39.46 MIXED INCONTINENCE: Status: ACTIVE | Noted: 2025-04-07

## 2025-04-07 PROBLEM — Z15.09 MONOALLELIC MUTATION OF MITF GENE: Status: ACTIVE | Noted: 2024-12-23

## 2025-04-07 PROCEDURE — 3075F SYST BP GE 130 - 139MM HG: CPT | Performed by: NURSE PRACTITIONER

## 2025-04-07 PROCEDURE — 99214 OFFICE O/P EST MOD 30 MIN: CPT | Performed by: NURSE PRACTITIONER

## 2025-04-07 PROCEDURE — 3078F DIAST BP <80 MM HG: CPT | Performed by: NURSE PRACTITIONER

## 2025-04-07 ASSESSMENT — ENCOUNTER SYMPTOMS
BLOOD IN STOOL: 1
RECTAL PAIN: 0
STRIDOR: 0

## 2025-04-07 NOTE — PATIENT INSTRUCTIONS
suppression therapies, anti-estrogen treatments.  This is because these treatments affect the ovaries, which produce estrogen, leading to symptoms that mimic menopause including changes in libido, vaginal discomfort, hot flashes, weight gain, and more. These symptoms may be temporary (coinciding with treatment) and vary in severity (manageable in some or negatively impacting the quality of life for others). Regardless, this is something to discuss with your care team, as menopause increases the risk of osteoporosis. Referral to a provider who specializes and menopause treatment may also be recommended.        Remember, it is important to keep us informed of any new or worsening issues related to your breast or your breast cancer treatment.  We are here to serve you and to help you manage your care in the best way  possible.      Sincerely,    [facility] Breast Care Team.  Phone number: [phone]; we can be reached Monday through Friday from 8 AM to 4:00 PM.

## 2025-04-08 DIAGNOSIS — E66.813 CLASS 3 SEVERE OBESITY DUE TO EXCESS CALORIES WITHOUT SERIOUS COMORBIDITY WITH BODY MASS INDEX (BMI) OF 40.0 TO 44.9 IN ADULT: Primary | ICD-10-CM

## 2025-04-10 ENCOUNTER — HOSPITAL ENCOUNTER (OUTPATIENT)
Dept: OCCUPATIONAL THERAPY | Age: 40
Setting detail: THERAPIES SERIES
Discharge: HOME | End: 2025-04-10
Payer: COMMERCIAL

## 2025-04-10 NOTE — PROGRESS NOTES
Occupational Therapy          [facility] [place]  MARY OCCUPATIONAL THERAPY  [address] [facility] [address]  Dept: [phone]  Loc: [phone]   [facility] Fax: [phone]    Cancellation/No Show Note      Date:  4/10/2025    Patient Name:  Tatiana Mtz     :  1985         PT ID: [id number]    Total missed visits including today: 0       Total number of no shows: 0    For today's appointment patient:   [x]  Cancelled   []  Rescheduled appointment   []  No-show     Reason given by patient:   []  Patient ill   []  Conflicting appointment   []  No transportation   []  Conflict with work   []  No reason given   []  Other:    Comments:                    Comments:  patient called clinic and cancelled scheduled lymphedema appointment secondary to patient son ill.  Patient next appointment cancelled    Electronically signed by:  TALITA Anders/L, CLT-VITALY

## 2025-04-17 ENCOUNTER — HOSPITAL ENCOUNTER (OUTPATIENT)
Dept: OCCUPATIONAL THERAPY | Age: 40
Setting detail: THERAPIES SERIES
Discharge: HOME | End: 2025-04-17
Payer: COMMERCIAL

## 2025-04-17 DIAGNOSIS — E66.813 CLASS 3 SEVERE OBESITY DUE TO EXCESS CALORIES WITHOUT SERIOUS COMORBIDITY WITH BODY MASS INDEX (BMI) OF 40.0 TO 44.9 IN ADULT (HCC): ICD-10-CM

## 2025-04-17 LAB
ALBUMIN: 4 G/DL (ref 3.5–5.2)
ALP BLD-CCNC: 71 U/L (ref 35–104)
ALT SERPL-CCNC: 17 U/L (ref 0–35)
ANION GAP SERPL CALCULATED.3IONS-SCNC: 11 MMOL/L (ref 7–16)
AST SERPL-CCNC: 20 U/L (ref 0–35)
BASOPHILS ABSOLUTE: 0.06 K/UL (ref 0–0.2)
BASOPHILS RELATIVE PERCENT: 1 % (ref 0–2)
BILIRUB SERPL-MCNC: 0.3 MG/DL (ref 0–1.2)
BUN BLDV-MCNC: 13 MG/DL (ref 6–20)
CALCIUM SERPL-MCNC: 9.2 MG/DL (ref 8.6–10)
CHLORIDE BLD-SCNC: 103 MMOL/L (ref 98–107)
CO2: 24 MMOL/L (ref 22–29)
CREAT SERPL-MCNC: 0.7 MG/DL (ref 0.5–1)
EOSINOPHILS ABSOLUTE: 0.18 K/UL (ref 0.05–0.5)
EOSINOPHILS RELATIVE PERCENT: 3 % (ref 0–6)
GFR, ESTIMATED: >90 ML/MIN/1.73M2
GLUCOSE BLD-MCNC: 84 MG/DL (ref 74–99)
HBA1C MFR BLD: 5.5 % (ref 4–5.6)
HCT VFR BLD CALC: 34.7 % (ref 34–48)
HEMOGLOBIN: 10.4 G/DL (ref 11.5–15.5)
IMMATURE GRANULOCYTES %: 1 % (ref 0–5)
IMMATURE GRANULOCYTES ABSOLUTE: 0.03 K/UL (ref 0–0.58)
LYMPHOCYTES ABSOLUTE: 1.61 K/UL (ref 1.5–4)
LYMPHOCYTES RELATIVE PERCENT: 26 % (ref 20–42)
MCH RBC QN AUTO: 24.5 PG (ref 26–35)
MCHC RBC AUTO-ENTMCNC: 30 G/DL (ref 32–34.5)
MCV RBC AUTO: 81.8 FL (ref 80–99.9)
MONOCYTES ABSOLUTE: 0.42 K/UL (ref 0.1–0.95)
MONOCYTES RELATIVE PERCENT: 7 % (ref 2–12)
NEUTROPHILS ABSOLUTE: 3.86 K/UL (ref 1.8–7.3)
NEUTROPHILS RELATIVE PERCENT: 63 % (ref 43–80)
PDW BLD-RTO: 16.1 % (ref 11.5–15)
PLATELET # BLD: 411 K/UL (ref 130–450)
PMV BLD AUTO: 8.9 FL (ref 7–12)
POTASSIUM SERPL-SCNC: 4.5 MMOL/L (ref 3.4–4.5)
RBC # BLD: 4.24 M/UL (ref 3.5–5.5)
SODIUM BLD-SCNC: 138 MMOL/L (ref 136–145)
TOTAL PROTEIN: 6.8 G/DL (ref 6.4–8.3)
TSH SERPL DL<=0.05 MIU/L-ACNC: 0.8 UIU/ML (ref 0.27–4.2)
WBC # BLD: 6.2 K/UL (ref 4.5–11.5)

## 2025-04-17 PROCEDURE — 97140 MANUAL THERAPY 1/> REGIONS: CPT | Performed by: OCCUPATIONAL THERAPIST

## 2025-04-17 PROCEDURE — 97535 SELF CARE MNGMENT TRAINING: CPT | Performed by: OCCUPATIONAL THERAPIST

## 2025-04-17 NOTE — PROGRESS NOTES
Occupational Therapy        OCCUPATIONAL THERAPY PROGRESS NOTE  [facility]  [address] [facility]  [address]              Phone: [phone]             Fax: [phone]     Date:  2025  Initial Evaluation Date: 2025     Evaluating Therapist: TALITA Anders/L, CLT-VITALY    Patient Name:  Tatiana Mtz    :  1985    Restrictions/Precautions:   fall risk  Diagnosis:  Malignant neoplasm of lower-inner quadrant of right breast of female, estrogen receptor positive (C50.2311, Z17.0)                      Date of Surgery/Injury: n/a    Insurance/Certification information:  [facility]   [id number]   Plan of care signed (Y/N): N  Visit# / total visits: Evaluation + Visit #4/6  Insurance Auth:  #[id number] for 6 visits from 2025 through 2025   Referring Practitioner:  Zuri Carrizales MD      NPI: [id number]   Specific Practitioner Orders: Evaluation and Treatment    Assessment of current deficits   []Pain  []Skin Integrity   [x]Lymphedema   []Functional transfers/mobility   []ADLs   []Strength    []Cognition  []IADLs   []Safety Awareness   []  Motor Endurance    []Fine Motor Coordination   []Balance   []Vision/perception  []Sensation []Gross Motor Coordination  [x]ROM     OT PLAN OF CARE   OT POC based on physician orders, patient diagnosis and results of clinical assessment    Frequency/Duration:  1-3x per week for 12 treatment sessions from 2025 through 2025   Specific OT Treatment to include:     Plan of Care:     [x]97140-Manual Lymph Drainage and Combined Decongestive Therapy  [x]29397- Skilled Multilayer Short Stretch Compression Bandaging/ Therapeutic Exercise  [x]Skin Care Education [x]HEP including Self MLD Education and/or Self Bandaging  [x]Education for Lymphedema Risks/Precautions     [x] Caregiver Education   []other:      Patient Specific Goal: to reduce lymphedema in chest and to reduce pain     STG: 3 weeks

## 2025-04-18 ENCOUNTER — RESULTS FOLLOW-UP (OUTPATIENT)
Dept: PRIMARY CARE CLINIC | Age: 40
End: 2025-04-18

## 2025-04-24 ENCOUNTER — APPOINTMENT (OUTPATIENT)
Dept: OCCUPATIONAL THERAPY | Age: 40
End: 2025-04-24
Payer: COMMERCIAL

## 2025-04-28 ENCOUNTER — OFFICE VISIT (OUTPATIENT)
Dept: SURGERY | Age: 40
End: 2025-04-28
Payer: COMMERCIAL

## 2025-04-28 ENCOUNTER — PREP FOR PROCEDURE (OUTPATIENT)
Dept: SURGERY | Age: 40
End: 2025-04-28

## 2025-04-28 VITALS
WEIGHT: 293 LBS | BODY MASS INDEX: 48.5 KG/M2 | HEART RATE: 78 BPM | RESPIRATION RATE: 15 BRPM | TEMPERATURE: 97.7 F | SYSTOLIC BLOOD PRESSURE: 124 MMHG | DIASTOLIC BLOOD PRESSURE: 62 MMHG | OXYGEN SATURATION: 97 %

## 2025-04-28 DIAGNOSIS — K62.5 RECTAL BLEEDING: Primary | ICD-10-CM

## 2025-04-28 DIAGNOSIS — K62.5 RECTAL BLEEDING: ICD-10-CM

## 2025-04-28 PROCEDURE — 3078F DIAST BP <80 MM HG: CPT | Performed by: SURGERY

## 2025-04-28 PROCEDURE — 99214 OFFICE O/P EST MOD 30 MIN: CPT | Performed by: SURGERY

## 2025-04-28 PROCEDURE — 3074F SYST BP LT 130 MM HG: CPT | Performed by: SURGERY

## 2025-04-28 RX ORDER — POLYETHYLENE GLYCOL 3350 17 G/17G
238 POWDER, FOR SOLUTION ORAL DAILY
Qty: 238 G | Refills: 0 | Status: SHIPPED | OUTPATIENT
Start: 2025-04-28 | End: 2025-04-29

## 2025-04-28 RX ORDER — BISACODYL 5 MG
10 TABLET, DELAYED RELEASE (ENTERIC COATED) ORAL 2 TIMES DAILY
Qty: 4 TABLET | Refills: 0 | Status: SHIPPED | OUTPATIENT
Start: 2025-04-28

## 2025-04-28 ASSESSMENT — ENCOUNTER SYMPTOMS
SHORTNESS OF BREATH: 0
BACK PAIN: 0
NAUSEA: 0
ANAL BLEEDING: 0
DIARRHEA: 0
EYES NEGATIVE: 1
VOMITING: 0
BLOOD IN STOOL: 0
CONSTIPATION: 0
COUGH: 1
ABDOMINAL DISTENTION: 0
ABDOMINAL PAIN: 0
ALLERGIC/IMMUNOLOGIC NEGATIVE: 1
GASTROINTESTINAL NEGATIVE: 1

## 2025-04-28 NOTE — PROGRESS NOTES
myalgias. Negative for back pain, gait problem and joint swelling.   Skin: Negative.    Allergic/Immunologic: Negative.    Neurological: Negative.  Negative for dizziness, weakness and headaches.   Hematological: Negative.    Psychiatric/Behavioral: Negative.  Negative for confusion, decreased concentration and sleep disturbance.        /62 (BP Site: Right Upper Arm, Patient Position: Sitting, BP Cuff Size: Large Adult)   Pulse 78   Temp 97.7 °F (36.5 °C) (Temporal)   Resp 15   Wt (!) 144.7 kg (319 lb)   SpO2 97%   BMI 48.50 kg/m²   Physical Exam  Constitutional:       Appearance: Normal appearance. She is obese.   HENT:      Head: Normocephalic and atraumatic.      Nose: Nose normal.      Mouth/Throat:      Mouth: Mucous membranes are moist.      Pharynx: Oropharynx is clear.   Eyes:      Extraocular Movements: Extraocular movements intact.      Pupils: Pupils are equal, round, and reactive to light.   Cardiovascular:      Rate and Rhythm: Normal rate and regular rhythm.      Pulses: Normal pulses.      Heart sounds: Normal heart sounds.   Pulmonary:      Effort: Pulmonary effort is normal.      Breath sounds: Normal breath sounds.   Abdominal:      General: There is no distension.      Palpations: Abdomen is soft.      Tenderness: There is no abdominal tenderness.   Musculoskeletal:         General: No tenderness or signs of injury.      Cervical back: Normal range of motion and neck supple.   Skin:     General: Skin is warm and dry.   Neurological:      General: No focal deficit present.      Mental Status: She is alert and oriented to person, place, and time.   Psychiatric:         Mood and Affect: Mood normal.         Behavior: Behavior normal.         Thought Content: Thought content normal.         Judgment: Judgment normal.           ASSESSMENT/PLAN:   Rectal bleeding  -- plan for colonoscopy    Prep:  miralax prep    Minor surgery with risk factors:  obesity; breast cancer    Colonoscopy. The

## 2025-04-28 NOTE — PATIENT INSTRUCTIONS
[facility]    Instructions for Clear liquid diet  Definition  A clear liquid diet consists of clear liquids, such as water, broth and plain gelatin, that are easily digested and leave no undigested residue in your intestinal tract. Your doctor may prescribe a clear liquid diet before certain medical procedures or if you have certain digestive problems. Because a clear liquid diet can't provide you with adequate calories and nutrients, it shouldn't be continued for more than a few days.     Purpose  A clear liquid diet is often used before tests, procedures or surgeries that require no food in your stomach or intestines, such as before colonoscopy. It may also be recommended as a short-term diet if you have certain digestive problems, such as nausea, vomiting or diarrhea, or after certain types of surgery.     Diet details  A clear liquid diet helps maintain adequate hydration, provides some important electrolytes, such as sodium and potassium, and gives some energy at a time when a full diet isn't possible or recommended.     The following foods are allowed in a clear liquid diet:   Plain water   Fruit juices without pulp, such as apple juice, white grape juice.  Strained lemonade   Clear, fat-free broth (bouillon or consomme)   Clear sodas    Plain gelatin (Not RED or PURPLE)  Honey   Ice pops without bits of fruit or fruit pulp   Tea or coffee without milk or cream  ** NOTHING RED OR PURPLE  **Do not eat corn, tomatoes, peas or watermelon 3 to 5 days before procedure.    Any foods not on the above list should be avoided. Also, for certain tests, such as colon exams, your doctor may ask you to avoid liquids or gelatin with red coloring.     A typical menu on the clear liquid diet may look like this:   Breakfast:  1 glass fruit juice  1 cup coffee or tea (without dairy products)  1 cup broth  1 bowl gelatin     Snack:  1 glass fruit juice  1 bowl gelatin     Lunch:  1 glass fruit juice  1 glass

## 2025-04-29 ENCOUNTER — TELEPHONE (OUTPATIENT)
Dept: SURGERY | Age: 40
End: 2025-04-29

## 2025-04-29 ENCOUNTER — HOSPITAL ENCOUNTER (OUTPATIENT)
Dept: OCCUPATIONAL THERAPY | Age: 40
Setting detail: THERAPIES SERIES
Discharge: HOME | End: 2025-04-29
Payer: COMMERCIAL

## 2025-04-29 PROCEDURE — 97535 SELF CARE MNGMENT TRAINING: CPT | Performed by: OCCUPATIONAL THERAPIST

## 2025-04-29 PROCEDURE — 97140 MANUAL THERAPY 1/> REGIONS: CPT | Performed by: OCCUPATIONAL THERAPIST

## 2025-04-29 NOTE — TELEPHONE ENCOUNTER
Patient is scheduled for Colonoscopy with   on 5/16/25 at 9 am with an arrival time of 7:45 am. Pt accepted date and time and verbalized understanding. Procedure letter mailed to patient as well.  Electronically signed by Saskia Jones on 4/29/2025 at 2:53 PM

## 2025-04-29 NOTE — PROGRESS NOTES
Occupational Therapy  OCCUPATIONAL THERAPY UPDATE/PROGRESS NOTE  [facility]  [address] [facility]  [address]              Phone: [phone]             Fax: [phone]     Date:  2025  Initial Evaluation Date: 2025     Evaluating Therapist: TALITA Anders/L, CLT-VITALY    Patient Name:  Tatiana Mtz    :  1985    Restrictions/Precautions:   fall risk  Diagnosis:  Malignant neoplasm of lower-inner quadrant of right breast of female, estrogen receptor positive (C50.2311, Z17.0)        Date of Surgery/Injury: n/a    Insurance/Certification information:  [facility]   [id number]   Visit# / total visits: Evaluation + Visit #4/6  Insurance Auth:  #[id number] for 6 visits from 2025 through 2025   Plan of care signed (Y/N): Yes  Visit#:  5   Total Visits to date:  Evaluation +Visit #5/6   Current update duration from 2025 to 2025  Cancels/No-shows to date: 0  Last seen by therapist:  2025  Patient reaction to treatment:  patient making steady gains toward therapy goals. Patient currently performing self hand manual lymphatic drainage massage 3x per week, bilateral range of motion exercises 2x per week, and utilizes her compression garment daily.  Patient trying to increase exercise / activity daily, utilize elevation for fluid management, and follow a low sodium eating lifestyle.  Patient has received financial responsibility for lymphedema pump and will be following through with acquiring pump.    Garment / DME recommended:  lymphedema pump, compression garment    Referring Practitioner:  Zuri Carrizales MD      NPI: [id number]   Specific Practitioner Orders: Evaluation and Treatment    Assessment of current deficits   []Pain  []Skin Integrity   [x]Lymphedema   []Functional transfers/mobility   []ADLs   []Strength    []Cognition  []IADLs   []Safety Awareness   []  Motor Endurance    []Fine Motor Coordination

## 2025-04-30 ASSESSMENT — PATIENT HEALTH QUESTIONNAIRE - PHQ9
4. FEELING TIRED OR HAVING LITTLE ENERGY: NEARLY EVERY DAY
10. IF YOU CHECKED OFF ANY PROBLEMS, HOW DIFFICULT HAVE THESE PROBLEMS MADE IT FOR YOU TO DO YOUR WORK, TAKE CARE OF THINGS AT HOME, OR GET ALONG WITH OTHER PEOPLE: SOMEWHAT DIFFICULT
SUM OF ALL RESPONSES TO PHQ QUESTIONS 1-9: 8
8. MOVING OR SPEAKING SO SLOWLY THAT OTHER PEOPLE COULD HAVE NOTICED. OR THE OPPOSITE, BEING SO FIGETY OR RESTLESS THAT YOU HAVE BEEN MOVING AROUND A LOT MORE THAN USUAL: NOT AT ALL
7. TROUBLE CONCENTRATING ON THINGS, SUCH AS READING THE NEWSPAPER OR WATCHING TELEVISION: NOT AT ALL
1. LITTLE INTEREST OR PLEASURE IN DOING THINGS: NOT AT ALL
SUM OF ALL RESPONSES TO PHQ QUESTIONS 1-9: 8
3. TROUBLE FALLING OR STAYING ASLEEP: SEVERAL DAYS
6. FEELING BAD ABOUT YOURSELF - OR THAT YOU ARE A FAILURE OR HAVE LET YOURSELF OR YOUR FAMILY DOWN: SEVERAL DAYS
4. FEELING TIRED OR HAVING LITTLE ENERGY: NEARLY EVERY DAY
2. FEELING DOWN, DEPRESSED OR HOPELESS: NOT AT ALL
3. TROUBLE FALLING OR STAYING ASLEEP: SEVERAL DAYS
9. THOUGHTS THAT YOU WOULD BE BETTER OFF DEAD, OR OF HURTING YOURSELF: NOT AT ALL
SUM OF ALL RESPONSES TO PHQ QUESTIONS 1-9: 8
5. POOR APPETITE OR OVEREATING: NEARLY EVERY DAY
8. MOVING OR SPEAKING SO SLOWLY THAT OTHER PEOPLE COULD HAVE NOTICED. OR THE OPPOSITE - BEING SO FIDGETY OR RESTLESS THAT YOU HAVE BEEN MOVING AROUND A LOT MORE THAN USUAL: NOT AT ALL
1. LITTLE INTEREST OR PLEASURE IN DOING THINGS: NOT AT ALL
7. TROUBLE CONCENTRATING ON THINGS, SUCH AS READING THE NEWSPAPER OR WATCHING TELEVISION: NOT AT ALL
2. FEELING DOWN, DEPRESSED OR HOPELESS: NOT AT ALL
SUM OF ALL RESPONSES TO PHQ QUESTIONS 1-9: 8
9. THOUGHTS THAT YOU WOULD BE BETTER OFF DEAD, OR OF HURTING YOURSELF: NOT AT ALL
10. IF YOU CHECKED OFF ANY PROBLEMS, HOW DIFFICULT HAVE THESE PROBLEMS MADE IT FOR YOU TO DO YOUR WORK, TAKE CARE OF THINGS AT HOME, OR GET ALONG WITH OTHER PEOPLE: SOMEWHAT DIFFICULT
6. FEELING BAD ABOUT YOURSELF - OR THAT YOU ARE A FAILURE OR HAVE LET YOURSELF OR YOUR FAMILY DOWN: SEVERAL DAYS
SUM OF ALL RESPONSES TO PHQ QUESTIONS 1-9: 8
5. POOR APPETITE OR OVEREATING: NEARLY EVERY DAY

## 2025-05-01 ENCOUNTER — OFFICE VISIT (OUTPATIENT)
Dept: PRIMARY CARE CLINIC | Age: 40
End: 2025-05-01
Payer: COMMERCIAL

## 2025-05-01 VITALS
HEIGHT: 68 IN | RESPIRATION RATE: 18 BRPM | WEIGHT: 293 LBS | SYSTOLIC BLOOD PRESSURE: 136 MMHG | HEART RATE: 75 BPM | OXYGEN SATURATION: 97 % | TEMPERATURE: 98.8 F | BODY MASS INDEX: 44.41 KG/M2 | DIASTOLIC BLOOD PRESSURE: 86 MMHG

## 2025-05-01 DIAGNOSIS — R06.83 SNORING: ICD-10-CM

## 2025-05-01 DIAGNOSIS — R60.0 LOWER EXTREMITY EDEMA: ICD-10-CM

## 2025-05-01 DIAGNOSIS — F33.41 RECURRENT MAJOR DEPRESSIVE DISORDER, IN PARTIAL REMISSION: Primary | ICD-10-CM

## 2025-05-01 DIAGNOSIS — R53.83 OTHER FATIGUE: ICD-10-CM

## 2025-05-01 DIAGNOSIS — E66.813 CLASS 3 SEVERE OBESITY DUE TO EXCESS CALORIES WITHOUT SERIOUS COMORBIDITY WITH BODY MASS INDEX (BMI) OF 40.0 TO 44.9 IN ADULT (HCC): ICD-10-CM

## 2025-05-01 DIAGNOSIS — I10 ESSENTIAL HYPERTENSION: ICD-10-CM

## 2025-05-01 DIAGNOSIS — E83.110 HEMOCHROMATOSIS, HEREDITARY: ICD-10-CM

## 2025-05-01 DIAGNOSIS — K21.00 GASTROESOPHAGEAL REFLUX DISEASE WITH ESOPHAGITIS WITHOUT HEMORRHAGE: ICD-10-CM

## 2025-05-01 LAB
ALBUMIN: 3.7 G/DL (ref 3.5–5.2)
ALP BLD-CCNC: 78 U/L (ref 35–104)
ALT SERPL-CCNC: 16 U/L (ref 0–35)
ANION GAP SERPL CALCULATED.3IONS-SCNC: 10 MMOL/L (ref 7–16)
AST SERPL-CCNC: 21 U/L (ref 0–35)
BASOPHILS ABSOLUTE: 0.06 K/UL (ref 0–0.2)
BASOPHILS RELATIVE PERCENT: 1 % (ref 0–2)
BILIRUB SERPL-MCNC: 0.3 MG/DL (ref 0–1.2)
BUN BLDV-MCNC: 9 MG/DL (ref 6–20)
CALCIUM SERPL-MCNC: 9.1 MG/DL (ref 8.6–10)
CHLORIDE BLD-SCNC: 103 MMOL/L (ref 98–107)
CO2: 26 MMOL/L (ref 22–29)
CREAT SERPL-MCNC: 0.8 MG/DL (ref 0.5–1)
EOSINOPHILS ABSOLUTE: 0.13 K/UL (ref 0.05–0.5)
EOSINOPHILS RELATIVE PERCENT: 2 % (ref 0–6)
GFR, ESTIMATED: >90 ML/MIN/1.73M2
GLUCOSE BLD-MCNC: 91 MG/DL (ref 74–99)
HCT VFR BLD CALC: 34.2 % (ref 34–48)
HEMOGLOBIN: 10.2 G/DL (ref 11.5–15.5)
IMMATURE GRANULOCYTES %: 1 % (ref 0–5)
IMMATURE GRANULOCYTES ABSOLUTE: 0.04 K/UL (ref 0–0.58)
IRON % SATURATION: 11 % (ref 15–50)
IRON: 32 UG/DL (ref 37–145)
LYMPHOCYTES ABSOLUTE: 1.72 K/UL (ref 1.5–4)
LYMPHOCYTES RELATIVE PERCENT: 27 % (ref 20–42)
MCH RBC QN AUTO: 24.3 PG (ref 26–35)
MCHC RBC AUTO-ENTMCNC: 29.8 G/DL (ref 32–34.5)
MCV RBC AUTO: 81.6 FL (ref 80–99.9)
MONOCYTES ABSOLUTE: 0.41 K/UL (ref 0.1–0.95)
MONOCYTES RELATIVE PERCENT: 7 % (ref 2–12)
NEUTROPHILS ABSOLUTE: 3.95 K/UL (ref 1.8–7.3)
NEUTROPHILS RELATIVE PERCENT: 63 % (ref 43–80)
PDW BLD-RTO: 15.3 % (ref 11.5–15)
PLATELET # BLD: 423 K/UL (ref 130–450)
PMV BLD AUTO: 8.7 FL (ref 7–12)
POTASSIUM SERPL-SCNC: 4.1 MMOL/L (ref 3.5–5.1)
RBC # BLD: 4.19 M/UL (ref 3.5–5.5)
SODIUM BLD-SCNC: 138 MMOL/L (ref 136–145)
TOTAL IRON BINDING CAPACITY: 288 UG/DL (ref 250–450)
TOTAL PROTEIN: 6.8 G/DL (ref 6.4–8.3)
VITAMIN B-12: 677 PG/ML (ref 232–1245)
VITAMIN D 25-HYDROXY: 44.9 NG/ML (ref 30–100)
WBC # BLD: 6.3 K/UL (ref 4.5–11.5)

## 2025-05-01 PROCEDURE — 3075F SYST BP GE 130 - 139MM HG: CPT | Performed by: NURSE PRACTITIONER

## 2025-05-01 PROCEDURE — 3079F DIAST BP 80-89 MM HG: CPT | Performed by: NURSE PRACTITIONER

## 2025-05-01 PROCEDURE — 99214 OFFICE O/P EST MOD 30 MIN: CPT | Performed by: NURSE PRACTITIONER

## 2025-05-01 PROCEDURE — G2211 COMPLEX E/M VISIT ADD ON: HCPCS | Performed by: NURSE PRACTITIONER

## 2025-05-01 RX ORDER — PROPRANOLOL HYDROCHLORIDE 60 MG/1
60 CAPSULE, EXTENDED RELEASE ORAL DAILY
Qty: 90 CAPSULE | Refills: 1 | Status: SHIPPED | OUTPATIENT
Start: 2025-05-01

## 2025-05-01 RX ORDER — TRIAMTERENE AND HYDROCHLOROTHIAZIDE 37.5; 25 MG/1; MG/1
1 TABLET ORAL DAILY
Qty: 30 TABLET | Refills: 3 | Status: SHIPPED | OUTPATIENT
Start: 2025-05-01

## 2025-05-01 RX ORDER — AMLODIPINE BESYLATE 5 MG/1
5 TABLET ORAL DAILY
Qty: 90 TABLET | Refills: 1 | Status: SHIPPED | OUTPATIENT
Start: 2025-05-01

## 2025-05-01 RX ORDER — PANTOPRAZOLE SODIUM 20 MG/1
20 TABLET, DELAYED RELEASE ORAL 2 TIMES DAILY
Qty: 180 TABLET | Refills: 1 | Status: SHIPPED | OUTPATIENT
Start: 2025-05-01

## 2025-05-01 SDOH — ECONOMIC STABILITY: FOOD INSECURITY: WITHIN THE PAST 12 MONTHS, YOU WORRIED THAT YOUR FOOD WOULD RUN OUT BEFORE YOU GOT MONEY TO BUY MORE.: NEVER TRUE

## 2025-05-01 SDOH — ECONOMIC STABILITY: FOOD INSECURITY: WITHIN THE PAST 12 MONTHS, THE FOOD YOU BOUGHT JUST DIDN'T LAST AND YOU DIDN'T HAVE MONEY TO GET MORE.: NEVER TRUE

## 2025-05-01 ASSESSMENT — ENCOUNTER SYMPTOMS: RESPIRATORY NEGATIVE: 1

## 2025-05-01 NOTE — PROGRESS NOTES
Subjective  CC: Patient presents for follow-up.    HPI:   The patient has completed radiation, she is now currently on tamoxifen.  She presents today with multiple complaints.  First, prior to diagnosis of breast cancer, the patient was complaining of rectal bleeding.  She will have colonoscopy done in about 2 weeks.  She reports bleeding has improved.  She does continue to be anemic, iron studies will be obtained today, she reports significant fatigue.  It is unclear if this is related to anemia or possible other etiology.  She reports she snores and does not sleep well, was referred for sleep study but did not have this done prior to diagnosis of cancer.  The patient at this point in time is willing to have this completed.    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  Propranolol was started many years ago, it does not seem likely this is contributing to her symptoms although it is a possibility.  She does feel that blood pressures have been typically above goal when she is going to other offices although she is not monitoring this at home.  She has noticed lower extremity edema and is going to be getting compression hose from her occupational therapist who is treating her for lymphedema.    ROS:  Review of Systems   Constitutional:         Fatigue   HENT: Negative.     Eyes:         Wears glasses   Respiratory: Negative.     Cardiovascular:         Hypertension   Gastrointestinal:         Hx rectal abscess, complicated by fistula and need further repair   Musculoskeletal:         Lumbar pain   Psychiatric/Behavioral:          Depression, anxiety          Current Outpatient Medications:     amLODIPine

## 2025-05-02 ENCOUNTER — RESULTS FOLLOW-UP (OUTPATIENT)
Dept: PRIMARY CARE CLINIC | Age: 40
End: 2025-05-02

## 2025-05-04 DIAGNOSIS — I10 ESSENTIAL HYPERTENSION: ICD-10-CM

## 2025-05-05 DIAGNOSIS — R60.0 LOWER EXTREMITY EDEMA: ICD-10-CM

## 2025-05-05 RX ORDER — TRIAMTERENE AND HYDROCHLOROTHIAZIDE 37.5; 25 MG/1; MG/1
1 TABLET ORAL DAILY
Qty: 30 TABLET | Refills: 3 | Status: SHIPPED | OUTPATIENT
Start: 2025-05-05

## 2025-05-05 RX ORDER — AMLODIPINE BESYLATE 5 MG/1
5 TABLET ORAL DAILY
Qty: 90 TABLET | Refills: 1 | OUTPATIENT
Start: 2025-05-05

## 2025-05-07 ENCOUNTER — CLINICAL DOCUMENTATION (OUTPATIENT)
Dept: ONCOLOGY | Age: 40
End: 2025-05-07

## 2025-05-07 NOTE — PROGRESS NOTES
Received referral for wt management from PCP. Pt actively follows at [facility] medical oncology office. Pt to be assessed by oncology dietitian at that location as able. If pt desires more regular follow up, may benefit from referral to general outpatient registered dietitian, phone number [phone]. Referral closed at this time.  Electronically signed by Joy Lucas MS, RD, LD on 5/7/2025 at 1:58 PM

## 2025-05-08 DIAGNOSIS — Z17.0 MALIGNANT NEOPLASM OF LOWER-INNER QUADRANT OF RIGHT BREAST OF FEMALE, ESTROGEN RECEPTOR POSITIVE (HCC): Primary | ICD-10-CM

## 2025-05-08 DIAGNOSIS — C50.311 MALIGNANT NEOPLASM OF LOWER-INNER QUADRANT OF RIGHT BREAST OF FEMALE, ESTROGEN RECEPTOR POSITIVE (HCC): Primary | ICD-10-CM

## 2025-05-09 NOTE — PROGRESS NOTES
[facility] PRE-ADMISSION TESTING   COLONOSCOPY INSTRUCTIONS  PAT- Phone Number: [phone]    COLONOSCOPY INSTRUCTIONS:     [x] Bowel Prep instructions reviewed - any questions regarding your prep, please contact surgeon's office.  [x] Colonoscopy: 1-2 days prior: Clear liquids only - nothing red or purple in color.  [x] Antibacterial Soap Shower Night before AND the morning of procedure.  [x] No solid food after midnight. You may have SIPS of clear liquids up until 2 hours before your arrival time to the hospital.   [x] Do not wear makeup, lotions, powders, deodorant.   [x] No tobacco products, illegal drugs, or alcohol within 24 hours of your surgery.  [x] Jewelry or valuables should not be brought to the hospital. All body and/or tongue piercing's must be removed prior to arriving to hospital. No contact lens or hair pins.   [x] Urine Pregnancy test will be preformed the day of surgery. A specimen sample may be brought from home.  [x] Arrange transportation with a responsible adult  to and from the hospital. If you do not have a responsible adult  to transport you, you will need to make arrangements with a medical transportation company. Arrange for someone to be with you for the remainder of the day and for 24 hours after your procedure due to having had anesthesia.    -Who will be your  for transportation? Mom   -Who will be staying with you for 24 hrs after your procedure? Mom  [x] Bring insurance card and photo ID.  [x] Bring copy of living will or healthcare power of  papers to be placed in your electronic record.    PARKING INSTRUCTIONS:     [x] ARRIVAL DATE & TIME:  5/16  @  0745   [x] Times are subject to change. We will contact you the business day before surgery if that were to occur.  [x] Enter into the [facility] Entrance. Two people may accompany you. Masks are not required.  [x] Parking Lot \"I\" is where you will park. It is located on  the corner of [address] and [address]. The entrance is on [address].   Only one vehicle - per patient, is permitted in parking lot.   Upon entering the parking lot, a voucher ticket will print.    MEDICATION INSTRUCTIONS:    [x] Bring a complete list of your medications, please write the last time you took the medicine, give this list to the nurse in Pre-Op.  [x] Take ONLY the following medications the morning of surgery:  citalopram, propranolol, amlodipine, pantoprazole   [x] Stop all herbal supplements and vitamins 5 days before surgery.   [x] Stop all NSAIDS 7 days before surgery.  [] DO NOT take any diabetic medicine the morning of surgery.  Follow instructions for insulin the day before surgery.  [] If you are diabetic and your blood sugar is low or you feel symptomatic, you may drink 1-2 ounces of apple juice or take a glucose tablet.            -The morning of your procedure, you may call the pre-op area if you have concerns about your blood sugar [phone].  [x] Use your inhalers the morning of surgery.  Bring your emergency inhaler with you day of surgery.  [x] Follow physician instructions regarding any blood thinners you may be taking.    WHAT TO EXPECT:    [x] The day of your procedure you will be greeted and checked in by the [facility] .  In addition, you will be registered in the [facility] by a Patient Access Representative. Please bring your photo ID and insurance card. A nurse will greet you in accordance to the time you are needed in the pre-op area to prepare you for surgery. Please do not be discouraged if you are not greeted in the order you arrive as there are many variables that are involved in patient preparation. Your patience is greatly appreciated as you wait for your nurse.  [x] Delays may occur. Staff will make a sincere effort to keep you informed of delays. If any delays occur with your procedure, we apologize ahead of time for your inconvenience

## 2025-05-13 ENCOUNTER — HOSPITAL ENCOUNTER (OUTPATIENT)
Dept: OCCUPATIONAL THERAPY | Age: 40
Setting detail: THERAPIES SERIES
Discharge: HOME | End: 2025-05-13

## 2025-05-13 NOTE — PROGRESS NOTES
Occupational Therapy            [facility]  MARY OCCUPATIONAL THERAPY  [address] [address]  Dept: [phone]  Loc: [phone]   [facility] OT Fax: [phone]    Cancellation/No Show Note      Date:  2025    Patient Name:  Tatiana Mtz     :  1985         PT ID: [id number]    Total missed visits including today: 2       Total number of no shows: 0    For today's appointment patient:   [x]  Cancelled   []  Rescheduled appointment   []  No-show     Reason given by patient:   []  Patient ill   []  Conflicting appointment   []  No transportation   [x]  Conflict with work   []  No reason given   []  Other:    Comments:                    Comments:  patient next appointment scheduled.    Electronically signed by:  Rosalio Brizuela OTR/LDELMY

## 2025-05-14 ENCOUNTER — CLINICAL DOCUMENTATION (OUTPATIENT)
Age: 40
End: 2025-05-14

## 2025-05-14 ENCOUNTER — OFFICE VISIT (OUTPATIENT)
Age: 40
End: 2025-05-14

## 2025-05-14 ENCOUNTER — HOSPITAL ENCOUNTER (OUTPATIENT)
Dept: INFUSION THERAPY | Age: 40
Discharge: HOME | End: 2025-05-14

## 2025-05-14 VITALS
HEIGHT: 68 IN | WEIGHT: 293 LBS | SYSTOLIC BLOOD PRESSURE: 146 MMHG | HEART RATE: 84 BPM | DIASTOLIC BLOOD PRESSURE: 88 MMHG | BODY MASS INDEX: 44.41 KG/M2 | TEMPERATURE: 98.2 F | OXYGEN SATURATION: 98 %

## 2025-05-14 DIAGNOSIS — D50.9 IRON DEFICIENCY ANEMIA, UNSPECIFIED IRON DEFICIENCY ANEMIA TYPE: Primary | ICD-10-CM

## 2025-05-14 DIAGNOSIS — Z17.0 MALIGNANT NEOPLASM OF LOWER-INNER QUADRANT OF RIGHT BREAST OF FEMALE, ESTROGEN RECEPTOR POSITIVE (HCC): ICD-10-CM

## 2025-05-14 DIAGNOSIS — C50.311 MALIGNANT NEOPLASM OF LOWER-INNER QUADRANT OF RIGHT BREAST OF FEMALE, ESTROGEN RECEPTOR POSITIVE (HCC): ICD-10-CM

## 2025-05-14 DIAGNOSIS — E83.110 HEREDITARY HEMOCHROMATOSIS: ICD-10-CM

## 2025-05-14 RX ORDER — TAMOXIFEN CITRATE 20 MG/1
20 TABLET ORAL DAILY
Qty: 90 TABLET | Refills: 1 | Status: SHIPPED | OUTPATIENT
Start: 2025-05-14 | End: 2026-05-09

## 2025-05-14 NOTE — PROGRESS NOTES
Tatiana Mtz  5/14/2025  Ht Readings from Last 1 Encounters:   05/14/25 1.727 m (5' 8\")     Wt Readings from Last 10 Encounters:   05/14/25 (!) 139.7 kg (308 lb)   05/01/25 (!) 142.9 kg (315 lb)   04/28/25 (!) 144.7 kg (319 lb)   04/07/25 (!) 142.4 kg (314 lb)   03/20/25 (!) 142 kg (313 lb)   03/14/25 136.1 kg (300 lb)   02/21/25 (!) 137.9 kg (304 lb)   02/12/25 (!) 138.2 kg (304 lb 9.6 oz)   01/31/25 (!) 138.3 kg (305 lb)   01/08/25 133.8 kg (295 lb)     BMI: 46.83    Assessment: Met with patient following medical oncology appointment. Received referral from PCP for weight management. Patient admits to struggling with her weight and diet for 20 plus years. Now that she is on tamoxifen, she has found weight loss to be even more challenging. States that she has been successful at losing weight in the past, but always gains it back. Currently, she is not following a specific diet for weight loss. Says that she has been eating a lot of fast food. In the past, she has tried Weight Watchers, Noom, calorie tracking, and was on a GLP-1 prior to her cancer diagnosis. Patient found the most success with the GLP-1 drug and calorie tracking. Says that the GLP-1 drug helped to eliminate her \"food noise,\" which is something she struggles with daily. She was recently prescribed Zepbound, but her insurance will not cover it. She is in the process of filing an appeal and is hopeful that she can begin taking a GLP-1 again. She feels as if she has a decent understanding of healthy eating, but finds it difficult to stay on track and hold herself accountable especially since her family does not have the same health goals.     Recommendations: Recommended patient begin tracking food on My Fitness Pal, as she had success with this in the past. Provided calorie and protein goals for patient to aim for (9981-4138 kcal/day and 115-125 grams of protein/day). Set the goal of eating 5-6 small meals/snacks daily. Recommended including

## 2025-05-14 NOTE — PROGRESS NOTES
[facility] MEDICAL ONCOLOGY  [address] [address]  Dept: [phone]  Loc: [phone]  Attending progress note      Reason for Visit:   Right breast cancer.    Referring Physician:  Michelle Negrete MD     PCP:  Tatiana Rodrigues APRN - CNP    History of Present Illness:     Mrs. Mtz is a very pleasant 40-year-old premenopausal lady, with a past medical history significant for chronic back pain, hypertension, and hereditary hemochromatosis, who had presented with an abnormal screening mammogram, which was her first mammogram, it was done on 6/4/2024, hide revealed an asymmetry on the right, at the 5 o'clock position, this was BI-RADS Category 0, was recommended coned-down compression imaging, which was done on 6/26/2024, along with concurrent right breast ultrasound, which had revealed a 6 mm irregular hypoechoic mass at the 6 o'clock position, she underwent on 7/2/2024 and ultrasound-guided right breast biopsy, pathology was consistent with invasive ductal carcinoma, ER positive greater than 90%, KS positive, greater than 90%, HER2/tj negative, plus by IHC.  The patient underwent on 9/17/2020 for a right breast Magseed localized lumpectomy with sentinel lymph node biopsy, right oncoplastic reduction with matching left breast reduction, pathology:    Cancer Case Summary  Procedure: Excision  Specimen laterality: Right  Histologic type: Invasive carcinoma of no special type (ductal)  [place] histologic score: 2 (tubule formation) +2 (nuclear  pleomorphism) +1 (mitotic count) = 5  Overall grade: Grade 1  Tumor size: 1.1 x 0.8 x 0.7 cm  Ductal carcinoma in situ: Low-grade DCIS is present; negative for  extensive intraductal component  Tumor extent: Not applicable  Lymphatic and/or vascular invasion: Not identified  Treatment effect in the breast: No known presurgical therapy  Margin status for invasive carcinoma: All margins

## 2025-05-15 ENCOUNTER — TELEPHONE (OUTPATIENT)
Age: 40
End: 2025-05-15

## 2025-05-15 RX ORDER — SODIUM CHLORIDE 0.9 % (FLUSH) 0.9 %
5-40 SYRINGE (ML) INJECTION EVERY 12 HOURS SCHEDULED
Status: CANCELLED | OUTPATIENT
Start: 2025-05-15

## 2025-05-15 RX ORDER — SODIUM CHLORIDE 9 MG/ML
INJECTION, SOLUTION INTRAVENOUS CONTINUOUS
Status: CANCELLED | OUTPATIENT
Start: 2025-05-15

## 2025-05-15 RX ORDER — SODIUM CHLORIDE 9 MG/ML
25 INJECTION, SOLUTION INTRAVENOUS PRN
Status: CANCELLED | OUTPATIENT
Start: 2025-05-15

## 2025-05-15 RX ORDER — SODIUM CHLORIDE 0.9 % (FLUSH) 0.9 %
5-40 SYRINGE (ML) INJECTION PRN
Status: CANCELLED | OUTPATIENT
Start: 2025-05-15

## 2025-05-15 NOTE — H&P
BREAST REDUCTION SURGERY Bilateral 9/17/2024     RIGHT ONCOPLASTIC BREAST REDUCTION, MATCHING LEFT BREAST REDUCTION performed by Ganesh Russell MD at [facility] OR    HERNIA REPAIR N/A 10/11/2022     LAPAROSCOPIC ROBOTIC XI ASSISTED VENTRAL HERNIA REPAIR WITH MESH performed by Kingsley Pardo MD at [facility] OR    RECTAL SURGERY N/A 09/15/2022     DRAINAGE PERIANAL ABSCESS performed by Kingsley Pardo MD at [facility] OR    RECTAL SURGERY N/A 12/06/2022     RECTAL EXAMINATION UNDER ANESTHESIA WITH FISTULOTOMY performed by Kingsley Pardo MD at [facility] OR    SINUS SURGERY N/A 02/25/2016     FUNCTIONAL ENDOSCOPIC SINUS SURGERY WITH BILATERAL MAXILLARY, FRONTAL & SPENOID ANTROSTOMIES, SUBMUCCOSAL RESECTION OF INFERIOR TURBINATES    TUBAL LIGATION   07/22/2021    US BREAST BIOPSY W LOC DEVICE 1ST LESION RIGHT Right 7/2/2024     US BREAST BIOPSY W LOC DEVICE 1ST LESION RIGHT 7/2/2024 SEYZ ABDU BCC            Family History         Family History   Problem Relation Age of Onset    Cancer Mother 40         Skin, Squamous cell carcinoma    Arthritis Mother      Other Mother           sinus problems    Thyroid Disease Mother      High Blood Pressure Father      Hemochromatosis Brother      Hemochromatosis Brother      Cancer Maternal Aunt 60         Squamous cell carcinoma    Other Maternal Aunt           migraine    Breast Cancer Maternal Aunt 50 - 59    Prostate Cancer Maternal Uncle      Other Maternal Uncle           migraine    Other Paternal Aunt           migraine    Other Paternal Uncle           migraine    Arthritis Maternal Grandmother      Prostate Cancer Maternal Grandfather      Cancer Maternal Grandfather 70 - 79         melanoma    Cancer Maternal Cousin 11         abdominal tumor(likely Wilms)    No Known Problems Son              Allergies        Allergies   Allergen Reactions    Latex Rash    Pcn [Penicillins] Rash            Social History   Social History           Tobacco Use    Smoking status: Never    Smokeless tobacco: Never

## 2025-05-15 NOTE — TELEPHONE ENCOUNTER
MA received a call from pt stating that she had an episode of vomiting FDC through completing prep for tomorrow, pt wants to know if she should take a break or stop prep? MA routing to Dr. Negrete for advisement,  Electronically signed by Saskia Jones on 5/15/2025 at 3:36 PM

## 2025-05-16 ENCOUNTER — HOSPITAL ENCOUNTER (OUTPATIENT)
Age: 40
Setting detail: OUTPATIENT SURGERY
Discharge: HOME | End: 2025-05-16
Attending: SURGERY | Admitting: SURGERY
Payer: COMMERCIAL

## 2025-05-16 ENCOUNTER — ANESTHESIA EVENT (OUTPATIENT)
Dept: ENDOSCOPY | Age: 40
End: 2025-05-16
Payer: COMMERCIAL

## 2025-05-16 ENCOUNTER — ANESTHESIA (OUTPATIENT)
Dept: ENDOSCOPY | Age: 40
End: 2025-05-16
Payer: COMMERCIAL

## 2025-05-16 VITALS
HEART RATE: 65 BPM | WEIGHT: 293 LBS | SYSTOLIC BLOOD PRESSURE: 135 MMHG | HEIGHT: 68 IN | RESPIRATION RATE: 16 BRPM | OXYGEN SATURATION: 98 % | DIASTOLIC BLOOD PRESSURE: 77 MMHG | BODY MASS INDEX: 44.41 KG/M2 | TEMPERATURE: 97.5 F

## 2025-05-16 DIAGNOSIS — Z01.812 PRE-OPERATIVE LABORATORY EXAMINATION: Primary | ICD-10-CM

## 2025-05-16 LAB
HCG, URINE, POC: NEGATIVE
Lab: NORMAL
NEGATIVE QC PASS/FAIL: NORMAL
POSITIVE QC PASS/FAIL: NORMAL

## 2025-05-16 PROCEDURE — 7100000010 HC PHASE II RECOVERY - FIRST 15 MIN: Performed by: SURGERY

## 2025-05-16 PROCEDURE — 2709999900 HC NON-CHARGEABLE SUPPLY: Performed by: SURGERY

## 2025-05-16 PROCEDURE — 2580000003 HC RX 258: Performed by: SURGERY

## 2025-05-16 PROCEDURE — 45378 DIAGNOSTIC COLONOSCOPY: CPT | Performed by: SURGERY

## 2025-05-16 PROCEDURE — 3700000001 HC ADD 15 MINUTES (ANESTHESIA): Performed by: SURGERY

## 2025-05-16 PROCEDURE — 6360000002 HC RX W HCPCS: Performed by: NURSE ANESTHETIST, CERTIFIED REGISTERED

## 2025-05-16 PROCEDURE — 2580000003 HC RX 258: Performed by: NURSE ANESTHETIST, CERTIFIED REGISTERED

## 2025-05-16 PROCEDURE — 3700000000 HC ANESTHESIA ATTENDED CARE: Performed by: SURGERY

## 2025-05-16 PROCEDURE — 7100000011 HC PHASE II RECOVERY - ADDTL 15 MIN: Performed by: SURGERY

## 2025-05-16 PROCEDURE — 3609027000 HC COLONOSCOPY: Performed by: SURGERY

## 2025-05-16 RX ORDER — FENTANYL CITRATE 50 UG/ML
INJECTION, SOLUTION INTRAMUSCULAR; INTRAVENOUS
Status: DISCONTINUED | OUTPATIENT
Start: 2025-05-16 | End: 2025-05-16 | Stop reason: SDUPTHER

## 2025-05-16 RX ORDER — LIDOCAINE HYDROCHLORIDE 20 MG/ML
INJECTION, SOLUTION INTRAVENOUS
Status: DISCONTINUED | OUTPATIENT
Start: 2025-05-16 | End: 2025-05-16 | Stop reason: SDUPTHER

## 2025-05-16 RX ORDER — ONDANSETRON 2 MG/ML
INJECTION INTRAMUSCULAR; INTRAVENOUS
Status: DISCONTINUED | OUTPATIENT
Start: 2025-05-16 | End: 2025-05-16 | Stop reason: SDUPTHER

## 2025-05-16 RX ORDER — SODIUM CHLORIDE 9 MG/ML
INJECTION, SOLUTION INTRAVENOUS CONTINUOUS
Status: DISCONTINUED | OUTPATIENT
Start: 2025-05-16 | End: 2025-05-16 | Stop reason: HOSPADM

## 2025-05-16 RX ORDER — SODIUM CHLORIDE 9 MG/ML
25 INJECTION, SOLUTION INTRAVENOUS PRN
Status: DISCONTINUED | OUTPATIENT
Start: 2025-05-16 | End: 2025-05-16 | Stop reason: HOSPADM

## 2025-05-16 RX ORDER — SODIUM CHLORIDE 0.9 % (FLUSH) 0.9 %
5-40 SYRINGE (ML) INJECTION EVERY 12 HOURS SCHEDULED
Status: DISCONTINUED | OUTPATIENT
Start: 2025-05-16 | End: 2025-05-16 | Stop reason: HOSPADM

## 2025-05-16 RX ORDER — SODIUM CHLORIDE 9 MG/ML
INJECTION, SOLUTION INTRAVENOUS
Status: DISCONTINUED | OUTPATIENT
Start: 2025-05-16 | End: 2025-05-16 | Stop reason: SDUPTHER

## 2025-05-16 RX ORDER — SODIUM CHLORIDE 0.9 % (FLUSH) 0.9 %
5-40 SYRINGE (ML) INJECTION PRN
Status: DISCONTINUED | OUTPATIENT
Start: 2025-05-16 | End: 2025-05-16 | Stop reason: HOSPADM

## 2025-05-16 RX ORDER — PROPOFOL 10 MG/ML
INJECTION, EMULSION INTRAVENOUS
Status: DISCONTINUED | OUTPATIENT
Start: 2025-05-16 | End: 2025-05-16 | Stop reason: SDUPTHER

## 2025-05-16 RX ORDER — DEXAMETHASONE SODIUM PHOSPHATE 10 MG/ML
INJECTION, SOLUTION INTRA-ARTICULAR; INTRALESIONAL; INTRAMUSCULAR; INTRAVENOUS; SOFT TISSUE
Status: DISCONTINUED | OUTPATIENT
Start: 2025-05-16 | End: 2025-05-16 | Stop reason: SDUPTHER

## 2025-05-16 RX ADMIN — PROPOFOL 300 MG: 10 INJECTION, EMULSION INTRAVENOUS at 09:09

## 2025-05-16 RX ADMIN — LIDOCAINE HYDROCHLORIDE 100 MG: 20 INJECTION, SOLUTION INTRAVENOUS at 09:09

## 2025-05-16 RX ADMIN — SODIUM CHLORIDE: 9 INJECTION, SOLUTION INTRAVENOUS at 08:30

## 2025-05-16 RX ADMIN — ONDANSETRON HYDROCHLORIDE 4 MG: 2 SOLUTION INTRAMUSCULAR; INTRAVENOUS at 09:09

## 2025-05-16 RX ADMIN — FENTANYL CITRATE 100 MCG: 50 INJECTION, SOLUTION INTRAMUSCULAR; INTRAVENOUS at 09:09

## 2025-05-16 RX ADMIN — DEXAMETHASONE SODIUM PHOSPHATE 4 MG: 10 INJECTION INTRAMUSCULAR; INTRAVENOUS at 09:09

## 2025-05-16 RX ADMIN — SODIUM CHLORIDE 25 ML: 0.9 INJECTION, SOLUTION INTRAVENOUS at 08:24

## 2025-05-16 ASSESSMENT — PAIN - FUNCTIONAL ASSESSMENT
PAIN_FUNCTIONAL_ASSESSMENT: NONE - DENIES PAIN
PAIN_FUNCTIONAL_ASSESSMENT: NONE - DENIES PAIN

## 2025-05-16 ASSESSMENT — LIFESTYLE VARIABLES: SMOKING_STATUS: 0

## 2025-05-16 NOTE — ANESTHESIA POSTPROCEDURE EVALUATION
Department of Anesthesiology  Postprocedure Note    Patient: Tatiana Mtz  MRN: [id number]  YOB: 1985  Date of evaluation: 5/16/2025    Procedure Summary       Date: 05/16/25 Room / Location: [place] [address] / [facility]    Anesthesia Start: 0847 Anesthesia Stop: 0927    Procedure: COLONOSCOPY DIAGNOSTIC Diagnosis:       Rectal bleeding      (Rectal bleeding [K62.5])    Surgeons: Michelle Negrete MD Responsible Provider: Branden Davidson MD    Anesthesia Type: MAC ASA Status: 3            Anesthesia Type: No value filed.    Luisa Phase I: Luisa Score: 10    Luisa Phase II:      Anesthesia Post Evaluation    Patient location during evaluation: PACU  Patient participation: complete - patient participated  Level of consciousness: awake  Airway patency: patent  Nausea & Vomiting: no nausea and no vomiting  Cardiovascular status: hemodynamically stable  Respiratory status: acceptable  Hydration status: stable  Pain management: adequate    No notable events documented.

## 2025-05-16 NOTE — H&P
[facility]  HISTORY & PHYSICAL  ATTENDING NOTE             Chief Complaint   Patient presents with    Rectal Bleeding       Rectal bleeding - off and on for last 15 years. Infrequent. Not a lot.  None in last 6 months. Unsure of any hemorrhoids. Previous rectal abscess in last 2022. Denies constipation or diarrhea.     Colonoscopy       Denies any previous colonoscopy. Denies family hx of colon cancer.             HPI:  40 y.o. female denies weight loss, diarrhea, constipation, melena, N/V, abdominal pain.  she denies family history of colon cancer, Crohn's disease or colitis.    Bad heartburn--has had EGD prior without issues; controlled with PPI     Has had BRBRP off and on for years.  She was having a lot of it at the time of her breast surgery.  She has now not had any in a few months.  Denies issues with hemorrhoids.       Past Medical History           Past Medical History:   Diagnosis Date    Abnormal Pap smear of cervix       normal since    Asthma      Chronic back pain      Chronic generalized pain disorder 08/13/2021    Chronic sinusitis       with facial pain    Class 3 severe obesity due to excess calories without serious comorbidity with body mass index (BMI) of 40.0 to 44.9 in adult (HCC)      DDD (degenerative disc disease), cervical 08/13/2021    Depression 06/06/2011    Essential hypertension      Gastroesophageal reflux disease without esophagitis      Hemochromatosis      Invasive ductal carcinoma of breast (HCC) 07/08/2024    Migraines       born with heart murmur    Paresthesias 08/10/2021     resolved    Seasonal allergies              Past Surgical History             Past Surgical History:   Procedure Laterality Date    ANAL FISTULOTOMY Right 12/06/2022    BLADDER SUSPENSION   03/2023     Apostalis    BREAST BIOPSY Right 9/17/2024     Right breast mag-seed localized lumpectomy with sentinel lymph node biopsy, axillary content performed by Michelle Negrete MD at

## 2025-05-16 NOTE — ANESTHESIA PRE PROCEDURE
Department of Anesthesiology  Preprocedure Note       Name:  Tatiana Mtz   Age:  40 y.o.  :  1985                                          MRN:  [id number]         Date:  2025      Surgeon: Surgeon(s):  Michelle Negrete MD    Procedure: Procedure(s):  COLONOSCOPY DIAGNOSTIC    Medications prior to admission:   Prior to Admission medications    Medication Sig Start Date End Date Taking? Authorizing Provider   tamoxifen (NOLVADEX) 20 MG tablet Take 1 tablet by mouth daily 25 Yes uZri Carrizales MD   triamterene-hydroCHLOROthiazide (MAXZIDE-25) 37.5-25 MG per tablet Take 1 tablet by mouth daily 25  Yes Tatiana Rodrigues APRN - CNP   amLODIPine (NORVASC) 5 MG tablet Take 1 tablet by mouth daily 25  Yes Tatiana Rodrigues APRN - CNP   pantoprazole (PROTONIX) 20 MG tablet Take 1 tablet by mouth in the morning and at bedtime 25  Yes Tatiana Rodrigues APRN - CNP   propranolol (INDERAL LA) 60 MG extended release capsule Take 1 capsule by mouth daily 25  Yes Tatiana Rodrigues APRN - CNP   albuterol (PROVENTIL) (2.5 MG/3ML) 0.083% nebulizer solution Take 3 mLs by nebulization every 4 hours as needed for Shortness of Breath or Wheezing 1/3/25  Yes Jamarcus Dillard Jr., APRN - CNP   albuterol sulfate HFA (PROVENTIL;VENTOLIN;PROAIR) 108 (90 Base) MCG/ACT inhaler Inhale 2 puffs into the lungs 4 times daily as needed for Wheezing 24  Yes Sha Dubois III, PA   fluticasone (FLONASE) 50 MCG/ACT nasal spray 1 spray by Each Nostril route daily 24  Yes Sha Dubois III, PA   citalopram (CELEXA) 40 MG tablet Take 1 tablet by mouth daily 23  Yes Raul Garner MD   traZODone (DESYREL) 50 MG tablet Take 1 tablet by mouth nightly 5/10/22  Yes Raul Garner MD   tirzepatide-weight management (ZEPBOUND) 2.5 MG/0.5ML SOAJ subCUTAneous auto-injector pen Inject 2.5 mg into the skin every 7 days 25   Tatiana Rodrigues, TICO - CNP   bisacodyl 5 MG EC tablet Take 2

## 2025-05-16 NOTE — DISCHARGE INSTRUCTIONS
Colonoscopy normal  Repeat colonoscopy in 10 years       Colonoscopy: What to Expect at Home  Your Recovery  After a colonoscopy, you'll stay at the clinic until you wake up. Then you can go home. But you'll need to arrange for a ride. Your doctor will tell you when you can eat and do your other usual activities.  Your doctor will talk to you about when you'll need your next colonoscopy. Your doctor can help you decide how often you need to be checked. This will depend on the results of your test and your risk for colorectal cancer.  After the test, you may be bloated or have gas pains. You may need to pass gas. If a biopsy was done or a polyp was removed, you may have streaks of blood in your stool (feces) for a few days. Problems such as heavy rectal bleeding may not occur until several weeks after the test. This isn't common. But it can happen after polyps are removed.  This care sheet gives you a general idea about how long it will take for you to recover. But each person recovers at a different pace. Follow the steps below to get better as quickly as possible.  How can you care for yourself at home?  Activity    Rest when you feel tired.     You can do your normal activities when it feels okay to do so.   Diet    Follow your doctor's directions for eating.     Unless your doctor has told you not to, drink plenty of fluids. This helps to replace the fluids that were lost during the colon prep.     Do not drink alcohol.   Medicines    Your doctor will tell you if and when you can restart your medicines. You will also be given instructions about taking any new medicines.     If you stopped taking aspirin or some other blood thinner, your doctor will tell you when to start taking it again.     If polyps were removed or a biopsy was done during the test, your doctor may tell you not to take aspirin or other anti-inflammatory medicines for a few days. These include ibuprofen (Advil, Motrin) and naproxen (Aleve).  .

## 2025-05-29 ENCOUNTER — CLINICAL DOCUMENTATION (OUTPATIENT)
Dept: INFUSION THERAPY | Age: 40
End: 2025-05-29

## 2025-05-29 ENCOUNTER — HOSPITAL ENCOUNTER (OUTPATIENT)
Dept: OCCUPATIONAL THERAPY | Age: 40
Setting detail: THERAPIES SERIES
Discharge: HOME | End: 2025-05-29
Payer: COMMERCIAL

## 2025-05-29 PROCEDURE — 97535 SELF CARE MNGMENT TRAINING: CPT | Performed by: OCCUPATIONAL THERAPIST

## 2025-05-29 NOTE — PROGRESS NOTES
Received a call from patients insurance about the following approval:     Approved for 5 Occupational Therapy visits  Valid: 05/29/2025 - 07/27/2025  Confirmation: [id number]

## 2025-05-29 NOTE — DISCHARGE SUMMARY
decreased limb volume in the involved extremity from moderate to minimal for improved functional mobility.   Therapist continued patient education regarding manual lymphatic drainage massage sequence.  Therapist reviewed written hand out provided to patient last treatment session.  Patient has received her lymphedema pump and utilizes daily for sixty minutes at 45 mmHg compression.  Patient states she is able to utilize without issue and feels more relief from her symptoms than when performing hand manual massage.  Therapist provided support and encouragement as able.  Patient understands to contact lymphapress with any issues with pump that may occur.  Patient presented with minimal fluid this treatment session.  Patient circumferential measurements taken and recorded below.  Patient goal met.  (Total:  10min)     3.  Patient will demonstrate compliance with compression therapy for independent management of lymphedema.  Patient goal met.     4.  Patient will increase active/passive shoulder flexion/abduction by 15 degrees to increase pain free movement and independence and safety during daily life tasks.     Therapist continued patient education regarding range of motion/stretch exercises.  Therapist continues to educate patient on benefits of consistent performance of home program.  Patient able to verbalize understanding.  Patient performs exercises as able secondary to lower extremity orthopedic issue and an increase in migraines preventing her to complete home program as she would like.  Therapist provides support and encouragement explaining to patient she will have priority shifts moving order of importance for various life activities.  Therapist encouraged patient to move exercise and home program back to the top of the list as current issues begin to resolve.  Patient able to verbalize understanding.  Patient left active/passive shoulder flexion/abduction 0 - 142 degrees; right active/passive shoulder

## 2025-06-03 DIAGNOSIS — M79.672 PAIN IN LEFT FOOT: ICD-10-CM

## 2025-06-03 DIAGNOSIS — R60.0 LOCALIZED EDEMA: ICD-10-CM

## 2025-06-03 DIAGNOSIS — M72.2 PLANTAR FASCIITIS: Primary | ICD-10-CM

## 2025-06-03 DIAGNOSIS — R26.2 DIFFICULTY WALKING: ICD-10-CM

## 2025-06-04 ENCOUNTER — CLINICAL DOCUMENTATION (OUTPATIENT)
Dept: NUTRITION | Age: 40
End: 2025-06-04

## 2025-06-04 NOTE — PROGRESS NOTES
is to eat.\" Reports that she stopped eating fast food and now she is back to it. Reports that she recently started walking regularly. Pt reports that she has lost weight many times- States \"I've kept it off for a while and then have gained the weight back.\" Reports that she has tried many things. States \"I want to be healthy.\" Pt used to track on [employer] and is willing to log again.     Pt reports anemia and hemochromatosis. Defer recommended iron intake to MD.     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 Pt has a 8yo son who has evening activities like baseball games. Dinner time varies because  works late and waits for him to come home to eat. Pt get take out or fast food breakfasts often. Pt states \"I have a diet coke problem.\"     Pt reports poor sleep for which she takes Trazadone nightly. Reports that she is groggy in the morning. States that sleep is not restful. 8yo sleeps with them and disrupts her sleep. Pt reports normal BM daily or every other day. Pt reports recent rectal bleeding.     Pt's current eating pattern consists of 3 meals + 2-3 snacks per day. Lack of consistent meal/snack schedule, High intake of processed foods/fast foods, High intake of trans fat, saturated fat, sodium, added sugars, High intake of sugar alternatives.     Intervention/Plan:   Emphasized the importance of portion control and mindful eating to limit caloric intake as prescribed for safe wt reduction of 1-2 lb per week.     Pt to consume a diet rich in a variety of fruits, vegetables, whole grains, lean proteins, and healthy fats. Provided pt education on MyPlate method for balanced meals, consistency and portion control.  Emphasized importance of meal planning and preparation to have components of balanced meals on-hand. Reviewed foods lists per categories of MyPlate to create shopping list.  Provided education on food label reading to identify content of foods. Reviewed food

## 2025-06-17 ENCOUNTER — OFFICE VISIT (OUTPATIENT)
Dept: PRIMARY CARE CLINIC | Age: 40
End: 2025-06-17
Payer: COMMERCIAL

## 2025-06-17 VITALS
DIASTOLIC BLOOD PRESSURE: 80 MMHG | RESPIRATION RATE: 18 BRPM | TEMPERATURE: 97.8 F | WEIGHT: 293 LBS | SYSTOLIC BLOOD PRESSURE: 136 MMHG | BODY MASS INDEX: 44.41 KG/M2 | OXYGEN SATURATION: 96 % | HEART RATE: 86 BPM | HEIGHT: 68 IN

## 2025-06-17 DIAGNOSIS — F33.41 RECURRENT MAJOR DEPRESSIVE DISORDER, IN PARTIAL REMISSION: ICD-10-CM

## 2025-06-17 DIAGNOSIS — Z13.820 SCREENING FOR OSTEOPOROSIS: ICD-10-CM

## 2025-06-17 DIAGNOSIS — R53.83 OTHER FATIGUE: ICD-10-CM

## 2025-06-17 DIAGNOSIS — I10 ESSENTIAL HYPERTENSION: ICD-10-CM

## 2025-06-17 DIAGNOSIS — R53.1 GENERALIZED WEAKNESS: Primary | ICD-10-CM

## 2025-06-17 PROCEDURE — 99213 OFFICE O/P EST LOW 20 MIN: CPT | Performed by: NURSE PRACTITIONER

## 2025-06-17 PROCEDURE — 3079F DIAST BP 80-89 MM HG: CPT | Performed by: NURSE PRACTITIONER

## 2025-06-17 PROCEDURE — 3075F SYST BP GE 130 - 139MM HG: CPT | Performed by: NURSE PRACTITIONER

## 2025-06-17 ASSESSMENT — ENCOUNTER SYMPTOMS: RESPIRATORY NEGATIVE: 1

## 2025-06-17 NOTE — PROGRESS NOTES
Subjective  CC: Patient presents for follow-up.    HPI:   The patient is here today for follow-up.  I last saw her about 6 weeks ago, he had discussed anemia, brain fog, fatigue.  Propranolol was cut back but she was unable to tolerate this due to increase in migraines.  She is now back on her current dose.  She is doing much better in regards to migraine.  She did follow with oncology, repeat lab work is ordered to be done to follow iron levels and CBC, this will be done today.  She did have colonoscopy which was normal.  She does have pending appointment with sleep medicine in August.  She has followed with podiatry, MRI of the left foot was denied, she is in physical therapy and will likely proceed with imaging if symptoms do not improve.  She has lost about 8 pounds since her last office visit with me.  She reports she did follow with the dietitian and has been slowly making changes to her diet.  She continues to have generalized weakness in the upper extremities, and would like to be referred to physical therapy for strengthening.  Overall, she feels improved from when I saw her last month.    ROS:  Review of Systems   Constitutional:         Fatigue   HENT: Negative.     Eyes:         Wears glasses   Respiratory: Negative.     Cardiovascular:         Hypertension   Gastrointestinal:         Hx rectal abscess, complicated by fistula and need further repair   Musculoskeletal:         Lumbar pain   Psychiatric/Behavioral:          Depression, anxiety          Current Outpatient Medications:     tamoxifen (NOLVADEX) 20 MG tablet, Take 1 tablet by mouth daily, Disp: 90 tablet, Rfl: 1    triamterene-hydroCHLOROthiazide (MAXZIDE-25) 37.5-25 MG per tablet, Take 1 tablet by mouth daily, Disp: 30 tablet, Rfl: 3    amLODIPine (NORVASC) 5 MG tablet, Take 1 tablet by mouth daily, Disp: 90 tablet, Rfl: 1    pantoprazole (PROTONIX) 20 MG tablet, Take 1 tablet by mouth in the morning and at bedtime, Disp: 180 tablet, Rfl: 1

## 2025-06-26 ENCOUNTER — HOSPITAL ENCOUNTER (OUTPATIENT)
Dept: RADIATION ONCOLOGY | Age: 40
Discharge: HOME | End: 2025-06-26
Payer: COMMERCIAL

## 2025-06-26 ENCOUNTER — OFFICE VISIT (OUTPATIENT)
Age: 40
End: 2025-06-26

## 2025-06-26 VITALS
DIASTOLIC BLOOD PRESSURE: 79 MMHG | BODY MASS INDEX: 46.68 KG/M2 | HEART RATE: 73 BPM | SYSTOLIC BLOOD PRESSURE: 157 MMHG | WEIGHT: 293 LBS | RESPIRATION RATE: 18 BRPM | TEMPERATURE: 97.1 F | OXYGEN SATURATION: 98 %

## 2025-06-26 VITALS
TEMPERATURE: 97.9 F | WEIGHT: 293 LBS | DIASTOLIC BLOOD PRESSURE: 74 MMHG | SYSTOLIC BLOOD PRESSURE: 113 MMHG | BODY MASS INDEX: 46.68 KG/M2

## 2025-06-26 DIAGNOSIS — Z92.3 S/P RADIATION THERAPY > 12 WKS AGO: ICD-10-CM

## 2025-06-26 DIAGNOSIS — Z17.0 MALIGNANT NEOPLASM OF LOWER-INNER QUADRANT OF RIGHT BREAST OF FEMALE, ESTROGEN RECEPTOR POSITIVE (HCC): Primary | ICD-10-CM

## 2025-06-26 DIAGNOSIS — N39.3 STRESS INCONTINENCE OF URINE: ICD-10-CM

## 2025-06-26 DIAGNOSIS — C50.311 MALIGNANT NEOPLASM OF LOWER-INNER QUADRANT OF RIGHT BREAST OF FEMALE, ESTROGEN RECEPTOR POSITIVE (HCC): Primary | ICD-10-CM

## 2025-06-26 DIAGNOSIS — N93.9 ABNORMAL UTERINE BLEEDING (AUB): Primary | ICD-10-CM

## 2025-06-26 DIAGNOSIS — R30.0 DYSURIA: ICD-10-CM

## 2025-06-26 LAB
BILIRUBIN, URINE: NEGATIVE
COLOR, UA: YELLOW
COMMENT: NORMAL
GLUCOSE URINE: NEGATIVE MG/DL
KETONES, URINE: NEGATIVE MG/DL
LEUKOCYTE ESTERASE, URINE: NEGATIVE
NITRITE, URINE: NEGATIVE
PH, URINE: 6 (ref 5–8)
PROTEIN UA: NEGATIVE MG/DL
SPECIFIC GRAVITY UA: 1.01 (ref 1–1.03)
TURBIDITY: CLEAR
URINE HGB: NEGATIVE
UROBILINOGEN, URINE: 0.2 EU/DL (ref 0–1)

## 2025-06-26 PROCEDURE — 99213 OFFICE O/P EST LOW 20 MIN: CPT | Performed by: NURSE PRACTITIONER

## 2025-06-26 NOTE — PROGRESS NOTES
Radiation Oncology- [facility]  6 Month Follow Up Note    06/26/2025     Tatiana Mtz  Vitals:    06/26/25 0900   BP: (!) 157/79   Pulse: 73   Resp: 18   Temp: 97.1 °F (36.2 °C)   SpO2: 98%     Wt Readings from Last 1 Encounters:   06/26/25 (!) 139.3 kg (307 lb)       CC: Patient is here for 6 month Radiation Oncology follow-up visit.    HPI:  Tatiana Mtz is a pleasant 40 y.o. female with a diagnosis of invasive ductal carcinoma (IDC) of the lower-inner quadrant of the right breast, ER positive > 90%, ID positive > 90%; HER2 negative.     The patient initially underwent her first screening mammogram on 06/03/2024 which revealed an asymmetry in the right breast in the 5 o'clock position. Recommended for further work-up the patient underwent diagnostic right breast mammogram and ultrasound on 06/26/2025 which revealed a 6 mm irregular, hypoechoic mass at 6 o'clock, 8 cm from the nipple of the right breast. Ultrasound-guided right breast biopsy completed 07/02/2025, revealed IDC, ER/ID positive; HER2 negative. The patient proceed with surgery. S/p right Magseed localized lumpectomy with right sentinel lymph node biopsy, right oncoplastic reduction with matching left breast reduction on 09/17/2024. 2 SLNS negative, no LVSI, negative margins with the closest margin at less than 1 mm, DCIS low-grade.     The patient referred to PT, OT/ lymphedema clinic per Medical Oncology 11/2024.     The patient proceeded with adjuvant radiation therapy:    Radiation therapy 4005 cGy/ 15  fractions directed to the R breast completion 12/30/2024.     The patient started on adjuvant endocrine therapy Tamoxifen on 01/08/2025 per Medical Oncology.     The patient is seen today in 6 month Radiation Oncology follow-up visit. The patient denies skin complaints. No new lumps, bumps, skin rashes or nipple discharge. The patient continues on Tamoxifen as directed per Medical Oncology. The patient report some weight gain which she

## 2025-06-26 NOTE — PROGRESS NOTES
New patient here to establish care  Patient was diagnosed with breast cancer last year. Finished treatment early January and recently started tamoxifen. Periods are irregular.   Discharge instructions have been discussed with the patient. Patient advised to call our office with any questions or concerns.   Voiced understanding.  Urine culture obtained, labeled and sent to lab

## 2025-06-26 NOTE — PROGRESS NOTES
HISTORY OF PRESENT ILLNESS:    Patient was diagnosed with breast cancer last year. Finished treatment early January and recently started tamoxifen in January. Periods are irregular. Heavy, changes pad q 2 hours on heavy days. Last period lasted 16 days.   Hgb 10.2. on iron    Right breast cancer   invasive ductal carcinoma, ER positive greater than 90%, ID positive, greater than 90%, HER2/tj negative, plus by IHC. The patient underwent on 2020 for a right breast Magseed localized lumpectomy with sentinel lymph node biopsy, right oncoplastic reduction with matching left breast reduction  No chemo  +radiation  On tamoxifen  Dr. Jarod Meehan    Saw radiation oncology today. Had breast exam at breast center 2025    Pt has hx of small rectal fistula that did not track to vagina. Pt wants that looked at.    Hx of tubal ligation  Hx of bladder suspension    Pt c/o leaking with coughing, sneezing.     Past Medical History:   Past Medical History:   Diagnosis Date    Abnormal Pap smear of cervix     normal since    Asthma     Chronic back pain     Chronic generalized pain disorder 2021    Chronic sinusitis     with facial pain    Class 3 severe obesity due to excess calories without serious comorbidity with body mass index (BMI) of 40.0 to 44.9 in adult (HCC)     DDD (degenerative disc disease), cervical 2021    Depression 2011    Essential hypertension     Gastroesophageal reflux disease without esophagitis     Hemochromatosis     Hypertension     Invasive ductal carcinoma of breast (HCC) 2024    Migraines     aura    Osteoarthritis 2021    Paresthesias 08/10/2021    resolved    Seasonal allergies                                              OB History    Para Term  AB Living   1 1 1   1   SAB IAB Ectopic Molar Multiple Live Births        1      # Outcome Date GA Lbr [place]/2nd Weight Sex Type Anes PTL Lv   1 Term 10/17/17 39w5d  3.572 kg (7

## 2025-06-28 LAB
CULTURE: NORMAL
SPECIMEN DESCRIPTION: NORMAL

## 2025-06-30 ENCOUNTER — TELEPHONE (OUTPATIENT)
Age: 40
End: 2025-06-30

## 2025-06-30 NOTE — TELEPHONE ENCOUNTER
Patient connected our office stating she has a new right breast lump.  She is scheduled to see NP on 7/7/25 in the [place] Office with Bilateral screening mammogram.  Patient would like some guidance.

## 2025-07-01 DIAGNOSIS — N63.15 MASS OVERLAPPING MULTIPLE QUADRANTS OF RIGHT BREAST: Primary | ICD-10-CM

## 2025-07-01 DIAGNOSIS — C50.311 MALIGNANT NEOPLASM OF LOWER-INNER QUADRANT OF RIGHT BREAST OF FEMALE, ESTROGEN RECEPTOR POSITIVE (HCC): ICD-10-CM

## 2025-07-01 DIAGNOSIS — Z17.0 MALIGNANT NEOPLASM OF LOWER-INNER QUADRANT OF RIGHT BREAST OF FEMALE, ESTROGEN RECEPTOR POSITIVE (HCC): ICD-10-CM

## 2025-07-01 NOTE — PROGRESS NOTES
Tatiana called to report finding of a new lump in the right breast.  She has a hx pre-menopausal right breast IDC.  Recommend follow up with bilateral diagnostic mammogram +/- right limited US prior.    Mechelle Meehan RN (Terrie), MSN, APRN-CNP, AOCNP  Advanced Oncology Certified Nurse Practitioner  [facility]/Department of Breast Surgery   Office Phone [phone]; Fax [phone]  Located within the [facility]

## 2025-07-02 NOTE — PROGRESS NOTES
This note was copied forward from the last encounter.  Essential components for this patient record were reviewed and verified on this visit including:  recent hospitalizations, recent imaging, PMH, PSH, FH, SOC HX, Allergies, and Medications were reviewed and updated as appropriate.  In addition, the assessment and plan were copied from prior office note and updated accordingly.     Medical breast clinic note/[facility]  [phone]    Patient ID:  Tatiana Mtz is a 40 y.o. extremely pleasant female who presents for the following:    Stage Ia invasive ductal carcinoma of the right breast, 6:00.  ER/TN +, HER2 -.  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 Maximilian DEMPSEY     Tatiana is a luz elena 40 y.o. female who has past medical history of asthma, chronic back pain, chronic generalized pain disorder, elevated BMI, degenerative disc disease, depression, hypertension, GERD, hemochromatosis, CHERRIE, migraine headaches.  She presents for follow-up of her right breast cancer.  The asymmetry was noted in the lower inner right breast and identified on screening mammogram.  Risks for breast cancer include family history on maternal side mom with squamous cell carcinoma age 40, maternal grandfather malignant melanoma age 70, maternal aunt with breast cancer age 50s, maternal grandfather prostate cancer, maternal uncle with prostate cancer, maternal aunt age 60 squamous cell carcinoma, maternal cousin age 11 with likely Wilms tumor.  Menarche age 12.  G1, P1.  First live birth age 33.    06/26/2024 right breast ultrasound revealed a 6 mm irregular hypoechoic mass at the 6 o'clock position.    07/02/2024 she underwent a right breast, 6 o'clock position core biopsies.  Pathologic evaluation at [facility]:  Invasive mammary carcinoma of no special type

## 2025-07-08 ENCOUNTER — HOSPITAL ENCOUNTER (OUTPATIENT)
Dept: GENERAL RADIOLOGY | Age: 40
Discharge: HOME | End: 2025-07-10
Payer: COMMERCIAL

## 2025-07-08 ENCOUNTER — OFFICE VISIT (OUTPATIENT)
Age: 40
End: 2025-07-08
Payer: COMMERCIAL

## 2025-07-08 VITALS
TEMPERATURE: 98.7 F | BODY MASS INDEX: 43.4 KG/M2 | OXYGEN SATURATION: 100 % | DIASTOLIC BLOOD PRESSURE: 78 MMHG | RESPIRATION RATE: 16 BRPM | HEIGHT: 69 IN | SYSTOLIC BLOOD PRESSURE: 124 MMHG | WEIGHT: 293 LBS | HEART RATE: 69 BPM

## 2025-07-08 VITALS — HEIGHT: 69 IN | BODY MASS INDEX: 43.4 KG/M2 | WEIGHT: 293 LBS

## 2025-07-08 DIAGNOSIS — N63.15 MASS OVERLAPPING MULTIPLE QUADRANTS OF RIGHT BREAST: ICD-10-CM

## 2025-07-08 DIAGNOSIS — Z17.0 MALIGNANT NEOPLASM OF LOWER-INNER QUADRANT OF RIGHT BREAST OF FEMALE, ESTROGEN RECEPTOR POSITIVE (HCC): Primary | ICD-10-CM

## 2025-07-08 DIAGNOSIS — Z17.0 MALIGNANT NEOPLASM OF LOWER-INNER QUADRANT OF RIGHT BREAST OF FEMALE, ESTROGEN RECEPTOR POSITIVE (HCC): ICD-10-CM

## 2025-07-08 DIAGNOSIS — C50.311 MALIGNANT NEOPLASM OF LOWER-INNER QUADRANT OF RIGHT BREAST OF FEMALE, ESTROGEN RECEPTOR POSITIVE (HCC): ICD-10-CM

## 2025-07-08 DIAGNOSIS — C50.311 MALIGNANT NEOPLASM OF LOWER-INNER QUADRANT OF RIGHT BREAST OF FEMALE, ESTROGEN RECEPTOR POSITIVE (HCC): Primary | ICD-10-CM

## 2025-07-08 PROCEDURE — 76642 ULTRASOUND BREAST LIMITED: CPT

## 2025-07-08 PROCEDURE — 3078F DIAST BP <80 MM HG: CPT | Performed by: NURSE PRACTITIONER

## 2025-07-08 PROCEDURE — G0279 TOMOSYNTHESIS, MAMMO: HCPCS

## 2025-07-08 PROCEDURE — 3074F SYST BP LT 130 MM HG: CPT | Performed by: NURSE PRACTITIONER

## 2025-07-08 PROCEDURE — 99214 OFFICE O/P EST MOD 30 MIN: CPT | Performed by: NURSE PRACTITIONER

## 2025-07-08 ASSESSMENT — ENCOUNTER SYMPTOMS
APNEA: 0
BLOOD IN STOOL: 0

## 2025-07-17 ENCOUNTER — OFFICE VISIT (OUTPATIENT)
Dept: FAMILY MEDICINE CLINIC | Age: 40
End: 2025-07-17
Payer: COMMERCIAL

## 2025-07-17 VITALS
BODY MASS INDEX: 43.4 KG/M2 | RESPIRATION RATE: 18 BRPM | HEART RATE: 68 BPM | DIASTOLIC BLOOD PRESSURE: 70 MMHG | OXYGEN SATURATION: 96 % | SYSTOLIC BLOOD PRESSURE: 120 MMHG | HEIGHT: 69 IN | WEIGHT: 293 LBS | TEMPERATURE: 97.5 F

## 2025-07-17 DIAGNOSIS — N93.9 ABNORMAL UTERINE BLEEDING (AUB): ICD-10-CM

## 2025-07-17 DIAGNOSIS — L02.91 ABSCESS: Primary | ICD-10-CM

## 2025-07-17 DIAGNOSIS — D50.9 IRON DEFICIENCY ANEMIA, UNSPECIFIED IRON DEFICIENCY ANEMIA TYPE: ICD-10-CM

## 2025-07-17 LAB
BASOPHILS ABSOLUTE: 0.07 K/UL (ref 0–0.2)
BASOPHILS RELATIVE PERCENT: 1 % (ref 0–2)
EOSINOPHILS ABSOLUTE: 0.15 K/UL (ref 0.05–0.5)
EOSINOPHILS RELATIVE PERCENT: 2 % (ref 0–6)
ESTRADIOL LEVEL: 627 PG/ML
FERRITIN: 32 NG/ML
FOLLICLE STIMULATING HORMONE: 6.7 MIU/ML
GLUCOSE BLD-MCNC: 97 MG/DL (ref 74–99)
HCT VFR BLD CALC: 41.1 % (ref 34–48)
HCT VFR BLD CALC: 41.1 % (ref 34–48)
HEMOGLOBIN: 13.5 G/DL (ref 11.5–15.5)
HEMOGLOBIN: 13.6 G/DL (ref 11.5–15.5)
IMMATURE GRANULOCYTES %: 1 % (ref 0–5)
IMMATURE GRANULOCYTES ABSOLUTE: 0.05 K/UL (ref 0–0.58)
IRON % SATURATION: 37 % (ref 15–50)
IRON: 99 UG/DL (ref 37–145)
LH: 12.2 MIU/ML
LYMPHOCYTES ABSOLUTE: 1.58 K/UL (ref 1.5–4)
LYMPHOCYTES RELATIVE PERCENT: 21 % (ref 20–42)
MCH RBC QN AUTO: 28.7 PG (ref 26–35)
MCH RBC QN AUTO: 28.9 PG (ref 26–35)
MCHC RBC AUTO-ENTMCNC: 32.8 G/DL (ref 32–34.5)
MCHC RBC AUTO-ENTMCNC: 33.1 G/DL (ref 32–34.5)
MCV RBC AUTO: 87.3 FL (ref 80–99.9)
MCV RBC AUTO: 87.4 FL (ref 80–99.9)
MONOCYTES ABSOLUTE: 0.43 K/UL (ref 0.1–0.95)
MONOCYTES RELATIVE PERCENT: 6 % (ref 2–12)
NEUTROPHILS ABSOLUTE: 5.31 K/UL (ref 1.8–7.3)
NEUTROPHILS RELATIVE PERCENT: 70 % (ref 43–80)
PDW BLD-RTO: 16.2 % (ref 11.5–15)
PDW BLD-RTO: 16.3 % (ref 11.5–15)
PLATELET # BLD: 313 K/UL (ref 130–450)
PLATELET # BLD: 316 K/UL (ref 130–450)
PMV BLD AUTO: 9.1 FL (ref 7–12)
PMV BLD AUTO: 9.2 FL (ref 7–12)
PREGNANCY, SERUM: NEGATIVE
PROLACTIN: 6.52 NG/ML
RBC # BLD: 4.7 M/UL (ref 3.5–5.5)
RBC # BLD: 4.71 M/UL (ref 3.5–5.5)
TESTOSTERONE TOTAL: 37 NG/DL (ref 8–48)
TOTAL IRON BINDING CAPACITY: 266 UG/DL (ref 250–450)
TSH SERPL DL<=0.05 MIU/L-ACNC: 0.79 UIU/ML (ref 0.27–4.2)
WBC # BLD: 7.6 K/UL (ref 4.5–11.5)
WBC # BLD: 8 K/UL (ref 4.5–11.5)

## 2025-07-17 PROCEDURE — 99214 OFFICE O/P EST MOD 30 MIN: CPT | Performed by: NURSE PRACTITIONER

## 2025-07-17 PROCEDURE — 3078F DIAST BP <80 MM HG: CPT | Performed by: NURSE PRACTITIONER

## 2025-07-17 PROCEDURE — 3074F SYST BP LT 130 MM HG: CPT | Performed by: NURSE PRACTITIONER

## 2025-07-17 RX ORDER — SULFAMETHOXAZOLE AND TRIMETHOPRIM 800; 160 MG/1; MG/1
1 TABLET ORAL 2 TIMES DAILY
Qty: 20 TABLET | Refills: 0 | Status: SHIPPED | OUTPATIENT
Start: 2025-07-17 | End: 2025-07-27

## 2025-07-18 ENCOUNTER — RESULTS FOLLOW-UP (OUTPATIENT)
Dept: OBGYN | Age: 40
End: 2025-07-18

## 2025-07-18 DIAGNOSIS — N93.9 ABNORMAL UTERINE BLEEDING (AUB): Primary | ICD-10-CM

## 2025-07-18 NOTE — TELEPHONE ENCOUNTER
----- Message from Dr. aSmmie Knight, DO sent at 7/18/2025  9:32 AM EDT -----  Estrogen elevated. Likely caught  ovulation. Can repeat in a week. Thanks.

## 2025-07-18 NOTE — TELEPHONE ENCOUNTER
Spoke with patient and notified, \"Estrogen elevated. Likely caught ovulation. Can repeat in a week\"

## 2025-07-19 LAB
DHEAS (DHEA SULFATE): 96 UG/DL (ref 60.9–337)
INSULIN COMMENT: 0
INSULIN REFERENCE RANGE:: NORMAL
INSULIN: 32.6 MU/L

## 2025-07-23 LAB — 17 OH PROGESTERONE: 73.46 NG/DL

## 2025-07-24 ENCOUNTER — OFFICE VISIT (OUTPATIENT)
Age: 40
End: 2025-07-24

## 2025-07-24 VITALS
TEMPERATURE: 98.1 F | BODY MASS INDEX: 43.4 KG/M2 | OXYGEN SATURATION: 97 % | SYSTOLIC BLOOD PRESSURE: 136 MMHG | HEIGHT: 69 IN | WEIGHT: 293 LBS | RESPIRATION RATE: 16 BRPM | HEART RATE: 77 BPM | DIASTOLIC BLOOD PRESSURE: 84 MMHG

## 2025-07-24 DIAGNOSIS — L72.3 INFECTED SEBACEOUS CYST: Primary | ICD-10-CM

## 2025-07-24 DIAGNOSIS — L72.3 INFECTED SEBACEOUS CYST: ICD-10-CM

## 2025-07-24 DIAGNOSIS — L08.9 INFECTED SEBACEOUS CYST: ICD-10-CM

## 2025-07-24 DIAGNOSIS — L08.9 INFECTED SEBACEOUS CYST: Primary | ICD-10-CM

## 2025-07-24 RX ORDER — LIDOCAINE HYDROCHLORIDE AND EPINEPHRINE 10; 10 MG/ML; UG/ML
20 INJECTION, SOLUTION INFILTRATION; PERINEURAL ONCE
Status: COMPLETED | OUTPATIENT
Start: 2025-07-24 | End: 2025-07-24

## 2025-07-24 RX ADMIN — LIDOCAINE HYDROCHLORIDE AND EPINEPHRINE 20 ML: 10; 10 INJECTION, SOLUTION INFILTRATION; PERINEURAL at 10:42

## 2025-07-25 DIAGNOSIS — L02.211 ABSCESS OF FLANK: Primary | ICD-10-CM

## 2025-07-25 NOTE — PROGRESS NOTES
[facility]  PROGRESS NOTE  ATTENDING NOTE    Chief Complaint   Patient presents with    Abscess     Patient has an abscess on her lower right side abdomen     S: 40-year-old female presents today for a right flank abscess.  She states it has been there for couple weeks she went to her primary care doctor urgent care clinic and was given antibiotics it got a little bit better but now it is getting worse again.  She states it is very tender.  She has had a perianal abscess before but no other abscesses.    /84 (BP Site: Right Lower Arm, Patient Position: Sitting, BP Cuff Size: Large Adult)   Pulse 77   Temp 98.1 °F (36.7 °C) (Infrared)   Resp 16   Ht 1.753 m (5' 9\")   Wt (!) 140.6 kg (310 lb)   LMP 07/08/2025 (Exact Date)   SpO2 97%   BMI 45.78 kg/m²     On exam it is a very inflamed and fluctuant.    Plan for incision and drainage.      PROCEDURE NOTE:  Consent obtained.  Area cleaned with ChloraPrep.  1% lidocaine with epinephrine was used to anesthetize the area.  I then used a 15 blade scalpel and cut elliptical incision out over the areas that were fluctuant.  This was about 2-1/2 to 3 cm in length.  I got into the abscess cavity and got purulent drainage and sent cultures.  Once I got further down the abscess cavity this appeared to be an infected sebaceous cyst.  I removed all the contents as much of the sac wall as I could.  Hemostasis was achieved.  I irrigated with saline.  I then packed the wound with packing strips quarter inch.  Cleaned patient 1 dry sponge and applied 4 x 4's and tape.    Patient advised to change daily.  I offered home health care which she declined at the time but I have now put in home health care since the date of this appointment.  Will have her return August 4 for exam.  Advised that if she needs assistance in the meantime to call.  Advised to continue taking her antibiotics till finished.    Michelle Negrete MD, MSc,

## 2025-07-27 LAB
CULTURE: ABNORMAL
SPECIMEN DESCRIPTION: ABNORMAL

## 2025-07-28 ENCOUNTER — TELEPHONE (OUTPATIENT)
Dept: PODIATRY | Age: 40
End: 2025-07-28

## 2025-07-28 DIAGNOSIS — R60.0 LOWER EXTREMITY EDEMA: ICD-10-CM

## 2025-07-28 LAB
CULTURE: NORMAL
CULTURE: NORMAL
DIRECT EXAM: NORMAL
SPECIMEN DESCRIPTION: NORMAL

## 2025-07-28 NOTE — TELEPHONE ENCOUNTER
Name of Medication(s) Requested:  Requested Prescriptions     Pending Prescriptions Disp Refills    triamterene-hydroCHLOROthiazide (MAXZIDE-25) 37.5-25 MG per tablet 90 tablet 1     Sig: Take 1 tablet by mouth daily       Medication is on current medication list Yes    Dosage and directions were verified? Yes    Quantity verified: 90 day supply     Pharmacy Verified?  Yes    Last Appointment:  6/17/2025    Future appts:  Future Appointments   Date Time Provider Department Center   7/31/2025 11:30 AM Sammie Knight DO [facility]   8/4/2025  2:00 PM Michelle Negrete MD [place] [facility]   8/21/2025  9:00 AM Nash Tyson MD [place] [facility]   8/25/2025  8:00 AM Broderick Gtz PA Plastics [facility]   10/21/2025  8:00 AM Tatiana Rodrigues APRN - CNP [place] [facility]   11/19/2025  9:30 AM Zuri Carrizales MD MED ONC [facility]   1/12/2026  8:00 AM Mechelle Meehan APRN - [facility]        (If no appt send self scheduling link. .REFILLAPPT)  Scheduling request sent?     [] Yes  [x] No    Does patient need updated?  [] Yes  [x] No

## 2025-07-29 RX ORDER — TRIAMTERENE AND HYDROCHLOROTHIAZIDE 37.5; 25 MG/1; MG/1
1 TABLET ORAL DAILY
Qty: 90 TABLET | Refills: 1 | Status: SHIPPED | OUTPATIENT
Start: 2025-07-29 | End: 2025-07-31 | Stop reason: SDUPTHER

## 2025-07-31 ENCOUNTER — PROCEDURE VISIT (OUTPATIENT)
Age: 40
End: 2025-07-31

## 2025-07-31 VITALS
TEMPERATURE: 98.1 F | SYSTOLIC BLOOD PRESSURE: 116 MMHG | DIASTOLIC BLOOD PRESSURE: 77 MMHG | OXYGEN SATURATION: 94 % | WEIGHT: 293 LBS | HEART RATE: 76 BPM | BODY MASS INDEX: 45.63 KG/M2

## 2025-07-31 DIAGNOSIS — R60.0 LOWER EXTREMITY EDEMA: ICD-10-CM

## 2025-07-31 DIAGNOSIS — N93.9 ABNORMAL UTERINE BLEEDING (AUB): Primary | ICD-10-CM

## 2025-07-31 DIAGNOSIS — Z12.4 SCREENING FOR CERVICAL CANCER: ICD-10-CM

## 2025-07-31 LAB
CONTROL: NORMAL
PREGNANCY TEST URINE, POC: NEGATIVE

## 2025-07-31 RX ORDER — TRIAMTERENE AND HYDROCHLOROTHIAZIDE 37.5; 25 MG/1; MG/1
1 TABLET ORAL DAILY
Qty: 90 TABLET | Refills: 1 | Status: SHIPPED | OUTPATIENT
Start: 2025-07-31

## 2025-07-31 NOTE — PROGRESS NOTES
Endometrial biopsy Procedure Note    Tatiana INMAN Brothada    2025               Patient's last menstrual period was 2025 (exact date).     40 y.o.     Endometrial biopsy    Indications:  AUB  Patient was diagnosed with breast cancer last year. Finished treatment early January and recently started tamoxifen in January. Periods are irregular. Heavy, changes pad q 2 hours on heavy days. Last period lasted 16 days.   Hgb 10.2. on iron     Right breast cancer   invasive ductal carcinoma, ER positive greater than 90%, MD positive, greater than 90%, HER2/tj negative, plus by IHC. The patient underwent on 2020 for a right breast Magseed localized lumpectomy with sentinel lymph node biopsy, right oncoplastic reduction with matching left breast reduction  No chemo  +radiation  On tamoxifen  Dr. Jarod Meehan     Saw radiation oncology today. Had breast exam at 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 Pt wants that looked at.     Hx of tubal ligation  Hx of bladder suspension     Pt c/o leaking with coughing, sneezing.     Labs wnl, except estrogen slightly elevated  US uterus 9.9 cm, ES 13 mm    Pt found lump on right breast after last visit. Was seen at [facility], just scar tissue/necrosis from radiation.     Had abscess on abdomen treated by pcp.      Anesthesia:  None    Pregnancy test: negative     Procedural Technique:  Tatiana is here for an endometrial biopsy. Risks and benefits discussed and consents signed.       Endometrial biopsy:    The patient was positioned comfortably on the exam table in the dorsal lithotomy position. Speculum was placed in the vagina. The cervix was visualized, cleansed with betadine, and grasped w/ a single tooth tenaculum. Endometrial biopsy was performed with the [place].  Tissue was obtained and sent to pathology. Adequate tissue noted in the container. Excellent hemostasis was

## 2025-07-31 NOTE — PROGRESS NOTES
Here today for Endometrial biopsy Instructed on the procedure of Endometrial biopsy voiced understanding and permit signed for Endometrial biopsy  Urine for pregnancy obtained with negative results   Prepared for procedure.  Time out done by DR. Knight  Prior to starting the procedure.  Tolerated procedure.  No bleeding noted after procedure.  Biopsy obtained, labeled and sent to lab   To return in one week for results.  Discharge instructions reviewed verbally and written AVS given to patient.  Voiced understanding and agreement.  No baths, tampons, intercourse and nothing in the vagina for 48 hours

## 2025-08-04 ENCOUNTER — OFFICE VISIT (OUTPATIENT)
Dept: SURGERY | Age: 40
End: 2025-08-04
Payer: COMMERCIAL

## 2025-08-04 VITALS
DIASTOLIC BLOOD PRESSURE: 80 MMHG | BODY MASS INDEX: 43.4 KG/M2 | HEIGHT: 69 IN | TEMPERATURE: 97.4 F | SYSTOLIC BLOOD PRESSURE: 121 MMHG | WEIGHT: 293 LBS | HEART RATE: 64 BPM | RESPIRATION RATE: 16 BRPM

## 2025-08-04 DIAGNOSIS — L72.3 INFECTED SEBACEOUS CYST: Primary | ICD-10-CM

## 2025-08-04 DIAGNOSIS — L08.9 INFECTED SEBACEOUS CYST: Primary | ICD-10-CM

## 2025-08-04 PROCEDURE — 3079F DIAST BP 80-89 MM HG: CPT | Performed by: SURGERY

## 2025-08-04 PROCEDURE — 99212 OFFICE O/P EST SF 10 MIN: CPT | Performed by: SURGERY

## 2025-08-04 PROCEDURE — 3074F SYST BP LT 130 MM HG: CPT | Performed by: SURGERY

## 2025-08-07 LAB — SURGICAL PATHOLOGY REPORT: NORMAL

## 2025-08-20 ENCOUNTER — RESULTS FOLLOW-UP (OUTPATIENT)
Dept: OBGYN | Age: 40
End: 2025-08-20

## (undated) DEVICE — INSUFFLATION TUBING SET WITH FILTER, FUNNEL CONNECTOR AND LUER LOCK: Brand: JOSNOE MEDICAL INC

## (undated) DEVICE — BLADE,STAINLESS-STEEL,10,STRL,DISPOSABLE: Brand: MEDLINE

## (undated) DEVICE — TOWEL,OR,DSP,ST,BLUE,STD,6/PK,12PK/CS: Brand: MEDLINE

## (undated) DEVICE — ARM DRAPE

## (undated) DEVICE — 4-PORT MANIFOLD: Brand: NEPTUNE 2

## (undated) DEVICE — TUBING SUCTION 15 FRX3 MM 32 CM SIL

## (undated) DEVICE — CATHETER IV 18 GAX1.25 IN WNG INTROCAN SAFETY

## (undated) DEVICE — KIT,ANTI FOG,W/SPONGE & FLUID,SOFT PACK: Brand: MEDLINE

## (undated) DEVICE — STERILE POLYISOPRENE POWDER-FREE SURGICAL GLOVES: Brand: PROTEXIS

## (undated) DEVICE — COVER HNDL LT DISP

## (undated) DEVICE — AGENT HEMSTAT 5GM ARISTA AH

## (undated) DEVICE — UNDERPANTS INCONT XL 45-70IN KNIT SEAMLESS DSGN COLOR-CODED

## (undated) DEVICE — LOOP VES W25MM THK1MM MAXI RED SIL FLD REPELLENT 100 PER

## (undated) DEVICE — Device

## (undated) DEVICE — AGENT HEMSTAT 3GM PURIFIED PLNT STARCH PWD ABSRB ARISTA AH

## (undated) DEVICE — DOUBLE BASIN SET: Brand: MEDLINE INDUSTRIES, INC.

## (undated) DEVICE — GLOVE ORANGE PI 7 1/2   MSG9075

## (undated) DEVICE — PAD,ABDOMINAL,5"X9",ST,LF,25/BX: Brand: MEDLINE INDUSTRIES, INC.

## (undated) DEVICE — DEFENDO AIR WATER SUCTION AND BIOPSY VALVE KIT FOR  OLYMPUS: Brand: DEFENDO AIR/WATER/SUCTION AND BIOPSY VALVE

## (undated) DEVICE — CATHETER DRNGE 22FR 2 EYE PROPORTIONATE HD DISP FOR

## (undated) DEVICE — GOWN,SIRUS,FABRNF,XL,20/CS: Brand: MEDLINE

## (undated) DEVICE — ANTISEPTIC 16OZ H PEROX 1ST AID ORAL DEBRIDING AGNT

## (undated) DEVICE — CONNECTOR IRRIGATION AUXILIARY H2O JET W/ PRT MTL THRD HYDR

## (undated) DEVICE — TAPE,WATERPROOF,CURAD,2"X10YD,LF,72/CS: Brand: CURAD

## (undated) DEVICE — INTENDED FOR TISSUE SEPARATION, AND OTHER PROCEDURES THAT REQUIRE A SHARP SURGICAL BLADE TO PUNCTURE OR CUT.: Brand: BARD-PARKER ® STAINLESS STEEL BLADES

## (undated) DEVICE — ADHESIVE SKIN CLSR 0.7ML TOP DERMBND ADV

## (undated) DEVICE — MARKER SURG MARGIN STD 6 CLR INK ASST CORR-MIN ORDER 6 EACH

## (undated) DEVICE — NEEDLE HYPO 22GA L1.5IN BLK POLYPR HUB S STL REG BVL STR

## (undated) DEVICE — PLASMABLADE PS210-030S 3.0S LOCK: Brand: PLASMABLADE™

## (undated) DEVICE — LIQUIBAND RAPID ADHESIVE 36/CS 0.8ML: Brand: MEDLINE

## (undated) DEVICE — ELECTRODE PT RET AD L9FT HI MOIST COND ADH HYDRGEL CORDED

## (undated) DEVICE — SOLUTION IV 1000ML LAC RINGERS PH 6.5 INJ USP VIAFLX PLAS

## (undated) DEVICE — COLUMN DRAPE

## (undated) DEVICE — GLOVE ORANGE PI 7   MSG9070

## (undated) DEVICE — SOLUTION IV IRRIG POUR BRL 0.9% SODIUM CHL 2F7124

## (undated) DEVICE — JELLY,LUBE,STERILE,FLIP TOP,TUBE,2-OZ: Brand: MEDLINE

## (undated) DEVICE — STAPLER SKIN SQ 30 ABSRB STPL DISP INSORB ORDER VIA PHONE OR EMAIL

## (undated) DEVICE — DRAPE,LAP,CHOLE,W/TROUGHS,STERILE: Brand: MEDLINE

## (undated) DEVICE — BRA SURG FULL SUPP SFT

## (undated) DEVICE — TIP COVER ACCESSORY

## (undated) DEVICE — GAUZE,SPONGE,4"X4",8PLY,STRL,LF,10/TRAY: Brand: MEDLINE

## (undated) DEVICE — BANDAGE,GAUZE,4.5"X4.1YD,STERILE,LF: Brand: MEDLINE

## (undated) DEVICE — APPLICATOR MEDICATED 26 CC SOLUTION HI LT ORNG CHLORAPREP

## (undated) DEVICE — SHEARS ENDOSCP L9CM CRV HARM FOCS +

## (undated) DEVICE — DEVICE WND CLSR V-LOC 2-0 V-20 12IN

## (undated) DEVICE — AIR/WATER CLEANING ADAPTER FOR OLYMPUS® GI ENDOSCOPE: Brand: BULLDOG®

## (undated) DEVICE — SUTURE DEV SZ 0 L6IN N ABSORBABLE

## (undated) DEVICE — TUBING, SUCTION, 3/16" X 12', STRAIGHT: Brand: MEDLINE

## (undated) DEVICE — PROBE GAMMA TRUNODE

## (undated) DEVICE — UNIVERSAL DRAPE: Brand: MEDLINE INDUSTRIES, INC.

## (undated) DEVICE — CHLORAPREP 26ML ORANGE

## (undated) DEVICE — SKIN PREP TRAY 4 COMPARTM TRAY: Brand: MEDLINE INDUSTRIES, INC.

## (undated) DEVICE — NEEDLE HYPO 27GA L15IN REG BVL W O SFTY FOR SYR DISPOSABLE

## (undated) DEVICE — WIPES SKIN CLOTH READYPREP 9 X 10.5 IN 2% CHLORHEX GLUCONATE CHG PREOP

## (undated) DEVICE — SYRINGE MED 10ML TRNSLUC BRL PLUNG BLK MRK POLYPR CTRL

## (undated) DEVICE — STRAP POS MP 30X3 IN HK LOOP CLOSURE FOAM DISP

## (undated) DEVICE — PACK PROCEDURE SURG GEN CUST

## (undated) DEVICE — INSTRUMENT CLAMP TOWEL LARGE REUSABLE

## (undated) DEVICE — DRAIN CHN 19FR L0.25MM DIA6.3MM SIL RND HUBLESS FULL FLUT

## (undated) DEVICE — RETRACTOR 50-101-1 RADIALUX US: Brand: RADIALUX™

## (undated) DEVICE — WARMER SCP 2 ANTIFOG LAP DISP

## (undated) DEVICE — GLOVE SURG SZ 65 THK91MIL LTX FREE SYN POLYISOPRENE

## (undated) DEVICE — SPONGE LAP W18XL18IN WHT COT 4 PLY FLD STRUNG RADPQ DISP ST 2 PER PACK

## (undated) DEVICE — BLADELESS OBTURATOR: Brand: WECK VISTA

## (undated) DEVICE — APPLIER LIG CLP M L11IN TI STR RNG HNDL FOR 20 CLP DISP

## (undated) DEVICE — 3M™ STERI-STRIP™ REINFORCED ADHESIVE SKIN CLOSURES, R1548, 1 IN X 5 IN (25 MM X 125 MM), 4 STRIPS/ENVELOPE: Brand: 3M™ STERI-STRIP™

## (undated) DEVICE — PACK,HEAVY DRAINAGE,STERILE: Brand: MEDLINE INDUSTRIES, INC.

## (undated) DEVICE — STANDARD HYPODERMIC NEEDLE,ALUMINUM HUB: Brand: MONOJECT

## (undated) DEVICE — SYRINGE 20ML LL S/C 50

## (undated) DEVICE — DRAPE,REIN 53X77,STERILE: Brand: MEDLINE

## (undated) DEVICE — INSUFFLATION NEEDLE TO ESTABLISH PNEUMOPERITONEUM.: Brand: INSUFFLATION NEEDLE